# Patient Record
Sex: MALE | Race: WHITE | Employment: UNEMPLOYED | URBAN - METROPOLITAN AREA
[De-identification: names, ages, dates, MRNs, and addresses within clinical notes are randomized per-mention and may not be internally consistent; named-entity substitution may affect disease eponyms.]

---

## 2019-11-14 ENCOUNTER — TELEPHONE (OUTPATIENT)
Dept: OPHTHALMOLOGY | Facility: CLINIC | Age: 74
End: 2019-11-14

## 2019-11-14 NOTE — TELEPHONE ENCOUNTER
Scheduled November 25th at 1215 with Dr. Gil (MD sees new referrals for this diagnosis 1/2 day once a month)    Pt aware of date/time/location    Note to facilitator and financial counselor to [facility]    Shawn Zhang RN RN 3:16 PM 11/14/19        [facility]    Phone Message    May a detailed message be left on voicemail: yes    Reason for Call: Other: New Pt Appt needed ASAP - please call Pt back on his Cell phone to schedule - Pt Referred for Choroidal Lesion for possible Melanoma - GL's say for Choroidal Nevus and Rupture to send high priority encounter to clinic pool to handle so this is similar so sending encounter - Created Pt full chart - Pt Referral & Records being faxed over from the [facility] - Pt Referred by Dr Sil Gonzales at [facility] - Pt would appreciate a call back to get an Appt booked - Pt said Referral mentioned seeing Dr Gil - but Pt said he would see any Dr that specializes in his condition - Thanks        Action Taken: Message routed to:  [facility]

## 2019-11-25 ENCOUNTER — OFFICE VISIT (OUTPATIENT)
Dept: OPHTHALMOLOGY | Facility: CLINIC | Age: 74
End: 2019-11-25
Attending: OPHTHALMOLOGY
Payer: COMMERCIAL

## 2019-11-25 DIAGNOSIS — H31.8 CHOROIDAL LESION: Primary | ICD-10-CM

## 2019-11-25 DIAGNOSIS — H31.8 CHOROIDAL LESION: ICD-10-CM

## 2019-11-25 DIAGNOSIS — C69.32 MALIGNANT MELANOMA OF CHOROID OF LEFT EYE (H): Primary | ICD-10-CM

## 2019-11-25 PROCEDURE — 76513 OPH US DX ANT SGM US UNI/BI: CPT | Mod: ZF | Performed by: OPHTHALMOLOGY

## 2019-11-25 PROCEDURE — 92250 FUNDUS PHOTOGRAPHY W/I&R: CPT | Mod: ZF | Performed by: OPHTHALMOLOGY

## 2019-11-25 PROCEDURE — 76510 OPH US DX B-SCAN&QUAN A-SCAN: CPT | Mod: ZF | Performed by: OPHTHALMOLOGY

## 2019-11-25 PROCEDURE — G0463 HOSPITAL OUTPT CLINIC VISIT: HCPCS | Mod: ZF

## 2019-11-25 PROCEDURE — 92134 CPTRZ OPH DX IMG PST SGM RTA: CPT | Mod: ZF | Performed by: OPHTHALMOLOGY

## 2019-11-25 RX ORDER — FINASTERIDE 5 MG/1
5 TABLET, FILM COATED ORAL
COMMUNITY
Start: 2000-01-01

## 2019-11-25 RX ORDER — AMOXICILLIN 500 MG/1
CAPSULE ORAL
Status: ON HOLD | COMMUNITY
Start: 2017-01-01 | End: 2020-01-01

## 2019-11-25 RX ORDER — ACETAMINOPHEN 500 MG
1000 TABLET ORAL
COMMUNITY
Start: 2017-12-15

## 2019-11-25 RX ORDER — TAMSULOSIN HYDROCHLORIDE 0.4 MG/1
0.4 CAPSULE ORAL
COMMUNITY
Start: 2000-01-01

## 2019-11-25 RX ORDER — METHYLPHENIDATE HYDROCHLORIDE 10 MG/1
TABLET ORAL PRN
COMMUNITY
Start: 2018-01-01

## 2019-11-25 RX ORDER — TESTOSTERONE 20.25 MG/1.25G
GEL TOPICAL
COMMUNITY
Start: 2018-06-30

## 2019-11-25 ASSESSMENT — REFRACTION_WEARINGRX
OD_CYLINDER: +1.00
OD_AXIS: 010
OS_SPHERE: +1.50
SPECS_TYPE: TRIFOCALS
OD_SPHERE: +1.50
OS_CYLINDER: +0.25
OS_AXIS: 010
OS_ADD: +2.50
OD_ADD: +2.50

## 2019-11-25 ASSESSMENT — TONOMETRY
OS_IOP_MMHG: 17
IOP_METHOD: TONOPEN
OD_IOP_MMHG: 17

## 2019-11-25 ASSESSMENT — VISUAL ACUITY
METHOD: SNELLEN - LINEAR
CORRECTION_TYPE: GLASSES
OD_CC+: -1
OS_CC: 20/30
OD_CC: 20/20
OS_CC+: +1

## 2019-11-25 ASSESSMENT — GONIOSCOPY
OS_SUPERIOR: NORMAL
OS_TEMPORAL: NORMAL
OS_NASAL: NORMAL
OS_INFERIOR: NORMAL

## 2019-11-25 ASSESSMENT — CONF VISUAL FIELD
OS_NORMAL: 1
OD_NORMAL: 1

## 2019-11-25 ASSESSMENT — CUP TO DISC RATIO
OS_RATIO: 0.3
OD_RATIO: 0.3

## 2019-11-25 ASSESSMENT — SLIT LAMP EXAM - LIDS
COMMENTS: NORMAL
COMMENTS: NORMAL

## 2019-11-25 ASSESSMENT — EXTERNAL EXAM - RIGHT EYE: OD_EXAM: NORMAL

## 2019-11-25 ASSESSMENT — EXTERNAL EXAM - LEFT EYE: OS_EXAM: NORMAL

## 2019-11-25 NOTE — NURSING NOTE
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  Associated symptoms include Negative for flashes and shade.              Comments     Pt is here for a lesion evaluation of the LE [facility]. Pt does note some vision changes x the last 2 months. Most of the vision changes are in the LE, pt notes \"white balloons\" (intermittent) that float in the vision of the LE. Pt c/o seeing a significant amount of \"targets\" around pt's field of vision x 2 months constantly. Pt also c/o his vision seeming dimmer/darker x the last 2 weeks in the LE. Pt c/o some eye pain (dull ache, 2-3/10 on pain scale ) in LE only x the last 2 weeks. Denies any dryness or redness.    Ocular meds: none    CHRIS Winter 12:30 PM November 25, 2019                   "

## 2019-11-25 NOTE — LETTER
"11/25/2019      RE: Galileo Mayberry  [address]  [facility] [address]       CC -   choroidlal lesion OS    INTERVAL HISTORY - Initial visit.  New \"targets\" seen in OS since ~10/2019, increasing    HPI - Galileo Mayberry is a  74 year old year-old patient referred by the [facility] for evaluation and treat of a choroidal lesion left eye   [facility] , found choroidal elevation OS inferior not noted previously.  SJ prior 1 year before.  No personal cancer history prior.  Has FMH of CA in multiple relatives    PAST OCULAR SURGERY  None    RETINAL IMAGING:  OCT  11-25-19  OD -  Macula - retina normal, PVD  OS -  Macula - retina normal, PVD, vitreous opacities   Inferior - SRF    U/S OS 11-25-19  A-scan - low reflectivity  B-scan - elevated bilobed lesion, size 6.28mm x 18.31(T) x 17.04(L)    UBM 11-25-19  OS - lesion involves CB about 2 clock hours inferior    ASSESSMENT & PLAN    1. CMM OS   - very likely based on U/S, exam, and history   - plan 22 mm plaque given size on U/S and exam   - d/w patient r/b/a, prognosis of CMM   - d/w patient biopsy and GEP, may be interested in pursuing   - d/w patient enucleation vs plaque, wishes to proceed with plaque     - r/b/a d/w patient: failure to stop tumor growth, metastasis   - diplopia, vision loss, blindness, radiation retinopathy   - risk of tumor spread with biopsy    2. PVD OU    3. NS OU    return to clinic:  For plaque Tx    ATTESTATION     Attending Attestation: Complete documentation of historical and exam elements from today's encounter can be found in the full encounter summary report (not reduplicated in this progress note).  I personally obtained the chief complaint(s) and history of present illness.  I confirmed and edited as necessary the review of systems, past medical/surgical history, family history, social history, and examination findings as documented by others; and I examined the patient myself.  I personally reviewed the relevant " tests, images, and reports as documented above.  I formulated and edited as necessary the assessment and plan and discussed the findings and management plan with the patient and family- Mayra Gil MD, PhD      Mayra Gil MD, PhD  , Vitreoretinal Surgery  Department of Ophthalmology  [facility]

## 2019-11-25 NOTE — PROGRESS NOTES
"CC -   choroidlal lesion OS    INTERVAL HISTORY - Initial visit.  New \"targets\" seen in OS since ~10/2019, increasing    HPI -   Galileo Mayberry is a  74 year old year-old patient referred by the [facility] for evaluation and treat of a choroidal lesion left eye   [facility] , found choroidal elevation OS inferior not noted previously.  SJ prior 1 year before.  No personal cancer history prior.  Has FMH of CA in multiple relatives      PAST OCULAR SURGERY  None    RETINAL IMAGING:  OCT  11-25-19  OD -  Macula - retina normal, PVD  OS -  Macula - retina normal, PVD, vitreous opacities   Inferior - SRF        U/S OS 11-25-19  A-scan - low reflectivity  B-scan - elevated bilobed lesion, size 6.28mm x 18.31(T) x 17.04(L)      UBM 11-25-19  OS - lesion involves CB about 2 clock hours inferior      ASSESSMENT & PLAN    1. CMM OS   - very likely based on U/S, exam, and history   - plan 22 mm plaque given size on U/S and exam   - d/w patient r/b/a, prognosis of CMM   - d/w patient biopsy and GEP, may be interested in pursuing   - d/w patient enucleation vs plaque, wishes to proceed with plaque     - r/b/a d/w patient: failure to stop tumor growth, metastasis   - diplopia, vision loss, blindness, radiation retinopathy   - risk of tumor spread with biopsy    2. PVD OU      3. NS OU          return to clinic:  For plaque Tx    ATTESTATION     Attending Attestation:     Complete documentation of historical and exam elements from today's encounter can be found in the full encounter summary report (not reduplicated in this progress note).  I personally obtained the chief complaint(s) and history of present illness.  I confirmed and edited as necessary the review of systems, past medical/surgical history, family history, social history, and examination findings as documented by others; and I examined the patient myself.  I personally reviewed the relevant tests, images, and reports as documented above.  I formulated " and edited as necessary the assessment and plan and discussed the findings and management plan with the patient and family    Mayra Gil MD, PhD  , Vitreoretinal Surgery  Department of Ophthalmology  [facility]

## 2019-11-27 NOTE — TELEPHONE ENCOUNTER
ONCOLOGY INTAKE: Records Information      APPT INFORMATION:  Referring provider: Dr. Mayra Gil   Referring provider s clinic:  [facility]  Reason for visit/diagnosis: Choroidal Carcinoma  Has patient been notified of appointment date and time?: (Referring provider was contacting Pt)    RECORDS INFORMATION:  Were the records received with the referral (via [employer])? No    Has patient been seen for any external appt for this diagnosis? No    If yes, where? N/a    Has patient had any imaging or procedures outside of [place] for this condition? No      If Yes, where? N/a    ADDITIONAL INFORMATION:  None

## 2019-12-10 ENCOUNTER — PREP FOR PROCEDURE (OUTPATIENT)
Dept: OPHTHALMOLOGY | Facility: CLINIC | Age: 74
End: 2019-12-10

## 2019-12-10 ENCOUNTER — HOSPITAL ENCOUNTER (OUTPATIENT)
Facility: CLINIC | Age: 74
End: 2019-12-10
Attending: OPHTHALMOLOGY | Admitting: OPHTHALMOLOGY

## 2019-12-10 DIAGNOSIS — H31.8 CHOROIDAL LESION: ICD-10-CM

## 2019-12-10 DIAGNOSIS — C69.32 MALIGNANT MELANOMA OF CHOROID OF LEFT EYE (H): ICD-10-CM

## 2019-12-11 DIAGNOSIS — C69.32 MALIGNANT MELANOMA OF CHOROID OF LEFT EYE (H): Primary | ICD-10-CM

## 2019-12-12 ENCOUNTER — OFFICE VISIT (OUTPATIENT)
Dept: RADIATION ONCOLOGY | Facility: CLINIC | Age: 74
End: 2019-12-12
Attending: RADIOLOGY
Payer: COMMERCIAL

## 2019-12-12 VITALS — HEART RATE: 58 BPM | DIASTOLIC BLOOD PRESSURE: 78 MMHG | WEIGHT: 226.6 LBS | SYSTOLIC BLOOD PRESSURE: 144 MMHG

## 2019-12-12 DIAGNOSIS — C69.32 MALIGNANT MELANOMA OF CHOROID OF LEFT EYE (H): Primary | ICD-10-CM

## 2019-12-12 PROCEDURE — G0463 HOSPITAL OUTPT CLINIC VISIT: HCPCS | Performed by: RADIOLOGY

## 2019-12-12 SDOH — HEALTH STABILITY: MENTAL HEALTH: HOW MANY STANDARD DRINKS CONTAINING ALCOHOL DO YOU HAVE ON A TYPICAL DAY?: NOT ASKED

## 2019-12-12 SDOH — HEALTH STABILITY: MENTAL HEALTH: HOW OFTEN DO YOU HAVE 6 OR MORE DRINKS ON ONE OCCASION?: NEVER

## 2019-12-12 ASSESSMENT — ENCOUNTER SYMPTOMS
HEARTBURN: 0
FEVER: 0
DIARRHEA: 0
SORE THROAT: 1
COUGH: 1
BLURRED VISION: 1
CONSTIPATION: 0
WEIGHT LOSS: 0
HEADACHES: 0
NECK PAIN: 1
DEPRESSION: 0
NAUSEA: 0
CHILLS: 0
SPUTUM PRODUCTION: 1
SHORTNESS OF BREATH: 0
VOMITING: 0
ABDOMINAL PAIN: 0
DIZZINESS: 0
DOUBLE VISION: 0
FREQUENCY: 0
FALLS: 0
WHEEZING: 0
NERVOUS/ANXIOUS: 0

## 2019-12-12 NOTE — PROGRESS NOTES
HPI  INITIAL PATIENT ASSESSMENT    Diagnosis: met melanoma    Prior radiation therapy: None    Prior chemotherapy: None    Prior hormonal therapy:No    Pain Eval:  Denies    Psychosocial  Living arrangements: wife  Fall Risk: independent   referral needs: Not needed    Advanced Directive: Yes - Location: chart  Implantable Cardiac Device? No    Onset of menarche:   LMP: No LMP for male patient.  Onset of menopause:   Abnormal vaginal bleeding/discharge:   Are you pregnant?   Reproductive note: 2 grown children    Nurse face-to-face time: Level 3:  10 min face to face time    Review of Systems   Constitutional: Negative for chills, fever, malaise/fatigue and weight loss.   HENT: Positive for congestion, hearing loss, sore throat and tinnitus.    Eyes: Positive for blurred vision. Negative for double vision.        L eye fuzzy vision since Nov   Respiratory: Positive for cough and sputum production. Negative for shortness of breath and wheezing.    Cardiovascular: Negative for chest pain and leg swelling.   Gastrointestinal: Negative for abdominal pain, constipation, diarrhea, heartburn, nausea and vomiting.   Genitourinary: Negative for frequency and urgency.   Musculoskeletal: Positive for neck pain. Negative for falls.   Skin: Negative for itching and rash.   Neurological: Negative for dizziness and headaches.   Endo/Heme/Allergies: Negative for environmental allergies.   Psychiatric/Behavioral: Negative for depression. The patient is not nervous/anxious.

## 2019-12-12 NOTE — PROGRESS NOTES
Radiation Oncology Consultation:  Date on this visit: 12/12/2019    Galileo Mayberry  is referred by Melba  for a radiation oncology consultation. He requires evaluation for diagnosis of ocular melanoma    History Of Present Illness:  Mr. Mayberry is a 74 year old male referred by the [facility] for evaluation and treat of a choroidal lesion LEFT  eye.   [facility] , found choroidal elevation in the left eye  inferiorly  not noted previously. At that time he complained that the Left eye had floaters, dozens of different sized dots like a constellation, 3 weeks ago onset. He was also having  having flashes left eye for  weeks, 3-4 x/d, ongoing without improvement. Peripheral vision may have a shadow   Right eye with stable floaters,No eye pain   [facility] revealed   OD: 20/25-2 Pinhole: 20/No Improvement  0S: 20/25-2 Pinhole: 20/No Improvement    [facility] revealed   OS with 5:30-7:30 anterior irregular choroidal elevation     He was referred to DR Jeffery Gli agrees that this is likely an ocular melaonom on the left.  The measurements by Dr Gil :      U/S OS 11-25-19  A-scan - low reflectivity  B-scan - elevated bilobed lesion, size 6.28mm x 18.31(T) x 17.04(L)     UBM 11-25-19   OS - lesion involves CB about 2 clock hours inferior     No personal cancer history prior.  Has FMH of CA in multiple relatives        PAST OCULAR SURGERY  None               Past Medical/Surgical History:  Past Medical History:   Diagnosis Date     Degenerative joint disease      Metastatic melanoma (H)     L eye     Nonsenile cataract      Past Surgical History:   Procedure Laterality Date     ------------OTHER-------------  2014    back surgery L4/L5      ------------OTHER-------------      knee surgery both 1961 Right, 1971 Left knee     AS REMOVAL OF KIDNEY STONE  2015     JOINT REPLACEMENT  2017    Both kneses replaced (2017 and 2018)      ROTATOR CUFF REPAIR RT/LT Right 2016     TURP  2000     Past Ocular History:   Family hx: no family history of melanoma. Mother with glaucoma  Denies hx ocular trauma, surgery, laser. Denies amblyopia.  Past Radiation History:none  Past Chemotherapy History:none  Implanted Cardiac device:   none  Advanced directive  :      Review of Systems:  Reviewed.  See details in nursing note    Allergies:  Allergies as of 12/12/2019 - Reviewed 11/25/2019   Allergen Reaction Noted     Vardenafil Other (See Comments) and Nausea 06/23/2009     Contrast dye Rash 12/04/2008       Current Medications:   current medication list reviewed    Family History:  Family History   Problem Relation Age of Onset     Glaucoma Mother      Macular Degeneration No family hx of        Social History:  Social History     Socioeconomic History     Marital status:      Spouse name: Not on file     Number of children: Not on file     Years of education: Not on file     Highest education level: Not on file   Occupational History     Not on file   Social Needs     Financial resource strain: Not on file     Food insecurity:     Worry: Not on file     Inability: Not on file     Transportation needs:     Medical: Not on file     Non-medical: Not on file   Tobacco Use     Smoking status: Never Smoker     Smokeless tobacco: Never Used   Substance and Sexual Activity     Alcohol use: Not on file     Drug use: Not on file     Sexual activity: Not on file   Lifestyle     Physical activity:     Days per week: Not on file     Minutes per session: Not on file     Stress: Not on file   Relationships     Social connections:     Talks on phone: Not on file     Gets together: Not on file     Attends Scientology service: Not on file     Active member of club or organization: Not on file     Attends meetings of clubs or organizations: Not on file     Relationship status: Not on file     Intimate partner violence:     Fear of current or ex partner: Not on file      Emotionally abused: Not on file     Physically abused: Not on file     Forced sexual activity: Not on file   Other Topics Concern     Not on file   Social History Narrative     Not on file       Physical Exam:  Blood pressure (!) 144/78, pulse 58, weight 102.8 kg (226 lb 9.6 oz).      Pathology: na    Laboratory/Imaging Studies  No results found for any visits on 12/12/19.    ASSESSMENT:     1. CMM OS              - very likely based on U/S, exam, and history         RECOMMENDATION:      He is a good  candidate for eye plaque therapy, although this is a relatively large tumor.   We will likely use a 22 mm plaque.    The alternative which would be enucleation or proton therapy were discussed.     He will be seeing Dr Trent next week.  I anticipate a CT of the CAP as staging.     He has previously discussed this with Dr Gil and he has recommended a  plan 22 mm plaque given size on U/S and exam    The risks and benefits of radiotherapy were discussed with the patient.  Potential acute and long term side effects were explained especially that he may have loss of vision, a cataract and that if the tumor is not controlled, he would likely require an enucleation.     The patient agrees to proceed with radiation therapy, but he has a few questions for Dr Gil.  A written consent was obtained.  I gave him the NCCN guidelines for evaluation and treatment of ocular melanoma.     Thank you for allowing me to participate in Galileo Mayberry 's care .  Please do not hesitate to call me with questions.    Naomi Conteh MD  [phone]   Pager   [phone]  [facility]   [phone] [place]  [phone] [facility]  Primary Physician: Vanvranken, Benjamin E   Patient Care Team:  Vanvranken, Benjamin E, MD as PCP - Sil Hernández MD as Referring Physician  Mayra Gil MD as Referring Physician (Ophthalmology)  wJ Burden MD as MD (Hematology &  Oncology)

## 2019-12-12 NOTE — LETTER
12/12/2019       RE: Galileo Mayberry  [address]  [facility] [address]     Dear Colleague,    Thank you for referring your patient, Galileo Mayberry, to the RADIATION ONCOLOGY CLINIC. Please see a copy of my visit note below.    Radiation Oncology Consultation:  Date on this visit: 12/12/2019    Galileo Mayberry  is referred by Melba  for a radiation oncology consultation. He requires evaluation for diagnosis of ocular melanoma    History Of Present Illness:  Mr. Mayberry is a 74 year old male referred by the [facility] for evaluation and treat of a choroidal lesion LEFT  eye.   [facility] , found choroidal elevation in the left eye  inferiorly  not noted previously. At that time he complained that the Left eye had floaters, dozens of different sized dots like a constellation, 3 weeks ago onset. He was also having  having flashes left eye for  weeks, 3-4 x/d, ongoing without improvement. Peripheral vision may have a shadow   Right eye with stable floaters,No eye pain   [facility] revealed   OD: 20/25-2 Pinhole: 20/No Improvement  0S: 20/25-2 Pinhole: 20/No Improvement    [facility] revealed   OS with 5:30-7:30 anterior irregular choroidal elevation     He was referred to DR Jeffery Gil agrees that this is likely an ocular melaonom on the left.  The measurements by Dr Gil :      U/S OS 11-25-19  A-scan - low reflectivity  B-scan - elevated bilobed lesion, size 6.28mm x 18.31(T) x 17.04(L)     UBM 11-25-19   OS - lesion involves CB about 2 clock hours inferior     No personal cancer history prior.  Has FMH of CA in multiple relatives        PAST OCULAR SURGERY  None               Past Medical/Surgical History:  Past Medical History:   Diagnosis Date     Degenerative joint disease      Metastatic melanoma (H)     L eye     Nonsenile cataract      Past Surgical History:   Procedure Laterality Date      ------------OTHER-------------  2014    back surgery L4/L5      ------------OTHER-------------      knee surgery both 1961 Right, 1971 Left knee     AS REMOVAL OF KIDNEY STONE  2015     JOINT REPLACEMENT  2017    Both kneses replaced (2017 and 2018)     ROTATOR CUFF REPAIR RT/LT Right 2016     TURP  2000     Past Ocular History:   Family hx: no family history of melanoma. Mother with glaucoma  Denies hx ocular trauma, surgery, laser. Denies amblyopia.  Past Radiation History:none  Past Chemotherapy History:none  Implanted Cardiac device:   none  Advanced directive  :      Review of Systems:  Reviewed.  See details in nursing note    Allergies:  Allergies as of 12/12/2019 - Reviewed 11/25/2019   Allergen Reaction Noted     Vardenafil Other (See Comments) and Nausea 06/23/2009     Contrast dye Rash 12/04/2008       Current Medications:   current medication list reviewed    Family History:  Family History   Problem Relation Age of Onset     Glaucoma Mother      Macular Degeneration No family hx of        Social History:  Social History     Socioeconomic History     Marital status:      Spouse name: Not on file     Number of children: Not on file     Years of education: Not on file     Highest education level: Not on file   Occupational History     Not on file   Social Needs     Financial resource strain: Not on file     Food insecurity:     Worry: Not on file     Inability: Not on file     Transportation needs:     Medical: Not on file     Non-medical: Not on file   Tobacco Use     Smoking status: Never Smoker     Smokeless tobacco: Never Used   Substance and Sexual Activity     Alcohol use: Not on file     Drug use: Not on file     Sexual activity: Not on file   Lifestyle     Physical activity:     Days per week: Not on file     Minutes per session: Not on file     Stress: Not on file   Relationships     Social connections:     Talks on phone: Not on file     Gets together: Not on file     Attends Yazidi  service: Not on file     Active member of club or organization: Not on file     Attends meetings of clubs or organizations: Not on file     Relationship status: Not on file     Intimate partner violence:     Fear of current or ex partner: Not on file     Emotionally abused: Not on file     Physically abused: Not on file     Forced sexual activity: Not on file   Other Topics Concern     Not on file   Social History Narrative     Not on file       Physical Exam:  Blood pressure (!) 144/78, pulse 58, weight 102.8 kg (226 lb 9.6 oz).      Pathology: na    Laboratory/Imaging Studies  No results found for any visits on 12/12/19.    ASSESSMENT:     1. CMM OS              - very likely based on U/S, exam, and history         RECOMMENDATION:      He is a good  candidate for eye plaque therapy, although this is a relatively large tumor.   We will likely use a 22 mm plaque.    The alternative which would be enucleation or proton therapy were discussed.     He will be seeing Dr Trent next week.  I anticipate a CT of the CAP as staging.     He has previously discussed this with Dr Gil and he has recommended a  plan 22 mm plaque given size on U/S and exam    The risks and benefits of radiotherapy were discussed with the patient.  Potential acute and long term side effects were explained especially that he may have loss of vision, a cataract and that if the tumor is not controlled, he would likely require an enucleation.     The patient agrees to proceed with radiation therapy, but he has a few questions for Dr Gil.  A written consent was obtained.  I gave him the NCCN guidelines for evaluation and treatment of ocular melanoma.     Thank you for allowing me to participate in Galileo Mayberry 's care .  Please do not hesitate to call me with questions.    Naomi Conteh MD  [phone]   Pager   [phone]  [facility]   [phone] [place]  [phone] [facility]  Primary Physician: Vanvranken,  Bebeto KAY   Patient Care Team:  Vanvranken, Benjamin E, MD as PCP - General  Sil Gonzales MD as Referring Physician  Mayra Gil MD as Referring Physician (Ophthalmology)  Jw Burden MD as MD (Hematology & Oncology)                    HPI  INITIAL PATIENT ASSESSMENT    Diagnosis: met melanoma    Prior radiation therapy: None    Prior chemotherapy: None    Prior hormonal therapy:No    Pain Eval:  Denies    Psychosocial  Living arrangements: wife  Fall Risk: independent   referral needs: Not needed    Advanced Directive: Yes - Location: chart  Implantable Cardiac Device? No    Onset of menarche:   LMP: No LMP for male patient.  Onset of menopause:   Abnormal vaginal bleeding/discharge:   Are you pregnant?   Reproductive note: 2 grown children    Nurse face-to-face time: Level 3:  10 min face to face time    Review of Systems   Constitutional: Negative for chills, fever, malaise/fatigue and weight loss.   HENT: Positive for congestion, hearing loss, sore throat and tinnitus.    Eyes: Positive for blurred vision. Negative for double vision.        L eye fuzzy vision since Nov   Respiratory: Positive for cough and sputum production. Negative for shortness of breath and wheezing.    Cardiovascular: Negative for chest pain and leg swelling.   Gastrointestinal: Negative for abdominal pain, constipation, diarrhea, heartburn, nausea and vomiting.   Genitourinary: Negative for frequency and urgency.   Musculoskeletal: Positive for neck pain. Negative for falls.   Skin: Negative for itching and rash.   Neurological: Negative for dizziness and headaches.   Endo/Heme/Allergies: Negative for environmental allergies.   Psychiatric/Behavioral: Negative for depression. The patient is not nervous/anxious.          Again, thank you for allowing me to participate in the care of your patient.      Sincerely,    Naomi Conteh MD

## 2019-12-13 ENCOUNTER — PATIENT OUTREACH (OUTPATIENT)
Dept: ONCOLOGY | Facility: CLINIC | Age: 74
End: 2019-12-13

## 2019-12-13 DIAGNOSIS — C69.32 MALIGNANT MELANOMA OF CHOROID OF LEFT EYE (H): ICD-10-CM

## 2019-12-13 DIAGNOSIS — C69.32 MALIGNANT MELANOMA OF CHOROID OF LEFT EYE (H): Primary | ICD-10-CM

## 2019-12-13 RX ORDER — METHYLPREDNISOLONE 32 MG/1
TABLET ORAL
Qty: 2 TABLET | Refills: 0 | Status: SHIPPED | OUTPATIENT
Start: 2019-12-13 | End: 2019-12-13

## 2019-12-13 RX ORDER — METHYLPREDNISOLONE 32 MG/1
TABLET ORAL
Qty: 2 TABLET | Refills: 0 | Status: SHIPPED | OUTPATIENT
Start: 2019-12-13 | End: 2020-01-01

## 2019-12-13 NOTE — PROGRESS NOTES
This RN updated patients with schedule and treatment plan as follows:    1. Labs and ct-cap w/contrast scheduled for 12/14 am.  2. Pre-meds for contrast allergy (methylprednisolone 32 mg. 12 hours and 2 hours before scan) reviewed with patient.  Rx sent to pharmacy.  3. Patient to see Dr. Trent for management of choroidal melanoma on 12/19.    Patient indicated understanding of above.      Sayra Montiel MBA, MSN, RN, ONC  RN Care Coordinator  [facility]

## 2019-12-14 ENCOUNTER — ANCILLARY PROCEDURE (OUTPATIENT)
Dept: CT IMAGING | Facility: CLINIC | Age: 74
End: 2019-12-14
Attending: INTERNAL MEDICINE
Payer: COMMERCIAL

## 2019-12-14 DIAGNOSIS — C69.32 MALIGNANT MELANOMA OF CHOROID OF LEFT EYE (H): ICD-10-CM

## 2019-12-14 LAB
ALBUMIN SERPL-MCNC: 3.8 G/DL (ref 3.4–5)
ALP SERPL-CCNC: 57 U/L (ref 40–150)
ALT SERPL W P-5'-P-CCNC: 21 U/L (ref 0–70)
ANION GAP SERPL CALCULATED.3IONS-SCNC: 4 MMOL/L (ref 3–14)
AST SERPL W P-5'-P-CCNC: 10 U/L (ref 0–45)
BASOPHILS # BLD AUTO: 0 10E9/L (ref 0–0.2)
BASOPHILS NFR BLD AUTO: 0.1 %
BILIRUB SERPL-MCNC: 0.4 MG/DL (ref 0.2–1.3)
BUN SERPL-MCNC: 24 MG/DL (ref 7–30)
CALCIUM SERPL-MCNC: 8.8 MG/DL (ref 8.5–10.1)
CHLORIDE SERPL-SCNC: 108 MMOL/L (ref 94–109)
CO2 SERPL-SCNC: 27 MMOL/L (ref 20–32)
CREAT BLD-MCNC: 1.2 MG/DL (ref 0.66–1.25)
CREAT SERPL-MCNC: 1.04 MG/DL (ref 0.66–1.25)
DIFFERENTIAL METHOD BLD: NORMAL
EOSINOPHIL # BLD AUTO: 0 10E9/L (ref 0–0.7)
EOSINOPHIL NFR BLD AUTO: 0.1 %
ERYTHROCYTE [DISTWIDTH] IN BLOOD BY AUTOMATED COUNT: 13.1 % (ref 10–15)
GFR SERPL CREATININE-BSD FRML MDRD: 59 ML/MIN/{1.73_M2}
GFR SERPL CREATININE-BSD FRML MDRD: 69 ML/MIN/{1.73_M2}
GLUCOSE SERPL-MCNC: 122 MG/DL (ref 70–99)
HCT VFR BLD AUTO: 43.2 % (ref 40–53)
HGB BLD-MCNC: 14.8 G/DL (ref 13.3–17.7)
IMM GRANULOCYTES # BLD: 0 10E9/L (ref 0–0.4)
IMM GRANULOCYTES NFR BLD: 0.3 %
LYMPHOCYTES # BLD AUTO: 1.2 10E9/L (ref 0.8–5.3)
LYMPHOCYTES NFR BLD AUTO: 12.2 %
MCH RBC QN AUTO: 30 PG (ref 26.5–33)
MCHC RBC AUTO-ENTMCNC: 34.3 G/DL (ref 31.5–36.5)
MCV RBC AUTO: 87 FL (ref 78–100)
MONOCYTES # BLD AUTO: 0.2 10E9/L (ref 0–1.3)
MONOCYTES NFR BLD AUTO: 1.7 %
NEUTROPHILS # BLD AUTO: 8.2 10E9/L (ref 1.6–8.3)
NEUTROPHILS NFR BLD AUTO: 85.6 %
NRBC # BLD AUTO: 0 10*3/UL
NRBC BLD AUTO-RTO: 0 /100
PLATELET # BLD AUTO: 324 10E9/L (ref 150–450)
POTASSIUM SERPL-SCNC: 4.2 MMOL/L (ref 3.4–5.3)
PROT SERPL-MCNC: 7.3 G/DL (ref 6.8–8.8)
RBC # BLD AUTO: 4.94 10E12/L (ref 4.4–5.9)
SODIUM SERPL-SCNC: 138 MMOL/L (ref 133–144)
WBC # BLD AUTO: 9.6 10E9/L (ref 4–11)

## 2019-12-14 PROCEDURE — 80053 COMPREHEN METABOLIC PANEL: CPT | Performed by: INTERNAL MEDICINE

## 2019-12-14 PROCEDURE — 85025 COMPLETE CBC W/AUTO DIFF WBC: CPT | Performed by: INTERNAL MEDICINE

## 2019-12-14 RX ORDER — IOPAMIDOL 755 MG/ML
135 INJECTION, SOLUTION INTRAVASCULAR ONCE
Status: COMPLETED | OUTPATIENT
Start: 2019-12-14 | End: 2019-12-14

## 2019-12-14 RX ADMIN — IOPAMIDOL 135 ML: 755 INJECTION, SOLUTION INTRAVASCULAR at 09:30

## 2019-12-14 NOTE — NURSING NOTE
Chief Complaint   Patient presents with     Lab Only     labs drawn by rn. from previously-placed piv.     Good blood return noted from previously-placed piv.  Labs drawn from piv by rn the piv dc'd.    Katie Bowens RN

## 2019-12-19 ENCOUNTER — ONCOLOGY VISIT (OUTPATIENT)
Dept: ONCOLOGY | Facility: CLINIC | Age: 74
End: 2019-12-19
Attending: INTERNAL MEDICINE
Payer: COMMERCIAL

## 2019-12-19 ENCOUNTER — PRE VISIT (OUTPATIENT)
Dept: ONCOLOGY | Facility: CLINIC | Age: 74
End: 2019-12-19

## 2019-12-19 VITALS
HEIGHT: 71 IN | BODY MASS INDEX: 31.48 KG/M2 | SYSTOLIC BLOOD PRESSURE: 144 MMHG | TEMPERATURE: 97.3 F | RESPIRATION RATE: 16 BRPM | OXYGEN SATURATION: 98 % | DIASTOLIC BLOOD PRESSURE: 82 MMHG | HEART RATE: 63 BPM | WEIGHT: 224.9 LBS

## 2019-12-19 DIAGNOSIS — C69.32 MALIGNANT MELANOMA OF CHOROID OF LEFT EYE (H): Primary | ICD-10-CM

## 2019-12-19 PROCEDURE — 99205 OFFICE O/P NEW HI 60 MIN: CPT | Mod: GC | Performed by: INTERNAL MEDICINE

## 2019-12-19 PROCEDURE — G0463 HOSPITAL OUTPT CLINIC VISIT: HCPCS | Mod: ZF

## 2019-12-19 ASSESSMENT — PAIN SCALES - GENERAL: PAINLEVEL: NO PAIN (0)

## 2019-12-19 ASSESSMENT — MIFFLIN-ST. JEOR: SCORE: 1769.57

## 2019-12-19 NOTE — PROGRESS NOTES
[facility] Oncology Consultation    Outpatient Visit Note:    Patient: Galileo Mayberry  MRN: [id number]  : 1945  ALISSA: 2019    Reason for Consultation:  Galileo Maybrery is a referred from the [facility] for evaluation and treatment of new diagnosis of ocular melanoma.    History of Present Illness:  Galileo Mayberry is a 74 year old male with history of DJD who presents with new diagnosis of ocular melanoma, diagnosed 2019 after a choroidal lesion was found in his Left eye.  Patient reports over the last several months he developed new floaters/spots in his L eye.  [facility] when he was found to have a probable ocular melanoma.  He was then referred to the [facility] for further management.  On  he was seen by ophthalmology who noted a left eye, elevated bilobed lesion, size 6.28mm x 18.31(T) x 17.04(L).  He was referred to radiation oncology for eye plaque therapy which is planned for next month.  He additionally underwent CT-CAP on 19 which showed no evidence of metastatic disease.    Today Mr. Mayberry reports he is doing well.  He notes after he was given his diagnosis he did extensive reading regarding the condition.  He feels he has a good understanding of immediate next steps via surgery/radiation but wonders after what he will need to do afterwards.  He notes that quality of life is important to him and he would prefer that providers be open with him if his prognosis becomes poor in the future so he can take care of certain things in his life before he passes.  He understands that right now he does not have evidence of metastatic disease.    He denies any acute concerns today including eye pain, changes in vision, fevers/chills, chest pain, shortness of breath or any other issues.    Past Medical History:  Past Medical History:   Diagnosis Date     Degenerative joint disease       Metastatic melanoma (H)     L eye     Nonsenile cataract        Past Surgical History:  Past Surgical History:   Procedure Laterality Date     ------------OTHER-------------  2014    back surgery L4/L5      ------------OTHER-------------      knee surgery both 1961 Right, 1971 Left knee     AS REMOVAL OF KIDNEY STONE  2015     JOINT REPLACEMENT  2017    Both kneses replaced (2017 and 2018)     ROTATOR CUFF REPAIR RT/LT Right 2016     TURP  2000       Medications:  Current Outpatient Medications   Medication Sig     acetaminophen (TYLENOL) 500 MG tablet Take 1,000 mg by mouth     amoxicillin (AMOXIL) 500 MG capsule      Cholecalciferol (VITAMIN D3) 25 MCG (1000 UT) CAPS Takes 3 daily     diclofenac (VOLTAREN) 1 % topical gel      finasteride (PROSCAR) 5 MG tablet Take 5 mg by mouth     methylphenidate (RITALIN) 10 MG tablet as needed     methylPREDNISolone (MEDROL) 32 MG tablet Take 1 tab 12 hours before scan.  Take the second tab 2 hours before scan.     tamsulosin (FLOMAX) 0.4 MG capsule Take 0.4 mg by mouth     Testosterone 1.62 % GEL      No current facility-administered medications for this visit.        Allergies:  Allergies   Allergen Reactions     Vardenafil Other (See Comments) and Nausea     Contrast Dye Rash       ROS:  A 14 point ROS is negative except as stated in the HPI    Social History:  Never smoker, drinks ~ 1 beer per week, denies illicit drug use.  Was a  in [employer].  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    Family History:  Father passed away from prostate cancer at age 40, grandfather had kidney cancer  Patient has 2 daughters, both healthy    Objective:  Vitals: B/P: 144/82, T: 97.3, P: 63, R: 16, Wt: 224 lbs 14.4 oz Body mass index is 31.52 kg/m .   Exam:   Gen: Appears well, no distress  HEENT: no scleral icterus or hemorrhage, no wet purpura, no lymphadenopathy  CV: regular, no murmurs  Pulm: clear  Abd: soft, nontender, no splenomegaly  Ext: no  edema  Skin: no ecchymoses or hematomas  Neuro: no focal deficits, affect and cognition are normal    Labs:  Results for JUWAN LYNCH (MRN [id number]) as of 12/19/2019 13:31   Ref. Range 12/14/2019 11:05   Sodium Latest Ref Range: 133 - 144 mmol/L 138   Potassium Latest Ref Range: 3.4 - 5.3 mmol/L 4.2   Chloride Latest Ref Range: 94 - 109 mmol/L 108   Carbon Dioxide Latest Ref Range: 20 - 32 mmol/L 27   Urea Nitrogen Latest Ref Range: 7 - 30 mg/dL 24   Creatinine Latest Ref Range: 0.66 - 1.25 mg/dL 1.04   GFR Estimate Latest Ref Range: >60 mL/min/1.73_m2 69   GFR Estimate If Black Latest Ref Range: >60 mL/min/1.73_m2 80   Calcium Latest Ref Range: 8.5 - 10.1 mg/dL 8.8   Anion Gap Latest Ref Range: 3 - 14 mmol/L 4   Albumin Latest Ref Range: 3.4 - 5.0 g/dL 3.8   Protein Total Latest Ref Range: 6.8 - 8.8 g/dL 7.3   Bilirubin Total Latest Ref Range: 0.2 - 1.3 mg/dL 0.4   Alkaline Phosphatase Latest Ref Range: 40 - 150 U/L 57   ALT Latest Ref Range: 0 - 70 U/L 21   AST Latest Ref Range: 0 - 45 U/L 10   Glucose Latest Ref Range: 70 - 99 mg/dL 122 (H)   WBC Latest Ref Range: 4.0 - 11.0 10e9/L 9.6   Hemoglobin Latest Ref Range: 13.3 - 17.7 g/dL 14.8   Hematocrit Latest Ref Range: 40.0 - 53.0 % 43.2   Platelet Count Latest Ref Range: 150 - 450 10e9/L 324   RBC Count Latest Ref Range: 4.4 - 5.9 10e12/L 4.94   MCV Latest Ref Range: 78 - 100 fl 87   MCH Latest Ref Range: 26.5 - 33.0 pg 30.0   MCHC Latest Ref Range: 31.5 - 36.5 g/dL 34.3   RDW Latest Ref Range: 10.0 - 15.0 % 13.1   Diff Method Unknown Automated Method   % Neutrophils Latest Units: % 85.6   % Lymphocytes Latest Units: % 12.2   % Monocytes Latest Units: % 1.7   % Eosinophils Latest Units: % 0.1   % Basophils Latest Units: % 0.1   % Immature Granulocytes Latest Units: % 0.3   Nucleated RBCs Latest Ref Range: 0 /100 0   Absolute Neutrophil Latest Ref Range: 1.6 - 8.3 10e9/L 8.2   Absolute Lymphocytes Latest Ref Range: 0.8 - 5.3 10e9/L 1.2   Absolute Monocytes  Latest Ref Range: 0.0 - 1.3 10e9/L 0.2   Absolute Eosinophils Latest Ref Range: 0.0 - 0.7 10e9/L 0.0   Absolute Basophils Latest Ref Range: 0.0 - 0.2 10e9/L 0.0   Abs Immature Granulocytes Latest Ref Range: 0 - 0.4 10e9/L 0.0   Absolute Nucleated RBC Unknown 0.0       Imaging:  CT CHEST/ABDOMEN/PELVIS W CONTRAST, 12/14/2019 9:43 AM  IMPRESSION: In this patient with history of left eye melanoma:  1. No convincing evidence for metastatic disease in the abdomen,  pelvis and bones.  2. Indeterminate pulmonary nodules measuring up to 4 mm. Attention on  follow-up studies.  3. Additional incidental findings as described above.    Assessment:  In summary, Galileo Mayberry is a 74 year old with T3 ocular melanoma who is planned for upcoming plaque therapy.    Plan:  We spent significant time today reviewing ocular melanoma including both staging workup and treatments.  Mr. Mayberry is planned for his procedure next month and he notes he discussed having a biopsy with ophthalmology (noted in their most recent note).  We will plan  toorder genetic marker testing on this biopsy in order to better prognosticate and determine whether adjuvant chemotherapy is indicated.  We will plan to have the patient return to clinic approximately one month after his procedure to review this testing and make sure these tests are added, as well as review potential next steps.    Regardless of genetic testing results the patient will require follow up approximately 3 months after his treatment with repeat CT CAP at that time.    The patient has a multiple family members with malignancy and reports other family members have been informed of a genetic component to their disease (he is unsure what this diagnosis was).  He is interested in meeting with genetics; referral placed today.    Manny Mckeon MD  Hematology/Oncology Fellow  [phone]    ---  I evaluated the patient and reviewed the plan of care with the patient and the Oncology Fellow.  I agree with the assessment and plan documented in the clinical note.    In brief, Mr. Mayberry is a delightful 74 year old man with a T3 choroidal melanoma lesion with symptoms. CT-CAP was obtained and was negative for metastasis, and plan is to proceed with plaque brachytherapy. Biopsy is planned, which we will send for [place] testing. I will plan to see back in about a month to review next steps in further detail. We also discussed genetics referral, and observation regime of scans every 3-4 months for the first 2 years.    Jw Reyes M.D.   of Medicine  Hematology, Oncology and Transplantation  [facility]

## 2019-12-19 NOTE — TELEPHONE ENCOUNTER
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 No, internal referral    Has patient been seen for any external appt for this diagnosis? No    If yes, where? NA    Has patient had any imaging or procedures outside of [place] for this condition? No      If Yes, where? NA    ADDITIONAL INFORMATION:

## 2019-12-19 NOTE — LETTER
2019       RE: Galileo Mayberry  [address] [place] [address]     Dear Colleague,    Thank you for referring your patient, Galileo Mayberry, to the [facility]. Please see a copy of my visit note below.        [facility] Oncology Consultation    Outpatient Visit Note:    Patient: Galileo Mayberry  MRN: [id number]  : 1945  ALISSA: 2019    Reason for Consultation:  Galileo Mayberry is a referred from the [facility] for evaluation and treatment of new diagnosis of ocular melanoma.    History of Present Illness:  Galileo Mayberry is a 74 year old male with history of DJD who presents with new diagnosis of ocular melanoma, diagnosed 2019 after a choroidal lesion was found in his Left eye.  Patient reports over the last several months he developed new floaters/spots in his L eye.  [facility] when he was found to have a probable ocular melanoma.  He was then referred to the [facility] for further management.  On  he was seen by ophthalmology who noted a left eye, elevated bilobed lesion, size 6.28mm x 18.31(T) x 17.04(L).  He was referred to radiation oncology for eye plaque therapy which is planned for next month.  He additionally underwent CT-CAP on 19 which showed no evidence of metastatic disease.    Today Mr. Mayberry reports he is doing well.  He notes after he was given his diagnosis he did extensive reading regarding the condition.  He feels he has a good understanding of immediate next steps via surgery/radiation but wonders after what he will need to do afterwards.  He notes that quality of life is important to him and he would prefer that providers be open with him if his prognosis becomes poor in the future so he can take care of certain things in his life before he passes.  He understands that right now he does not have evidence of metastatic disease.    He  denies any acute concerns today including eye pain, changes in vision, fevers/chills, chest pain, shortness of breath or any other issues.    Past Medical History:  Past Medical History:   Diagnosis Date     Degenerative joint disease      Metastatic melanoma (H)     L eye     Nonsenile cataract        Past Surgical History:  Past Surgical History:   Procedure Laterality Date     ------------OTHER-------------  2014    back surgery L4/L5      ------------OTHER-------------      knee surgery both 1961 Right, 1971 Left knee     AS REMOVAL OF KIDNEY STONE  2015     JOINT REPLACEMENT  2017    Both kneses replaced (2017 and 2018)     ROTATOR CUFF REPAIR RT/LT Right 2016     TURP  2000       Medications:  Current Outpatient Medications   Medication Sig     acetaminophen (TYLENOL) 500 MG tablet Take 1,000 mg by mouth     amoxicillin (AMOXIL) 500 MG capsule      Cholecalciferol (VITAMIN D3) 25 MCG (1000 UT) CAPS Takes 3 daily     diclofenac (VOLTAREN) 1 % topical gel      finasteride (PROSCAR) 5 MG tablet Take 5 mg by mouth     methylphenidate (RITALIN) 10 MG tablet as needed     methylPREDNISolone (MEDROL) 32 MG tablet Take 1 tab 12 hours before scan.  Take the second tab 2 hours before scan.     tamsulosin (FLOMAX) 0.4 MG capsule Take 0.4 mg by mouth     Testosterone 1.62 % GEL      No current facility-administered medications for this visit.        Allergies:  Allergies   Allergen Reactions     Vardenafil Other (See Comments) and Nausea     Contrast Dye Rash       ROS:  A 14 point ROS is negative except as stated in the HPI    Social History:  Never smoker, drinks ~ 1 beer per week, denies illicit drug use.  Was a  in [employer].  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    Family History:  Father passed away from prostate cancer at age 40, grandfather had kidney cancer  Patient has 2 daughters, both healthy    Objective:  Vitals: B/P: 144/82, T: 97.3, P: 63, R: 16, Wt: 224 lbs 14.4  oz Body mass index is 31.52 kg/m .   Exam:   Gen: Appears well, no distress  HEENT: no scleral icterus or hemorrhage, no wet purpura, no lymphadenopathy  CV: regular, no murmurs  Pulm: clear  Abd: soft, nontender, no splenomegaly  Ext: no edema  Skin: no ecchymoses or hematomas  Neuro: no focal deficits, affect and cognition are normal    Labs:  Results for JUWAN LYNCH (MRN [id number]) as of 12/19/2019 13:31   Ref. Range 12/14/2019 11:05   Sodium Latest Ref Range: 133 - 144 mmol/L 138   Potassium Latest Ref Range: 3.4 - 5.3 mmol/L 4.2   Chloride Latest Ref Range: 94 - 109 mmol/L 108   Carbon Dioxide Latest Ref Range: 20 - 32 mmol/L 27   Urea Nitrogen Latest Ref Range: 7 - 30 mg/dL 24   Creatinine Latest Ref Range: 0.66 - 1.25 mg/dL 1.04   GFR Estimate Latest Ref Range: >60 mL/min/1.73_m2 69   GFR Estimate If Black Latest Ref Range: >60 mL/min/1.73_m2 80   Calcium Latest Ref Range: 8.5 - 10.1 mg/dL 8.8   Anion Gap Latest Ref Range: 3 - 14 mmol/L 4   Albumin Latest Ref Range: 3.4 - 5.0 g/dL 3.8   Protein Total Latest Ref Range: 6.8 - 8.8 g/dL 7.3   Bilirubin Total Latest Ref Range: 0.2 - 1.3 mg/dL 0.4   Alkaline Phosphatase Latest Ref Range: 40 - 150 U/L 57   ALT Latest Ref Range: 0 - 70 U/L 21   AST Latest Ref Range: 0 - 45 U/L 10   Glucose Latest Ref Range: 70 - 99 mg/dL 122 (H)   WBC Latest Ref Range: 4.0 - 11.0 10e9/L 9.6   Hemoglobin Latest Ref Range: 13.3 - 17.7 g/dL 14.8   Hematocrit Latest Ref Range: 40.0 - 53.0 % 43.2   Platelet Count Latest Ref Range: 150 - 450 10e9/L 324   RBC Count Latest Ref Range: 4.4 - 5.9 10e12/L 4.94   MCV Latest Ref Range: 78 - 100 fl 87   MCH Latest Ref Range: 26.5 - 33.0 pg 30.0   MCHC Latest Ref Range: 31.5 - 36.5 g/dL 34.3   RDW Latest Ref Range: 10.0 - 15.0 % 13.1   Diff Method Unknown Automated Method   % Neutrophils Latest Units: % 85.6   % Lymphocytes Latest Units: % 12.2   % Monocytes Latest Units: % 1.7   % Eosinophils Latest Units: % 0.1   % Basophils Latest Units: % 0.1    % Immature Granulocytes Latest Units: % 0.3   Nucleated RBCs Latest Ref Range: 0 /100 0   Absolute Neutrophil Latest Ref Range: 1.6 - 8.3 10e9/L 8.2   Absolute Lymphocytes Latest Ref Range: 0.8 - 5.3 10e9/L 1.2   Absolute Monocytes Latest Ref Range: 0.0 - 1.3 10e9/L 0.2   Absolute Eosinophils Latest Ref Range: 0.0 - 0.7 10e9/L 0.0   Absolute Basophils Latest Ref Range: 0.0 - 0.2 10e9/L 0.0   Abs Immature Granulocytes Latest Ref Range: 0 - 0.4 10e9/L 0.0   Absolute Nucleated RBC Unknown 0.0       Imaging:  CT CHEST/ABDOMEN/PELVIS W CONTRAST, 12/14/2019 9:43 AM  IMPRESSION: In this patient with history of left eye melanoma:  1. No convincing evidence for metastatic disease in the abdomen,  pelvis and bones.  2. Indeterminate pulmonary nodules measuring up to 4 mm. Attention on  follow-up studies.  3. Additional incidental findings as described above.    Assessment:  In summary, Galileo Mayberry is a 74 year old with T3 ocular melanoma who is planned for upcoming plaque therapy.    Plan:  We spent significant time today reviewing ocular melanoma including both staging workup and treatments.  Mr. Mayberry is planned for his procedure next month and he notes he discussed having a biopsy with ophthalmology (noted in their most recent note).  We will plan  toorder genetic marker testing on this biopsy in order to better prognosticate and determine whether adjuvant chemotherapy is indicated.  We will plan to have the patient return to clinic approximately one month after his procedure to review this testing and make sure these tests are added, as well as review potential next steps.    Regardless of genetic testing results the patient will require follow up approximately 3 months after his treatment with repeat CT CAP at that time.    The patient has a multiple family members with malignancy and reports other family members have been informed of a genetic component to their disease (he is unsure what this diagnosis was).   He is interested in meeting with genetics; referral placed today.    Manny Mckeon MD  Hematology/Oncology Fellow  [phone]    ---  I evaluated the patient and reviewed the plan of care with the patient and the Oncology Fellow. I agree with the assessment and plan documented in the clinical note.    In brief, Mr. Mayberry is a delightful 74 year old man with a T3 choroidal melanoma lesion with symptoms. CT-CAP was obtained and was negative for metastasis, and plan is to proceed with plaque brachytherapy. Biopsy is planned, which we will send for [place] testing. I will plan to see back in about a month to review next steps in further detail. We also discussed genetics referral, and observation regime of scans every 3-4 months for the first 2 years.    Jw Reyes M.D.   of Medicine  Hematology, Oncology and Transplantation  [facility]

## 2019-12-19 NOTE — NURSING NOTE
"Oncology Rooming Note    December 19, 2019 8:56 AM   Galileo Mayberry is a 74 year old male who presents for:    Chief Complaint   Patient presents with     New Patient     NEW PT; CHOROIDAL CARCINOMA; VITALS TAKEN      Initial Vitals: BP (!) 144/82   Pulse 63   Temp 97.3  F (36.3  C) (Oral)   Resp 16   Ht 1.799 m (5' 10.83\")   Wt 102 kg (224 lb 14.4 oz)   SpO2 98%   BMI 31.52 kg/m   Estimated body mass index is 31.52 kg/m  as calculated from the following:    Height as of this encounter: 1.799 m (5' 10.83\").    Weight as of this encounter: 102 kg (224 lb 14.4 oz). Body surface area is 2.26 meters squared.  No Pain (0) Comment: Data Unavailable   No LMP for male patient.  Allergies reviewed: Yes  Medications reviewed: Yes    Medications: Medication refills not needed today.  Pharmacy name entered into EPIC:    [facility] - [place], [place] - [id number] - [facility] [address] [id number]    Clinical concerns: No new concerns today  Dr Trent  was notified.      Sari Rivera              "

## 2020-01-01 ENCOUNTER — TELEPHONE (OUTPATIENT)
Dept: VASCULAR SURGERY | Facility: CLINIC | Age: 75
End: 2020-01-01

## 2020-01-01 ENCOUNTER — APPOINTMENT (OUTPATIENT)
Dept: MEDSURG UNIT | Facility: CLINIC | Age: 75
End: 2020-01-01
Attending: RADIOLOGY
Payer: COMMERCIAL

## 2020-01-01 ENCOUNTER — VIRTUAL VISIT (OUTPATIENT)
Dept: ONCOLOGY | Facility: CLINIC | Age: 75
End: 2020-01-01
Attending: INTERNAL MEDICINE
Payer: COMMERCIAL

## 2020-01-01 ENCOUNTER — APPOINTMENT (OUTPATIENT)
Dept: INTERVENTIONAL RADIOLOGY/VASCULAR | Facility: CLINIC | Age: 75
End: 2020-01-01
Attending: RADIOLOGY
Payer: COMMERCIAL

## 2020-01-01 ENCOUNTER — HOSPITAL ENCOUNTER (OUTPATIENT)
Dept: NUCLEAR MEDICINE | Facility: CLINIC | Age: 75
Setting detail: NUCLEAR MEDICINE
End: 2020-11-16
Attending: RADIOLOGY | Admitting: RADIOLOGY
Payer: COMMERCIAL

## 2020-01-01 ENCOUNTER — MYC MEDICAL ADVICE (OUTPATIENT)
Dept: RADIOLOGY | Facility: CLINIC | Age: 75
End: 2020-01-01

## 2020-01-01 ENCOUNTER — PATIENT OUTREACH (OUTPATIENT)
Dept: ONCOLOGY | Facility: CLINIC | Age: 75
End: 2020-01-01

## 2020-01-01 ENCOUNTER — HOSPITAL ENCOUNTER (OUTPATIENT)
Facility: CLINIC | Age: 75
Discharge: HOME | End: 2020-11-16
Attending: RADIOLOGY | Admitting: RADIOLOGY
Payer: COMMERCIAL

## 2020-01-01 ENCOUNTER — APPOINTMENT (OUTPATIENT)
Dept: LAB | Facility: CLINIC | Age: 75
End: 2020-01-01
Attending: INTERNAL MEDICINE
Payer: COMMERCIAL

## 2020-01-01 ENCOUNTER — HOSPITAL ENCOUNTER (OUTPATIENT)
Dept: MRI IMAGING | Facility: CLINIC | Age: 75
Discharge: HOME | End: 2020-12-02
Attending: INTERNAL MEDICINE | Admitting: INTERNAL MEDICINE
Payer: COMMERCIAL

## 2020-01-01 ENCOUNTER — DOCUMENTATION ONLY (OUTPATIENT)
Dept: ONCOLOGY | Facility: CLINIC | Age: 75
End: 2020-01-01

## 2020-01-01 ENCOUNTER — VIRTUAL VISIT (OUTPATIENT)
Dept: ONCOLOGY | Facility: CLINIC | Age: 75
End: 2020-01-01
Payer: COMMERCIAL

## 2020-01-01 ENCOUNTER — HOSPITAL ENCOUNTER (OUTPATIENT)
Dept: MRI IMAGING | Facility: CLINIC | Age: 75
End: 2020-10-22
Attending: INTERNAL MEDICINE
Payer: COMMERCIAL

## 2020-01-01 ENCOUNTER — HOSPITAL ENCOUNTER (OUTPATIENT)
Dept: NUCLEAR MEDICINE | Facility: CLINIC | Age: 75
Setting detail: NUCLEAR MEDICINE
End: 2020-10-19
Attending: RADIOLOGY | Admitting: RADIOLOGY
Payer: COMMERCIAL

## 2020-01-01 ENCOUNTER — VIRTUAL VISIT (OUTPATIENT)
Dept: RADIOLOGY | Facility: CLINIC | Age: 75
End: 2020-01-01
Attending: RADIOLOGY
Payer: COMMERCIAL

## 2020-01-01 ENCOUNTER — ONCOLOGY VISIT (OUTPATIENT)
Dept: ONCOLOGY | Facility: CLINIC | Age: 75
End: 2020-01-01
Attending: NURSE PRACTITIONER
Payer: COMMERCIAL

## 2020-01-01 ENCOUNTER — VIRTUAL VISIT (OUTPATIENT)
Dept: ONCOLOGY | Facility: CLINIC | Age: 75
End: 2020-01-01
Attending: PHYSICIAN ASSISTANT
Payer: COMMERCIAL

## 2020-01-01 ENCOUNTER — PATIENT OUTREACH (OUTPATIENT)
Dept: PALLIATIVE CARE | Facility: CLINIC | Age: 75
End: 2020-01-01

## 2020-01-01 ENCOUNTER — VIRTUAL VISIT (OUTPATIENT)
Dept: PALLIATIVE CARE | Facility: CLINIC | Age: 75
End: 2020-01-01
Attending: INTERNAL MEDICINE
Payer: COMMERCIAL

## 2020-01-01 ENCOUNTER — HOSPITAL ENCOUNTER (OUTPATIENT)
Facility: CLINIC | Age: 75
Discharge: HOME | End: 2020-10-19
Attending: RADIOLOGY | Admitting: RADIOLOGY
Payer: COMMERCIAL

## 2020-01-01 ENCOUNTER — MYC MEDICAL ADVICE (OUTPATIENT)
Dept: PALLIATIVE CARE | Facility: CLINIC | Age: 75
End: 2020-01-01

## 2020-01-01 ENCOUNTER — HEALTH MAINTENANCE LETTER (OUTPATIENT)
Age: 75
End: 2020-01-01

## 2020-01-01 VITALS
OXYGEN SATURATION: 97 % | WEIGHT: 206 LBS | DIASTOLIC BLOOD PRESSURE: 51 MMHG | TEMPERATURE: 98.1 F | HEIGHT: 71 IN | SYSTOLIC BLOOD PRESSURE: 103 MMHG | HEART RATE: 51 BPM | BODY MASS INDEX: 28.84 KG/M2 | RESPIRATION RATE: 16 BRPM

## 2020-01-01 VITALS
HEART RATE: 63 BPM | OXYGEN SATURATION: 97 % | SYSTOLIC BLOOD PRESSURE: 113 MMHG | WEIGHT: 206 LBS | BODY MASS INDEX: 28.73 KG/M2 | TEMPERATURE: 98.4 F | RESPIRATION RATE: 16 BRPM | DIASTOLIC BLOOD PRESSURE: 71 MMHG

## 2020-01-01 VITALS
WEIGHT: 199 LBS | HEIGHT: 71 IN | RESPIRATION RATE: 16 BRPM | DIASTOLIC BLOOD PRESSURE: 63 MMHG | HEART RATE: 56 BPM | BODY MASS INDEX: 27.86 KG/M2 | TEMPERATURE: 97.7 F | SYSTOLIC BLOOD PRESSURE: 108 MMHG | OXYGEN SATURATION: 96 %

## 2020-01-01 VITALS
SYSTOLIC BLOOD PRESSURE: 125 MMHG | OXYGEN SATURATION: 98 % | HEART RATE: 60 BPM | RESPIRATION RATE: 16 BRPM | DIASTOLIC BLOOD PRESSURE: 75 MMHG | TEMPERATURE: 98.3 F

## 2020-01-01 VITALS
DIASTOLIC BLOOD PRESSURE: 65 MMHG | SYSTOLIC BLOOD PRESSURE: 101 MMHG | WEIGHT: 206.1 LBS | TEMPERATURE: 98.4 F | RESPIRATION RATE: 18 BRPM | HEART RATE: 61 BPM | BODY MASS INDEX: 28.75 KG/M2 | OXYGEN SATURATION: 97 %

## 2020-01-01 VITALS
SYSTOLIC BLOOD PRESSURE: 134 MMHG | RESPIRATION RATE: 18 BRPM | DIASTOLIC BLOOD PRESSURE: 79 MMHG | OXYGEN SATURATION: 97 % | BODY MASS INDEX: 29.62 KG/M2 | TEMPERATURE: 97.9 F | WEIGHT: 212.4 LBS | HEART RATE: 61 BPM

## 2020-01-01 DIAGNOSIS — C78.7 LIVER METASTASES: ICD-10-CM

## 2020-01-01 DIAGNOSIS — C69.40 MELANOMA OF UVEA METASTATIC TO LIVER (H): ICD-10-CM

## 2020-01-01 DIAGNOSIS — C69.32 MALIGNANT MELANOMA OF CHOROID OF LEFT EYE (H): Primary | ICD-10-CM

## 2020-01-01 DIAGNOSIS — C78.7 MELANOMA OF UVEA METASTATIC TO LIVER (H): Primary | ICD-10-CM

## 2020-01-01 DIAGNOSIS — C78.7 MELANOMA OF UVEA METASTATIC TO LIVER (H): ICD-10-CM

## 2020-01-01 DIAGNOSIS — C69.32 MALIGNANT MELANOMA OF CHOROID OF LEFT EYE (H): ICD-10-CM

## 2020-01-01 DIAGNOSIS — Z91.041 ALLERGY TO INTRAVENOUS CONTRAST: ICD-10-CM

## 2020-01-01 DIAGNOSIS — C79.51 BONE METASTASIS: Primary | ICD-10-CM

## 2020-01-01 DIAGNOSIS — H31.8 CHOROIDAL LESION: ICD-10-CM

## 2020-01-01 DIAGNOSIS — R53.0 NEOPLASTIC MALIGNANT RELATED FATIGUE: ICD-10-CM

## 2020-01-01 DIAGNOSIS — C69.40 MELANOMA OF UVEA METASTATIC TO LIVER (H): Primary | ICD-10-CM

## 2020-01-01 DIAGNOSIS — Z11.59 ENCOUNTER FOR SCREENING FOR OTHER VIRAL DISEASES: ICD-10-CM

## 2020-01-01 DIAGNOSIS — Z79.899 ENCOUNTER FOR LONG-TERM (CURRENT) USE OF MEDICATIONS: ICD-10-CM

## 2020-01-01 DIAGNOSIS — Z71.89 ADVANCE CARE PLANNING: ICD-10-CM

## 2020-01-01 DIAGNOSIS — Z91.041 ALLERGY TO INTRAVENOUS CONTRAST: Primary | ICD-10-CM

## 2020-01-01 DIAGNOSIS — Z91.041 CONTRAST MEDIA ALLERGY: ICD-10-CM

## 2020-01-01 DIAGNOSIS — R63.0 ANOREXIA: ICD-10-CM

## 2020-01-01 DIAGNOSIS — G47.00 INSOMNIA, UNSPECIFIED TYPE: ICD-10-CM

## 2020-01-01 DIAGNOSIS — Z11.59 ENCOUNTER FOR SCREENING FOR OTHER VIRAL DISEASES: Primary | ICD-10-CM

## 2020-01-01 DIAGNOSIS — G47.00 INSOMNIA, UNSPECIFIED TYPE: Primary | ICD-10-CM

## 2020-01-01 DIAGNOSIS — L29.9 PRURITIC DISORDER: ICD-10-CM

## 2020-01-01 DIAGNOSIS — C79.51 MALIGNANT MELANOMA METASTATIC TO BONE (H): ICD-10-CM

## 2020-01-01 LAB
ALBUMIN SERPL-MCNC: 3.3 G/DL (ref 3.4–5)
ALBUMIN SERPL-MCNC: 3.4 G/DL (ref 3.4–5)
ALBUMIN SERPL-MCNC: 3.6 G/DL (ref 3.4–5)
ALBUMIN SERPL-MCNC: 3.8 G/DL (ref 3.4–5)
ALBUMIN SERPL-MCNC: 4.3 G/DL (ref 3.4–5)
ALP SERPL-CCNC: 143 U/L (ref 40–150)
ALP SERPL-CCNC: 66 U/L (ref 40–150)
ALP SERPL-CCNC: 78 U/L (ref 40–150)
ALP SERPL-CCNC: 86 U/L (ref 40–150)
ALP SERPL-CCNC: 86 U/L (ref 40–150)
ALT SERPL W P-5'-P-CCNC: 39 U/L (ref 0–70)
ALT SERPL W P-5'-P-CCNC: 39 U/L (ref 0–70)
ALT SERPL W P-5'-P-CCNC: 47 U/L (ref 0–70)
ALT SERPL W P-5'-P-CCNC: 50 U/L (ref 0–70)
ALT SERPL W P-5'-P-CCNC: 55 U/L (ref 0–70)
ANION GAP SERPL CALCULATED.3IONS-SCNC: 5 MMOL/L (ref 3–14)
ANION GAP SERPL CALCULATED.3IONS-SCNC: 6 MMOL/L (ref 3–14)
ANION GAP SERPL CALCULATED.3IONS-SCNC: 6 MMOL/L (ref 3–14)
ANION GAP SERPL CALCULATED.3IONS-SCNC: 9 MMOL/L (ref 3–14)
APTT PPP: 33 SEC (ref 22–37)
AST SERPL W P-5'-P-CCNC: 27 U/L (ref 0–45)
AST SERPL W P-5'-P-CCNC: 29 U/L (ref 0–45)
AST SERPL W P-5'-P-CCNC: 40 U/L (ref 0–45)
AST SERPL W P-5'-P-CCNC: 43 U/L (ref 0–45)
AST SERPL W P-5'-P-CCNC: 43 U/L (ref 0–45)
BASOPHILS # BLD AUTO: 0 10E9/L (ref 0–0.2)
BASOPHILS # BLD AUTO: 0 10E9/L (ref 0–0.2)
BASOPHILS # BLD AUTO: 0.1 10E9/L (ref 0–0.2)
BASOPHILS NFR BLD AUTO: 0.4 %
BASOPHILS NFR BLD AUTO: 0.4 %
BASOPHILS NFR BLD AUTO: 1.7 %
BILIRUB DIRECT SERPL-MCNC: 0.2 MG/DL (ref 0–0.2)
BILIRUB DIRECT SERPL-MCNC: 0.2 MG/DL (ref 0–0.2)
BILIRUB SERPL-MCNC: 0.5 MG/DL (ref 0.2–1.3)
BILIRUB SERPL-MCNC: 0.6 MG/DL (ref 0.2–1.3)
BILIRUB SERPL-MCNC: 0.7 MG/DL (ref 0.2–1.3)
BILIRUB SERPL-MCNC: 0.7 MG/DL (ref 0.2–1.3)
BILIRUB SERPL-MCNC: 0.9 MG/DL (ref 0.2–1.3)
BUN SERPL-MCNC: 14 MG/DL (ref 7–30)
BUN SERPL-MCNC: 17 MG/DL (ref 7–30)
BUN SERPL-MCNC: 19 MG/DL (ref 7–30)
BUN SERPL-MCNC: 22 MG/DL (ref 7–30)
CALCIUM SERPL-MCNC: 8.6 MG/DL (ref 8.5–10.1)
CALCIUM SERPL-MCNC: 8.7 MG/DL (ref 8.5–10.1)
CALCIUM SERPL-MCNC: 9.2 MG/DL (ref 8.5–10.1)
CALCIUM SERPL-MCNC: 9.4 MG/DL (ref 8.5–10.1)
CHLORIDE SERPL-SCNC: 106 MMOL/L (ref 94–109)
CHLORIDE SERPL-SCNC: 107 MMOL/L (ref 94–109)
CHLORIDE SERPL-SCNC: 108 MMOL/L (ref 94–109)
CHLORIDE SERPL-SCNC: 108 MMOL/L (ref 94–109)
CHOLEST SERPL-MCNC: 163 MG/DL
CO2 SERPL-SCNC: 22 MMOL/L (ref 20–32)
CO2 SERPL-SCNC: 26 MMOL/L (ref 20–32)
CO2 SERPL-SCNC: 27 MMOL/L (ref 20–32)
CO2 SERPL-SCNC: 28 MMOL/L (ref 20–32)
COPPER SERPL-MCNC: 114.1 UG/DL (ref 70–140)
CREAT SERPL-MCNC: 1.01 MG/DL (ref 0.66–1.25)
CREAT SERPL-MCNC: 1.01 MG/DL (ref 0.66–1.25)
CREAT SERPL-MCNC: 1.03 MG/DL (ref 0.66–1.25)
CREAT SERPL-MCNC: 1.05 MG/DL (ref 0.66–1.25)
CRP SERPL-MCNC: 3 MG/L (ref 0–8)
DHEA-S SERPL-MCNC: 27 UG/DL (ref 80–560)
DIFFERENTIAL METHOD BLD: ABNORMAL
DIFFERENTIAL METHOD BLD: ABNORMAL
DIFFERENTIAL METHOD BLD: NORMAL
EOSINOPHIL # BLD AUTO: 0.1 10E9/L (ref 0–0.7)
EOSINOPHIL # BLD AUTO: 0.1 10E9/L (ref 0–0.7)
EOSINOPHIL # BLD AUTO: 0.2 10E9/L (ref 0–0.7)
EOSINOPHIL NFR BLD AUTO: 1.2 %
EOSINOPHIL NFR BLD AUTO: 3.8 %
EOSINOPHIL NFR BLD AUTO: 3.9 %
ERYTHROCYTE [DISTWIDTH] IN BLOOD BY AUTOMATED COUNT: 12.3 % (ref 10–15)
ERYTHROCYTE [DISTWIDTH] IN BLOOD BY AUTOMATED COUNT: 12.3 % (ref 10–15)
ERYTHROCYTE [DISTWIDTH] IN BLOOD BY AUTOMATED COUNT: 12.4 % (ref 10–15)
ERYTHROCYTE [DISTWIDTH] IN BLOOD BY AUTOMATED COUNT: 13.3 % (ref 10–15)
ERYTHROCYTE [SEDIMENTATION RATE] IN BLOOD BY WESTERGREN METHOD: 20 MM/H (ref 0–20)
FERRITIN SERPL-MCNC: 47 NG/ML (ref 26–388)
GFR SERPL CREATININE-BSD FRML MDRD: 68 ML/MIN/{1.73_M2}
GFR SERPL CREATININE-BSD FRML MDRD: 69 ML/MIN/{1.73_M2}
GFR SERPL CREATININE-BSD FRML MDRD: 71 ML/MIN/{1.73_M2}
GFR SERPL CREATININE-BSD FRML MDRD: 71 ML/MIN/{1.73_M2}
GGT SERPL-CCNC: 143 U/L (ref 0–75)
GLUCOSE SERPL-MCNC: 109 MG/DL (ref 70–99)
GLUCOSE SERPL-MCNC: 113 MG/DL (ref 70–99)
GLUCOSE SERPL-MCNC: 131 MG/DL (ref 70–99)
GLUCOSE SERPL-MCNC: 85 MG/DL (ref 70–99)
HCT VFR BLD AUTO: 40.7 % (ref 40–53)
HCT VFR BLD AUTO: 41.1 % (ref 40–53)
HCT VFR BLD AUTO: 42.2 % (ref 40–53)
HCT VFR BLD AUTO: 42.6 % (ref 40–53)
HCYS SERPL-SCNC: 11.2 UMOL/L (ref 4–12)
HCYS SERPL-SCNC: 11.3 UMOL/L (ref 4–12)
HDLC SERPL-MCNC: 36 MG/DL
HGB BLD-MCNC: 13.6 G/DL (ref 13.3–17.7)
HGB BLD-MCNC: 13.7 G/DL (ref 13.3–17.7)
HGB BLD-MCNC: 14.1 G/DL (ref 13.3–17.7)
HGB BLD-MCNC: 14.1 G/DL (ref 13.3–17.7)
IMM GRANULOCYTES # BLD: 0 10E9/L (ref 0–0.4)
IMM GRANULOCYTES # BLD: 0 10E9/L (ref 0–0.4)
IMM GRANULOCYTES NFR BLD: 0.3 %
IMM GRANULOCYTES NFR BLD: 0.6 %
INR PPP: 1.04 (ref 0.86–1.14)
LDH SERPL L TO P-CCNC: 357 U/L (ref 85–227)
LDH SERPL L TO P-CCNC: 534 U/L (ref 85–227)
LDLC SERPL CALC-MCNC: 88 MG/DL
LYMPHOCYTES # BLD AUTO: 0.5 10E9/L (ref 0.8–5.3)
LYMPHOCYTES # BLD AUTO: 0.7 10E9/L (ref 0.8–5.3)
LYMPHOCYTES # BLD AUTO: 0.9 10E9/L (ref 0.8–5.3)
LYMPHOCYTES NFR BLD AUTO: 13.9 %
LYMPHOCYTES NFR BLD AUTO: 15 %
LYMPHOCYTES NFR BLD AUTO: 20 %
MAGNESIUM SERPL-MCNC: 2.1 MG/DL (ref 1.6–2.3)
MAGNESIUM SERPL-MCNC: 2.2 MG/DL (ref 1.6–2.3)
MAGNESIUM SERPL-MCNC: 2.4 MG/DL (ref 1.6–2.3)
MCH RBC QN AUTO: 30.7 PG (ref 26.5–33)
MCH RBC QN AUTO: 31.6 PG (ref 26.5–33)
MCH RBC QN AUTO: 31.8 PG (ref 26.5–33)
MCH RBC QN AUTO: 32.5 PG (ref 26.5–33)
MCHC RBC AUTO-ENTMCNC: 33.1 G/DL (ref 31.5–36.5)
MCHC RBC AUTO-ENTMCNC: 33.3 G/DL (ref 31.5–36.5)
MCHC RBC AUTO-ENTMCNC: 33.4 G/DL (ref 31.5–36.5)
MCHC RBC AUTO-ENTMCNC: 33.4 G/DL (ref 31.5–36.5)
MCV RBC AUTO: 92 FL (ref 78–100)
MCV RBC AUTO: 95 FL (ref 78–100)
MCV RBC AUTO: 96 FL (ref 78–100)
MCV RBC AUTO: 97 FL (ref 78–100)
MONOCYTES # BLD AUTO: 0.4 10E9/L (ref 0–1.3)
MONOCYTES # BLD AUTO: 0.5 10E9/L (ref 0–1.3)
MONOCYTES # BLD AUTO: 0.6 10E9/L (ref 0–1.3)
MONOCYTES NFR BLD AUTO: 10.7 %
MONOCYTES NFR BLD AUTO: 11.2 %
MONOCYTES NFR BLD AUTO: 11.4 %
NEUTROPHILS # BLD AUTO: 2.4 10E9/L (ref 1.6–8.3)
NEUTROPHILS # BLD AUTO: 3 10E9/L (ref 1.6–8.3)
NEUTROPHILS # BLD AUTO: 3.4 10E9/L (ref 1.6–8.3)
NEUTROPHILS NFR BLD AUTO: 64.5 %
NEUTROPHILS NFR BLD AUTO: 69.6 %
NEUTROPHILS NFR BLD AUTO: 71.4 %
NONHDLC SERPL-MCNC: 127 MG/DL
NRBC # BLD AUTO: 0 10*3/UL
NRBC # BLD AUTO: 0 10*3/UL
NRBC BLD AUTO-RTO: 0 /100
NRBC BLD AUTO-RTO: 0 /100
PHOSPHATE SERPL-MCNC: 3 MG/DL (ref 2.5–4.5)
PHOSPHATE SERPL-MCNC: 3.6 MG/DL (ref 2.5–4.5)
PHOSPHATE SERPL-MCNC: 3.8 MG/DL (ref 2.5–4.5)
PLATELET # BLD AUTO: 180 10E9/L (ref 150–450)
PLATELET # BLD AUTO: 181 10E9/L (ref 150–450)
PLATELET # BLD AUTO: 241 10E9/L (ref 150–450)
PLATELET # BLD AUTO: 280 10E9/L (ref 150–450)
POTASSIUM SERPL-SCNC: 3.7 MMOL/L (ref 3.4–5.3)
POTASSIUM SERPL-SCNC: 3.8 MMOL/L (ref 3.4–5.3)
POTASSIUM SERPL-SCNC: 4.2 MMOL/L (ref 3.4–5.3)
POTASSIUM SERPL-SCNC: 4.2 MMOL/L (ref 3.4–5.3)
PROT SERPL-MCNC: 6.8 G/DL (ref 6.8–8.8)
PROT SERPL-MCNC: 6.9 G/DL (ref 6.8–8.8)
PROT SERPL-MCNC: 7.3 G/DL (ref 6.8–8.8)
PROT SERPL-MCNC: 7.4 G/DL (ref 6.8–8.8)
PROT SERPL-MCNC: 7.5 G/DL (ref 6.8–8.8)
RBC # BLD AUTO: 4.18 10E12/L (ref 4.4–5.9)
RBC # BLD AUTO: 4.31 10E12/L (ref 4.4–5.9)
RBC # BLD AUTO: 4.46 10E12/L (ref 4.4–5.9)
RBC # BLD AUTO: 4.6 10E12/L (ref 4.4–5.9)
SARS-COV-2 RNA SPEC QL NAA+PROBE: NOT DETECTED
SARS-COV-2 RNA SPEC QL NAA+PROBE: NOT DETECTED
SODIUM SERPL-SCNC: 138 MMOL/L (ref 133–144)
SODIUM SERPL-SCNC: 139 MMOL/L (ref 133–144)
SODIUM SERPL-SCNC: 139 MMOL/L (ref 133–144)
SODIUM SERPL-SCNC: 141 MMOL/L (ref 133–144)
SPECIMEN SOURCE: NORMAL
SPECIMEN SOURCE: NORMAL
TRIGL SERPL-MCNC: 196 MG/DL
TSH SERPL DL<=0.005 MIU/L-ACNC: 1.41 MU/L (ref 0.4–4)
TSH SERPL DL<=0.005 MIU/L-ACNC: 1.97 MU/L (ref 0.4–4)
TSH SERPL DL<=0.005 MIU/L-ACNC: 3.58 MU/L (ref 0.4–4)
URATE SERPL-MCNC: 5.1 MG/DL (ref 3.5–7.2)
VEGF SERPL-MCNC: 75 PG/ML (ref 9–86)
WBC # BLD AUTO: 3.5 10E9/L (ref 4–11)
WBC # BLD AUTO: 3.9 10E9/L (ref 4–11)
WBC # BLD AUTO: 4.6 10E9/L (ref 4–11)
WBC # BLD AUTO: 4.8 10E9/L (ref 4–11)

## 2020-01-01 PROCEDURE — 99152 MOD SED SAME PHYS/QHP 5/>YRS: CPT | Mod: GC | Performed by: RADIOLOGY

## 2020-01-01 PROCEDURE — 99153 MOD SED SAME PHYS/QHP EA: CPT

## 2020-01-01 PROCEDURE — 272N000566 HC SHEATH CR3

## 2020-01-01 PROCEDURE — A9581 GADOXETATE DISODIUM INJ: HCPCS | Performed by: INTERNAL MEDICINE

## 2020-01-01 PROCEDURE — 75726 ARTERY X-RAYS ABDOMEN: CPT

## 2020-01-01 PROCEDURE — 36415 COLL VENOUS BLD VENIPUNCTURE: CPT | Performed by: INTERNAL MEDICINE

## 2020-01-01 PROCEDURE — 83735 ASSAY OF MAGNESIUM: CPT | Performed by: INTERNAL MEDICINE

## 2020-01-01 PROCEDURE — 80050 GENERAL HEALTH PANEL: CPT | Performed by: PATHOLOGY

## 2020-01-01 PROCEDURE — 82977 ASSAY OF GGT: CPT | Performed by: INTERNAL MEDICINE

## 2020-01-01 PROCEDURE — 999N001193 HC VIDEO/TELEPHONE VISIT; NO CHARGE

## 2020-01-01 PROCEDURE — 99153 MOD SED SAME PHYS/QHP EA: CPT | Mod: 59

## 2020-01-01 PROCEDURE — 99207 PR NO CHARGE LOS: CPT

## 2020-01-01 PROCEDURE — 96417 CHEMO IV INFUS EACH ADDL SEQ: CPT

## 2020-01-01 PROCEDURE — 99214 OFFICE O/P EST MOD 30 MIN: CPT | Mod: 95 | Performed by: PHYSICIAN ASSISTANT

## 2020-01-01 PROCEDURE — C1769 GUIDE WIRE: HCPCS

## 2020-01-01 PROCEDURE — 83735 ASSAY OF MAGNESIUM: CPT | Performed by: PATHOLOGY

## 2020-01-01 PROCEDURE — 258N000003 HC RX IP 258 OP 636: Performed by: INTERNAL MEDICINE

## 2020-01-01 PROCEDURE — C1887 CATHETER, GUIDING: HCPCS

## 2020-01-01 PROCEDURE — 77300 RADIATION THERAPY DOSE PLAN: CPT

## 2020-01-01 PROCEDURE — 99204 OFFICE O/P NEW MOD 45 MIN: CPT | Mod: 95 | Performed by: STUDENT IN AN ORGANIZED HEALTH CARE EDUCATION/TRAINING PROGRAM

## 2020-01-01 PROCEDURE — C9113 INJ PANTOPRAZOLE SODIUM, VIA: HCPCS | Performed by: PHYSICIAN ASSISTANT

## 2020-01-01 PROCEDURE — 76937 US GUIDE VASCULAR ACCESS: CPT | Mod: 26 | Performed by: RADIOLOGY

## 2020-01-01 PROCEDURE — 37243 VASC EMBOLIZE/OCCLUDE ORGAN: CPT

## 2020-01-01 PROCEDURE — 250N000011 HC RX IP 250 OP 636: Performed by: PHYSICIAN ASSISTANT

## 2020-01-01 PROCEDURE — 99152 MOD SED SAME PHYS/QHP 5/>YRS: CPT | Mod: 59

## 2020-01-01 PROCEDURE — 85610 PROTHROMBIN TIME: CPT | Performed by: RADIOLOGY

## 2020-01-01 PROCEDURE — 250N000011 HC RX IP 250 OP 636: Performed by: STUDENT IN AN ORGANIZED HEALTH CARE EDUCATION/TRAINING PROGRAM

## 2020-01-01 PROCEDURE — 96367 TX/PROPH/DG ADDL SEQ IV INF: CPT

## 2020-01-01 PROCEDURE — 258N000003 HC RX IP 258 OP 636: Performed by: PHYSICIAN ASSISTANT

## 2020-01-01 PROCEDURE — 80076 HEPATIC FUNCTION PANEL: CPT | Performed by: RADIOLOGY

## 2020-01-01 PROCEDURE — 250N000009 HC RX 250: Performed by: STUDENT IN AN ORGANIZED HEALTH CARE EDUCATION/TRAINING PROGRAM

## 2020-01-01 PROCEDURE — 74183 MRI ABD W/O CNTR FLWD CNTR: CPT | Mod: 26 | Performed by: RADIOLOGY

## 2020-01-01 PROCEDURE — 999N000134 HC STATISTIC PP CARE STAGE 3

## 2020-01-01 PROCEDURE — C1760 CLOSURE DEV, VASC: HCPCS

## 2020-01-01 PROCEDURE — 255N000002 HC RX 255 OP 636: Performed by: RADIOLOGY

## 2020-01-01 PROCEDURE — 999N000127 HC STATISTIC PERIPHERAL IV START W US GUIDANCE

## 2020-01-01 PROCEDURE — 250N000011 HC RX IP 250 OP 636: Performed by: INTERNAL MEDICINE

## 2020-01-01 PROCEDURE — 99152 MOD SED SAME PHYS/QHP 5/>YRS: CPT

## 2020-01-01 PROCEDURE — 272N000143 HC KIT CR3

## 2020-01-01 PROCEDURE — 74183 MRI ABD W/O CNTR FLWD CNTR: CPT

## 2020-01-01 PROCEDURE — 36248 INS CATH ABD/L-EXT ART ADDL: CPT | Mod: GZ

## 2020-01-01 PROCEDURE — 255N000002 HC RX 255 OP 636: Performed by: INTERNAL MEDICINE

## 2020-01-01 PROCEDURE — 36247 INS CATH ABD/L-EXT ART 3RD: CPT | Mod: GZ

## 2020-01-01 PROCEDURE — 78830 RP LOCLZJ TUM SPECT W/CT 1: CPT | Mod: XU

## 2020-01-01 PROCEDURE — 99213 OFFICE O/P EST LOW 20 MIN: CPT | Performed by: RADIOLOGY

## 2020-01-01 PROCEDURE — 84100 ASSAY OF PHOSPHORUS: CPT | Performed by: INTERNAL MEDICINE

## 2020-01-01 PROCEDURE — 272N000504 HC NEEDLE CR4

## 2020-01-01 PROCEDURE — G0463 HOSPITAL OUTPT CLINIC VISIT: HCPCS

## 2020-01-01 PROCEDURE — 83520 IMMUNOASSAY QUANT NOS NONAB: CPT | Performed by: INTERNAL MEDICINE

## 2020-01-01 PROCEDURE — 84443 ASSAY THYROID STIM HORMONE: CPT | Performed by: PATHOLOGY

## 2020-01-01 PROCEDURE — 36247 INS CATH ABD/L-EXT ART 3RD: CPT | Mod: GC | Performed by: RADIOLOGY

## 2020-01-01 PROCEDURE — 82728 ASSAY OF FERRITIN: CPT | Performed by: INTERNAL MEDICINE

## 2020-01-01 PROCEDURE — 99214 OFFICE O/P EST MOD 30 MIN: CPT | Mod: 95 | Performed by: INTERNAL MEDICINE

## 2020-01-01 PROCEDURE — 82525 ASSAY OF COPPER: CPT | Performed by: INTERNAL MEDICINE

## 2020-01-01 PROCEDURE — 96413 CHEMO IV INFUSION 1 HR: CPT

## 2020-01-01 PROCEDURE — 80048 BASIC METABOLIC PNL TOTAL CA: CPT | Performed by: RADIOLOGY

## 2020-01-01 PROCEDURE — 84100 ASSAY OF PHOSPHORUS: CPT | Performed by: PATHOLOGY

## 2020-01-01 PROCEDURE — 97802 MEDICAL NUTRITION INDIV IN: CPT | Mod: TEL | Performed by: DIETITIAN, REGISTERED

## 2020-01-01 PROCEDURE — 79445 NUCLEAR RX INTRA-ARTERIAL: CPT | Mod: 26 | Performed by: RADIOLOGY

## 2020-01-01 PROCEDURE — 83615 LACTATE (LD) (LDH) ENZYME: CPT | Performed by: INTERNAL MEDICINE

## 2020-01-01 PROCEDURE — C2616 BRACHYTX, NON-STR,YTTRIUM-90: HCPCS | Performed by: RADIOLOGY

## 2020-01-01 PROCEDURE — 80053 COMPREHEN METABOLIC PANEL: CPT | Performed by: INTERNAL MEDICINE

## 2020-01-01 PROCEDURE — 85652 RBC SED RATE AUTOMATED: CPT | Performed by: INTERNAL MEDICINE

## 2020-01-01 PROCEDURE — 99215 OFFICE O/P EST HI 40 MIN: CPT | Performed by: NURSE PRACTITIONER

## 2020-01-01 PROCEDURE — 78800 RP LOCLZJ TUM 1 AREA 1 D IMG: CPT | Mod: 26

## 2020-01-01 PROCEDURE — 85025 COMPLETE CBC W/AUTO DIFF WBC: CPT | Performed by: INTERNAL MEDICINE

## 2020-01-01 PROCEDURE — 99000 SPECIMEN HANDLING OFFICE-LAB: CPT | Performed by: PATHOLOGY

## 2020-01-01 PROCEDURE — U0003 INFECTIOUS AGENT DETECTION BY NUCLEIC ACID (DNA OR RNA); SEVERE ACUTE RESPIRATORY SYNDROME CORONAVIRUS 2 (SARS-COV-2) (CORONAVIRUS DISEASE [COVID-19]), AMPLIFIED PROBE TECHNIQUE, MAKING USE OF HIGH THROUGHPUT TECHNOLOGIES AS DESCRIBED BY CMS-2020-01-R: HCPCS | Mod: 90 | Performed by: PATHOLOGY

## 2020-01-01 PROCEDURE — 86140 C-REACTIVE PROTEIN: CPT | Performed by: INTERNAL MEDICINE

## 2020-01-01 PROCEDURE — 78800 RP LOCLZJ TUM 1 AREA 1 D IMG: CPT | Mod: 26 | Performed by: RADIOLOGY

## 2020-01-01 PROCEDURE — 278N000001 HC RX 278: Performed by: RADIOLOGY

## 2020-01-01 PROCEDURE — 36415 COLL VENOUS BLD VENIPUNCTURE: CPT | Performed by: PATHOLOGY

## 2020-01-01 PROCEDURE — 84443 ASSAY THYROID STIM HORMONE: CPT | Performed by: INTERNAL MEDICINE

## 2020-01-01 PROCEDURE — 80061 LIPID PANEL: CPT | Performed by: INTERNAL MEDICINE

## 2020-01-01 PROCEDURE — G0008 ADMIN INFLUENZA VIRUS VAC: HCPCS | Performed by: PHYSICIAN ASSISTANT

## 2020-01-01 PROCEDURE — 36247 INS CATH ABD/L-EXT ART 3RD: CPT

## 2020-01-01 PROCEDURE — 84550 ASSAY OF BLOOD/URIC ACID: CPT | Performed by: INTERNAL MEDICINE

## 2020-01-01 PROCEDURE — 90662 IIV NO PRSV INCREASED AG IM: CPT | Performed by: PHYSICIAN ASSISTANT

## 2020-01-01 PROCEDURE — U0003 INFECTIOUS AGENT DETECTION BY NUCLEIC ACID (DNA OR RNA); SEVERE ACUTE RESPIRATORY SYNDROME CORONAVIRUS 2 (SARS-COV-2) (CORONAVIRUS DISEASE [COVID-19]), AMPLIFIED PROBE TECHNIQUE, MAKING USE OF HIGH THROUGHPUT TECHNOLOGIES AS DESCRIBED BY CMS-2020-01-R: HCPCS | Performed by: RADIOLOGY

## 2020-01-01 PROCEDURE — 83615 LACTATE (LD) (LDH) ENZYME: CPT | Performed by: PATHOLOGY

## 2020-01-01 PROCEDURE — 80050 GENERAL HEALTH PANEL: CPT | Performed by: INTERNAL MEDICINE

## 2020-01-01 PROCEDURE — 82627 DEHYDROEPIANDROSTERONE: CPT | Performed by: INTERNAL MEDICINE

## 2020-01-01 PROCEDURE — 37243 VASC EMBOLIZE/OCCLUDE ORGAN: CPT | Mod: GC | Performed by: RADIOLOGY

## 2020-01-01 PROCEDURE — 85730 THROMBOPLASTIN TIME PARTIAL: CPT | Performed by: RADIOLOGY

## 2020-01-01 PROCEDURE — 85027 COMPLETE CBC AUTOMATED: CPT | Performed by: RADIOLOGY

## 2020-01-01 PROCEDURE — 83090 ASSAY OF HOMOCYSTEINE: CPT | Performed by: INTERNAL MEDICINE

## 2020-01-01 RX ORDER — NALOXONE HYDROCHLORIDE 0.4 MG/ML
.1-.4 INJECTION, SOLUTION INTRAMUSCULAR; INTRAVENOUS; SUBCUTANEOUS
Status: DISCONTINUED | OUTPATIENT
Start: 2020-01-01 | End: 2020-01-01 | Stop reason: HOSPADM

## 2020-01-01 RX ORDER — LORAZEPAM 0.5 MG/1
0.5 TABLET ORAL EVERY 4 HOURS PRN
Qty: 30 TABLET | Refills: 3 | Status: SHIPPED | OUTPATIENT
Start: 2020-01-01 | End: 2020-01-01

## 2020-01-01 RX ORDER — ONDANSETRON 2 MG/ML
4 INJECTION INTRAMUSCULAR; INTRAVENOUS ONCE
Status: COMPLETED | OUTPATIENT
Start: 2020-01-01 | End: 2020-01-01

## 2020-01-01 RX ORDER — METHYLPREDNISOLONE 4 MG
4 TABLET, DOSE PACK ORAL 2 TIMES DAILY
Qty: 21 TABLET | Refills: 0 | Status: SHIPPED | OUTPATIENT
Start: 2020-01-01 | End: 2021-01-01

## 2020-01-01 RX ORDER — LORAZEPAM 2 MG/ML
0.5 INJECTION INTRAMUSCULAR EVERY 4 HOURS PRN
Status: CANCELLED
Start: 2020-01-01

## 2020-01-01 RX ORDER — NALOXONE HYDROCHLORIDE 0.4 MG/ML
.1-.4 INJECTION, SOLUTION INTRAMUSCULAR; INTRAVENOUS; SUBCUTANEOUS
Status: CANCELLED | OUTPATIENT
Start: 2020-01-01

## 2020-01-01 RX ORDER — ACETAMINOPHEN 325 MG/1
650 TABLET ORAL EVERY 4 HOURS PRN
Status: DISCONTINUED | OUTPATIENT
Start: 2020-01-01 | End: 2020-01-01 | Stop reason: HOSPADM

## 2020-01-01 RX ORDER — AMPICILLIN AND SULBACTAM 2; 1 G/1; G/1
3 INJECTION, POWDER, FOR SOLUTION INTRAMUSCULAR; INTRAVENOUS
Status: COMPLETED | OUTPATIENT
Start: 2020-01-01 | End: 2020-01-01

## 2020-01-01 RX ORDER — METHYLPREDNISOLONE SODIUM SUCCINATE 125 MG/2ML
125 INJECTION, POWDER, LYOPHILIZED, FOR SOLUTION INTRAMUSCULAR; INTRAVENOUS
Status: CANCELLED
Start: 2020-01-01

## 2020-01-01 RX ORDER — EPINEPHRINE 1 MG/ML
0.3 INJECTION, SOLUTION INTRAMUSCULAR; SUBCUTANEOUS EVERY 5 MIN PRN
Status: CANCELLED | OUTPATIENT
Start: 2020-01-01

## 2020-01-01 RX ORDER — HEPARIN SODIUM (PORCINE) LOCK FLUSH IV SOLN 100 UNIT/ML 100 UNIT/ML
5 SOLUTION INTRAVENOUS
Status: CANCELLED | OUTPATIENT
Start: 2020-01-01

## 2020-01-01 RX ORDER — DEXTROSE MONOHYDRATE 25 G/50ML
25-50 INJECTION, SOLUTION INTRAVENOUS
Status: CANCELLED | OUTPATIENT
Start: 2020-01-01

## 2020-01-01 RX ORDER — FLUMAZENIL 0.1 MG/ML
0.2 INJECTION, SOLUTION INTRAVENOUS
Status: DISCONTINUED | OUTPATIENT
Start: 2020-01-01 | End: 2020-01-01 | Stop reason: HOSPADM

## 2020-01-01 RX ORDER — HEPARIN SODIUM,PORCINE 10 UNIT/ML
5 VIAL (ML) INTRAVENOUS
Status: CANCELLED | OUTPATIENT
Start: 2020-01-01

## 2020-01-01 RX ORDER — DEXTROSE MONOHYDRATE 25 G/50ML
25-50 INJECTION, SOLUTION INTRAVENOUS
Status: DISCONTINUED | OUTPATIENT
Start: 2020-01-01 | End: 2020-01-01 | Stop reason: HOSPADM

## 2020-01-01 RX ORDER — CIPROFLOXACIN 500 MG/1
500 TABLET, FILM COATED ORAL DAILY
Qty: 14 TABLET | Refills: 0 | Status: SHIPPED | OUTPATIENT
Start: 2020-01-01 | End: 2020-01-01

## 2020-01-01 RX ORDER — METHYLPREDNISOLONE 4 MG
4 TABLET, DOSE PACK ORAL 2 TIMES DAILY
Qty: 21 TABLET | Refills: 0 | Status: SHIPPED | OUTPATIENT
Start: 2020-01-01 | End: 2020-01-01

## 2020-01-01 RX ORDER — DIPHENHYDRAMINE HYDROCHLORIDE 50 MG/ML
50 INJECTION INTRAMUSCULAR; INTRAVENOUS
Status: CANCELLED
Start: 2020-01-01

## 2020-01-01 RX ORDER — LIDOCAINE 40 MG/G
CREAM TOPICAL
Status: DISCONTINUED | OUTPATIENT
Start: 2020-01-01 | End: 2020-01-01 | Stop reason: HOSPADM

## 2020-01-01 RX ORDER — DOXEPIN 6 MG/1
6 TABLET, FILM COATED ORAL
Qty: 30 TABLET | Refills: 1 | Status: SHIPPED | OUTPATIENT
Start: 2020-01-01 | End: 2020-01-01

## 2020-01-01 RX ORDER — ALBUTEROL SULFATE 90 UG/1
1-2 AEROSOL, METERED RESPIRATORY (INHALATION)
Status: CANCELLED
Start: 2020-01-01

## 2020-01-01 RX ORDER — SODIUM CHLORIDE 9 MG/ML
INJECTION, SOLUTION INTRAVENOUS CONTINUOUS
Status: DISCONTINUED | OUTPATIENT
Start: 2020-01-01 | End: 2020-01-01 | Stop reason: HOSPADM

## 2020-01-01 RX ORDER — ALBUTEROL SULFATE 0.83 MG/ML
2.5 SOLUTION RESPIRATORY (INHALATION)
Status: CANCELLED | OUTPATIENT
Start: 2020-01-01

## 2020-01-01 RX ORDER — OXYCODONE HYDROCHLORIDE 5 MG/1
5-10 TABLET ORAL EVERY 6 HOURS PRN
Qty: 10 TABLET | Refills: 0 | Status: SHIPPED | OUTPATIENT
Start: 2020-01-01 | End: 2020-01-01

## 2020-01-01 RX ORDER — DOXEPIN HYDROCHLORIDE 10 MG/ML
6 SOLUTION ORAL AT BEDTIME
Qty: 18 ML | Refills: 1 | Status: SHIPPED | OUTPATIENT
Start: 2020-01-01 | End: 2020-01-01

## 2020-01-01 RX ORDER — HYDROMORPHONE HYDROCHLORIDE 2 MG/1
2-4 TABLET ORAL EVERY 4 HOURS PRN
Status: DISCONTINUED | OUTPATIENT
Start: 2020-01-01 | End: 2020-01-01 | Stop reason: HOSPADM

## 2020-01-01 RX ORDER — HEPARIN SODIUM 200 [USP'U]/100ML
1 INJECTION, SOLUTION INTRAVENOUS CONTINUOUS PRN
Status: DISCONTINUED | OUTPATIENT
Start: 2020-01-01 | End: 2020-01-01 | Stop reason: HOSPADM

## 2020-01-01 RX ORDER — SODIUM CHLORIDE 9 MG/ML
1000 INJECTION, SOLUTION INTRAVENOUS CONTINUOUS PRN
Status: CANCELLED
Start: 2020-01-01

## 2020-01-01 RX ORDER — NICOTINE POLACRILEX 4 MG
15-30 LOZENGE BUCCAL
Status: DISCONTINUED | OUTPATIENT
Start: 2020-01-01 | End: 2020-01-01 | Stop reason: HOSPADM

## 2020-01-01 RX ORDER — ZOLEDRONIC ACID 0.04 MG/ML
4 INJECTION, SOLUTION INTRAVENOUS ONCE
Status: CANCELLED | OUTPATIENT
Start: 2020-01-01 | End: 2020-01-01

## 2020-01-01 RX ORDER — ONDANSETRON 4 MG/1
4-8 TABLET, FILM COATED ORAL EVERY 6 HOURS PRN
Qty: 40 TABLET | Refills: 0 | Status: SHIPPED | OUTPATIENT
Start: 2020-01-01

## 2020-01-01 RX ORDER — PROCHLORPERAZINE MALEATE 10 MG
10 TABLET ORAL EVERY 6 HOURS PRN
Qty: 30 TABLET | Refills: 3 | Status: SHIPPED | OUTPATIENT
Start: 2020-01-01 | End: 2021-01-01

## 2020-01-01 RX ORDER — MEPERIDINE HYDROCHLORIDE 25 MG/ML
25 INJECTION INTRAMUSCULAR; INTRAVENOUS; SUBCUTANEOUS EVERY 30 MIN PRN
Status: CANCELLED | OUTPATIENT
Start: 2020-01-01

## 2020-01-01 RX ORDER — CALCIUM CARBONATE 500(1250)
1 TABLET ORAL 2 TIMES DAILY
Qty: 60 TABLET | Refills: 3 | Status: SHIPPED | OUTPATIENT
Start: 2020-01-01 | End: 2020-01-01

## 2020-01-01 RX ORDER — DOXEPIN HYDROCHLORIDE 10 MG/ML
6 SOLUTION ORAL AT BEDTIME
Qty: 18 ML | Refills: 1 | Status: SHIPPED | OUTPATIENT
Start: 2020-01-01

## 2020-01-01 RX ORDER — METHYLPREDNISOLONE 32 MG/1
TABLET ORAL
Qty: 2 TABLET | Refills: 0 | Status: SHIPPED | OUTPATIENT
Start: 2020-01-01 | End: 2021-01-01

## 2020-01-01 RX ORDER — AMOXICILLIN 250 MG
1 CAPSULE ORAL 2 TIMES DAILY PRN
Qty: 60 TABLET | Refills: 1 | Status: SHIPPED | OUTPATIENT
Start: 2020-01-01

## 2020-01-01 RX ORDER — METHYLPREDNISOLONE 32 MG/1
TABLET ORAL
Qty: 2 TABLET | Refills: 0 | Status: SHIPPED | OUTPATIENT
Start: 2020-01-01 | End: 2020-01-01

## 2020-01-01 RX ORDER — METHYLPREDNISOLONE 32 MG/1
TABLET ORAL
Qty: 2 TABLET | Refills: 0 | Status: ON HOLD | OUTPATIENT
Start: 2020-01-01 | End: 2020-01-01

## 2020-01-01 RX ORDER — ZOLEDRONIC ACID 0.04 MG/ML
4 INJECTION, SOLUTION INTRAVENOUS ONCE
Status: COMPLETED | OUTPATIENT
Start: 2020-01-01 | End: 2020-01-01

## 2020-01-01 RX ORDER — ONDANSETRON 4 MG/1
4-8 TABLET, FILM COATED ORAL EVERY 6 HOURS PRN
Qty: 40 TABLET | Refills: 0 | Status: SHIPPED | OUTPATIENT
Start: 2020-01-01 | End: 2020-01-01

## 2020-01-01 RX ORDER — FENTANYL CITRATE 50 UG/ML
25-50 INJECTION, SOLUTION INTRAMUSCULAR; INTRAVENOUS EVERY 5 MIN PRN
Status: DISCONTINUED | OUTPATIENT
Start: 2020-01-01 | End: 2020-01-01 | Stop reason: HOSPADM

## 2020-01-01 RX ORDER — HYDROMORPHONE HYDROCHLORIDE 2 MG/1
2 TABLET ORAL EVERY 6 HOURS PRN
Qty: 10 TABLET | Refills: 0 | Status: SHIPPED | OUTPATIENT
Start: 2020-01-01 | End: 2021-01-01

## 2020-01-01 RX ORDER — IODIXANOL 320 MG/ML
150 INJECTION, SOLUTION INTRAVASCULAR ONCE
Status: COMPLETED | OUTPATIENT
Start: 2020-01-01 | End: 2020-01-01

## 2020-01-01 RX ORDER — POLYETHYLENE GLYCOL 3350 17 G/17G
1 POWDER, FOR SOLUTION ORAL 2 TIMES DAILY
Qty: 255 G | Refills: 1 | Status: SHIPPED | OUTPATIENT
Start: 2020-01-01

## 2020-01-01 RX ORDER — PROCHLORPERAZINE MALEATE 10 MG
10 TABLET ORAL EVERY 6 HOURS PRN
Qty: 30 TABLET | Refills: 3 | Status: SHIPPED | OUTPATIENT
Start: 2020-01-01 | End: 2020-01-01

## 2020-01-01 RX ORDER — CALCIUM CARBONATE 500(1250)
1 TABLET ORAL 2 TIMES DAILY
Qty: 60 TABLET | Refills: 3 | Status: SHIPPED | OUTPATIENT
Start: 2020-01-01

## 2020-01-01 RX ORDER — ONDANSETRON 4 MG/1
4-8 TABLET, FILM COATED ORAL EVERY 6 HOURS PRN
Qty: 40 TABLET | Refills: 0 | Status: ON HOLD | OUTPATIENT
Start: 2020-01-01 | End: 2020-01-01

## 2020-01-01 RX ORDER — PANTOPRAZOLE SODIUM 40 MG/1
40 TABLET, DELAYED RELEASE ORAL DAILY
Qty: 30 TABLET | Refills: 0 | Status: SHIPPED | OUTPATIENT
Start: 2020-01-01 | End: 2020-01-01

## 2020-01-01 RX ORDER — LORAZEPAM 0.5 MG/1
0.5 TABLET ORAL EVERY 4 HOURS PRN
Qty: 30 TABLET | Refills: 3 | Status: SHIPPED | OUTPATIENT
Start: 2020-01-01

## 2020-01-01 RX ORDER — NICOTINE POLACRILEX 4 MG
15-30 LOZENGE BUCCAL
Status: CANCELLED | OUTPATIENT
Start: 2020-01-01

## 2020-01-01 RX ADMIN — MIDAZOLAM 1 MG: 1 INJECTION INTRAMUSCULAR; INTRAVENOUS at 08:34

## 2020-01-01 RX ADMIN — LIDOCAINE HYDROCHLORIDE 5 ML: 10 INJECTION, SOLUTION EPIDURAL; INFILTRATION; INTRACAUDAL; PERINEURAL at 08:58

## 2020-01-01 RX ADMIN — FENTANYL CITRATE 25 MCG: 50 INJECTION, SOLUTION INTRAMUSCULAR; INTRAVENOUS at 08:51

## 2020-01-01 RX ADMIN — HYDROCORTISONE SODIUM SUCCINATE 100 MG: 100 INJECTION, POWDER, FOR SOLUTION INTRAMUSCULAR; INTRAVENOUS at 07:32

## 2020-01-01 RX ADMIN — AMPICILLIN SODIUM AND SULBACTAM SODIUM 3 G: 2; 1 INJECTION, POWDER, FOR SOLUTION INTRAMUSCULAR; INTRAVENOUS at 07:33

## 2020-01-01 RX ADMIN — HEPARIN SODIUM 2 BAG: 200 INJECTION, SOLUTION INTRAVENOUS at 08:31

## 2020-01-01 RX ADMIN — FENTANYL CITRATE 50 MCG: 50 INJECTION, SOLUTION INTRAMUSCULAR; INTRAVENOUS at 08:34

## 2020-01-01 RX ADMIN — ZOLEDRONIC ACID 4 MG: 0.04 INJECTION, SOLUTION INTRAVENOUS at 12:43

## 2020-01-01 RX ADMIN — SODIUM CHLORIDE 300 MG: 9 INJECTION, SOLUTION INTRAVENOUS at 13:03

## 2020-01-01 RX ADMIN — HYDROCORTISONE SODIUM SUCCINATE 100 MG: 100 INJECTION, POWDER, FOR SOLUTION INTRAMUSCULAR; INTRAVENOUS at 08:26

## 2020-01-01 RX ADMIN — SODIUM CHLORIDE: 900 INJECTION INTRAVENOUS at 07:32

## 2020-01-01 RX ADMIN — FENTANYL CITRATE 25 MCG: 50 INJECTION, SOLUTION INTRAMUSCULAR; INTRAVENOUS at 08:56

## 2020-01-01 RX ADMIN — IODIXANOL 20 ML: 320 INJECTION, SOLUTION INTRAVASCULAR at 09:25

## 2020-01-01 RX ADMIN — SODIUM CHLORIDE 100 MG: 9 INJECTION, SOLUTION INTRAVENOUS at 16:00

## 2020-01-01 RX ADMIN — SODIUM CHLORIDE 250 ML: 9 INJECTION, SOLUTION INTRAVENOUS at 12:43

## 2020-01-01 RX ADMIN — GADOXETATE DISODIUM 10 ML: 181.43 INJECTION, SOLUTION INTRAVENOUS at 13:09

## 2020-01-01 RX ADMIN — SODIUM CHLORIDE 250 ML: 9 INJECTION, SOLUTION INTRAVENOUS at 15:19

## 2020-01-01 RX ADMIN — FENTANYL CITRATE 25 MCG: 50 INJECTION, SOLUTION INTRAMUSCULAR; INTRAVENOUS at 09:10

## 2020-01-01 RX ADMIN — SODIUM CHLORIDE 250 ML: 9 INJECTION, SOLUTION INTRAVENOUS at 12:58

## 2020-01-01 RX ADMIN — SODIUM CHLORIDE 40 ML: 9 INJECTION, SOLUTION INTRAVENOUS at 08:59

## 2020-01-01 RX ADMIN — MIDAZOLAM 0.5 MG: 1 INJECTION INTRAMUSCULAR; INTRAVENOUS at 08:51

## 2020-01-01 RX ADMIN — GADOXETATE DISODIUM 10 ML: 181.43 INJECTION, SOLUTION INTRAVENOUS at 08:58

## 2020-01-01 RX ADMIN — ONDANSETRON 4 MG: 2 INJECTION INTRAMUSCULAR; INTRAVENOUS at 08:47

## 2020-01-01 RX ADMIN — LIDOCAINE HYDROCHLORIDE 8 ML: 10 INJECTION, SOLUTION EPIDURAL; INFILTRATION; INTRACAUDAL; PERINEURAL at 09:09

## 2020-01-01 RX ADMIN — FENTANYL CITRATE 50 MCG: 50 INJECTION, SOLUTION INTRAMUSCULAR; INTRAVENOUS at 08:59

## 2020-01-01 RX ADMIN — FENTANYL CITRATE 50 MCG: 50 INJECTION, SOLUTION INTRAMUSCULAR; INTRAVENOUS at 08:46

## 2020-01-01 RX ADMIN — MIDAZOLAM 1 MG: 1 INJECTION INTRAMUSCULAR; INTRAVENOUS at 08:59

## 2020-01-01 RX ADMIN — SODIUM CHLORIDE 100 MG: 9 INJECTION, SOLUTION INTRAVENOUS at 12:17

## 2020-01-01 RX ADMIN — MIDAZOLAM 0.5 MG: 1 INJECTION INTRAMUSCULAR; INTRAVENOUS at 09:05

## 2020-01-01 RX ADMIN — IODIXANOL 20 ML: 320 INJECTION, SOLUTION INTRAVASCULAR at 09:18

## 2020-01-01 RX ADMIN — SODIUM CHLORIDE 300 MG: 9 INJECTION, SOLUTION INTRAVENOUS at 11:44

## 2020-01-01 RX ADMIN — SODIUM CHLORIDE 250 ML: 9 INJECTION, SOLUTION INTRAVENOUS at 11:44

## 2020-01-01 RX ADMIN — PANTOPRAZOLE SODIUM 40 MG: 40 INJECTION, POWDER, FOR SOLUTION INTRAVENOUS at 07:32

## 2020-01-01 RX ADMIN — AMPICILLIN SODIUM AND SULBACTAM SODIUM 3 G: 2; 1 INJECTION, POWDER, FOR SOLUTION INTRAMUSCULAR; INTRAVENOUS at 07:52

## 2020-01-01 RX ADMIN — ONDANSETRON 4 MG: 2 INJECTION INTRAMUSCULAR; INTRAVENOUS at 07:55

## 2020-01-01 RX ADMIN — FENTANYL CITRATE 25 MCG: 50 INJECTION, SOLUTION INTRAMUSCULAR; INTRAVENOUS at 09:05

## 2020-01-01 RX ADMIN — GADOXETATE DISODIUM 8.6 ML: 181.43 INJECTION, SOLUTION INTRAVENOUS at 08:59

## 2020-01-01 RX ADMIN — SODIUM CHLORIDE 100 MG: 9 INJECTION, SOLUTION INTRAVENOUS at 14:04

## 2020-01-01 RX ADMIN — SODIUM CHLORIDE 300 MG: 9 INJECTION, SOLUTION INTRAVENOUS at 15:20

## 2020-01-01 RX ADMIN — MIDAZOLAM 0.5 MG: 1 INJECTION INTRAMUSCULAR; INTRAVENOUS at 09:10

## 2020-01-01 RX ADMIN — MIDAZOLAM 0.5 MG: 1 INJECTION INTRAMUSCULAR; INTRAVENOUS at 08:56

## 2020-01-01 RX ADMIN — Medication 72.2 MCI.: at 13:00

## 2020-01-01 RX ADMIN — INFLUENZA A VIRUS A/MICHIGAN/45/2015 X-275 (H1N1) ANTIGEN (FORMALDEHYDE INACTIVATED), INFLUENZA A VIRUS A/SINGAPORE/INFIMH-16-0019/2016 IVR-186 (H3N2) ANTIGEN (FORMALDEHYDE INACTIVATED), INFLUENZA B VIRUS B/PHUKET/3073/2013 ANTIGEN (FORMALDEHYDE INACTIVATED), AND INFLUENZA B VIRUS B/MARYLAND/15/2016 BX-69A ANTIGEN (FORMALDEHYDE INACTIVATED) 0.7 ML: 60; 60; 60; 60 INJECTION, SUSPENSION INTRAMUSCULAR at 13:10

## 2020-01-01 RX ADMIN — MIDAZOLAM 1 MG: 1 INJECTION INTRAMUSCULAR; INTRAVENOUS at 08:46

## 2020-01-01 RX ADMIN — Medication 69 MCI.: at 09:58

## 2020-01-01 RX ADMIN — PANTOPRAZOLE SODIUM 40 MG: 40 INJECTION, POWDER, FOR SOLUTION INTRAVENOUS at 08:11

## 2020-01-01 RX ADMIN — SODIUM CHLORIDE 300 MG: 9 INJECTION, SOLUTION INTRAVENOUS at 13:17

## 2020-01-01 RX ADMIN — SODIUM CHLORIDE 100 MG: 9 INJECTION, SOLUTION INTRAVENOUS at 13:49

## 2020-01-01 ASSESSMENT — PAIN SCALES - GENERAL
PAINLEVEL: NO PAIN (1)
PAINLEVEL: NO PAIN (0)
PAINLEVEL: NO PAIN (0)

## 2020-01-01 ASSESSMENT — MIFFLIN-ST. JEOR
SCORE: 1681.54
SCORE: 1649.79

## 2020-01-07 ENCOUNTER — TELEPHONE (OUTPATIENT)
Dept: OPHTHALMOLOGY | Facility: CLINIC | Age: 75
End: 2020-01-07

## 2020-01-10 ENCOUNTER — PRE VISIT (OUTPATIENT)
Dept: ONCOLOGY | Facility: CLINIC | Age: 75
End: 2020-01-10

## 2020-01-15 ENCOUNTER — TELEPHONE (OUTPATIENT)
Dept: OPHTHALMOLOGY | Facility: CLINIC | Age: 75
End: 2020-01-15

## 2020-01-15 NOTE — TELEPHONE ENCOUNTER
Surgeries on 1/20/20 and 1/24/2020 with Dr. Mayra Gil have been cancelled per doctor. Patient will now be going to [place] for radiation, it is too large now to plaque. Patient aware that surgeries have been cancelled.    [phone]

## 2020-01-20 ENCOUNTER — TRANSFERRED RECORDS (OUTPATIENT)
Dept: SURGERY | Facility: CLINIC | Age: 75
End: 2020-01-20

## 2020-01-27 ENCOUNTER — OFFICE VISIT (OUTPATIENT)
Dept: ONCOLOGY | Facility: CLINIC | Age: 75
End: 2020-01-27
Attending: INTERNAL MEDICINE
Payer: COMMERCIAL

## 2020-01-27 DIAGNOSIS — C69.90 OCULAR MELANOMA, UNSPECIFIED LATERALITY (H): ICD-10-CM

## 2020-01-27 DIAGNOSIS — Z80.51 FAMILY HISTORY OF KIDNEY CANCER: ICD-10-CM

## 2020-01-27 DIAGNOSIS — Z80.42 FAMILY HISTORY OF PROSTATE CANCER: ICD-10-CM

## 2020-01-27 DIAGNOSIS — C69.90 OCULAR MELANOMA, UNSPECIFIED LATERALITY (H): Primary | ICD-10-CM

## 2020-01-27 PROCEDURE — 96040 ZZH GENETIC COUNSELING, EACH 30 MINUTES: CPT | Mod: ZF | Performed by: GENETIC COUNSELOR, MS

## 2020-01-27 PROCEDURE — 36415 COLL VENOUS BLD VENIPUNCTURE: CPT

## 2020-01-27 NOTE — LETTER
Cancer Risk Management  Program Locations    [facility]  [facility]  Mailing Address  Cancer Risk Management Program  [facility]  [address] [facility] [address]  [place] [address]    New patient appointments  [phone]  February 2, 2020    Shiva Mayberry  [address]  [facility] [address]      Dear Shiva,    It was a pleasure meeting with you at the Cancer Risk Management Program at the [facility] on 1/27/2020. Here is a copy of the progress note from your recent genetic counseling visit to the Cancer Risk Management Program. If you have any additional questions, please feel free to call.    Referring Provider: Jw Trent MD    Presenting Information:   I met with Shiva Mayberry today for genetic counseling at the Cancer Risk Management Program at the [facility] to discuss his personal and family history of cancer.  He is here today to review this history, cancer screening recommendations, and available genetic testing options.    Personal History:  Shiva is a 74 year old male. 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      Shiva reports that he has had 3 colonoscopies, approximately every 10 years. He reports no polyps. Shiva reported former minimal tobacco use in his 20s and alcohol use of 1 drink per week. He was a  in the past.    Family History: (Please see scanned pedigree for detailed family history information)  Siblings:  His sister was diagnosed with an abdominal cancer (he reports likely GI cancer) at age 57 and had two surgeries. She passed away at age 65.   His other sister is 82 years old with no known history of cancer.   Her son (Shiva's nephew) is 55 years old and was diagnosed with kidney cancer in his late 40s. He had one kidney  removed.  Maternal:  A maternal uncle was diagnosed with prostate cancer in his mid-late 60s and passed away at age 70.  He reports no other known cancers in his maternal relatives, although he has limited contact with them.   Paternal:  His father was diagnosed with prostate cancer in his 30s. He also had kidney disease and possibly kidney cancer. He passed away at age 41.   His father was an only child.    His paternal grandfather was diagnosed with either kidney or stomach cancer and passed away in his early to mid 50s.   His paternal grandmother passed away around age 30 due to tuberculosis.     He is not aware of any genetic testing in his relatives.     His maternal and paternal ancestry is unknown.      Discussion:    Galileo's personal and family history of cancer is suggestive of a hereditary cancer syndrome.    We reviewed the features of sporadic, familial, and hereditary cancers. In looking at Galileo's family history, it is possible that a cancer susceptibility gene is present due to his recent diagnosis of ocular melanoma and his family history of prostate cancer and kidney cancer.    Based on his diagnosis of ocular melanoma and family history of prostate cancer in his father in his 30s, we discussed the BRCA1 and BRCA2 genes. Mutations in these genes cause a condition known as Hereditary Breast and Ovarian Cancer syndrome (HBOC). Women with a mutation in either of these genes are at increased risk for breast and ovarian cancer. There is also an increased risk for a second primary breast cancer. Men with a mutation in either of these genes are at increased risk for breast and prostate cancer. Both women and men may also be at increased risk for pancreatic cancer and melanoma. There may also be an increased risk for ocular melanoma.     Additionally, based on his diagnosis and his family history of kidney cancer in his nephew and possibly in his father and paternal grandfather, we discussed the BAP1  gene. Mutations in the BAP1 gene cause BAP1-tumor predisposition syndrome, which is associated with an increased risk for skin findings including atypical Spitz tumors, cutaneous (skin) melanoma, and basal cell carcinoma, as well as other cancers, such as uveal (eye) melanoma, malignant mesothelioma, and kidney cancer.      A detailed handout regarding these genes and other genes in which mutations are associated with an increased risk for melanoma was provided to Galileo at the end of our appointment today and can be found in the after visit summary. Topics included: inheritance pattern, cancer risks, cancer screening recommendations, and also risks, benefits and limitations of testing.    Based on his personal and family history, we discussed the option of genetic testing of genes associated with ocular melanoma and other cancers.      We discussed that there are additional genes that could cause increased risk for melanoma, kidney, prostate, and GI cancers. As many of these genes present with overlapping features in a family and accurate cancer risk cannot always be established based upon the pedigree analysis alone, it would be reasonable for Galileo to consider panel genetic testing to analyze multiple genes at once.    We reviewed genetic testing options for the cancers seen in Galileo s family history that suggest a hereditary cause: an actionable high/moderate risk gene custom panel or an expanded custom panel. Galileo expressed an interest in learning as much information as possible from the testing. We discussed expanded panel options including the CancerNext panel, the CancerNext-Expanded panel, and the CustomNext-Cancer panel. He opted for an expanded CustomNext-Cancer panel (75 genes associated with an increased risk for multiple different types of cancer).    We discussed that many genes on this panel are associated with specific hereditary cancer syndromes and have published management guidelines.  Other genes have medical management guidelines available to screen for certain cancers. The remaining genes are associated with increased cancer risk and may allow us to make medical recommendations when mutations are identified. Some of the genes on this expanded panel may have limited information available about specific cancer risks and therefore, there may be limited screening guidelines available. He stated that he understood potential limitations of a larger gene panel.     Consent was obtained and genetic testing for CustomNext-Cancer was sent to [facility]. Turnaround time: approximately 3-4 weeks.    Medical Management: For Galileo, we reviewed that the information from genetic testing may determine:    additional cancer screening for which Galileo may qualify (i.e. more frequent dermatologic exams, more frequent colonoscopies, etc.),    and targeted chemotherapies if he were to develop certain cancers in the future (i.e. immunotherapy for individuals with Jackson syndrome, PARP inhibitors, etc.).     These recommendations will be discussed in detail once genetic testing is completed.     Plan:  1) Today Galileo elected to proceed with genetic testing via a CustomNext-Cancer panel (75 genes associated with an increased risk for multiple different types of cancer) offered by [facility].  2) This information should be available in 3-4 weeks.  3) Galileo will return to clinic to discuss the results.    Krupa Starkey MS, [facility]  Licensed Genetic Counselor  Office: [phone]

## 2020-01-27 NOTE — PATIENT INSTRUCTIONS
Assessing Cancer Risk  Only about 5-10% of cancers are thought to be due to an inherited cancer susceptibility gene.    These families often have:    Several people with the same or related types of cancer    Cancers diagnosed at a young age (before age 50)    Individuals with more than one primary cancer    Multiple generations of the family affected with cancer    Melanoma Genes  We each inherit two copies of every gene in our bodies: one from our mother, and one from our father.  Each gene has a specific job to do.  When a gene has a mistake or  mutation  in it, it does not work like it should.  When someone inherits a mutation in a melanoma gene, this increases their risk to develop melanoma above that of the general population risk (about 1.6% lifetime risk).  Inheriting a gene mutation does not guarantee that a person will develop melanoma or other associated cancers, but it does significantly increase his or her risk above that of the general population     Below is a list of genes commonly associated with melanoma. A single mutation in any of these genes increases the risk for melanoma, among other cancers.     BAP1  BAP1-tumor predisposition syndrome (BAP1-TPDS) is caused by mutations in one copy of the BAP1 gene. It is associated with an increased risk for different skin findings such as atypical Spitz tumors, cutaneous (skin) melanoma, and basal cell carcinoma, as well as other cancers, such as uveal (eye) melanoma, malignant mesothelioma, and kidney cancer. The exact lifetime risks for these cancers are unknown at this time. Other suspected cancer risks include breast cancer, cholangiocarcinoma, non-small cell lung cancer, meningioma and neuroendocrine tumors.       BRCA2  Mutations in one copy of the BRCA2 gene result in Hereditary Breast and Ovarian Cancer (HBOC) syndrome. Individuals with HBOC have elevated breast, ovarian, male breast, prostate, pancreatic and melanoma risks. Risks for both  cutaneous and ocular melanoma may be elevated in some families with a BRCA2 mutation, but not all.     General Population BRCA2 Mutation   Breast 12% 40-80%   Ovarian 1-2% 10-20%   Male Breast <1% 5-10%   Prostate 16% 20%   Pancreatic 2-3% Increased   Melanoma 1.6% Increased     CDK4  Individuals with a mutation in one copy of their CDK4 gene have familial cutaneous malignant melanoma. They are at an increased risk for developing atypical moles and melanoma, though the exact lifetime risk has not yet been defined.     CDKN2A  Mutations in one copy of the CDKN2A gene is associated with Familial Atypical Multiple Mole Melanoma (FAMMM) syndrome. It is characterized by multiple melanocytic nevi and a family history of melanoma. The likelihood of developing melanoma varies by geographic location, and has been estimated to be 50-75% in the [place]. CDKN2A are also associated with an increased risk for pancreatic cancer, among others.  The likelihood of developing pancreatic cancer has been estimated to be approximately 17%; in some families, though, this risk may be higher.    MITF  Individuals with the E318K mutation in the MITF gene have an elevated risk for melanoma. This risk has been estimated to be nearly 3 times higher than the general population risk. Other mutations in the MITF are not currently known to be associated with this increased risk.    PTEN  PTEN hamartoma tumor syndrome (PHTS) is caused by mutations in one copy of the PTEN gene. It is characterized by increased risk for breast, thyroid (especially follicular type), endometrial and other cancers as well as macrocephaly and intellectual disability. Melanoma and other cutaneous features such as trichilemmoma, facial papules and palmoplantar keratoses can be seen as well.     General Population PTEN Mutation   Breast 12% 60-80%   Thyroid 1-2% 20-40%   Endometrial 2-3% 20-30%   Colon 4.5% 10-20%   Renal 1-2% 10-30%   Melanoma 1.6% 2-6%       RB1  Retinoblastoma is caused by mutations in one copy of the RB1 gene. It is characterized by a significantly increased risk for malignant tumors in the retina, typically found in young children. Melanomas, osteosarcomas, and soft tissues sarcoma can also be seen in affected individuals, though the exact lifetime risks are unknown.     TP53  Mutations in one copy of the TP53 gene cause Li-Fraumeni syndrome (LFS). LFS is associated with the development of many different types of cancer (i.e. sarcomas, adrenocortical tumors, breast cancer) beginning in childhood or young adulthood. Increased risk for melanoma has also been reported in families with LFS.     Genetic Testing  Genetic testing involves a simple blood test and will look at the genetic information in genes for any harmful mutations that are associated with increased melanoma and other cancer risks. If possible, it is recommended that the person(s) who has had cancer be tested before other family members.  That person will give us the most useful information about whether or not a specific gene is associated with the cancer in the family.     Results  There are three possible results genetic testing:    Positive--a harmful mutation was identified    Negative--no mutation was identified    Variant of unknown significance--a variation in one of the genes was identified, but it is unclear how this impacts cancer risk in the family    Advantages and Disadvantages  There are advantages and disadvantages to genetic testing of these genes.    Advantages    May clarify your cancer risk    Can help you make medical decisions    May explain the cancers in your family    May give useful information to your family members (if you share your results)    Disadvantages    Possible negative emotional impact of learning about inherited cancer risk    Uncertainty in interpreting a negative test result in some situations    Possible genetic discrimination concerns (see  below)    Inheritance   Mutations in genes associated with melanoma are inherited in an autosomal dominant pattern.  This means that if a parent has a mutation, each of his or her children will have a 50% chance of inheriting that same mutation.  Therefore, each child--male or female--would have a 50% chance of being at increased risk for developing cancer.                                            Image obtained from Genetics Home Reference, 2013       Genetic Information Nondiscrimination Act (RODRIGUE)  RODRIGUE is a federal law that protects individuals from health insurance or employment discrimination based on a genetic test result alone.  Although rare, there are currently no legal protections in terms of life insurance, long term care, or disability insurances.  Visit the National Human Genome Research [place] at Genome.gov/76878005 to learn more.    Reducing Cancer Risk  If a harmful mutation is found in a cancer risk gene, there may be certain screens or preventative surgeries that can be offered. For example, screening recommendations for individuals with a melanoma-associated gene mutation may include dermatology exams, ophthalmology exams, or other screening, depending on the gene mutation identified. This information will be discussed after genetic testing is completed.    If no mutations are found on genetic testing, screening is then recommended based on personal and/or family history of cancer.     Both men and women with a gene mutation should talk to their doctor or genetic counselor about mutation specific screening as different mutations present with different risk and screening recommendations. In addition, the age at which to start screening may be modified based on family history of young cancer.    Questions to Think About Regarding Genetic Testing    What effect will the test result have on me and my relationship with my family members if I have an inherited gene mutation?  If I don t have a  gene mutation?    Should I share my test results, and how will my family react to this news, which may also affect them?    Are my children ready to learn new information that may one day affect their own health?    Resources  Melanoma Research Foundation https://www.melanoma.org/   AIM at Melanoma Foundation [email or web address]   American Cancer Society (ACS) cancer.org   National Cancer [place] (NCI) cancer.gov     Please call us if you have any questions or concerns.   Cancer Risk Management Program [phone] ([phone])  ? Bhavin Toure, MS, Located within [facility] [phone]  ? Andressa Batista, MS, Located within [facility]  [phone]  ? Ruby Godwinamy, MS, Located within [facility]  [phone]  ? Hanna Caleb, MS, Located within [facility]  [phone]  ? Adilia Deal, MS, Located within [facility] [phone]  ? Kacie Menchaca, MS, Located within [facility] [phone]  ? Krupa Lalito, MS, Located within [facility]  [phone]    References  1. Oleg E, Piyush P, Nemo K, Efrem A, Jeffry H. Hereditary Melanoma: Update on Syndromes and Management - Genetics of familial atypical multiple mole melanoma syndrome. Journal of the American Academy of Dermatology. 2016;74(3):395-407. doi:10.1016/j.jaad.2015.08.038.  2. Chacorta K, Tabitha R, Sloane CM, [employer] . Comprehensive review of BAP1 tumor predisposition syndrome with report of two new cases. Clinical genetics. 2016;89(3):285-294. doi:10.1111/cge.88002.  3. Agnes, Briseyda, Sekou, Mir, Rochelle, Fineberg, . . . Vamsi. (2013). Cancer Risks for BRCA1 and BRCA2 Mutation Carriers: Results From Prospective Analysis of EMBRACE. Journal Of The National Cancer [place], 105(11), 812-822.  4. Rosaura Boykin, Eleno Soni, Michelle Benjamin, Bess Mendoza, Latanya Chaney, oCsme Suarez, . . . Saleem Monahan. (1996). Germline mutations in the [id number] binding domain of CDK4 in familial melanoma. Nature Genetics, 12(1), 97-99.  5. RAJESH Ramirez, SAMI Phelps, RAJESH Marks, KYAW Mccloud, JOSE J Tripp., Spatz, A., . . . KYAW Eng (2007). BRCA1, BRCA2, TP53, and CDKN2A  germline mutations in patients with breast cancer and cutaneous melanoma. Familial Cancer, 6(4), 453-461.  6. SAMI Hancock, & HUNTER El. (2012). Genetic alterations of PTEN in human melanoma. Cellular and Molecular Life Sciences, 69(9), 0360-9662.  7. Cecelia Odom, Olga Laura, Katarzyna Sims, Arnaud Ash, Kassandra Mckeon, Salena Parrish, . . . Scarlett Latif. (2011). A SUMOylation-defective MITF germline mutation predisposes to melanoma and renal carcinoma. Nature, 480(5124), 94-8.  8. CHASE Sears., SAMI Dey, KYAW Huerta, & JOSE J De Oliveira. (2008). Identification of DIRZGM53 , JGXWL3V , FGFR3, and RB1 mutations in melanoma by inhibition of nonsense-mediated mRNA decay. Genes, Chromosomes and Cancer, 47(12), 5166-7897.  9. ISABEL Leach, & RAJESH Gómez (2015). Cowden syndrome: Recognizing and managing a not?so?rare hereditary cancer syndrome. Journal of Surgical Oncology, 111(1), 125-130.  10. Prevalence of the E318K MITF germline mutation in [place] melanoma patients: Associations with histological subtypes and family cancer history. (2013). Pigment Cell & Melanoma Research, 26(2), 259-262.  11. Oleg E, Piyush P, Nemo K, Efrem A, Jeffry H. Hereditary Melanoma: Update on Syndromes and Management - Genetics of familial atypical multiple mole melanoma syndrome. Journal of the American Academy of Dermatology. 2016;74(3):395-407. doi:10.1016/j.jaad.2015.08.038.  12. Mike RR, Kedar ED, Keven KG, et al. Prevalence of CDKN2A mutations in pancreatic cancer patients: implications for genetic counseling.  Journal of Human Genetics. 2011;19(4):472-478. doi:10.1038/ejhg.2010.198.  13. JUANCARLOS Hargrove Demenais, Florence, Goldstein, Alisa M., Carrie Brown Bishop, Julia Newton, Paillerets, Brigitte Bressac-de, . . . Latanya Chaney (2002). Geographical Variation in the Penetrance of CDKN2A Mutations for Melanoma. Journal of the National Cancer [place], 94(12), 894-903.  14. Reji,  Stephen Parry Gillanders, Ame De Oliveira, . . . Caleb. (2006). High-risk Melanoma Susceptibility Genes and Pancreatic Cancer, Neural System Tumors, and Uveal Melanoma across [employer]. Cancer Research., 66(55), 5779-3436.

## 2020-01-27 NOTE — PROGRESS NOTES
1/27/2020    Referring Provider: Jw Trent MD    Presenting Information:   I met with Galileo Mayberry today for genetic counseling at the Cancer Risk Management Program at the [facility] to discuss his personal and family history of cancer.  He is here today to review this history, cancer screening recommendations, and available genetic testing options.    Personal History:  Galileo is a 74 year old male. 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      Galileo reports that he has had 3 colonoscopies, approximately every 10 years. He reports no polyps. Galileo reported former minimal tobacco use in his 20s and alcohol use of 1 drink per week. He was a  in the past.    Family History: (Please see scanned pedigree for detailed family history information)  Siblings:  His sister was diagnosed with an abdominal cancer (he reports likely GI cancer) at age 57 and had two surgeries. She passed away at age 65.   His other sister is 82 years old with no known history of cancer.   Her son (Galileo's nephew) is 55 years old and was diagnosed with kidney cancer in his late 40s. He had one kidney removed.  Maternal:  A maternal uncle was diagnosed with prostate cancer in his mid-late 60s and passed away at age 70.  He reports no other known cancers in his maternal relatives, although he has limited contact with them.   Paternal:  His father was diagnosed with prostate cancer in his 30s. He also had kidney disease and possibly kidney cancer. He passed away at age 41.   His father was an only child.    His paternal grandfather was diagnosed with either kidney or stomach cancer and passed away in his early to mid 50s.   His paternal grandmother passed away around age 30 due to tuberculosis.     He is not aware of any genetic testing in his relatives.     His maternal and paternal ancestry is unknown.      Discussion:    Galileo's personal and family history of cancer is suggestive of a  hereditary cancer syndrome.    We reviewed the features of sporadic, familial, and hereditary cancers. In looking at Galileo's family history, it is possible that a cancer susceptibility gene is present due to his recent diagnosis of ocular melanoma and his family history of prostate cancer and kidney cancer.    Based on his diagnosis of ocular melanoma and family history of prostate cancer in his father in his 30s, we discussed the BRCA1 and BRCA2 genes. Mutations in these genes cause a condition known as Hereditary Breast and Ovarian Cancer syndrome (HBOC). Women with a mutation in either of these genes are at increased risk for breast and ovarian cancer. There is also an increased risk for a second primary breast cancer. Men with a mutation in either of these genes are at increased risk for breast and prostate cancer. Both women and men may also be at increased risk for pancreatic cancer and melanoma. There may also be an increased risk for ocular melanoma.     Additionally, based on his diagnosis and his family history of kidney cancer in his nephew and possibly in his father and paternal grandfather, we discussed the BAP1 gene. Mutations in the BAP1 gene cause BAP1-tumor predisposition syndrome, which is associated with an increased risk for skin findings including atypical Spitz tumors, cutaneous (skin) melanoma, and basal cell carcinoma, as well as other cancers, such as uveal (eye) melanoma, malignant mesothelioma, and kidney cancer.      A detailed handout regarding these genes and other genes in which mutations are associated with an increased risk for melanoma was provided to Galileo at the end of our appointment today and can be found in the after visit summary. Topics included: inheritance pattern, cancer risks, cancer screening recommendations, and also risks, benefits and limitations of testing.    Based on his personal and family history, we discussed the option of genetic testing of genes associated  with ocular melanoma and other cancers.      We discussed that there are additional genes that could cause increased risk for melanoma, kidney, prostate, and GI cancers. As many of these genes present with overlapping features in a family and accurate cancer risk cannot always be established based upon the pedigree analysis alone, it would be reasonable for Galileo to consider panel genetic testing to analyze multiple genes at once.    We reviewed genetic testing options for the cancers seen in Galileo s family history that suggest a hereditary cause: an actionable high/moderate risk gene custom panel or an expanded custom panel. Galileo expressed an interest in learning as much information as possible from the testing. We discussed expanded panel options including the CancerNext panel, the CancerNext-Expanded panel, and the CustomNext-Cancer panel. He opted for an expanded CustomNext-Cancer panel (75 genes associated with an increased risk for multiple different types of cancer).    We discussed that many genes on this panel are associated with specific hereditary cancer syndromes and have published management guidelines. Other genes have medical management guidelines available to screen for certain cancers. The remaining genes are associated with increased cancer risk and may allow us to make medical recommendations when mutations are identified. Some of the genes on this expanded panel may have limited information available about specific cancer risks and therefore, there may be limited screening guidelines available. He stated that he understood potential limitations of a larger gene panel.     Consent was obtained and genetic testing for CustomNext-Cancer was sent to [facility]. Turnaround time: approximately 3-4 weeks.    Medical Management: For Galileo, we reviewed that the information from genetic testing may determine:    additional cancer screening for which Galileo may qualify (i.e. more  frequent dermatologic exams, more frequent colonoscopies, etc.),    and targeted chemotherapies if he were to develop certain cancers in the future (i.e. immunotherapy for individuals with Jackson syndrome, PARP inhibitors, etc.).     These recommendations will be discussed in detail once genetic testing is completed.     Plan:  1) Today Galileo elected to proceed with genetic testing via a CustomNext-Cancer panel (75 genes associated with an increased risk for multiple different types of cancer) offered by [facility].  2) This information should be available in 3-4 weeks.  3) Galileo will return to clinic to discuss the results.    Face to face time: 60 minutes    Krupa Starkey MS, [facility]  Licensed Genetic Counselor  Office: [phone]

## 2020-01-27 NOTE — LETTER
RE: Galileo Mayberry  [address] [facility] [address]     Dear Colleague,    Thank you for referring your patient, Galileo Mayberry, to the [facility]. Please see a copy of my visit note below.    1/27/2020    Referring Provider: Jw Trent MD    Presenting Information:   I met with Galileo Mayberry today for genetic counseling at the Cancer Risk Management Program at the [facility] to discuss his personal and family history of cancer.  He is here today to review this history, cancer screening recommendations, and available genetic testing options.    Personal History:  Galileo is a 74 year old male. 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      Galileo reports that he has had 3 colonoscopies, approximately every 10 years. He reports no polyps. Galileo reported former minimal tobacco use in his 20s and alcohol use of 1 drink per week. He was a  in the past.    Family History: (Please see scanned pedigree for detailed family history information)  Siblings:  His sister was diagnosed with an abdominal cancer (he reports likely GI cancer) at age 57 and had two surgeries. She passed away at age 65.   His other sister is 82 years old with no known history of cancer.   Her son (Galileo's nephew) is 55 years old and was diagnosed with kidney cancer in his late 40s. He had one kidney removed.  Maternal:  A maternal uncle was diagnosed with prostate cancer in his mid-late 60s and passed away at age 70.  He reports no other known cancers in his maternal relatives, although he has limited contact with them.   Paternal:  His father was diagnosed with prostate cancer in his 30s. He also had kidney disease and possibly kidney cancer. He passed away at age 41.   His father was an only child.    His paternal grandfather was diagnosed with either kidney or stomach cancer and passed away in his early to mid 50s.   His paternal grandmother passed  away around age 30 due to tuberculosis.     He is not aware of any genetic testing in his relatives.     His maternal and paternal ancestry is unknown.      Discussion:    Galileo's personal and family history of cancer is suggestive of a hereditary cancer syndrome.    We reviewed the features of sporadic, familial, and hereditary cancers. In looking at Galileo's family history, it is possible that a cancer susceptibility gene is present due to his recent diagnosis of ocular melanoma and his family history of prostate cancer and kidney cancer.    Based on his diagnosis of ocular melanoma and family history of prostate cancer in his father in his 30s, we discussed the BRCA1 and BRCA2 genes. Mutations in these genes cause a condition known as Hereditary Breast and Ovarian Cancer syndrome (HBOC). Women with a mutation in either of these genes are at increased risk for breast and ovarian cancer. There is also an increased risk for a second primary breast cancer. Men with a mutation in either of these genes are at increased risk for breast and prostate cancer. Both women and men may also be at increased risk for pancreatic cancer and melanoma. There may also be an increased risk for ocular melanoma.     Additionally, based on his diagnosis and his family history of kidney cancer in his nephew and possibly in his father and paternal grandfather, we discussed the BAP1 gene. Mutations in the BAP1 gene cause BAP1-tumor predisposition syndrome, which is associated with an increased risk for skin findings including atypical Spitz tumors, cutaneous (skin) melanoma, and basal cell carcinoma, as well as other cancers, such as uveal (eye) melanoma, malignant mesothelioma, and kidney cancer.      A detailed handout regarding these genes and other genes in which mutations are associated with an increased risk for melanoma was provided to Galileo at the end of our appointment today and can be found in the after visit summary. Topics  included: inheritance pattern, cancer risks, cancer screening recommendations, and also risks, benefits and limitations of testing.    Based on his personal and family history, we discussed the option of genetic testing of genes associated with ocular melanoma and other cancers.      We discussed that there are additional genes that could cause increased risk for melanoma, kidney, prostate, and GI cancers. As many of these genes present with overlapping features in a family and accurate cancer risk cannot always be established based upon the pedigree analysis alone, it would be reasonable for Galileo to consider panel genetic testing to analyze multiple genes at once.    We reviewed genetic testing options for the cancers seen in Galileo s family history that suggest a hereditary cause: an actionable high/moderate risk gene custom panel or an expanded custom panel. Galileo expressed an interest in learning as much information as possible from the testing. We discussed expanded panel options including the CancerNext panel, the CancerNext-Expanded panel, and the CustomNext-Cancer panel. He opted for an expanded CustomNext-Cancer panel (75 genes associated with an increased risk for multiple different types of cancer).    We discussed that many genes on this panel are associated with specific hereditary cancer syndromes and have published management guidelines. Other genes have medical management guidelines available to screen for certain cancers. The remaining genes are associated with increased cancer risk and may allow us to make medical recommendations when mutations are identified. Some of the genes on this expanded panel may have limited information available about specific cancer risks and therefore, there may be limited screening guidelines available. He stated that he understood potential limitations of a larger gene panel.     Consent was obtained and genetic testing for CustomNext-Cancer was sent to [facility]. Turnaround time: approximately 3-4 weeks.    Medical Management: For Galileo, we reviewed that the information from genetic testing may determine:    additional cancer screening for which Galileo may qualify (i.e. more frequent dermatologic exams, more frequent colonoscopies, etc.),    and targeted chemotherapies if he were to develop certain cancers in the future (i.e. immunotherapy for individuals with Jackson syndrome, PARP inhibitors, etc.).     These recommendations will be discussed in detail once genetic testing is completed.     Plan:  1) Today Galileo elected to proceed with genetic testing via a CustomNext-Cancer panel (75 genes associated with an increased risk for multiple different types of cancer) offered by [facility].  2) This information should be available in 3-4 weeks.  3) Galileo will return to clinic to discuss the results.    Face to face time: 60 minutes    Krupa Starkey MS, [facility]  Licensed Genetic Counselor  Office: [phone]

## 2020-01-27 NOTE — NURSING NOTE
Chief Complaint   Patient presents with     Labs Only     genetics kit drawn via venipuncture by RN in lab     There were no vitals taken for this visit.    Labs collected and sent from left antecubital venipuncture in lab by RN. Pt tolerated well.     Claire Hughes RN

## 2020-01-28 LAB — MISCELLANEOUS TEST: NORMAL

## 2020-02-12 LAB — LAB SCANNED RESULT: NORMAL

## 2020-02-27 ENCOUNTER — ONCOLOGY VISIT (OUTPATIENT)
Dept: ONCOLOGY | Facility: CLINIC | Age: 75
End: 2020-02-27
Attending: INTERNAL MEDICINE
Payer: COMMERCIAL

## 2020-02-27 VITALS
DIASTOLIC BLOOD PRESSURE: 72 MMHG | TEMPERATURE: 98 F | SYSTOLIC BLOOD PRESSURE: 114 MMHG | OXYGEN SATURATION: 96 % | WEIGHT: 218.9 LBS | RESPIRATION RATE: 16 BRPM | BODY MASS INDEX: 30.65 KG/M2 | HEART RATE: 96 BPM | HEIGHT: 71 IN

## 2020-02-27 DIAGNOSIS — C69.90 OCULAR MELANOMA, UNSPECIFIED LATERALITY (H): Primary | ICD-10-CM

## 2020-02-27 PROCEDURE — 99214 OFFICE O/P EST MOD 30 MIN: CPT | Mod: ZP | Performed by: INTERNAL MEDICINE

## 2020-02-27 PROCEDURE — G0463 HOSPITAL OUTPT CLINIC VISIT: HCPCS | Mod: ZF

## 2020-02-27 RX ORDER — PREDNISOLONE ACETATE 10 MG/ML
1 SUSPENSION/ DROPS OPHTHALMIC 4 TIMES DAILY
COMMUNITY
Start: 2020-02-14 | End: 2020-06-26

## 2020-02-27 RX ORDER — NEOMYCIN SULFATE, POLYMYXIN B SULFATE, AND DEXAMETHASONE 3.5; 10000; 1 MG/G; [USP'U]/G; MG/G
OINTMENT OPHTHALMIC 3 TIMES DAILY
COMMUNITY
Start: 2020-02-14 | End: 2020-06-26

## 2020-02-27 RX ORDER — ATROPINE SULFATE 10 MG/ML
1 SOLUTION/ DROPS OPHTHALMIC DAILY
COMMUNITY
Start: 2020-02-14 | End: 2020-06-26

## 2020-02-27 ASSESSMENT — MIFFLIN-ST. JEOR: SCORE: 1737.35

## 2020-02-27 ASSESSMENT — PAIN SCALES - GENERAL: PAINLEVEL: NO PAIN (0)

## 2020-02-27 NOTE — LETTER
2/27/2020       RE: Galileo Mayberry  [address]  [place] [address]     Dear Colleague,    Thank you for referring your patient, Galileo Mayberry, to the [facility]. Please see a copy of my visit note below.    [facility]  MEDICAL ONCOLOGY PROGRESS NOTE  Feb 27, 2020    CHIEF COMPLAINT: ocular melanoma.    Oncologic History:  1. 11/2019, he is diagnosed with ocular melanoma, after a choroidal lesion was found in his Left eye.  Patient reports over the last several months he developed new floaters/spots in his L eye.  2. 11/25/2019, he was seen by ophthalmology who noted a left eye, elevated bilobed lesion, size 6.28mm x 18.31(T) x 17.04(L).   3. 12/14/19, CT-CAP showed no evidence of metastatic disease.  4. 1/10/2020: Patient referred to [facility]. B-scan ultrasound of left eye showed elevated bilobed lesion measuring 7.2-7.3 height with a base of 21.4 x 22.5 mm. Low to medium reflectivity. Difficult measurements due to location. Ciliary body involvement was noted. Ultrasound basal dimension measurement included subretinal fluid, and clinical measurement had to be done by transillumination due to anterior tumor location.  5. 1/15/2020, visual acuity right eye 20/20 -1, left eye 20/25 +2 nh. Visual fields counting fingers full bilaterally. Clinical fundus exam of left eye revealed choroidal/ciliary body malignant melanoma with basal measurement of 22 x 21 mm and height of 7.5 mm. Located in the Inferior left eye from 4:30-7:30, 15 mm to disc, 15 mm to fovea, +subretinal fluid, bilobed. The tumor without fluid appears to be 1-2 mm smaller in basal dimension (20 x 19 mm)  6. 2/10/2020, Iodine-125 24 mm plaque placement delivering 85 Gy to an 8 mm height.      HISTORY OF PRESENT ILLNESS  Mr. Mayberry is a 75 year old man with a history of uveal melanoma. He returns today for evaluation following plaque brachytherapy on 2/10/2020 at [facility]. He has done well and is  healing up from the procedure. He notes some residual blurriness in the left eye. He notes it helps to close the eye for clearer vision, but then depth perception suffers.    Currently he feels well and denies any systemic symptoms. No nausea, vomiting, abdominal pain, weight loss, diarrhea. No fevers or chills. ECOG performance status is 0.      Current Outpatient Medications   Medication Sig Dispense Refill     acetaminophen (TYLENOL) 500 MG tablet Take 1,000 mg by mouth       atropine 1 % ophthalmic solution Place 1 drop Into the left eye daily 1       Cholecalciferol (VITAMIN D3) 25 MCG (1000 UT) CAPS Takes 3 daily       diclofenac (VOLTAREN) 1 % topical gel        finasteride (PROSCAR) 5 MG tablet Take 5 mg by mouth       methylphenidate (RITALIN) 10 MG tablet as needed       neomycin-polymyxin-dexamethasone (MAXITROL) 3.5-36906-1.1 ophthalmic ointment 3 times daily       prednisoLONE acetate (PRED FORTE) 1 % ophthalmic suspension Place 1 drop Into the left eye 4 times daily       tamsulosin (FLOMAX) 0.4 MG capsule Take 0.4 mg by mouth       Testosterone 1.62 % GEL        amoxicillin (AMOXIL) 500 MG capsule        methylPREDNISolone (MEDROL) 32 MG tablet Take 1 tab 12 hours before scan.  Take the second tab 2 hours before scan. (Patient not taking: Reported on 2/27/2020) 2 tablet 0       REVIEW OF SYSTEMS  12-point ROS negative except as in HPI    Past Medical History:   Diagnosis Date     Degenerative joint disease      Metastatic melanoma (H)     L eye     Nonsenile cataract      Past Surgical History:   Procedure Laterality Date     ------------OTHER-------------  2014    back surgery L4/L5      ------------OTHER-------------      knee surgery both 1961 Right, 1971 Left knee     AS REMOVAL OF KIDNEY STONE  2015     JOINT REPLACEMENT  2017    Both kneses replaced (2017 and 2018)     ROTATOR CUFF REPAIR RT/LT Right 2016     TURP  2000     Family History   Problem Relation Age of Onset     Glaucoma Mother       "Prostate Cancer Father      Cancer Sister      Macular Degeneration No family hx of        PHYSICAL EXAMINATION  Ht 1.799 m (5' 10.83\")   BMI 31.52 kg/m    Wt Readings from Last 3 Encounters:   12/19/19 102 kg (224 lb 14.4 oz)   12/12/19 102.8 kg (226 lb 9.6 oz)     Gen: Appears well, no distress  HEENT: no scleral icterus, the sclera on the left is injected and pupil is dilated.  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 Mayberry. He underwent plaque brachytherapy on 2/10 and he is doing well. He had FNA biopsy and [facility] testing was sent and is currently pending.    We will plan to see back in about 1 month with MRI liver for restaging purposes. We will also review the [employer] risk score and plan to discuss adjuvant therapy recommendations.    We discussed today that he was a  for the . Given known increase in ionizing radiation exposure due to cosmic rays, this very well could have been the cause of his melanoma.     Multiple questions answered to patient's apparent satisfaction.    Jw Reyes M.D.   of Medicine  Hematology, Oncology and Transplantation  "

## 2020-02-27 NOTE — NURSING NOTE
"Oncology Rooming Note    February 27, 2020 10:24 AM   Galileo Mayberry is a 75 year old male who presents for:    Chief Complaint   Patient presents with     Oncology Clinic Visit     RETURN VISIT; CHOROIDAL CARCINOMA; VITALS TAKEN      Initial Vitals: /72   Pulse 96   Temp 98  F (36.7  C) (Oral)   Resp 16   Ht 1.799 m (5' 10.83\")   Wt 99.3 kg (218 lb 14.4 oz)   SpO2 96%   BMI 30.68 kg/m   Estimated body mass index is 30.68 kg/m  as calculated from the following:    Height as of this encounter: 1.799 m (5' 10.83\").    Weight as of this encounter: 99.3 kg (218 lb 14.4 oz). Body surface area is 2.23 meters squared.  No Pain (0) Comment: Data Unavailable   No LMP for male patient.  Allergies reviewed: Yes  Medications reviewed: Yes    Medications: Medication refills not needed today.  Pharmacy name entered into EPIC:    [facility] - [place], [place] - [id number] - [facility] [address] [address]    Clinical concerns: No new concerns today      Partner Violence Screening Completed: Yes   Oncology Distress Screening Completed: Yes     Sari Rivera              "

## 2020-02-27 NOTE — PROGRESS NOTES
[facility]  MEDICAL ONCOLOGY PROGRESS NOTE  Feb 27, 2020    CHIEF COMPLAINT: ocular melanoma.    Oncologic History:  1. 11/2019, he is diagnosed with ocular melanoma, after a choroidal lesion was found in his Left eye.  Patient reports over the last several months he developed new floaters/spots in his L eye.  2. 11/25/2019, he was seen by ophthalmology who noted a left eye, elevated bilobed lesion, size 6.28mm x 18.31(T) x 17.04(L).   3. 12/14/19, CT-CAP showed no evidence of metastatic disease.  4. 1/10/2020: Patient referred to [facility]. B-scan ultrasound of left eye showed elevated bilobed lesion measuring 7.2-7.3 height with a base of 21.4 x 22.5 mm. Low to medium reflectivity. Difficult measurements due to location. Ciliary body involvement was noted. Ultrasound basal dimension measurement included subretinal fluid, and clinical measurement had to be done by transillumination due to anterior tumor location.  5. 1/15/2020, visual acuity right eye 20/20 -1, left eye 20/25 +2 nh. Visual fields counting fingers full bilaterally. Clinical fundus exam of left eye revealed choroidal/ciliary body malignant melanoma with basal measurement of 22 x 21 mm and height of 7.5 mm. Located in the Inferior left eye from 4:30-7:30, 15 mm to disc, 15 mm to fovea, +subretinal fluid, bilobed. The tumor without fluid appears to be 1-2 mm smaller in basal dimension (20 x 19 mm)  6. 2/10/2020, Iodine-125 24 mm plaque placement delivering 85 Gy to an 8 mm height.      HISTORY OF PRESENT ILLNESS  Mr. Mayberry is a 75 year old man with a history of uveal melanoma. He returns today for evaluation following plaque brachytherapy on 2/10/2020 at [facility]. He has done well and is healing up from the procedure. He notes some residual blurriness in the left eye. He notes it helps to close the eye for clearer vision, but then depth perception suffers.    Currently he feels well and denies any systemic symptoms. No nausea,  "vomiting, abdominal pain, weight loss, diarrhea. No fevers or chills. ECOG performance status is 0.      Current Outpatient Medications   Medication Sig Dispense Refill     acetaminophen (TYLENOL) 500 MG tablet Take 1,000 mg by mouth       atropine 1 % ophthalmic solution Place 1 drop Into the left eye daily 1       Cholecalciferol (VITAMIN D3) 25 MCG (1000 UT) CAPS Takes 3 daily       diclofenac (VOLTAREN) 1 % topical gel        finasteride (PROSCAR) 5 MG tablet Take 5 mg by mouth       methylphenidate (RITALIN) 10 MG tablet as needed       neomycin-polymyxin-dexamethasone (MAXITROL) 3.5-44002-2.1 ophthalmic ointment 3 times daily       prednisoLONE acetate (PRED FORTE) 1 % ophthalmic suspension Place 1 drop Into the left eye 4 times daily       tamsulosin (FLOMAX) 0.4 MG capsule Take 0.4 mg by mouth       Testosterone 1.62 % GEL        amoxicillin (AMOXIL) 500 MG capsule        methylPREDNISolone (MEDROL) 32 MG tablet Take 1 tab 12 hours before scan.  Take the second tab 2 hours before scan. (Patient not taking: Reported on 2/27/2020) 2 tablet 0       REVIEW OF SYSTEMS  12-point ROS negative except as in HPI    Past Medical History:   Diagnosis Date     Degenerative joint disease      Metastatic melanoma (H)     L eye     Nonsenile cataract      Past Surgical History:   Procedure Laterality Date     ------------OTHER-------------  2014    back surgery L4/L5      ------------OTHER-------------      knee surgery both 1961 Right, 1971 Left knee     AS REMOVAL OF KIDNEY STONE  2015     JOINT REPLACEMENT  2017    Both kneses replaced (2017 and 2018)     ROTATOR CUFF REPAIR RT/LT Right 2016     TURP  2000     Family History   Problem Relation Age of Onset     Glaucoma Mother      Prostate Cancer Father      Cancer Sister      Macular Degeneration No family hx of        PHYSICAL EXAMINATION  Ht 1.799 m (5' 10.83\")   BMI 31.52 kg/m    Wt Readings from Last 3 Encounters:   12/19/19 102 kg (224 lb 14.4 oz)   12/12/19 102.8 " kg (226 lb 9.6 oz)     Gen: Appears well, no distress  HEENT: no scleral icterus, the sclera on the left is injected and pupil is dilated.  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 Mayberry. He underwent plaque brachytherapy on 2/10 and he is doing well. He had FNA biopsy and [employer] testing was sent and is currently pending.    We will plan to see back in about 1 month with MRI liver for restaging purposes. We will also review the [employer] risk score and plan to discuss adjuvant therapy recommendations.    We discussed today that he was a  for the . Given known increase in ionizing radiation exposure due to cosmic rays, this very well could have been the cause of his melanoma.     Multiple questions answered to patient's apparent satisfaction.    Jw Reyes M.D.   of Medicine  Hematology, Oncology and Transplantation

## 2020-03-01 ENCOUNTER — HEALTH MAINTENANCE LETTER (OUTPATIENT)
Age: 75
End: 2020-03-01

## 2020-03-04 ENCOUNTER — OFFICE VISIT (OUTPATIENT)
Dept: ONCOLOGY | Facility: CLINIC | Age: 75
End: 2020-03-04
Attending: GENETIC COUNSELOR, MS
Payer: COMMERCIAL

## 2020-03-04 DIAGNOSIS — C69.90 OCULAR MELANOMA, UNSPECIFIED LATERALITY (H): Primary | ICD-10-CM

## 2020-03-04 DIAGNOSIS — Z80.42 FAMILY HISTORY OF PROSTATE CANCER: ICD-10-CM

## 2020-03-04 DIAGNOSIS — Z80.51 FAMILY HISTORY OF KIDNEY CANCER: ICD-10-CM

## 2020-03-04 PROCEDURE — 40000072 ZZH STATISTIC GENETIC COUNSELING, < 16 MIN: Mod: ZF | Performed by: GENETIC COUNSELOR, MS

## 2020-03-04 NOTE — Clinical Note
Please send copy of letter to patient with test results. Please enclose test results: Send outs misc test [[id number]] (Order [id number])

## 2020-03-04 NOTE — LETTER
Cancer Risk Management  Program [facility]  [facility] Cancer [facility] Cancer [facility]  Mailing Address  Cancer Risk Management Program  [facility]  [address]  [place] [address]    New patient appointments  [phone]  April 19, 2020    Shiva Mayberry  [address]  [place] [address]      Dear Shiva,    It was a pleasure meeting with you at the Cancer Risk Management Program at the [facility] on 3/4/2020. Here is a copy of the progress note from your recent genetic counseling visit to the Cancer Risk Management Program. If you have any additional questions, please feel free to call.    Referring Provider: Jw Trent MD    Presenting Information:  Shiva Mayberry returned to the Cancer Risk Management Program at the [facility] to discuss his genetic testing results. His blood was drawn on 1/27/2020.  A CustomNext-Cancer panel (75 genes associated with an increased risk for multiple different types of cancer) was ordered from [employer]. This testing was done because of his personal and family history of cancer.    Genetic Testing Result: NEGATIVE  Shiva is negative for mutations in the 75 genes analyzed: AIP, ALK, APC, ROSY, AXIN2, BAP1, BARD1, BLM, BMPR1A, BRCA1, BRCA2, BRIP1, CDC73, CDH1, CDK4, CDKN1B, CDKN2A, CHEK2, CTNNA1, DICER1, FANCC, FH, FLCN, GALNT12, HOXB13, KIT, MAX, MEN1, MET, MLH1, MRE11A, MSH2, MSH3, MSH6, MUTYH, NBN, NF1, NF2, NTHL1, PALB2, PDGFRA, PHOX2B, PMS2, POLD1, POLE, POT1, DMHRI0B, PTCH1, PTEN, RAD50, RAD51C, RAD51D, RB1, RET, SDHA, SDHAF2, SDHB, SDHC, SDHD, SMAD4, SMARCA4, SMARCB1, SMARCE1, STK11, SUFU, ZIHU310, TP53, TSC1, TSC2, VHL and XRCC2 (sequencing and deletion/duplication); EGFR and MITF (sequencing only); EPCAM and GREM1 (deletion/duplication  only).      Interpretation:  We discussed several different interpretations of this negative test result.    1. One explanation may be that there is a different gene or combination of genes and environment that are associated with the cancers in this family.  2. Another explanation may be that his relatives did have a mutation in one of these genes and he did not inherit it.  3. There is also a small possibility that there is a mutation in one of these genes, and the testing laboratory could not find it with their current testing methods.       Screening:  Based on this negative test result, it is important for Galileo and his relatives to refer back to the family history for appropriate cancer screening.      He should continue with his ocular melanoma treatments as recommended by his oncology team. He should also continue with his colonoscopies as recommended based on his personal findings. Other population cancer screening options, such as those recommended by the American Cancer Society and the National Comprehensive Cancer Network (NCCN), are also appropriate for Galileo and his family. These screening recommendations may change if there are changes to Galileo's personal and/or family history of cancer. Final screening recommendations should be made by each individual's managing physician.      His daughters should discuss the family history of cancers with their physicians.     Inheritance:  We reviewed the autosomal dominant inheritance of mutations in these genes. We discussed that Galileo cannot/did not pass on an identifiable mutation in these genes to his children based on this test result. Mutations in these genes do not skip generations.      Additional Testing Considerations:  Although Galileo's genetic testing result was negative, other relatives may still carry a gene mutation associated with an increased risk for cancer. Genetic counseling is recommended for his nephew who was diagnosed with kidney  cancer in his late 40s to discuss genetic testing options. Galileo had previously reported that his sister was diagnosed with gastrointestinal cancer. He reports today that he believes this may have actually been an ovarian cancer. Based on this and his father's history of prostate cancer in his 30s, Galileo's other sister and nieces/nephews could also consider meeting with a genetic counselor to discuss genetic testing options. If any of these relatives do pursue genetic testing, Galileo is encouraged to contact me so that we may review the impact of their test results on him.    Summary:  We do not have an explanation for Galileo's personal or family history of cancer. While no genetic changes were identified, Galileo may still be at risk for certain cancers due to family history, environmental factors, or other genetic causes not identified by this test.  Because of that, it is important that he continue with cancer screening based on his personal and family history as discussed above.    Genetic testing is rapidly advancing, and new cancer susceptibility genes will most likely be identified in the future. Therefore, I encouraged Galileo to contact me annually or if there are changes in his personal or family history. This may change how we assess his cancer risk, screening, and the testing we would offer.    Plan:  1. I provided Galileo with a copy of his test results today and a handout summarizing negative genetic test results (see after visit summary).  2. He plans to follow-up with his physicians.  3. He should contact me annually, or sooner if his family history changes.    If Galileo has any further questions, I encouraged him to contact me at [phone].    Krupa Starkey MS, [facility]  Licensed Genetic Counselor  Office: [phone]

## 2020-03-04 NOTE — PATIENT INSTRUCTIONS
Negative Genetic Test Result    Genetic Testing  You had a blood test that looked at the genetic information in one or more genes associated with increased cancer risk.  The testing looked for any harmful changes that would stop this particular gene from working like it should. If an individual does not have any harmful changes or variants of unknown significance found from their blood test, their genetic test result is reported as negative.       Results  The genetic test did not identify any pathogenic (harmful) changes in the genes that were tested. There are several possible explanations for a negative test result. Without knowing the gene mutation in your family, the cause of the cancer in you or your relatives is still unknown. Your genetic counselor can help interpret the result for you and your relatives. In this case, there are several reasons that may explain the negative test result:    There may be a gene mutation in the family that you did not inherit.     You may have a gene mutation in a different gene that was not included in the test, or has not yet been discovered.     The cancers in you or your family may be due to a combination of genetic factors and environment (multifactorial/familial).    The cancers in you or your family may be sporadic/random cancers.    There is very small chance that a mutation was not found by current testing methods.  As testing technology evolves over time, it may still be possible to identify a mutation in a gene that was not found on this test.    It is important to note which genes were included in your test. A list of these genes can be found on your test result.    Screening Recommendations  Due to this negative test result, cancer screening recommendations should be based on your personal and family history. This may include increased cancer screening for you and/or your family members. Your genetic counselor and health care provider can help make  appropriate recommendations.      Please call us if you have any questions or concerns.   Cancer Risk Management Program [phone] ([phone])  ? Bhavin Toure, MS, [facility] [phone]  ? Andressa Batista, MS, [facility]  [phone]  ? Ruby Ferreira, MS, [facility]  [phone]  ? Hanna Ellis, MS, [facility]  [phone]  ? Adilia Deal, MS, [facility] [phone]  ? Kacie Menchaca, MS, [facility] [phone]  ? Krupa Starkey, MS, [facility]  [phone]

## 2020-03-18 ENCOUNTER — TRANSFERRED RECORDS (OUTPATIENT)
Dept: HEALTH INFORMATION MANAGEMENT | Facility: CLINIC | Age: 75
End: 2020-03-18

## 2020-03-20 ENCOUNTER — ANCILLARY PROCEDURE (OUTPATIENT)
Dept: MRI IMAGING | Facility: CLINIC | Age: 75
End: 2020-03-20
Attending: INTERNAL MEDICINE
Payer: COMMERCIAL

## 2020-03-20 DIAGNOSIS — C69.90 OCULAR MELANOMA, UNSPECIFIED LATERALITY (H): ICD-10-CM

## 2020-03-20 DIAGNOSIS — C69.32 MALIGNANT MELANOMA OF CHOROID OF LEFT EYE (H): ICD-10-CM

## 2020-03-20 LAB
ALBUMIN SERPL-MCNC: 3.8 G/DL (ref 3.4–5)
ALP SERPL-CCNC: 53 U/L (ref 40–150)
ALT SERPL W P-5'-P-CCNC: 19 U/L (ref 0–70)
ANION GAP SERPL CALCULATED.3IONS-SCNC: 4 MMOL/L (ref 3–14)
AST SERPL W P-5'-P-CCNC: 10 U/L (ref 0–45)
BASOPHILS # BLD AUTO: 0 10E9/L (ref 0–0.2)
BASOPHILS NFR BLD AUTO: 0.2 %
BILIRUB SERPL-MCNC: 0.5 MG/DL (ref 0.2–1.3)
BUN SERPL-MCNC: 22 MG/DL (ref 7–30)
CALCIUM SERPL-MCNC: 8.9 MG/DL (ref 8.5–10.1)
CHLORIDE SERPL-SCNC: 107 MMOL/L (ref 94–109)
CO2 SERPL-SCNC: 29 MMOL/L (ref 20–32)
CREAT SERPL-MCNC: 1.05 MG/DL (ref 0.66–1.25)
DIFFERENTIAL METHOD BLD: NORMAL
EOSINOPHIL # BLD AUTO: 0.1 10E9/L (ref 0–0.7)
EOSINOPHIL NFR BLD AUTO: 0.7 %
ERYTHROCYTE [DISTWIDTH] IN BLOOD BY AUTOMATED COUNT: 13.2 % (ref 10–15)
GFR SERPL CREATININE-BSD FRML MDRD: 68 ML/MIN/{1.73_M2}
GLUCOSE SERPL-MCNC: 89 MG/DL (ref 70–99)
HCT VFR BLD AUTO: 42.9 % (ref 40–53)
HGB BLD-MCNC: 14.6 G/DL (ref 13.3–17.7)
IMM GRANULOCYTES # BLD: 0 10E9/L (ref 0–0.4)
IMM GRANULOCYTES NFR BLD: 0.5 %
LYMPHOCYTES # BLD AUTO: 1.8 10E9/L (ref 0.8–5.3)
LYMPHOCYTES NFR BLD AUTO: 21.2 %
MCH RBC QN AUTO: 30.2 PG (ref 26.5–33)
MCHC RBC AUTO-ENTMCNC: 34 G/DL (ref 31.5–36.5)
MCV RBC AUTO: 89 FL (ref 78–100)
MONOCYTES # BLD AUTO: 0.6 10E9/L (ref 0–1.3)
MONOCYTES NFR BLD AUTO: 7.3 %
NEUTROPHILS # BLD AUTO: 5.8 10E9/L (ref 1.6–8.3)
NEUTROPHILS NFR BLD AUTO: 70.1 %
NRBC # BLD AUTO: 0 10*3/UL
NRBC BLD AUTO-RTO: 0 /100
PLATELET # BLD AUTO: 279 10E9/L (ref 150–450)
POTASSIUM SERPL-SCNC: 3.8 MMOL/L (ref 3.4–5.3)
PROT SERPL-MCNC: 7 G/DL (ref 6.8–8.8)
RBC # BLD AUTO: 4.84 10E12/L (ref 4.4–5.9)
SODIUM SERPL-SCNC: 140 MMOL/L (ref 133–144)
WBC # BLD AUTO: 8.2 10E9/L (ref 4–11)

## 2020-03-20 PROCEDURE — 85025 COMPLETE CBC W/AUTO DIFF WBC: CPT | Performed by: INTERNAL MEDICINE

## 2020-03-20 PROCEDURE — 80053 COMPREHEN METABOLIC PANEL: CPT | Performed by: INTERNAL MEDICINE

## 2020-03-20 NOTE — NURSING NOTE
Chief Complaint   Patient presents with     Blood Draw     IV placement with blood draw     Labs drawn via IV placed by Jalyn Rehman in left arm

## 2020-03-27 ENCOUNTER — ONCOLOGY VISIT (OUTPATIENT)
Dept: ONCOLOGY | Facility: CLINIC | Age: 75
End: 2020-03-27
Attending: INTERNAL MEDICINE
Payer: COMMERCIAL

## 2020-03-27 VITALS
SYSTOLIC BLOOD PRESSURE: 123 MMHG | WEIGHT: 221.9 LBS | BODY MASS INDEX: 31.06 KG/M2 | HEIGHT: 71 IN | DIASTOLIC BLOOD PRESSURE: 76 MMHG | OXYGEN SATURATION: 98 % | HEART RATE: 72 BPM | TEMPERATURE: 98.4 F

## 2020-03-27 DIAGNOSIS — C69.32 MALIGNANT MELANOMA OF CHOROID OF LEFT EYE (H): Primary | ICD-10-CM

## 2020-03-27 PROCEDURE — G0463 HOSPITAL OUTPT CLINIC VISIT: HCPCS | Mod: ZF

## 2020-03-27 PROCEDURE — 40000114 ZZH STATISTIC NO CHARGE CLINIC VISIT

## 2020-03-27 PROCEDURE — 99214 OFFICE O/P EST MOD 30 MIN: CPT | Mod: ZP | Performed by: INTERNAL MEDICINE

## 2020-03-27 ASSESSMENT — MIFFLIN-ST. JEOR: SCORE: 1750.96

## 2020-03-27 ASSESSMENT — PAIN SCALES - GENERAL: PAINLEVEL: NO PAIN (0)

## 2020-03-27 NOTE — NURSING NOTE
"Oncology Rooming Note    March 27, 2020 11:37 AM   Galileo Mayberry is a 75 year old male who presents for:    Chief Complaint   Patient presents with     Oncology Clinic Visit     [facility] RETURN; CHOROIDAL CARCINOMA; Vitals Done     Initial Vitals: /76   Pulse 72   Temp 98.4  F (36.9  C) (Oral)   Ht 1.799 m (5' 10.83\")   Wt 100.7 kg (221 lb 14.4 oz)   SpO2 98%   BMI 31.10 kg/m   Estimated body mass index is 31.1 kg/m  as calculated from the following:    Height as of this encounter: 1.799 m (5' 10.83\").    Weight as of this encounter: 100.7 kg (221 lb 14.4 oz). Body surface area is 2.24 meters squared.  No Pain (0) Comment: Data Unavailable   No LMP for male patient.  Allergies reviewed: Yes  Medications reviewed: Yes    Medications: Medication refills not needed today.  Pharmacy name entered into EPIC:    [facility] - [place], [place] - [id number] - [facility] [address] [address]    Clinical concerns: No new concerns.  Dr. Trent was notified.      Nicole Serrano, [facility]              "

## 2020-03-27 NOTE — LETTER
3/27/2020       RE: Galileo Mayberry  [address]  [place] [facility] [address]     Dear Colleague,    Thank you for referring your patient, Galileo Mayberry, to the [facility]. Please see a copy of my visit note below.    [facility]  MEDICAL ONCOLOGY PROGRESS NOTE  Mar 27, 2020    CHIEF COMPLAINT: choroidal melanoma, [employer] Class II, PRAME positive    Oncologic History:  1. 11/2019, he is diagnosed with ocular melanoma, after a choroidal lesion was found in his Left eye.  Patient reports over the last several months he developed new floaters/spots in his L eye.  2. 11/25/2019, he was seen by ophthalmology who noted a left eye, elevated bilobed lesion, size 6.28mm x 18.31(T) x 17.04(L).   3. 12/14/19, CT-CAP showed no evidence of metastatic disease.  4. 1/10/2020: Patient referred to [facility]. B-scan ultrasound of left eye showed elevated bilobed lesion measuring 7.2-7.3 height with a base of 21.4 x 22.5 mm. Low to medium reflectivity. Difficult measurements due to location. Ciliary body involvement was noted. Ultrasound basal dimension measurement included subretinal fluid, and clinical measurement had to be done by transillumination due to anterior tumor location.  5. 1/15/2020, visual acuity right eye 20/20 -1, left eye 20/25 +2 nh. Visual fields counting fingers full bilaterally. Clinical fundus exam of left eye revealed choroidal/ciliary body malignant melanoma with basal measurement of 22 x 21 mm and height of 7.5 mm. Located in the Inferior left eye from 4:30-7:30, 15 mm to disc, 15 mm to fovea, +subretinal fluid, bilobed. The tumor without fluid appears to be 1-2 mm smaller in basal dimension (20 x 19 mm)  6. 2/10/2020, Iodine-125 24 mm plaque placement delivering 85 Gy to an 8 mm height.      HISTORY OF PRESENT ILLNESS  Mr. Mayberry is a 75 year old man with a history of uveal melanoma. He returns today for evaluation.    He was seen at [facility] and received the  [facility] testing results. He was told his tumor was Class II and PRAME mRNA positive, or high risk for metastasis.    Prior to today's visit he had an MRI of the abdomen, which shows no evidence of metastatic disease within the abdomen.    He currently he feels well and denies any systemic symptoms. No nausea, vomiting, abdominal pain, weight loss, diarrhea. No fevers or chills. ECOG performance status is 0.      Current Outpatient Medications   Medication Sig Dispense Refill     acetaminophen (TYLENOL) 500 MG tablet Take 1,000 mg by mouth       amoxicillin (AMOXIL) 500 MG capsule        atropine 1 % ophthalmic solution Place 1 drop Into the left eye daily 1       Cholecalciferol (VITAMIN D3) 25 MCG (1000 UT) CAPS Takes 3 daily       diclofenac (VOLTAREN) 1 % topical gel        finasteride (PROSCAR) 5 MG tablet Take 5 mg by mouth       methylphenidate (RITALIN) 10 MG tablet as needed       neomycin-polymyxin-dexamethasone (MAXITROL) 3.5-82960-0.1 ophthalmic ointment 3 times daily       prednisoLONE acetate (PRED FORTE) 1 % ophthalmic suspension Place 1 drop Into the left eye 4 times daily       tamsulosin (FLOMAX) 0.4 MG capsule Take 0.4 mg by mouth       Testosterone 1.62 % GEL        methylPREDNISolone (MEDROL) 32 MG tablet Take 1 tab 12 hours before scan.  Take the second tab 2 hours before scan. (Patient not taking: Reported on 3/27/2020) 2 tablet 0       REVIEW OF SYSTEMS  12-point ROS negative except as in HPI    Past Medical History:   Diagnosis Date     Degenerative joint disease      Metastatic melanoma (H)     L eye     Nonsenile cataract      Past Surgical History:   Procedure Laterality Date     ------------OTHER-------------  2014    back surgery L4/L5      ------------OTHER-------------      knee surgery both 1961 Right, 1971 Left knee     AS REMOVAL OF KIDNEY STONE  2015     JOINT REPLACEMENT  2017    Both kneses replaced (2017 and 2018)     ROTATOR CUFF REPAIR RT/LT Right 2016     TURP  2000     Family  "History   Problem Relation Age of Onset     Glaucoma Mother      Prostate Cancer Father      Cancer Sister      Macular Degeneration No family hx of        PHYSICAL EXAMINATION  /76   Pulse 72   Temp 98.4  F (36.9  C) (Oral)   Ht 1.799 m (5' 10.83\")   Wt 100.7 kg (221 lb 14.4 oz)   SpO2 98%   BMI 31.10 kg/m    Wt Readings from Last 3 Encounters:   03/27/20 100.7 kg (221 lb 14.4 oz)   02/27/20 99.3 kg (218 lb 14.4 oz)   12/19/19 102 kg (224 lb 14.4 oz)     Gen: Appears well, no distress  HEENT: no scleral icterus, the sclera on the left is injected and pupil is dilated.  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 Mayberry. He underwent plaque brachytherapy on 2/10 and he is doing well. [employer] testing revealed Class II score and PRAME mRNA positive disease, high risk for metastasis.    We will plan to start Sunitinib based on data showing improvements in PFS and overall survival using the medication in high risk uveal melanoma. I will plan to see him back in about 3 months with MRI liver and CT-chest for restaging purposes. We will plan to continue with observation every 3 months with MRI liver and CT-chest.     Multiple questions answered to patient's apparent satisfaction.    Jw Reyes M.D.   of Medicine  Hematology, Oncology and Transplantation  "

## 2020-03-27 NOTE — PROGRESS NOTES
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 11/2019, he is diagnosed with ocular melanoma, after a choroidal lesion was found in his Left eye.  Patient reports over the last several months he developed new floaters/spots in his L eye.  2. 11/25/2019, he was seen by ophthalmology who noted a left eye, elevated bilobed lesion, size 6.28mm x 18.31(T) x 17.04(L).   3. 12/14/19, CT-CAP showed no evidence of metastatic disease.  4. 1/10/2020: Patient referred to [facility]. B-scan ultrasound of left eye showed elevated bilobed lesion measuring 7.2-7.3 height with a base of 21.4 x 22.5 mm. Low to medium reflectivity. Difficult measurements due to location. Ciliary body involvement was noted. Ultrasound basal dimension measurement included subretinal fluid, and clinical measurement had to be done by transillumination due to anterior tumor location.  5. 1/15/2020, visual acuity right eye 20/20 -1, left eye 20/25 +2 nh. Visual fields counting fingers full bilaterally. Clinical fundus exam of left eye revealed choroidal/ciliary body malignant melanoma with basal measurement of 22 x 21 mm and height of 7.5 mm. Located in the Inferior left eye from 4:30-7:30, 15 mm to disc, 15 mm to fovea, +subretinal fluid, bilobed. The tumor without fluid appears to be 1-2 mm smaller in basal dimension (20 x 19 mm)  6. 2/10/2020, Iodine-125 24 mm plaque placement delivering 85 Gy to an 8 mm height.      HISTORY OF PRESENT ILLNESS  Mr. Mayberry is a 75 year old man with a history of uveal melanoma. He returns today for evaluation.    He was seen at [place] and received the [employer] testing results. He was told his tumor was Class II and PRAME mRNA positive, or high risk for metastasis.    Prior to today's visit he had an MRI of the abdomen, which shows no evidence of metastatic disease within the abdomen.    He  currently he feels well and denies any systemic symptoms. No nausea, vomiting, abdominal pain, weight loss, diarrhea. No fevers or chills. ECOG performance status is 0.      Current Outpatient Medications   Medication Sig Dispense Refill     acetaminophen (TYLENOL) 500 MG tablet Take 1,000 mg by mouth       amoxicillin (AMOXIL) 500 MG capsule        atropine 1 % ophthalmic solution Place 1 drop Into the left eye daily 1       Cholecalciferol (VITAMIN D3) 25 MCG (1000 UT) CAPS Takes 3 daily       diclofenac (VOLTAREN) 1 % topical gel        finasteride (PROSCAR) 5 MG tablet Take 5 mg by mouth       methylphenidate (RITALIN) 10 MG tablet as needed       neomycin-polymyxin-dexamethasone (MAXITROL) 3.5-81557-8.1 ophthalmic ointment 3 times daily       prednisoLONE acetate (PRED FORTE) 1 % ophthalmic suspension Place 1 drop Into the left eye 4 times daily       tamsulosin (FLOMAX) 0.4 MG capsule Take 0.4 mg by mouth       Testosterone 1.62 % GEL        methylPREDNISolone (MEDROL) 32 MG tablet Take 1 tab 12 hours before scan.  Take the second tab 2 hours before scan. (Patient not taking: Reported on 3/27/2020) 2 tablet 0       REVIEW OF SYSTEMS  12-point ROS negative except as in HPI    Past Medical History:   Diagnosis Date     Degenerative joint disease      Metastatic melanoma (H)     L eye     Nonsenile cataract      Past Surgical History:   Procedure Laterality Date     ------------OTHER-------------  2014    back surgery L4/L5      ------------OTHER-------------      knee surgery both 1961 Right, 1971 Left knee     AS REMOVAL OF KIDNEY STONE  2015     JOINT REPLACEMENT  2017    Both kneses replaced (2017 and 2018)     ROTATOR CUFF REPAIR RT/LT Right 2016     TURP  2000     Family History   Problem Relation Age of Onset     Glaucoma Mother      Prostate Cancer Father      Cancer Sister      Macular Degeneration No family hx of        PHYSICAL EXAMINATION  /76   Pulse 72   Temp 98.4  F (36.9  C) (Oral)   Ht  "1.799 m (5' 10.83\")   Wt 100.7 kg (221 lb 14.4 oz)   SpO2 98%   BMI 31.10 kg/m    Wt Readings from Last 3 Encounters:   03/27/20 100.7 kg (221 lb 14.4 oz)   02/27/20 99.3 kg (218 lb 14.4 oz)   12/19/19 102 kg (224 lb 14.4 oz)     Gen: Appears well, no distress  HEENT: no scleral icterus, the sclera on the left is injected and pupil is dilated.  Lymph: No lymphadenopathy  CV: regular  Pulm: Good effort  Ext: no edema  Skin: no ecchymoses or hematomas  Neuro: no focal deficits, affect and cognition are normal    ASSESSMENT AND PLAN  #1 Choroidal melanoma with ciliary body involvement, left eye, status-post plaque brachytherapy,  [employer] Class II and PRAME mRNA positive  It was a pleasure to see Mr. Mayberry. He underwent plaque brachytherapy on 2/10 and he is doing well. [employer] testing revealed Class II score and PRAME mRNA positive disease, high risk for metastasis.    We will plan to start Sunitinib based on data showing improvements in PFS and overall survival using the medication in high risk uveal melanoma. I will plan to see him back in about 3 months with MRI liver and CT-chest for restaging purposes. We will plan to continue with observation every 3 months with MRI liver and CT-chest.     Multiple questions answered to patient's apparent satisfaction.    Jw Reyes M.D.   of Medicine  Hematology, Oncology and Transplantation      "

## 2020-03-29 NOTE — PROGRESS NOTES
"3/4/2020     Referring Provider: Jw Trent MD    Presenting Information:  Galileo Mayberry returned to the Cancer Risk Management Program at the [facility] to discuss his genetic testing results. His blood was drawn on 1/27/2020.  A CustomNext-Cancer panel (75 genes associated with an increased risk for multiple different types of cancer) was ordered from [employer]. This testing was done because of his personal and family history of cancer.    Genetic Testing Result: NEGATIVE  Galileo is negative for mutations in the 75 genes analyzed: AIP, ALK, APC, ROSY, AXIN2, BAP1, BARD1, BLM, BMPR1A, BRCA1, BRCA2, BRIP1, CDC73, CDH1, CDK4, CDKN1B, CDKN2A, CHEK2, CTNNA1, DICER1, FANCC, FH, FLCN, GALNT12, HOXB13, KIT, MAX, MEN1, MET, MLH1, MRE11A, MSH2, MSH3, MSH6, MUTYH, NBN, NF1, NF2, NTHL1, PALB2, PDGFRA, PHOX2B, PMS2, POLD1, POLE, POT1, FGRKZ6F, PTCH1, PTEN, RAD50, RAD51C, RAD51D, RB1, RET, SDHA, SDHAF2, SDHB, SDHC, SDHD, SMAD4, SMARCA4, SMARCB1, SMARCE1, STK11, SUFU, OSRF825, TP53, TSC1, TSC2, VHL and XRCC2 (sequencing and deletion/duplication); EGFR and MITF (sequencing only); EPCAM and GREM1 (deletion/duplication only).      A copy of the test report can be found in the Laboratory tab, dated 1/27/2020, and named \"[facility]\". The report is scanned in as a linked document.    Interpretation:  We discussed several different interpretations of this negative test result.    1. One explanation may be that there is a different gene or combination of genes and environment that are associated with the cancers in this family.  2. Another explanation may be that his relatives did have a mutation in one of these genes and he did not inherit it.  3. There is also a small possibility that there is a mutation in one of these genes, and the testing laboratory could not find it with their current testing methods.       Screening:  Based on this negative test result, it is important for Galileo and his " relatives to refer back to the family history for appropriate cancer screening.      He should continue with his ocular melanoma treatments as recommended by his oncology team. He should also continue with his colonoscopies as recommended based on his personal findings. Other population cancer screening options, such as those recommended by the American Cancer Society and the National Comprehensive Cancer Network (NCCN), are also appropriate for Galileo and his family. These screening recommendations may change if there are changes to Galileo's personal and/or family history of cancer. Final screening recommendations should be made by each individual's managing physician.      His daughters should discuss the family history of cancers with their physicians.     Inheritance:  We reviewed the autosomal dominant inheritance of mutations in these genes. We discussed that Galileo cannot/did not pass on an identifiable mutation in these genes to his children based on this test result. Mutations in these genes do not skip generations.      Additional Testing Considerations:  Although Galileo's genetic testing result was negative, other relatives may still carry a gene mutation associated with an increased risk for cancer. Genetic counseling is recommended for his nephew who was diagnosed with kidney cancer in his late 40s to discuss genetic testing options. Galileo had previously reported that his sister was diagnosed with gastrointestinal cancer. He reports today that he believes this may have actually been an ovarian cancer. Based on this and his father's history of prostate cancer in his 30s, Galileo's other sister and nieces/nephews could also consider meeting with a genetic counselor to discuss genetic testing options. If any of these relatives do pursue genetic testing, Galileo is encouraged to contact me so that we may review the impact of their test results on him.    Summary:  We do not have an explanation for  Galileo's personal or family history of cancer. While no genetic changes were identified, Galileo may still be at risk for certain cancers due to family history, environmental factors, or other genetic causes not identified by this test.  Because of that, it is important that he continue with cancer screening based on his personal and family history as discussed above.    Genetic testing is rapidly advancing, and new cancer susceptibility genes will most likely be identified in the future. Therefore, I encouraged Galileo to contact me annually or if there are changes in his personal or family history. This may change how we assess his cancer risk, screening, and the testing we would offer.    Plan:  1. I provided Galileo with a copy of his test results today and a handout summarizing negative genetic test results (see after visit summary).  2. He plans to follow-up with his physicians.  3. He should contact me annually, or sooner if his family history changes.    If Galileo has any further questions, I encouraged him to contact me at [phone].    Time spent face to face: 15 minutes    Krupa Starkey MS, [facility]  Licensed Genetic Counselor  Office: [phone]

## 2020-03-30 DIAGNOSIS — C69.32 MALIGNANT MELANOMA OF CHOROID OF LEFT EYE (H): Primary | ICD-10-CM

## 2020-03-31 DIAGNOSIS — C69.32 MALIGNANT MELANOMA OF CHOROID OF LEFT EYE (H): Primary | ICD-10-CM

## 2020-03-31 RX ORDER — SUNITINIB MALATE 25 MG/1
25 CAPSULE ORAL DAILY
Qty: 28 CAPSULE | Refills: 0 | Status: SHIPPED | OUTPATIENT
Start: 2020-03-31 | End: 2020-09-09

## 2020-03-31 RX ORDER — PROCHLORPERAZINE MALEATE 10 MG
10 TABLET ORAL EVERY 6 HOURS PRN
Qty: 30 TABLET | Refills: 2 | Status: CANCELLED | OUTPATIENT
Start: 2020-04-02

## 2020-04-08 ENCOUNTER — TELEPHONE (OUTPATIENT)
Dept: ONCOLOGY | Facility: CLINIC | Age: 75
End: 2020-04-08

## 2020-04-08 DIAGNOSIS — C69.32 MALIGNANT MELANOMA OF CHOROID OF LEFT EYE (H): Primary | ICD-10-CM

## 2020-04-08 DIAGNOSIS — Z79.899 ENCOUNTER FOR LONG-TERM (CURRENT) USE OF MEDICATIONS: ICD-10-CM

## 2020-04-08 NOTE — TELEPHONE ENCOUNTER
[facility] in [facility] to the Sutent Prescription. They got it, but it has been sitting in a pending queue for a PA to be sent to me. The only 2 people that have access to that information have been off since last Thursday. I asked if they would be in tomorrow and was told that they dont have access to the schedule. I will plan to follow up tomorrow. I tried to leave a message for this patient but when I called it never let me get to the  and the call was dropped all 3 times.    Naty Winchester CPhT  [facility]  Oncology Pharmacy Liaison  [email or web address]  Phone: [phone]  Fax: [phone]

## 2020-04-08 NOTE — TELEPHONE ENCOUNTER
Oral Chemotherapy Monitoring Program    Primary Oncologist: Dr. Vitor Trent  Primary Oncology Clinic: [facility]   Cancer Diagnosis: Uveal/Choroidal Melanoma    Drug: Sutent 25mg by mouth daily.   Dosing will mirror the trial https://doi.org/10.1016/j.ophtha.2017.08.017 where it was daily administration rather than a 4 weeks on and 2 weeks off schedule per Dr. Vitor Trent.   Start Date: ASAP  Dose is appropriate for patients: Renal and Hepatic Function   Expected duration of therapy: Until disease progression or unacceptable toxicity    Drug Interaction Assessment:   DDI: Sutent + testosterone = C; androgens may enhance the hypoglycemic effect of agents with blood glucose lowering effects. Patient does not have PMH of diabetes and was educated that this was not a concern.   Medication list checked (4/8): -Sutent -Acetaminophen -Amoxicillin -Cholecalciferol -Diclofenac -Finasteride -Methylphenidate -MethylPREDNISolone -Pred Forte -Tamsulosin -Testosterone    Lab Monitoring Plan  CBC, CMP, Mag, Phos every 4-6 weeks  TSH/T4 every 4-6 weeks for 2 cycles, then every 12 weeks  BP every 2 weeks for 8 weeks, then at the start of each cycle thereafter    Subjective/Objective:  Galileo Mayberry is a 75 year old male contacted by phone for an initial visit for oral chemotherapy education.     ORAL CHEMOTHERAPY 4/8/2020   Drug Name Sutent (Sunitinib)   Current Dosage 25mg   Current Schedule Daily   Cycle Details Continuous   Any new drug interactions? Yes   Pharmacist Intervention? Yes   Intervention(s) Patient Education   Is the dose as ordered appropriate for the patient? Yes       Last PHQ-2 Score on record:   PHQ-2 ( 1999 [employer]) 11/25/2019   Q1: Little interest or pleasure in doing things 1   Q2: Feeling down, depressed or hopeless 0   PHQ-2 Score 1       Vitals:  BP:   BP Readings from Last 1 Encounters:   03/27/20 123/76     Wt Readings from Last 1 Encounters:   03/27/20 100.7 kg (221 lb 14.4 oz)  "    Estimated body surface area is 2.24 meters squared as calculated from the following:    Height as of 3/27/20: 1.799 m (5' 10.83\").    Weight as of 3/27/20: 100.7 kg (221 lb 14.4 oz).      Labs:  _  Result Component Current Result Ref Range   Sodium 140 (3/20/2020) 133 - 144 mmol/L     _  Result Component Current Result Ref Range   Potassium 3.8 (3/20/2020) 3.4 - 5.3 mmol/L     _  Result Component Current Result Ref Range   Calcium 8.9 (3/20/2020) 8.5 - 10.1 mg/dL     No results found for Mag within last 30 days.     No results found for Phos within last 30 days.     _  Result Component Current Result Ref Range   Albumin 3.8 (3/20/2020) 3.4 - 5.0 g/dL     _  Result Component Current Result Ref Range   Urea Nitrogen 22 (3/20/2020) 7 - 30 mg/dL     _  Result Component Current Result Ref Range   Creatinine 1.05 (3/20/2020) 0.66 - 1.25 mg/dL     _  Result Component Current Result Ref Range   AST 10 (3/20/2020) 0 - 45 U/L     _  Result Component Current Result Ref Range   ALT 19 (3/20/2020) 0 - 70 U/L     _  Result Component Current Result Ref Range   Bilirubin Total 0.5 (3/20/2020) 0.2 - 1.3 mg/dL     _  Result Component Current Result Ref Range   WBC 8.2 (3/20/2020) 4.0 - 11.0 10e9/L     _  Result Component Current Result Ref Range   Hemoglobin 14.6 (3/20/2020) 13.3 - 17.7 g/dL     _  Result Component Current Result Ref Range   Platelet Count 279 (3/20/2020) 150 - 450 10e9/L     _  Result Component Current Result Ref Range   Absolute Neutrophil 5.8 (3/20/2020) 1.6 - 8.3 10e9/L       Assessment:  Patient is appropriate to start therapy.    Plan:  Basic chemotherapy teaching was reviewed with the patient including indication, start date of therapy, dose, administration, adverse effects, missed doses, food and drug interactions, monitoring, side effect management, office contact information, and safe handling. Written materials were provided and all questions answered.    Follow-Up:  1 week following the start of " Sutent therapy.        Eboni VelizD  Oral Chemotherapy Monitoring Program  [facility]  [phone]

## 2020-04-20 ENCOUNTER — TELEPHONE (OUTPATIENT)
Dept: ONCOLOGY | Facility: CLINIC | Age: 75
End: 2020-04-20

## 2020-04-20 NOTE — ORAL ONC MGMT
Oral Chemotherapy Monitoring Program     Primary Oncologist: Dr. Vitor Trent  Primary Oncology Clinic: [facility]   Cancer Diagnosis: Uveal/Choroidal Melanoma     Drug: Sutent 25mg by mouth daily.   Dosing will mirror the trial https://doi.org/10.1016/j.ophtha.2017.08.017 where it was daily administration rather than a 4 weeks on and 2 weeks off schedule per Dr. Vitor Trent.   Start Date: 4/13/2020  Dose is appropriate for patients: Renal and Hepatic Function            Expected duration of therapy: Until disease progression or unacceptable toxicity     Drug Interaction Assessment:   DDI: Sutent + testosterone = C; androgens may enhance the hypoglycemic effect of agents with blood glucose lowering effects. Patient does not have PMH of diabetes and was educated that this was not a concern. Favian feels good on this medication combination as of 4/20/2020.  Medications checked include: Acetaminophen -Amoxicillin -Atropine -Cholecalciferol -Diclofenac -Finasteride -Methylphenidate -MethylPREDNISolone -Neomycin, Polymyxin B, and Dexamethasone -PrednisoLONE -SUNItinib -Tamsulosin -Testosterone    Lab Monitoring Plan  CBC, CMP, Mag, Phos every 4 weeks  TSH/T4 every 4 weeks for 2 cycles, then every 12 weeks  BP every 2 weeks for 8 weeks, then at the start of each cycle thereafter     Subjective/Objective:  Galileo Mayberry is a 75 year old male contacted by phone for a follow-up visit for oral chemotherapy.  Favian said he has not checked his blood pressure, but that it usually runs low. He denied any nausea, constipation or diarrhea. He said he drinks 8-10 x 8 ounce glasses of water per day. He said he has a good appetite and exercises by riding bike every other day. He said he takes his Sutent every morning before breakfast. He thanked me for the call and care.    ORAL CHEMOTHERAPY 4/8/2020 4/20/2020   Drug Name Sutent (Sunitinib) Sutent (Sunitinib)   Current Dosage 25mg 25mg   Current Schedule Daily  "Daily   Cycle Details Continuous Continuous   Start Date of Last Cycle - 4/13/2020   Planned next cycle start date - 5/11/2020   Doses missed in last 2 weeks - 0   Adherence Assessment - Adherent   Adverse Effects - No AE identified during assessment   Any new drug interactions? Yes -   Pharmacist Intervention? Yes -   Intervention(s) Patient Education -   Is the dose as ordered appropriate for the patient? Yes -       Last PHQ-2 Score on record:   PHQ-2 ( 1999 [employer]) 11/25/2019   Q1: Little interest or pleasure in doing things 1   Q2: Feeling down, depressed or hopeless 0   PHQ-2 Score 1       Vitals:  BP:   BP Readings from Last 1 Encounters:   03/27/20 123/76     Wt Readings from Last 1 Encounters:   03/27/20 100.7 kg (221 lb 14.4 oz)     Estimated body surface area is 2.24 meters squared as calculated from the following:    Height as of 3/27/20: 1.799 m (5' 10.83\").    Weight as of 3/27/20: 100.7 kg (221 lb 14.4 oz).    Labs:  No results found for NA within last 30 days.     No results found for K within last 30 days.     No results found for CA within last 30 days.     No results found for Mag within last 30 days.     No results found for Phos within last 30 days.     No results found for ALBUMIN within last 30 days.     No results found for BUN within last 30 days.     No results found for CR within last 30 days.       No results found for AST within last 30 days.     No results found for ALT within last 30 days.     No results found for BILITOTAL within last 30 days.       No results found for WBC within last 30 days.     No results found for HGB within last 30 days.     No results found for PLT within last 30 days.     No results found for ANC within last 30 days.       Assessment:  Favina appears to be doing well with his first week of Sutent therapy.    Plan:  Favian will either buy a blood pressure monitor or go in to a place near him to have is blood pressure checked regularly. Plan for follow up labs on " 5/04/2020 and follow up with Dr. Trent on 6/26/2020. He also as a visit to [facility] for radiation follow up.    Refill Due:  By 5/11/2020    Manny LynD  [facility]  [phone]  April 20, 2020

## 2020-04-27 DIAGNOSIS — C69.32 MALIGNANT MELANOMA OF CHOROID OF LEFT EYE (H): Primary | ICD-10-CM

## 2020-05-04 DIAGNOSIS — C69.32 MALIGNANT MELANOMA OF CHOROID OF LEFT EYE (H): ICD-10-CM

## 2020-05-04 LAB
ALBUMIN SERPL-MCNC: 3.8 G/DL (ref 3.4–5)
ALP SERPL-CCNC: 51 U/L (ref 40–150)
ALT SERPL W P-5'-P-CCNC: 30 U/L (ref 0–70)
ANION GAP SERPL CALCULATED.3IONS-SCNC: 6 MMOL/L (ref 3–14)
AST SERPL W P-5'-P-CCNC: 19 U/L (ref 0–45)
BASOPHILS # BLD AUTO: 0 10E9/L (ref 0–0.2)
BASOPHILS NFR BLD AUTO: 0.9 %
BILIRUB SERPL-MCNC: 0.9 MG/DL (ref 0.2–1.3)
BUN SERPL-MCNC: 21 MG/DL (ref 7–30)
CALCIUM SERPL-MCNC: 8.7 MG/DL (ref 8.5–10.1)
CHLORIDE SERPL-SCNC: 110 MMOL/L (ref 94–109)
CO2 SERPL-SCNC: 26 MMOL/L (ref 20–32)
CREAT SERPL-MCNC: 1.17 MG/DL (ref 0.66–1.25)
DIFFERENTIAL METHOD BLD: ABNORMAL
EOSINOPHIL # BLD AUTO: 0.1 10E9/L (ref 0–0.7)
EOSINOPHIL NFR BLD AUTO: 3.7 %
ERYTHROCYTE [DISTWIDTH] IN BLOOD BY AUTOMATED COUNT: 15 % (ref 10–15)
GFR SERPL CREATININE-BSD FRML MDRD: 60 ML/MIN/{1.73_M2}
GLUCOSE SERPL-MCNC: 93 MG/DL (ref 70–99)
HCT VFR BLD AUTO: 43.6 % (ref 40–53)
HGB BLD-MCNC: 14.9 G/DL (ref 13.3–17.7)
IMM GRANULOCYTES # BLD: 0 10E9/L (ref 0–0.4)
IMM GRANULOCYTES NFR BLD: 0.3 %
LYMPHOCYTES # BLD AUTO: 1.6 10E9/L (ref 0.8–5.3)
LYMPHOCYTES NFR BLD AUTO: 46.4 %
MAGNESIUM SERPL-MCNC: 2.2 MG/DL (ref 1.6–2.3)
MCH RBC QN AUTO: 30.2 PG (ref 26.5–33)
MCHC RBC AUTO-ENTMCNC: 34.2 G/DL (ref 31.5–36.5)
MCV RBC AUTO: 88 FL (ref 78–100)
MONOCYTES # BLD AUTO: 0.3 10E9/L (ref 0–1.3)
MONOCYTES NFR BLD AUTO: 7.7 %
NEUTROPHILS # BLD AUTO: 1.4 10E9/L (ref 1.6–8.3)
NEUTROPHILS NFR BLD AUTO: 41 %
NRBC # BLD AUTO: 0 10*3/UL
NRBC BLD AUTO-RTO: 0 /100
PHOSPHATE SERPL-MCNC: 3.1 MG/DL (ref 2.5–4.5)
PLATELET # BLD AUTO: 186 10E9/L (ref 150–450)
POTASSIUM SERPL-SCNC: 3.9 MMOL/L (ref 3.4–5.3)
PROT SERPL-MCNC: 7.1 G/DL (ref 6.8–8.8)
RBC # BLD AUTO: 4.93 10E12/L (ref 4.4–5.9)
SODIUM SERPL-SCNC: 142 MMOL/L (ref 133–144)
TSH SERPL DL<=0.005 MIU/L-ACNC: 3.75 MU/L (ref 0.4–4)
WBC # BLD AUTO: 3.5 10E9/L (ref 4–11)

## 2020-05-04 PROCEDURE — 80053 COMPREHEN METABOLIC PANEL: CPT | Performed by: INTERNAL MEDICINE

## 2020-05-04 PROCEDURE — 83735 ASSAY OF MAGNESIUM: CPT | Performed by: INTERNAL MEDICINE

## 2020-05-04 PROCEDURE — 84443 ASSAY THYROID STIM HORMONE: CPT | Performed by: INTERNAL MEDICINE

## 2020-05-04 PROCEDURE — 84100 ASSAY OF PHOSPHORUS: CPT | Performed by: INTERNAL MEDICINE

## 2020-05-04 PROCEDURE — 85025 COMPLETE CBC W/AUTO DIFF WBC: CPT | Performed by: INTERNAL MEDICINE

## 2020-05-04 NOTE — NURSING NOTE
Chief Complaint   Patient presents with     Blood Draw     Labs drawn via  by RN in lab.     Labs collected from venipuncture by RN. Pt provided chart of past blood pressures taken at home for provider.  4/24/20 128/83, pulse 57  4/25/20 126/84, pulse 54  4/26/20 120/75, pulse 60  4/27/20 121/75, pulse 66  4/30/20 126/77, pulse 61   5/1/20 134/85, pulse 58  5/4/20 129/88 pulse 53    Chanda Amaral RN

## 2020-05-05 ENCOUNTER — TELEPHONE (OUTPATIENT)
Dept: ONCOLOGY | Facility: CLINIC | Age: 75
End: 2020-05-05

## 2020-05-05 DIAGNOSIS — C69.32 MALIGNANT MELANOMA OF CHOROID OF LEFT EYE (H): Primary | ICD-10-CM

## 2020-05-05 RX ORDER — SUNITINIB MALATE 25 MG/1
25 CAPSULE ORAL DAILY
Qty: 28 CAPSULE | Refills: 0 | Status: SHIPPED | OUTPATIENT
Start: 2020-05-05 | End: 2020-09-09

## 2020-05-05 NOTE — TELEPHONE ENCOUNTER
Oral Chemotherapy Monitoring Program    Called patient to follow up on labs from yesterday, and to remind Favian to have labs checked in about 1 month. Left message to please call back. No names or medications were mentioned.       Guero Adan  Pharmacy Intern  Oral Chemotherapy Monitoring Program  [phone]

## 2020-05-06 NOTE — TELEPHONE ENCOUNTER
Oral Chemotherapy Monitoring Program    Primary Oncologist: Dr. Vitor Trent  Primary Oncology Clinic: [facility]   Cancer Diagnosis: Uveal/Choroidal Melanoma     Drug: Sutent 25mg by mouth daily.   Dosing will mirror the trial https://doi.org/10.1016/j.ophtha.2017.08.017 where it was daily administration rather than a 4 weeks on and 2 weeks off schedule per Dr. Vitor Trent.   Start Date: 4/13/2020  Dose is appropriate for patients: Renal and Hepatic Function            Expected duration of therapy: Until disease progression or unacceptable toxicity     Drug Interaction Assessment:   DDI: Sutent + testosterone = C; androgens may enhance the hypoglycemic effect of agents with blood glucose lowering effects. Patient does not have PMH of diabetes and was educated that this was not a concern. Favian feels good on this medication combination as of 4/20/2020.  Medications checked include: Acetaminophen -Amoxicillin -Atropine -Cholecalciferol -Diclofenac -Finasteride -Methylphenidate -MethylPREDNISolone -Neomycin, Polymyxin B, and Dexamethasone -PrednisoLONE -SUNItinib -Tamsulosin -Testosterone     Lab Monitoring Plan  CBC, CMP, Mag, Phos every 4 weeks  TSH/T4 every 4 weeks for 2 cycles, then every 12 weeks  BP every 2 weeks for 8 weeks, then at the start of each cycle thereafter    Subjective/Objective:  Galileo Mayberry is a 75 year old male contacted by phone for a follow-up visit for oral chemotherapy.  Favian's labs from 5/4 show no concerning abnormalities. Favian stated things have been going well on Sutent, he has occasional loose stools, but not diarrhea, and has not needed to use anything for them. He has been checking his blood pressure at home, and the recent readings were entered under his lab appointment on 5/4. His blood pressure readings have been ~126/80. Favian thanked me for the call and care.     ORAL CHEMOTHERAPY 4/8/2020 4/20/2020 5/6/2020   Drug Name Sutent (Sunitinib) Sutent  "(Sunitinib) Sutent (Sunitinib)   Current Dosage 25mg 25mg 25mg   Current Schedule Daily Daily Daily   Cycle Details Continuous Continuous Continuous   Start Date of Last Cycle - 4/13/2020 -   Planned next cycle start date - 5/11/2020 -   Doses missed in last 2 weeks - 0 -   Adherence Assessment - Adherent Adherent   Adverse Effects - No AE identified during assessment -   Any new drug interactions? Yes - -   Pharmacist Intervention? Yes - -   Intervention(s) Patient Education - -   Is the dose as ordered appropriate for the patient? Yes - -     Vitals:  BP:   BP Readings from Last 1 Encounters:   03/27/20 123/76     Wt Readings from Last 1 Encounters:   03/27/20 100.7 kg (221 lb 14.4 oz)     Estimated body surface area is 2.24 meters squared as calculated from the following:    Height as of 3/27/20: 1.799 m (5' 10.83\").    Weight as of 3/27/20: 100.7 kg (221 lb 14.4 oz).    Labs:  _  Result Component Current Result Ref Range   Sodium 142 (5/4/2020) 133 - 144 mmol/L     _  Result Component Current Result Ref Range   Potassium 3.9 (5/4/2020) 3.4 - 5.3 mmol/L     _  Result Component Current Result Ref Range   Calcium 8.7 (5/4/2020) 8.5 - 10.1 mg/dL     _  Result Component Current Result Ref Range   Magnesium 2.2 (5/4/2020) 1.6 - 2.3 mg/dL     _  Result Component Current Result Ref Range   Phosphorus 3.1 (5/4/2020) 2.5 - 4.5 mg/dL     _  Result Component Current Result Ref Range   Albumin 3.8 (5/4/2020) 3.4 - 5.0 g/dL     _  Result Component Current Result Ref Range   Urea Nitrogen 21 (5/4/2020) 7 - 30 mg/dL     _  Result Component Current Result Ref Range   Creatinine 1.17 (5/4/2020) 0.66 - 1.25 mg/dL       _  Result Component Current Result Ref Range   AST 19 (5/4/2020) 0 - 45 U/L     _  Result Component Current Result Ref Range   ALT 30 (5/4/2020) 0 - 70 U/L     _  Result Component Current Result Ref Range   Bilirubin Total 0.9 (5/4/2020) 0.2 - 1.3 mg/dL       _  Result Component Current Result Ref Range   WBC 3.5 " (L) (5/4/2020) 4.0 - 11.0 10e9/L     _  Result Component Current Result Ref Range   Hemoglobin 14.9 (5/4/2020) 13.3 - 17.7 g/dL     _  Result Component Current Result Ref Range   Platelet Count 186 (5/4/2020) 150 - 450 10e9/L     _  Result Component Current Result Ref Range   Absolute Neutrophil 1.4 (L) (5/4/2020) 1.6 - 8.3 10e9/L       Assessment:  Favian is tolerating therapy well at this time.    Plan:  Continue Sutent.  Have labs checked in ~1 month. I contacted the schedulers to make an appointment at [facility] on 6/1.    Follow-Up:  With lab results on 6/1.      Guero Adan  Pharmacy Intern  Oral Chemotherapy Monitoring Program  [phone]

## 2020-05-07 ENCOUNTER — TELEPHONE (OUTPATIENT)
Dept: ONCOLOGY | Facility: CLINIC | Age: 75
End: 2020-05-07

## 2020-05-07 NOTE — ORAL ONC MGMT
Oral Chemotherapy Monitoring Program    Did not re-release Sutent prescription. Per [facility] Oncology Liaison, for [facility], the liaison will  local print it, e-mail it to the provider, provider e-mail it back and then liaison will fax it to the [facility].      Canelo Calderon, PharmD  Hematology/Oncology Clinical Pharmacist  [facility]  [facility]

## 2020-05-07 NOTE — TELEPHONE ENCOUNTER
I just spoke with the patient. I sent the prescription for Sutent at 10:14 this morning as it requires a physical signature from Dr. Trent in order for me to fax it to them. I gave him this information and also let him know what the process is with [facility] prescriptions. I also gave him my direct phone should he have any issues.     Naty Winchester CPhT  [facility]  Oncology Pharmacy Liaison  [email or web address]  Phone: [phone]  Fax: [phone]

## 2020-05-12 ENCOUNTER — TELEPHONE (OUTPATIENT)
Dept: ONCOLOGY | Facility: CLINIC | Age: 75
End: 2020-05-12

## 2020-05-12 NOTE — TELEPHONE ENCOUNTER
I spoke with the patient and he had inquired as to whether or not we were able to e-[facility] to the [facility] since it seems to be a difficult process getting the [facility] to actually get him his medication on time each month. They wont stock the medication so it always takes 1-2 business days for it to get it. I called the [facility] today and asked about that as the patient was told they would accept that. They told me that unless it was a referral from [facility] they would not allow e-scribe. I let the patient know this information. He informed me that he was actually referred to us from [facility]. He is going to e-mail his contact at [facility] that he has been working with. I will reach out to her just to double check that e-scribeing is actually something they will allow for him. Otherwise we will help come up with another solution. Patient is on the same page.     Naty Winchester CPhT  [facility]  Oncology Pharmacy Liaison  [email or web address]  Phone: [phone]  Fax: [phone]

## 2020-05-15 NOTE — TELEPHONE ENCOUNTER
Spoke with patient's community care nurse this morning. She checked with the pharmacy and they told her that we would be able to electronically prescribe this from now on if we would like. I have reached out to the patient with this information as it sounds like that is what he would like us to do going forward.     Naty Winchester CPhT  [facility]  Oncology Pharmacy Liaison  [email or web address]  Phone: [phone]  Fax: [phone]

## 2020-05-19 NOTE — TELEPHONE ENCOUNTER
Spoke with patient and let him know that next month we will try to e-scribe to the pharmacy and if they are not able to get it then we will have to go back to doing the old fashion fax way. He stated he understood.    He also asked about whether or not he needed to keep checking and documenting blood pressure, I have sent a note to his care team to have them follow up.    Naty Winchester CPhT  [facility]  Oncology Pharmacy Liaison  [email or web address]  Phone: [phone]  Fax: [phone]

## 2020-05-24 DIAGNOSIS — C69.32 MALIGNANT MELANOMA OF CHOROID OF LEFT EYE (H): Primary | ICD-10-CM

## 2020-06-01 ENCOUNTER — TELEPHONE (OUTPATIENT)
Dept: ONCOLOGY | Facility: CLINIC | Age: 75
End: 2020-06-01

## 2020-06-01 DIAGNOSIS — Z79.899 ENCOUNTER FOR LONG-TERM (CURRENT) USE OF MEDICATIONS: ICD-10-CM

## 2020-06-01 DIAGNOSIS — C69.32 MALIGNANT MELANOMA OF CHOROID OF LEFT EYE (H): ICD-10-CM

## 2020-06-01 LAB
ALBUMIN SERPL-MCNC: 3.7 G/DL (ref 3.4–5)
ALP SERPL-CCNC: 47 U/L (ref 40–150)
ALT SERPL W P-5'-P-CCNC: 24 U/L (ref 0–70)
ANION GAP SERPL CALCULATED.3IONS-SCNC: 2 MMOL/L (ref 3–14)
AST SERPL W P-5'-P-CCNC: 17 U/L (ref 0–45)
BASOPHILS # BLD AUTO: 0 10E9/L (ref 0–0.2)
BASOPHILS NFR BLD AUTO: 0.3 %
BILIRUB SERPL-MCNC: 0.7 MG/DL (ref 0.2–1.3)
BUN SERPL-MCNC: 16 MG/DL (ref 7–30)
CALCIUM SERPL-MCNC: 8.4 MG/DL (ref 8.5–10.1)
CHLORIDE SERPL-SCNC: 109 MMOL/L (ref 94–109)
CO2 SERPL-SCNC: 29 MMOL/L (ref 20–32)
CREAT SERPL-MCNC: 1.15 MG/DL (ref 0.66–1.25)
DIFFERENTIAL METHOD BLD: ABNORMAL
EOSINOPHIL # BLD AUTO: 0.1 10E9/L (ref 0–0.7)
EOSINOPHIL NFR BLD AUTO: 2.6 %
ERYTHROCYTE [DISTWIDTH] IN BLOOD BY AUTOMATED COUNT: 16.6 % (ref 10–15)
GFR SERPL CREATININE-BSD FRML MDRD: 61 ML/MIN/{1.73_M2}
GLUCOSE SERPL-MCNC: 94 MG/DL (ref 70–99)
HCT VFR BLD AUTO: 42 % (ref 40–53)
HGB BLD-MCNC: 14.8 G/DL (ref 13.3–17.7)
LYMPHOCYTES # BLD AUTO: 1.4 10E9/L (ref 0.8–5.3)
LYMPHOCYTES NFR BLD AUTO: 44.7 %
MAGNESIUM SERPL-MCNC: 2.2 MG/DL (ref 1.6–2.3)
MCH RBC QN AUTO: 31.8 PG (ref 26.5–33)
MCHC RBC AUTO-ENTMCNC: 35.2 G/DL (ref 31.5–36.5)
MCV RBC AUTO: 90 FL (ref 78–100)
MONOCYTES # BLD AUTO: 0.3 10E9/L (ref 0–1.3)
MONOCYTES NFR BLD AUTO: 8.9 %
NEUTROPHILS # BLD AUTO: 1.3 10E9/L (ref 1.6–8.3)
NEUTROPHILS NFR BLD AUTO: 43.5 %
PHOSPHATE SERPL-MCNC: 3 MG/DL (ref 2.5–4.5)
PLATELET # BLD AUTO: 192 10E9/L (ref 150–450)
POTASSIUM SERPL-SCNC: 4 MMOL/L (ref 3.4–5.3)
PROT SERPL-MCNC: 7 G/DL (ref 6.8–8.8)
RBC # BLD AUTO: 4.65 10E12/L (ref 4.4–5.9)
SODIUM SERPL-SCNC: 140 MMOL/L (ref 133–144)
TSH SERPL DL<=0.005 MIU/L-ACNC: 3.35 MU/L (ref 0.4–4)
WBC # BLD AUTO: 3 10E9/L (ref 4–11)

## 2020-06-01 PROCEDURE — 83735 ASSAY OF MAGNESIUM: CPT | Performed by: INTERNAL MEDICINE

## 2020-06-01 PROCEDURE — 80050 GENERAL HEALTH PANEL: CPT | Performed by: INTERNAL MEDICINE

## 2020-06-01 PROCEDURE — 36415 COLL VENOUS BLD VENIPUNCTURE: CPT | Performed by: INTERNAL MEDICINE

## 2020-06-01 PROCEDURE — 84100 ASSAY OF PHOSPHORUS: CPT | Performed by: INTERNAL MEDICINE

## 2020-06-01 NOTE — TELEPHONE ENCOUNTER
Oral Chemotherapy Monitoring Program   Lab Follow-Up    Placed call to patient in follow up of lab work completed today for Sutent oral chemotherapy.     Left message detailing that lab work was stable and there were no concerns. No drug names were mentioned. Encouraged patient to call back with any concerns.       Amber Tate, PharmD  Oral Chemotherapy Monitoring Program  [facility]  [phone]  June 1, 2020

## 2020-06-02 ENCOUNTER — TELEPHONE (OUTPATIENT)
Dept: ONCOLOGY | Facility: CLINIC | Age: 75
End: 2020-06-02

## 2020-06-02 DIAGNOSIS — C69.32 MALIGNANT MELANOMA OF CHOROID OF LEFT EYE (H): Primary | ICD-10-CM

## 2020-06-02 RX ORDER — SUNITINIB MALATE 25 MG/1
25 CAPSULE ORAL DAILY
Qty: 28 CAPSULE | Refills: 0 | Status: SHIPPED | OUTPATIENT
Start: 2020-06-02 | End: 2020-09-09

## 2020-06-02 NOTE — TELEPHONE ENCOUNTER
[facility] with [facility] this afternoon to verify that the prescription for Sutent was received as it was E-scribed for the first time. She said that they did get it and that it appeared to have been mailed out this afternoon.    Naty Winchester CPhT  [facility]  Oncology Pharmacy Liaison  [email or web address]  Phone: [phone]  Fax: [phone]

## 2020-06-09 ENCOUNTER — TELEPHONE (OUTPATIENT)
Dept: ONCOLOGY | Facility: CLINIC | Age: 75
End: 2020-06-09

## 2020-06-09 NOTE — TELEPHONE ENCOUNTER
"Oral Chemotherapy Monitoring Program    Primary Oncologist: Dr. Trent  Primary Oncology Clinic: [facility]  Cancer Diagnosis: Uveal/Choroidal Melanoma    Therapy History:  Sutent 25mg by mouth daily  C1D1= 4/13/2020    Drug Interaction Assessment: No new drug interactions identified.    Lab Monitoring Plan  CBC, CMP, Mag, Phos every 4 weeks  TSH/T4 every 4 weeks for 2 cycles, then every 12 weeks  BP every 2 weeks for 8 weeks, then at the start of each cycle thereafter  Subjective/Objective:  Galileo Mayberry is a 75 year old male contacted by phone for a follow-up visit for oral chemotherapy.  Favian confirmed that he is adherent to his Sutent therapy. He is monitoring his blood pressure daily and says that his averages have increased from 115/65 to 125/75 since starting Sutent. He described his bowel movements as \"inconsistent\" but does not think he needs anything to manage them. Favian denied nausea and vomiting but stated that he has stomach acid that has gotten progressively worse since starting Sutent therapy and causes him to belch. He says it is worse in the evening but is quickly resolved by drinking a mix of baking soda and water or taking Tums. He has also noticed more fatigue and weakness recently but is able to ride his bike regularly. Favian reported feeling dizzy occasionally. He states his appetite has not decreased on the medication and is drinking enough water to \"keep his urine clear\". Favian noted that he has been waking up in the middle of the night to urinate recently and cannot get back to sleep. He says it is easier to sleep when he has exercised during the day. Favian denied any other side effects,specifically heartburn and mouth sores.     ORAL CHEMOTHERAPY 4/8/2020 4/20/2020 5/6/2020 6/9/2020   Drug Name Sutent (Sunitinib) Sutent (Sunitinib) Sutent (Sunitinib) Sutent (Sunitinib)   Current Dosage 25mg 25mg 25mg 25mg   Current Schedule Daily Daily Daily Daily   Cycle Details " "Continuous Continuous Continuous Continuous   Start Date of Last Cycle - 4/13/2020 - -   Planned next cycle start date - 5/11/2020 - -   Doses missed in last 2 weeks - 0 - 0   Adherence Assessment - Adherent Adherent Adherent   Adverse Effects - No AE identified during assessment - No AE identified during assessment   Any new drug interactions? Yes - - No   Pharmacist Intervention? Yes - - No   Intervention(s) Patient Education - - -   Is the dose as ordered appropriate for the patient? Yes - - Yes       Last PHQ-2 Score on record:   PHQ-2 ( 1999 [employer]) 11/25/2019   Q1: Little interest or pleasure in doing things 1   Q2: Feeling down, depressed or hopeless 0   PHQ-2 Score 1     Vitals:  BP:   BP Readings from Last 1 Encounters:   03/27/20 123/76     Wt Readings from Last 1 Encounters:   03/27/20 100.7 kg (221 lb 14.4 oz)     Estimated body surface area is 2.24 meters squared as calculated from the following:    Height as of 3/27/20: 1.799 m (5' 10.83\").    Weight as of 3/27/20: 100.7 kg (221 lb 14.4 oz).    Labs:  _  Result Component Current Result Ref Range   Sodium 140 (6/1/2020) 133 - 144 mmol/L     _  Result Component Current Result Ref Range   Potassium 4.0 (6/1/2020) 3.4 - 5.3 mmol/L     _  Result Component Current Result Ref Range   Calcium 8.4 (L) (6/1/2020) 8.5 - 10.1 mg/dL     _  Result Component Current Result Ref Range   Magnesium 2.2 (6/1/2020) 1.6 - 2.3 mg/dL     _  Result Component Current Result Ref Range   Phosphorus 3.0 (6/1/2020) 2.5 - 4.5 mg/dL     _  Result Component Current Result Ref Range   Albumin 3.7 (6/1/2020) 3.4 - 5.0 g/dL     _  Result Component Current Result Ref Range   Urea Nitrogen 16 (6/1/2020) 7 - 30 mg/dL     _  Result Component Current Result Ref Range   Creatinine 1.15 (6/1/2020) 0.66 - 1.25 mg/dL       _  Result Component Current Result Ref Range   AST 17 (6/1/2020) 0 - 45 U/L     _  Result Component Current Result Ref Range   ALT 24 (6/1/2020) 0 - 70 U/L     _  Result " Component Current Result Ref Range   Bilirubin Total 0.7 (6/1/2020) 0.2 - 1.3 mg/dL       _  Result Component Current Result Ref Range   WBC 3.0 (L) (6/1/2020) 4.0 - 11.0 10e9/L     _  Result Component Current Result Ref Range   Hemoglobin 14.8 (6/1/2020) 13.3 - 17.7 g/dL     _  Result Component Current Result Ref Range   Platelet Count 192 (6/1/2020) 150 - 450 10e9/L     _  Result Component Current Result Ref Range   Absolute Neutrophil 1.3 (L) (6/1/2020) 1.6 - 8.3 10e9/L         Assessment:  Favian is tolerating Sutent therapy well with several mild side effects. He has stomach acid that causes flatulence but is managing well with a baking soda and water mixture and Tums. He is experiencing dizziness and was advised to drink 64 oz of water a day and be cautious when standing up or moving quickly. He has also had some difficulty with waking in the middle of the night. His blood pressure is stable. He has fatigue but is still able to exercise regularly. He was advised to continue activities and allow time for rest during the day.    Plan:  Continue Sutent therapy as planned.  Monitor for increased dizziness and difficulty sleeping.  Continue monitoring blood pressure.    Follow-Up:  Review 6/26 Miley [facility].    Refill Due:  6/26 for 7/4    Ro Van  Pharmacy Intern  [facility]  [phone]

## 2020-06-26 ENCOUNTER — ANCILLARY PROCEDURE (OUTPATIENT)
Dept: MRI IMAGING | Facility: CLINIC | Age: 75
End: 2020-06-26
Attending: INTERNAL MEDICINE
Payer: COMMERCIAL

## 2020-06-26 ENCOUNTER — VIRTUAL VISIT (OUTPATIENT)
Dept: ONCOLOGY | Facility: CLINIC | Age: 75
End: 2020-06-26
Attending: INTERNAL MEDICINE
Payer: COMMERCIAL

## 2020-06-26 ENCOUNTER — ANCILLARY PROCEDURE (OUTPATIENT)
Dept: CT IMAGING | Facility: CLINIC | Age: 75
End: 2020-06-26
Attending: INTERNAL MEDICINE
Payer: COMMERCIAL

## 2020-06-26 DIAGNOSIS — C69.32 MALIGNANT MELANOMA OF CHOROID OF LEFT EYE (H): Primary | ICD-10-CM

## 2020-06-26 DIAGNOSIS — C69.90 OCULAR MELANOMA, UNSPECIFIED LATERALITY (H): ICD-10-CM

## 2020-06-26 DIAGNOSIS — C69.32 MALIGNANT MELANOMA OF CHOROID OF LEFT EYE (H): ICD-10-CM

## 2020-06-26 LAB
ALBUMIN SERPL-MCNC: 3.9 G/DL (ref 3.4–5)
ALP SERPL-CCNC: 53 U/L (ref 40–150)
ALT SERPL W P-5'-P-CCNC: 26 U/L (ref 0–70)
ANION GAP SERPL CALCULATED.3IONS-SCNC: 4 MMOL/L (ref 3–14)
AST SERPL W P-5'-P-CCNC: 14 U/L (ref 0–45)
BASOPHILS # BLD AUTO: 0 10E9/L (ref 0–0.2)
BASOPHILS NFR BLD AUTO: 0 %
BILIRUB SERPL-MCNC: 0.6 MG/DL (ref 0.2–1.3)
BUN SERPL-MCNC: 18 MG/DL (ref 7–30)
CALCIUM SERPL-MCNC: 9 MG/DL (ref 8.5–10.1)
CHLORIDE SERPL-SCNC: 109 MMOL/L (ref 94–109)
CO2 SERPL-SCNC: 26 MMOL/L (ref 20–32)
CREAT SERPL-MCNC: 1.09 MG/DL (ref 0.66–1.25)
DIFFERENTIAL METHOD BLD: ABNORMAL
EOSINOPHIL # BLD AUTO: 0 10E9/L (ref 0–0.7)
EOSINOPHIL NFR BLD AUTO: 0 %
ERYTHROCYTE [DISTWIDTH] IN BLOOD BY AUTOMATED COUNT: 17 % (ref 10–15)
GFR SERPL CREATININE-BSD FRML MDRD: 65 ML/MIN/{1.73_M2}
GLUCOSE SERPL-MCNC: 115 MG/DL (ref 70–99)
HCT VFR BLD AUTO: 43.2 % (ref 40–53)
HGB BLD-MCNC: 14.8 G/DL (ref 13.3–17.7)
IMM GRANULOCYTES # BLD: 0 10E9/L (ref 0–0.4)
IMM GRANULOCYTES NFR BLD: 0.3 %
LYMPHOCYTES # BLD AUTO: 1 10E9/L (ref 0.8–5.3)
LYMPHOCYTES NFR BLD AUTO: 26.2 %
MAGNESIUM SERPL-MCNC: 2.3 MG/DL (ref 1.6–2.3)
MCH RBC QN AUTO: 32.4 PG (ref 26.5–33)
MCHC RBC AUTO-ENTMCNC: 34.3 G/DL (ref 31.5–36.5)
MCV RBC AUTO: 95 FL (ref 78–100)
MONOCYTES # BLD AUTO: 0.1 10E9/L (ref 0–1.3)
MONOCYTES NFR BLD AUTO: 1.3 %
NEUTROPHILS # BLD AUTO: 2.8 10E9/L (ref 1.6–8.3)
NEUTROPHILS NFR BLD AUTO: 72.2 %
NRBC # BLD AUTO: 0 10*3/UL
NRBC BLD AUTO-RTO: 0 /100
PHOSPHATE SERPL-MCNC: 3.1 MG/DL (ref 2.5–4.5)
PLATELET # BLD AUTO: 201 10E9/L (ref 150–450)
POTASSIUM SERPL-SCNC: 4.3 MMOL/L (ref 3.4–5.3)
PROT SERPL-MCNC: 7.4 G/DL (ref 6.8–8.8)
RBC # BLD AUTO: 4.57 10E12/L (ref 4.4–5.9)
SODIUM SERPL-SCNC: 140 MMOL/L (ref 133–144)
TSH SERPL DL<=0.005 MIU/L-ACNC: 1.33 MU/L (ref 0.4–4)
WBC # BLD AUTO: 3.8 10E9/L (ref 4–11)

## 2020-06-26 PROCEDURE — 84443 ASSAY THYROID STIM HORMONE: CPT | Performed by: INTERNAL MEDICINE

## 2020-06-26 PROCEDURE — 83735 ASSAY OF MAGNESIUM: CPT | Performed by: INTERNAL MEDICINE

## 2020-06-26 PROCEDURE — 85025 COMPLETE CBC W/AUTO DIFF WBC: CPT | Performed by: INTERNAL MEDICINE

## 2020-06-26 PROCEDURE — 84100 ASSAY OF PHOSPHORUS: CPT | Performed by: INTERNAL MEDICINE

## 2020-06-26 PROCEDURE — 80053 COMPREHEN METABOLIC PANEL: CPT | Performed by: INTERNAL MEDICINE

## 2020-06-26 PROCEDURE — 36415 COLL VENOUS BLD VENIPUNCTURE: CPT | Performed by: INTERNAL MEDICINE

## 2020-06-26 PROCEDURE — 99214 OFFICE O/P EST MOD 30 MIN: CPT | Mod: 95 | Performed by: INTERNAL MEDICINE

## 2020-06-26 PROCEDURE — 40001009 ZZH VIDEO/TELEPHONE VISIT; NO CHARGE

## 2020-06-26 ASSESSMENT — PAIN SCALES - GENERAL: PAINLEVEL: NO PAIN (0)

## 2020-06-26 NOTE — PROGRESS NOTES
"Galileo Mayberry is a 75 year old male who is being evaluated via a billable video visit.      The patient has been notified of following:     \"This video visit will be conducted via a call between you and your physician/provider. We have found that certain health care needs can be provided without the need for an in-person physical exam.  This service lets us provide the care you need with a video conversation.  If a prescription is necessary we can send it directly to your pharmacy.  If lab work is needed we can place an order for that and you can then stop by our lab to have the test done at a later time.    Video visits are billed at different rates depending on your insurance coverage.  Please reach out to your insurance provider with any questions.    If during the course of the call the physician/provider feels a video visit is not appropriate, you will not be charged for this service.\"    Patient has given verbal consent for Video visit? Yes    Will anyone else be joining your video visit? No      Patient reported vitals added.   0/10 pain scale.     No refills needed.   No additional concerns.     Nicole Serrano CMA    Video-Visit Details    Type of service:  Video Visit    Video Start Time: 2:15 PM  Video End Time: 2:35 PM    Originating Location (pt. Location): Home    Distant Location (provider location):  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 11/2019, he is diagnosed with ocular melanoma, after a choroidal lesion was found in his Left eye.  Patient reports over the last several months he developed new floaters/spots in his L eye.  2. 11/25/2019, he was seen by ophthalmology who noted a left eye, elevated bilobed lesion, size 6.28mm x 18.31(T) x 17.04(L).   3. 12/14/19, CT-CAP " showed no evidence of metastatic disease.  4. 1/10/2020: Patient referred to [facility]. B-scan ultrasound of left eye showed elevated bilobed lesion measuring 7.2-7.3 height with a base of 21.4 x 22.5 mm. Low to medium reflectivity. Difficult measurements due to location. Ciliary body involvement was noted. Ultrasound basal dimension measurement included subretinal fluid, and clinical measurement had to be done by transillumination due to anterior tumor location.  5. 1/15/2020, visual acuity right eye 20/20 -1, left eye 20/25 +2 nh. Visual fields counting fingers full bilaterally. Clinical fundus exam of left eye revealed choroidal/ciliary body malignant melanoma with basal measurement of 22 x 21 mm and height of 7.5 mm. Located in the Inferior left eye from 4:30-7:30, 15 mm to disc, 15 mm to fovea, +subretinal fluid, bilobed. The tumor without fluid appears to be 1-2 mm smaller in basal dimension (20 x 19 mm)  6. 2/10/2020, Iodine-125 24 mm plaque placement delivering 85 Gy to an 8 mm height.      HISTORY OF PRESENT ILLNESS  Mr. Mayberry is a 75 year old man with a history of uveal melanoma. He returns today for evaluation.    He was seen at [facility] and received the [place] testing results. He was told his tumor was Class II and PRAME mRNA positive, or high risk for metastasis.    Prior to today's visit he had an MRI of the abdomen, which shows small nodular areas of restricted diffusion I did not appreciate on the scan from 3 months ago. Final read is that they are consistent with hepatic cysts.    He currently he feels well and denies any major systemic symptoms. No nausea, vomiting, abdominal pain, weight loss, diarrhea. No fevers or chills.     Labs fortunately show stable CMP. TSH is 1.33. WBC is 3.8, hemoglobin is stable at 14.8, platelets are 201.     ECOG performance status is 0.      Current Outpatient Medications   Medication Sig Dispense Refill     acetaminophen (TYLENOL) 500 MG tablet Take 1,000 mg by mouth        amoxicillin (AMOXIL) 500 MG capsule        Cholecalciferol (VITAMIN D3) 25 MCG (1000 UT) CAPS Takes 3 daily       diclofenac (VOLTAREN) 1 % topical gel        finasteride (PROSCAR) 5 MG tablet Take 5 mg by mouth       methylphenidate (RITALIN) 10 MG tablet as needed       methylPREDNISolone (MEDROL) 32 MG tablet Take 1 tab 12 hours before scan.  Take the second tab 2 hours before scan. 2 tablet 0     SUNItinib (SUTENT) 25 MG capsule Take 1 capsule (25 mg) by mouth daily for 28 days May take with food or on empty stomach. 28 capsule 0     tamsulosin (FLOMAX) 0.4 MG capsule Take 0.4 mg by mouth       Testosterone 1.62 % GEL        SUNItinib (SUTENT) 25 MG capsule Take 1 capsule (25 mg) by mouth daily for 28 days May take with food or on empty stomach. 28 capsule 0     SUNItinib (SUTENT) 25 MG capsule Take 1 capsule (25 mg) by mouth daily for 28 days Take for 4 weeks, then 2 weeks off. May take with food or on empty stomach. 28 capsule 0       REVIEW OF SYSTEMS  12-point ROS negative except as in HPI    Past Medical History:   Diagnosis Date     Degenerative joint disease      Metastatic melanoma (H)     L eye     Nonsenile cataract      Past Surgical History:   Procedure Laterality Date     ------------OTHER-------------  2014    back surgery L4/L5      ------------OTHER-------------      knee surgery both 1961 Right, 1971 Left knee     AS REMOVAL OF KIDNEY STONE  2015     JOINT REPLACEMENT  2017    Both kneses replaced (2017 and 2018)     ROTATOR CUFF REPAIR RT/LT Right 2016     TURP  2000     Family History   Problem Relation Age of Onset     Glaucoma Mother      Prostate Cancer Father      Cancer Sister      Macular Degeneration No family hx of        PHYSICAL EXAMINATION  There were no vitals taken for this visit.  Wt Readings from Last 3 Encounters:   03/27/20 100.7 kg (221 lb 14.4 oz)   02/27/20 99.3 kg (218 lb 14.4 oz)   12/19/19 102 kg (224 lb 14.4 oz)     Gen: Appears well, no distress  HEENT: no scleral  icterus, the sclera on the left is injected and pupil is dilated.  Lymph: No lymphadenopathy  CV: regular  Pulm: Good effort  Ext: no edema  Skin: no ecchymoses or hematomas  Neuro: no focal deficits, affect and cognition are normal    The rest of a comprehensive physical examination is deferred due to PHE (public health emergency) video visit restrictions.    ASSESSMENT AND PLAN  #1 Choroidal melanoma with ciliary body involvement, left eye, status-post plaque brachytherapy,  [employer] Class II and PRAME mRNA positive  It was a pleasure to see Mr. Mayberry. He underwent plaque brachytherapy on 2/10, and testing revealed Class II PRAME mRNA positive disease, high risk for metastasis.    He has been on Sunitinib for about 3 months and is tolerating treatment well. MRI-abdomen radiology interpretation is consistent with small hepatic cysts. We will continue with the plan for radiographic reassessment in 1-2 months. In the meantime, he will continue with sunitinib.      Multiple questions answered to patient's apparent satisfaction.    Jw Reyes M.D.   of Medicine  Hematology, Oncology and Transplantation

## 2020-06-26 NOTE — DISCHARGE INSTRUCTIONS
MRI Contrast Discharge Instructions    The IV contrast you received today will pass out of your body in your  urine. This will happen in the next 24 hours. You will not feel this process.  Your urine will not change color.    Drink at least 4 extra glasses of water or juice today (unless your doctor  has restricted your fluids). This reduces the stress on your kidneys.  You may take your regular medicines.    If you are on dialysis: It is best to have dialysis today.    If you have a reaction: Most reactions happen right away. If you have  any new symptoms after leaving the hospital (such as hives or swelling),  call your hospital at the correct number below. Or call your family doctor.  If you have breathing distress or wheezing, call 911.    Special instructions: ***    I have read and understand the above information.    Signature:______________________________________ Date:___________    Staff:__________________________________________ Date:___________     Time:__________    [place] [facility] Departments:    ___[place]: [phone]  ___[facility]: [phone]  ___[place]: [phone] ___[facility]: [phone]  ___[facility]: [phone]  ___[facility]: [phone]  ___[phone]  ___[facility]: [phone]  ___[phone]

## 2020-06-26 NOTE — LETTER
6/26/2020         RE: Galileo Mayberry  [address]  [facility] [address]        Dear Colleague,    Thank you for referring your patient, Galileo Mayberry, to the [facility]. Please see a copy of my visit note below.    Galileo Mayberry is a 75 year old male who is being evaluated via a billable video visit.          Patient reported vitals added.   0/10 pain scale.     No refills needed.   No additional concerns.     Nicole Serrano CMA    Video-Visit Details    Type of service:  Video Visit    Video Start Time: 2:15 PM  Video End Time: 2:35 PM    Originating Location (pt. Location): Home    Distant Location (provider location):  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 11/2019, he is diagnosed with ocular melanoma, after a choroidal lesion was found in his Left eye.  Patient reports over the last several months he developed new floaters/spots in his L eye.  2. 11/25/2019, he was seen by ophthalmology who noted a left eye, elevated bilobed lesion, size 6.28mm x 18.31(T) x 17.04(L).   3. 12/14/19, CT-CAP showed no evidence of metastatic disease.  4. 1/10/2020: Patient referred to [facility]. B-scan ultrasound of left eye showed elevated bilobed lesion measuring 7.2-7.3 height with a base of 21.4 x 22.5 mm. Low to medium reflectivity. Difficult measurements due to location. Ciliary body involvement was noted. Ultrasound basal dimension measurement included subretinal fluid, and clinical measurement had to be done by transillumination due to anterior tumor location.  5. 1/15/2020, visual acuity right eye 20/20 -1, left eye 20/25 +2 nh. Visual fields counting fingers full bilaterally. Clinical fundus exam of left eye revealed choroidal/ciliary body malignant  melanoma with basal measurement of 22 x 21 mm and height of 7.5 mm. Located in the Inferior left eye from 4:30-7:30, 15 mm to disc, 15 mm to fovea, +subretinal fluid, bilobed. The tumor without fluid appears to be 1-2 mm smaller in basal dimension (20 x 19 mm)  6. 2/10/2020, Iodine-125 24 mm plaque placement delivering 85 Gy to an 8 mm height.      HISTORY OF PRESENT ILLNESS  Mr. Mayberry is a 75 year old man with a history of uveal melanoma. He returns today for evaluation.    He was seen at [place] and received the [place] testing results. He was told his tumor was Class II and PRAME mRNA positive, or high risk for metastasis.    Prior to today's visit he had an MRI of the abdomen, which shows small nodular areas of restricted diffusion I did not appreciate on the scan from 3 months ago. Final read is that they are consistent with hepatic cysts.    He currently he feels well and denies any major systemic symptoms. No nausea, vomiting, abdominal pain, weight loss, diarrhea. No fevers or chills.     Labs fortunately show stable CMP. TSH is 1.33. WBC is 3.8, hemoglobin is stable at 14.8, platelets are 201.     ECOG performance status is 0.      Current Outpatient Medications   Medication Sig Dispense Refill     acetaminophen (TYLENOL) 500 MG tablet Take 1,000 mg by mouth       amoxicillin (AMOXIL) 500 MG capsule        Cholecalciferol (VITAMIN D3) 25 MCG (1000 UT) CAPS Takes 3 daily       diclofenac (VOLTAREN) 1 % topical gel        finasteride (PROSCAR) 5 MG tablet Take 5 mg by mouth       methylphenidate (RITALIN) 10 MG tablet as needed       methylPREDNISolone (MEDROL) 32 MG tablet Take 1 tab 12 hours before scan.  Take the second tab 2 hours before scan. 2 tablet 0     SUNItinib (SUTENT) 25 MG capsule Take 1 capsule (25 mg) by mouth daily for 28 days May take with food or on empty stomach. 28 capsule 0     tamsulosin (FLOMAX) 0.4 MG capsule Take 0.4 mg by mouth       Testosterone 1.62 % GEL        SUNItinib (SUTENT) 25  MG capsule Take 1 capsule (25 mg) by mouth daily for 28 days May take with food or on empty stomach. 28 capsule 0     SUNItinib (SUTENT) 25 MG capsule Take 1 capsule (25 mg) by mouth daily for 28 days Take for 4 weeks, then 2 weeks off. May take with food or on empty stomach. 28 capsule 0       REVIEW OF SYSTEMS  12-point ROS negative except as in HPI    Past Medical History:   Diagnosis Date     Degenerative joint disease      Metastatic melanoma (H)     L eye     Nonsenile cataract      Past Surgical History:   Procedure Laterality Date     ------------OTHER-------------  2014    back surgery L4/L5      ------------OTHER-------------      knee surgery both 1961 Right, 1971 Left knee     AS REMOVAL OF KIDNEY STONE  2015     JOINT REPLACEMENT  2017    Both kneses replaced (2017 and 2018)     ROTATOR CUFF REPAIR RT/LT Right 2016     TURP  2000     Family History   Problem Relation Age of Onset     Glaucoma Mother      Prostate Cancer Father      Cancer Sister      Macular Degeneration No family hx of        PHYSICAL EXAMINATION  There were no vitals taken for this visit.  Wt Readings from Last 3 Encounters:   03/27/20 100.7 kg (221 lb 14.4 oz)   02/27/20 99.3 kg (218 lb 14.4 oz)   12/19/19 102 kg (224 lb 14.4 oz)     Gen: Appears well, no distress  HEENT: no scleral icterus, the sclera on the left is injected and pupil is dilated.  Lymph: No lymphadenopathy  CV: regular  Pulm: Good effort  Ext: no edema  Skin: no ecchymoses or hematomas  Neuro: no focal deficits, affect and cognition are normal    The rest of a comprehensive physical examination is deferred due to PHE (public health emergency) video visit restrictions.    ASSESSMENT AND PLAN  #1 Choroidal melanoma with ciliary body involvement, left eye, status-post plaque brachytherapy,  [employer] Class II and PRAME mRNA positive  It was a pleasure to see Mr. Mayberry. He underwent plaque brachytherapy on 2/10, and testing revealed Class II PRAME mRNA positive  disease, high risk for metastasis.    He has been on Sunitinib for about 3 months and is tolerating treatment well. MRI-abdomen radiology interpretation is consistent with small hepatic cysts. We will continue with the plan for radiographic reassessment in 1-2 months. In the meantime, he will continue with sunitinib.      Multiple questions answered to patient's apparent satisfaction.    Jw Reyes M.D.   of Medicine  Hematology, Oncology and Transplantation

## 2020-06-29 DIAGNOSIS — C69.32 MALIGNANT MELANOMA OF CHOROID OF LEFT EYE (H): Primary | ICD-10-CM

## 2020-06-30 DIAGNOSIS — C69.32 MALIGNANT MELANOMA OF CHOROID OF LEFT EYE (H): Primary | ICD-10-CM

## 2020-06-30 RX ORDER — SUNITINIB MALATE 25 MG/1
25 CAPSULE ORAL DAILY
Qty: 28 CAPSULE | Refills: 0 | Status: SHIPPED | OUTPATIENT
Start: 2020-06-30 | End: 2020-09-09

## 2020-07-01 ENCOUNTER — TELEPHONE (OUTPATIENT)
Dept: ONCOLOGY | Facility: CLINIC | Age: 75
End: 2020-07-01

## 2020-07-01 NOTE — TELEPHONE ENCOUNTER
Left message to schedule in about 1 month (~7/24) a MRI and labs and then a virtual follow up with Dr. Trent after that.

## 2020-07-02 DIAGNOSIS — C69.32 MALIGNANT MELANOMA OF CHOROID OF LEFT EYE (H): Primary | ICD-10-CM

## 2020-07-15 ENCOUNTER — TELEPHONE (OUTPATIENT)
Dept: ONCOLOGY | Facility: CLINIC | Age: 75
End: 2020-07-15

## 2020-07-15 NOTE — ORAL ONC MGMT
Oral Chemotherapy Monitoring Program    Primary Oncologist: Dr. Trent  Primary Oncology Clinic: [facility]  Cancer Diagnosis: Uveal/Choroidal Melanoma    Therapy History:  Sutent 25mg  Take 1 capsule by mouth daily  C1D1= 04/13/2020    Drug Interaction Assessment: No new notable drug interactions were identified.   Medications assessed:    Previous identified drug -drug interaction:  Sutent + testosterone = C; androgens may enhance the hypoglycemic effect of agents with blood glucose lowering effects    Lab Monitoring Plan:  CBC, CMP, Mag, Phos every 4 weeks  TSH/T4 every 4 weeks for 2 cycles, then every 12 weeks  BP every 2 weeks for 8 weeks, then at the start of each cycle thereafter    Subjective/Objective:  Galileo Mayberry is a 75 year old male contacted by phone for oral chemotherapy follow up. Favian confirmed his dose of 1 capsule by mouth daily and has not missed any doses. He described experiencing hair discoloration, weight gain, loose bowels, constipation, and heartburn. He stated that his hair has started to turn gray as of early June. Favian's appetite has been fine but he estimates that he has gained 7-8 pounds. He is currently taking omeprazole and a probiotic to alleviate heart burn symptoms. He occasionally has loose stools or constipation, and does not take anything to manage these symptoms.    ORAL CHEMOTHERAPY 4/8/2020 4/20/2020 5/6/2020 6/9/2020 7/15/2020   Drug Name Sutent (Sunitinib) Sutent (Sunitinib) Sutent (Sunitinib) Sutent (Sunitinib) Sutent (Sunitinib)   Current Dosage 25mg 25mg 25mg 25mg 25mg   Current Schedule Daily Daily Daily Daily Daily   Cycle Details Continuous Continuous Continuous Continuous Continuous   Start Date of Last Cycle - 4/13/2020 - - -   Planned next cycle start date - 5/11/2020 - - -   Doses missed in last 2 weeks - 0 - 0 0   Adherence Assessment - Adherent Adherent Adherent Adherent   Adverse Effects - No AE identified during assessment - No AE  identified during assessment Constipation;Diarrhea   Constipation - - - - Grade 1   Pharmacist Intervention(constipation) - - - - Yes   Intervention(s) - - - - OTC recommendation   Diarrhea - - - - Grade 1   Pharmacist Intervention(diarrhea) - - - - Yes   Intervention(s) - - - - OTC recommendation   Any new drug interactions? Yes - - No -   Pharmacist Intervention? Yes - - No -   Intervention(s) Patient Education - - - -   Is the dose as ordered appropriate for the patient? Yes - - Yes -       Vitals:  BP:   BP Readings from Last 1 Encounters:   03/27/20 123/76     Wt Readings from Last 1 Encounters:   03/27/20 100.7 kg (221 lb 14.4 oz)       Labs:  _  Result Component Current Result Ref Range   Sodium 140 (6/26/2020) 133 - 144 mmol/L     _  Result Component Current Result Ref Range   Potassium 4.3 (6/26/2020) 3.4 - 5.3 mmol/L     _  Result Component Current Result Ref Range   Calcium 9.0 (6/26/2020) 8.5 - 10.1 mg/dL     _  Result Component Current Result Ref Range   Magnesium 2.3 (6/26/2020) 1.6 - 2.3 mg/dL     _  Result Component Current Result Ref Range   Phosphorus 3.1 (6/26/2020) 2.5 - 4.5 mg/dL     _  Result Component Current Result Ref Range   Albumin 3.9 (6/26/2020) 3.4 - 5.0 g/dL     _  Result Component Current Result Ref Range   Urea Nitrogen 18 (6/26/2020) 7 - 30 mg/dL     _  Result Component Current Result Ref Range   Creatinine 1.09 (6/26/2020) 0.66 - 1.25 mg/dL     _  Result Component Current Result Ref Range   AST 14 (6/26/2020) 0 - 45 U/L     _  Result Component Current Result Ref Range   ALT 26 (6/26/2020) 0 - 70 U/L     _  Result Component Current Result Ref Range   Bilirubin Total 0.6 (6/26/2020) 0.2 - 1.3 mg/dL     _  Result Component Current Result Ref Range   WBC 3.8 (L) (6/26/2020) 4.0 - 11.0 10e9/L     _  Result Component Current Result Ref Range   Hemoglobin 14.8 (6/26/2020) 13.3 - 17.7 g/dL     _  Result Component Current Result Ref Range   Platelet Count 201 (6/26/2020) 150 - 450 10e9/L      _  Result Component Current Result Ref Range   Absolute Neutrophil 2.8 (6/26/2020) 1.6 - 8.3 10e9/L       Assessment:  Favian is tolerating sutent with some side effects including hair discoloration, weight gain, loose stools and constipation. I recommended miralax for occasional constipation. For loose stools or diarrhea, I recommended imodium. 2 tablets at first loose stool then 1 tablet with each loose stool thereafter. Hair discoloration occurs in 7%-29% of patients that take sutent according to [employer].     Plan:  Continue Sutent as prescribed  Use imodium as needed for loose stools/diarrhea  Use miralax or senna for constipation  Continue to monitor for side effects or changes    Follow-Up:  Review labs on 07/22    Estephania Honeycutt  Pharmacy Intern  [facility]  [phone]

## 2020-07-21 DIAGNOSIS — C69.32 MALIGNANT MELANOMA OF CHOROID OF LEFT EYE (H): Primary | ICD-10-CM

## 2020-07-22 DIAGNOSIS — C69.32 MALIGNANT MELANOMA OF CHOROID OF LEFT EYE (H): ICD-10-CM

## 2020-07-22 LAB
BASOPHILS # BLD AUTO: 0 10E9/L (ref 0–0.2)
BASOPHILS NFR BLD AUTO: 0.3 %
DIFFERENTIAL METHOD BLD: ABNORMAL
EOSINOPHIL # BLD AUTO: 0.1 10E9/L (ref 0–0.7)
EOSINOPHIL NFR BLD AUTO: 2.1 %
ERYTHROCYTE [DISTWIDTH] IN BLOOD BY AUTOMATED COUNT: 16.9 % (ref 10–15)
HCT VFR BLD AUTO: 37.5 % (ref 40–53)
HGB BLD-MCNC: 12.9 G/DL (ref 13.3–17.7)
LYMPHOCYTES # BLD AUTO: 1.4 10E9/L (ref 0.8–5.3)
LYMPHOCYTES NFR BLD AUTO: 36.9 %
MCH RBC QN AUTO: 33.6 PG (ref 26.5–33)
MCHC RBC AUTO-ENTMCNC: 34.4 G/DL (ref 31.5–36.5)
MCV RBC AUTO: 98 FL (ref 78–100)
MONOCYTES # BLD AUTO: 0.3 10E9/L (ref 0–1.3)
MONOCYTES NFR BLD AUTO: 8.5 %
NEUTROPHILS # BLD AUTO: 2 10E9/L (ref 1.6–8.3)
NEUTROPHILS NFR BLD AUTO: 52.2 %
PLATELET # BLD AUTO: 182 10E9/L (ref 150–450)
RBC # BLD AUTO: 3.84 10E12/L (ref 4.4–5.9)
WBC # BLD AUTO: 3.8 10E9/L (ref 4–11)

## 2020-07-22 PROCEDURE — 36415 COLL VENOUS BLD VENIPUNCTURE: CPT | Performed by: INTERNAL MEDICINE

## 2020-07-22 PROCEDURE — 80050 GENERAL HEALTH PANEL: CPT | Performed by: INTERNAL MEDICINE

## 2020-07-22 PROCEDURE — 84100 ASSAY OF PHOSPHORUS: CPT | Performed by: INTERNAL MEDICINE

## 2020-07-22 PROCEDURE — 83735 ASSAY OF MAGNESIUM: CPT | Performed by: INTERNAL MEDICINE

## 2020-07-23 ENCOUNTER — TELEPHONE (OUTPATIENT)
Dept: ONCOLOGY | Facility: CLINIC | Age: 75
End: 2020-07-23

## 2020-07-23 LAB
ALBUMIN SERPL-MCNC: 3.6 G/DL (ref 3.4–5)
ALP SERPL-CCNC: 43 U/L (ref 40–150)
ALT SERPL W P-5'-P-CCNC: 25 U/L (ref 0–70)
ANION GAP SERPL CALCULATED.3IONS-SCNC: 10 MMOL/L (ref 3–14)
AST SERPL W P-5'-P-CCNC: 17 U/L (ref 0–45)
BILIRUB SERPL-MCNC: 0.6 MG/DL (ref 0.2–1.3)
BUN SERPL-MCNC: 21 MG/DL (ref 7–30)
CALCIUM SERPL-MCNC: 8.3 MG/DL (ref 8.5–10.1)
CHLORIDE SERPL-SCNC: 108 MMOL/L (ref 94–109)
CO2 SERPL-SCNC: 24 MMOL/L (ref 20–32)
CREAT SERPL-MCNC: 1.19 MG/DL (ref 0.66–1.25)
GFR SERPL CREATININE-BSD FRML MDRD: 58 ML/MIN/{1.73_M2}
GLUCOSE SERPL-MCNC: 98 MG/DL (ref 70–99)
MAGNESIUM SERPL-MCNC: 2.1 MG/DL (ref 1.6–2.3)
PHOSPHATE SERPL-MCNC: 3.1 MG/DL (ref 2.5–4.5)
POTASSIUM SERPL-SCNC: 4.2 MMOL/L (ref 3.4–5.3)
PROT SERPL-MCNC: 6.7 G/DL (ref 6.8–8.8)
SODIUM SERPL-SCNC: 142 MMOL/L (ref 133–144)
TSH SERPL DL<=0.005 MIU/L-ACNC: 3.24 MU/L (ref 0.4–4)

## 2020-07-23 NOTE — ORAL ONC MGMT
Oral Chemotherapy Monitoring Program    Placed call to patient in follow up of labs drawn on 7/22.    I discussed with patient that labs look stable and there are no concerning abnormalities at this time. I asked about if he was still monitoring his blood pressure at home. He said he is monitoring it after exercising and it is 110/70 mmHg and then after sitting and working, it is 125/80 mmHg. He asked about his next refill of Sutent, and I informed him the prescription is signed and we will work on getting it refilled tomorrow.     Labs:  _  Result Component Current Result Ref Range   Sodium 142 (7/22/2020) 133 - 144 mmol/L     _  Result Component Current Result Ref Range   Potassium 4.2 (7/22/2020) 3.4 - 5.3 mmol/L     _  Result Component Current Result Ref Range   Calcium 8.3 (L) (7/22/2020) 8.5 - 10.1 mg/dL     _  Result Component Current Result Ref Range   Magnesium 2.1 (7/22/2020) 1.6 - 2.3 mg/dL     _  Result Component Current Result Ref Range   Phosphorus 3.1 (7/22/2020) 2.5 - 4.5 mg/dL     _  Result Component Current Result Ref Range   Albumin 3.6 (7/22/2020) 3.4 - 5.0 g/dL     _  Result Component Current Result Ref Range   Urea Nitrogen 21 (7/22/2020) 7 - 30 mg/dL     _  Result Component Current Result Ref Range   Creatinine 1.19 (7/22/2020) 0.66 - 1.25 mg/dL       _  Result Component Current Result Ref Range   AST 17 (7/22/2020) 0 - 45 U/L     _  Result Component Current Result Ref Range   ALT 25 (7/22/2020) 0 - 70 U/L     _  Result Component Current Result Ref Range   Bilirubin Total 0.6 (7/22/2020) 0.2 - 1.3 mg/dL       _  Result Component Current Result Ref Range   WBC 3.8 (L) (7/22/2020) 4.0 - 11.0 10e9/L     _  Result Component Current Result Ref Range   Hemoglobin 12.9 (L) (7/22/2020) 13.3 - 17.7 g/dL     _  Result Component Current Result Ref Range   Platelet Count 182 (7/22/2020) 150 - 450 10e9/L     _  Result Component Current Result Ref Range   Absolute Neutrophil 2.0 (7/22/2020) 1.6 - 8.3 10e9/L        Mary Barriga  Pharmacy Intern  [facility]  [phone]

## 2020-07-24 DIAGNOSIS — C69.32 MALIGNANT MELANOMA OF CHOROID OF LEFT EYE (H): Primary | ICD-10-CM

## 2020-07-24 RX ORDER — SUNITINIB MALATE 25 MG/1
25 CAPSULE ORAL DAILY
Qty: 28 CAPSULE | Refills: 0 | Status: SHIPPED | OUTPATIENT
Start: 2020-07-24 | End: 2020-09-09

## 2020-08-21 DIAGNOSIS — C69.32 MALIGNANT MELANOMA OF CHOROID OF LEFT EYE (H): Primary | ICD-10-CM

## 2020-08-24 DIAGNOSIS — C69.32 MALIGNANT MELANOMA OF CHOROID OF LEFT EYE (H): Primary | ICD-10-CM

## 2020-08-24 RX ORDER — SUNITINIB MALATE 25 MG/1
25 CAPSULE ORAL DAILY
Qty: 28 CAPSULE | Refills: 0 | Status: SHIPPED | OUTPATIENT
Start: 2020-08-24 | End: 2020-09-09

## 2020-08-26 ENCOUNTER — HOSPITAL ENCOUNTER (OUTPATIENT)
Dept: MRI IMAGING | Facility: CLINIC | Age: 75
End: 2020-08-26
Attending: INTERNAL MEDICINE
Payer: COMMERCIAL

## 2020-08-26 ENCOUNTER — HOSPITAL ENCOUNTER (OUTPATIENT)
Dept: CT IMAGING | Facility: CLINIC | Age: 75
End: 2020-08-26
Attending: INTERNAL MEDICINE
Payer: COMMERCIAL

## 2020-08-26 DIAGNOSIS — C69.32 MALIGNANT MELANOMA OF CHOROID OF LEFT EYE (H): ICD-10-CM

## 2020-08-26 DIAGNOSIS — C69.90 OCULAR MELANOMA, UNSPECIFIED LATERALITY (H): ICD-10-CM

## 2020-08-26 LAB
ALBUMIN SERPL-MCNC: 3.6 G/DL (ref 3.4–5)
ALP SERPL-CCNC: 52 U/L (ref 40–150)
ALT SERPL W P-5'-P-CCNC: 29 U/L (ref 0–70)
ANION GAP SERPL CALCULATED.3IONS-SCNC: 5 MMOL/L (ref 3–14)
AST SERPL W P-5'-P-CCNC: 22 U/L (ref 0–45)
BASOPHILS # BLD AUTO: 0 10E9/L (ref 0–0.2)
BASOPHILS NFR BLD AUTO: 0.7 %
BILIRUB SERPL-MCNC: 1.7 MG/DL (ref 0.2–1.3)
BUN SERPL-MCNC: 20 MG/DL (ref 7–30)
CALCIUM SERPL-MCNC: 8.7 MG/DL (ref 8.5–10.1)
CHLORIDE SERPL-SCNC: 110 MMOL/L (ref 94–109)
CO2 SERPL-SCNC: 26 MMOL/L (ref 20–32)
CREAT BLD-MCNC: 1.3 MG/DL (ref 0.66–1.25)
CREAT SERPL-MCNC: 1.21 MG/DL (ref 0.66–1.25)
DIFFERENTIAL METHOD BLD: ABNORMAL
EOSINOPHIL # BLD AUTO: 0.2 10E9/L (ref 0–0.7)
EOSINOPHIL NFR BLD AUTO: 5 %
ERYTHROCYTE [DISTWIDTH] IN BLOOD BY AUTOMATED COUNT: 14.6 % (ref 10–15)
GFR SERPL CREATININE-BSD FRML MDRD: 54 ML/MIN/{1.73_M2}
GFR SERPL CREATININE-BSD FRML MDRD: 57 ML/MIN/{1.73_M2}
GLUCOSE SERPL-MCNC: 98 MG/DL (ref 70–99)
HCT VFR BLD AUTO: 40.8 % (ref 40–53)
HGB BLD-MCNC: 13.7 G/DL (ref 13.3–17.7)
IMM GRANULOCYTES # BLD: 0 10E9/L (ref 0–0.4)
IMM GRANULOCYTES NFR BLD: 0 %
LYMPHOCYTES # BLD AUTO: 1.4 10E9/L (ref 0.8–5.3)
LYMPHOCYTES NFR BLD AUTO: 46.5 %
MAGNESIUM SERPL-MCNC: 2.1 MG/DL (ref 1.6–2.3)
MCH RBC QN AUTO: 33.3 PG (ref 26.5–33)
MCHC RBC AUTO-ENTMCNC: 33.6 G/DL (ref 31.5–36.5)
MCV RBC AUTO: 99 FL (ref 78–100)
MONOCYTES # BLD AUTO: 0.2 10E9/L (ref 0–1.3)
MONOCYTES NFR BLD AUTO: 6.7 %
NEUTROPHILS # BLD AUTO: 1.2 10E9/L (ref 1.6–8.3)
NEUTROPHILS NFR BLD AUTO: 41.1 %
NRBC # BLD AUTO: 0 10*3/UL
NRBC BLD AUTO-RTO: 0 /100
PHOSPHATE SERPL-MCNC: 3.2 MG/DL (ref 2.5–4.5)
PLATELET # BLD AUTO: 185 10E9/L (ref 150–450)
POTASSIUM SERPL-SCNC: 4.2 MMOL/L (ref 3.4–5.3)
PROT SERPL-MCNC: 6.8 G/DL (ref 6.8–8.8)
RBC # BLD AUTO: 4.11 10E12/L (ref 4.4–5.9)
SODIUM SERPL-SCNC: 142 MMOL/L (ref 133–144)
WBC # BLD AUTO: 3 10E9/L (ref 4–11)

## 2020-08-26 PROCEDURE — 25500064 ZZH RX 255 OP 636: Performed by: INTERNAL MEDICINE

## 2020-08-26 PROCEDURE — 84100 ASSAY OF PHOSPHORUS: CPT | Performed by: INTERNAL MEDICINE

## 2020-08-26 PROCEDURE — 83735 ASSAY OF MAGNESIUM: CPT | Performed by: INTERNAL MEDICINE

## 2020-08-26 PROCEDURE — 36415 COLL VENOUS BLD VENIPUNCTURE: CPT | Performed by: INTERNAL MEDICINE

## 2020-08-26 PROCEDURE — 80053 COMPREHEN METABOLIC PANEL: CPT | Performed by: INTERNAL MEDICINE

## 2020-08-26 PROCEDURE — 71250 CT THORAX DX C-: CPT

## 2020-08-26 PROCEDURE — 82565 ASSAY OF CREATININE: CPT | Mod: 91

## 2020-08-26 PROCEDURE — 85025 COMPLETE CBC W/AUTO DIFF WBC: CPT | Performed by: INTERNAL MEDICINE

## 2020-08-26 PROCEDURE — A9581 GADOXETATE DISODIUM INJ: HCPCS | Performed by: INTERNAL MEDICINE

## 2020-08-26 PROCEDURE — 74183 MRI ABD W/O CNTR FLWD CNTR: CPT

## 2020-08-26 RX ADMIN — GADOXETATE DISODIUM 10 ML: 181.43 INJECTION, SOLUTION INTRAVENOUS at 09:56

## 2020-08-28 ENCOUNTER — VIRTUAL VISIT (OUTPATIENT)
Dept: ONCOLOGY | Facility: CLINIC | Age: 75
End: 2020-08-28
Attending: INTERNAL MEDICINE
Payer: COMMERCIAL

## 2020-08-28 DIAGNOSIS — C69.32 MALIGNANT MELANOMA OF CHOROID OF LEFT EYE (H): Primary | ICD-10-CM

## 2020-08-28 PROCEDURE — 40001009 ZZH VIDEO/TELEPHONE VISIT; NO CHARGE

## 2020-08-28 PROCEDURE — 99215 OFFICE O/P EST HI 40 MIN: CPT | Mod: 95 | Performed by: INTERNAL MEDICINE

## 2020-08-28 PROCEDURE — G0463 HOSPITAL OUTPT CLINIC VISIT: HCPCS | Mod: GT,ZF

## 2020-08-28 NOTE — LETTER
8/28/2020         RE: Galileo Mayberry  [address]  [place] [address]        Dear Colleague,    Thank you for referring your patient, Galileo Mayberry, to the [facility]. Please see a copy of my visit note below.    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 11/2019, he is diagnosed with ocular melanoma, after a choroidal lesion was found in his Left eye.  Patient reports over the last several months he developed new floaters/spots in his L eye.  2. 11/25/2019, he was seen by ophthalmology who noted a left eye, elevated bilobed lesion, size 6.28mm x 18.31(T) x 17.04(L).   3. 12/14/19, CT-CAP showed no evidence of metastatic disease.  4. 1/10/2020: Patient referred to [facility]. B-scan ultrasound of left eye showed elevated bilobed lesion measuring 7.2-7.3 height with a base of 21.4 x 22.5 mm. Low to medium reflectivity. Difficult measurements due to location. Ciliary body involvement was noted. Ultrasound basal dimension measurement included subretinal fluid, and clinical measurement had to be done by transillumination due to anterior tumor location.  5. 1/15/2020, visual acuity right eye 20/20 -1, left eye 20/25 +2 nh. Visual fields counting fingers full bilaterally. Clinical fundus exam of left eye revealed choroidal/ciliary body malignant melanoma with basal measurement of 22 x 21 mm and height of 7.5 mm. Located in the Inferior left eye from 4:30-7:30, 15 mm to disc, 15 mm to fovea, +subretinal fluid, bilobed. The tumor without fluid appears to be 1-2 mm smaller in basal dimension (20 x 19 mm)  6. 2/10/2020, Iodine-125 24 mm plaque placement delivering 85 Gy to an 8 mm height.  7. 3/31/2020, he starts adjuvant sunitinib 25 mg daily for high risk disease, Class II molecular signature and PRAME mRNA positive, high-risk of early  metastasis.    HISTORY OF PRESENT ILLNESS  Mr. 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 He returns today for evaluation.     Three months ago he developed small nodular areas of restricted diffusion on MRI abdomen, with consideration for hepatic cysts he continued Sutent, which he tolerates very well.     MRI today confirms disease progression. He endorses increased fatigued the last month or so. He denies fevers or chills.    Abs Lymphs 1.4, Albumin 3.6 both normal  Creatinine is 1.2-1.3 mg/dL, ANC 1.2 , Bilirubin 1.7 all mildly abnormal    ECOG performance status is 1.      Current Outpatient Medications   Medication Sig Dispense Refill     acetaminophen (TYLENOL) 500 MG tablet Take 1,000 mg by mouth       amoxicillin (AMOXIL) 500 MG capsule        Cholecalciferol (VITAMIN D3) 25 MCG (1000 UT) CAPS Takes 3 daily       diclofenac (VOLTAREN) 1 % topical gel        finasteride (PROSCAR) 5 MG tablet Take 5 mg by mouth       methylphenidate (RITALIN) 10 MG tablet as needed       methylPREDNISolone (MEDROL) 32 MG tablet Take 1 tab 12 hours before scan.  Take the second tab 2 hours before scan. 2 tablet 0     SUNItinib (SUTENT) 25 MG capsule Take 1 capsule (25 mg) by mouth daily for 28 days May take with food or on empty stomach. 28 capsule 0     tamsulosin (FLOMAX) 0.4 MG capsule Take 0.4 mg by mouth       Testosterone 1.62 % GEL        SUNItinib (SUTENT) 25 MG capsule Take 1 capsule (25 mg) by mouth daily for 28 days May take with food or on empty stomach. 28 capsule 0     SUNItinib (SUTENT) 25 MG capsule Take 1 capsule (25 mg) by mouth daily for 28 days May take with food or on empty stomach. 28 capsule 0     SUNItinib (SUTENT) 25 MG capsule Take 1 capsule (25 mg) by mouth daily for 28 days May take with food or on empty stomach. 28 capsule 0     SUNItinib (SUTENT) 25 MG capsule Take 1 capsule (25 mg) by mouth daily for 28 days May take with food or on empty stomach. 28 capsule 0     SUNItinib  (SUTENT) 25 MG capsule Take 1 capsule (25 mg) by mouth daily for 28 days Take for 4 weeks, then 2 weeks off. May take with food or on empty stomach. 28 capsule 0       REVIEW OF SYSTEMS  12-point ROS negative except as in HPI    Past Medical History:   Diagnosis Date     Degenerative joint disease      Metastatic melanoma (H)     L eye     Nonsenile cataract      Past Surgical History:   Procedure Laterality Date     ------------OTHER-------------  2014    back surgery L4/L5      ------------OTHER-------------      knee surgery both 1961 Right, 1971 Left knee     AS REMOVAL OF KIDNEY STONE  2015     JOINT REPLACEMENT  2017    Both kneses replaced (2017 and 2018)     ROTATOR CUFF REPAIR RT/LT Right 2016     TURP  2000     Family History   Problem Relation Age of Onset     Glaucoma Mother      Prostate Cancer Father      Cancer Sister      Macular Degeneration No family hx of        PHYSICAL EXAMINATION  There were no vitals taken for this visit.  Wt Readings from Last 3 Encounters:   03/27/20 100.7 kg (221 lb 14.4 oz)   02/27/20 99.3 kg (218 lb 14.4 oz)   12/19/19 102 kg (224 lb 14.4 oz)     Gen: Appears well, no distress, in the home office  HEENT: no scleral icterus, the sclera on the left is injected and pupil is dilated.  Lymph: No lymphadenopathy  CV: regular  Pulm: Good effort  Ext: no edema  Skin: no ecchymoses or hematomas  Neuro: no focal deficits, affect and cognition are normal    The rest of a comprehensive physical examination is deferred due to PHE (public health emergency) video visit restrictions.    ASSESSMENT AND PLAN  #1 Metastatic uveal melanoma  #2 Choroidal melanoma with ciliary body involvement, left eye, status-post plaque brachytherapy,  [employer] Class II and PRAME mRNA positive  #3 Hyperbilirubinemia, Tbili 1.7  #4 Mild neutropenia, ANC 1200  It was a pleasure to see Mr. Mayberry and his wife. He underwent plaque brachytherapy on 2/10/2020 and testing revealed Class II PRAME mRNA  "positive disease, high risk for metastasis. He began adjuvant Sutent and completed nearly 6 months of Sunitinib.  However, the MRI-abdomen now convincingly shows worsening metastatic disease.    I have recommended he stop Sunitinib therapy. We discussed pros and cons of proceeding with biopsy to pursue further information to guide treatment selection. For PD-L1 positive disease would recommend ipilimumab/nivolumab. For PD-L1 negative disease would recommend either radiation therapy and Ipi/Nivo versus trial participation. Likely he will not tolerate IL2 well for the radiation and IL2 study.    PLAN  -Interventional Radiology consult for guided biopsy ASAP  -Return to clinic in 1 week to review treatment and potential trials       Multiple questions answered to patient's apparent satisfaction.     Jw Reyes M.D.   of Medicine  Hematology, Oncology and Transplantation      Galileo Mayberry is a 75 year old male who is being evaluated via a billable video visit.      The patient has been notified of following:     \"This video visit will be conducted via a call between you and your physician/provider. We have found that certain health care needs can be provided without the need for an in-person physical exam.  This service lets us provide the care you need with a video conversation.  If a prescription is necessary we can send it directly to your pharmacy.  If lab work is needed we can place an order for that and you can then stop by our lab to have the test done at a later time.    Video visits are billed at different rates depending on your insurance coverage.  Please reach out to your insurance provider with any questions.    If during the course of the call the physician/provider feels a video visit is not appropriate, you will not be charged for this service.\"    Patient has given verbal consent for Video visit? Yes  How would you like to obtain your AVS? MyChart  If you are " "dropped from the video visit, the video invite should be resent to: Text to cell phone: [phone]   Will anyone else be joining your video visit? No       Vitals - Patient Reported  Weight (Patient Reported): 100.2 kg (221 lb)  Height (Patient Reported): 179.9 cm (5' 10.83\")  BMI (Based on Pt Reported Ht/Wt): 30.97  Pain Score: No Pain (0)    I have reviewed and updated the patient's allergies and medication list.    Concerns: No concerns  Refills: No refills needed.      Hawa Coelho CMA    Video-Visit Details    Type of service:  Video Visit    Video Start Time: 4:25 PM  Video End Time: 5:00 PM    Originating Location (pt. Location): Home    Distant Location (provider location):  [facility]     Platform used for Video Visit: Well          Again, thank you for allowing me to participate in the care of your patient.        Sincerely,        Jw Trent MD    "

## 2020-08-28 NOTE — PROGRESS NOTES
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 11/2019, he is diagnosed with ocular melanoma, after a choroidal lesion was found in his Left eye.  Patient reports over the last several months he developed new floaters/spots in his L eye.  2. 11/25/2019, he was seen by ophthalmology who noted a left eye, elevated bilobed lesion, size 6.28mm x 18.31(T) x 17.04(L).   3. 12/14/19, CT-CAP showed no evidence of metastatic disease.  4. 1/10/2020: Patient referred to [facility]. B-scan ultrasound of left eye showed elevated bilobed lesion measuring 7.2-7.3 height with a base of 21.4 x 22.5 mm. Low to medium reflectivity. Difficult measurements due to location. Ciliary body involvement was noted. Ultrasound basal dimension measurement included subretinal fluid, and clinical measurement had to be done by transillumination due to anterior tumor location.  5. 1/15/2020, visual acuity right eye 20/20 -1, left eye 20/25 +2 nh. Visual fields counting fingers full bilaterally. Clinical fundus exam of left eye revealed choroidal/ciliary body malignant melanoma with basal measurement of 22 x 21 mm and height of 7.5 mm. Located in the Inferior left eye from 4:30-7:30, 15 mm to disc, 15 mm to fovea, +subretinal fluid, bilobed. The tumor without fluid appears to be 1-2 mm smaller in basal dimension (20 x 19 mm)  6. 2/10/2020, Iodine-125 24 mm plaque placement delivering 85 Gy to an 8 mm height.  7. 3/31/2020, he starts adjuvant sunitinib 25 mg daily for high risk disease, Class II molecular signature and PRAME mRNA positive, high-risk of early metastasis.    HISTORY OF PRESENT ILLNESS  Mr. 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 He returns today for evaluation.     Three months ago he developed small nodular areas of restricted diffusion on MRI abdomen, with consideration for hepatic  cysts he continued Sutent, which he tolerates very well.     MRI today confirms disease progression. He endorses increased fatigued the last month or so. He denies fevers or chills.    Abs Lymphs 1.4, Albumin 3.6 both normal  Creatinine is 1.2-1.3 mg/dL, ANC 1.2 , Bilirubin 1.7 all mildly abnormal    ECOG performance status is 1.      Current Outpatient Medications   Medication Sig Dispense Refill     acetaminophen (TYLENOL) 500 MG tablet Take 1,000 mg by mouth       amoxicillin (AMOXIL) 500 MG capsule        Cholecalciferol (VITAMIN D3) 25 MCG (1000 UT) CAPS Takes 3 daily       diclofenac (VOLTAREN) 1 % topical gel        finasteride (PROSCAR) 5 MG tablet Take 5 mg by mouth       methylphenidate (RITALIN) 10 MG tablet as needed       methylPREDNISolone (MEDROL) 32 MG tablet Take 1 tab 12 hours before scan.  Take the second tab 2 hours before scan. 2 tablet 0     SUNItinib (SUTENT) 25 MG capsule Take 1 capsule (25 mg) by mouth daily for 28 days May take with food or on empty stomach. 28 capsule 0     tamsulosin (FLOMAX) 0.4 MG capsule Take 0.4 mg by mouth       Testosterone 1.62 % GEL        SUNItinib (SUTENT) 25 MG capsule Take 1 capsule (25 mg) by mouth daily for 28 days May take with food or on empty stomach. 28 capsule 0     SUNItinib (SUTENT) 25 MG capsule Take 1 capsule (25 mg) by mouth daily for 28 days May take with food or on empty stomach. 28 capsule 0     SUNItinib (SUTENT) 25 MG capsule Take 1 capsule (25 mg) by mouth daily for 28 days May take with food or on empty stomach. 28 capsule 0     SUNItinib (SUTENT) 25 MG capsule Take 1 capsule (25 mg) by mouth daily for 28 days May take with food or on empty stomach. 28 capsule 0     SUNItinib (SUTENT) 25 MG capsule Take 1 capsule (25 mg) by mouth daily for 28 days Take for 4 weeks, then 2 weeks off. May take with food or on empty stomach. 28 capsule 0       REVIEW OF SYSTEMS  12-point ROS negative except as in HPI    Past Medical History:   Diagnosis Date      Degenerative joint disease      Metastatic melanoma (H)     L eye     Nonsenile cataract      Past Surgical History:   Procedure Laterality Date     ------------OTHER-------------  2014    back surgery L4/L5      ------------OTHER-------------      knee surgery both 1961 Right, 1971 Left knee     AS REMOVAL OF KIDNEY STONE  2015     JOINT REPLACEMENT  2017    Both kneses replaced (2017 and 2018)     ROTATOR CUFF REPAIR RT/LT Right 2016     TURP  2000     Family History   Problem Relation Age of Onset     Glaucoma Mother      Prostate Cancer Father      Cancer Sister      Macular Degeneration No family hx of        PHYSICAL EXAMINATION  There were no vitals taken for this visit.  Wt Readings from Last 3 Encounters:   03/27/20 100.7 kg (221 lb 14.4 oz)   02/27/20 99.3 kg (218 lb 14.4 oz)   12/19/19 102 kg (224 lb 14.4 oz)     Gen: Appears well, no distress, in the home office  HEENT: no scleral icterus, the sclera on the left is injected and pupil is dilated.  Lymph: No lymphadenopathy  CV: regular  Pulm: Good effort  Ext: no edema  Skin: no ecchymoses or hematomas  Neuro: no focal deficits, affect and cognition are normal    The rest of a comprehensive physical examination is deferred due to PHE (public health emergency) video visit restrictions.    ASSESSMENT AND PLAN  #1 Metastatic uveal melanoma  #2 Choroidal melanoma with ciliary body involvement, left eye, status-post plaque brachytherapy,  [employer] Class II and PRAME mRNA positive  #3 Hyperbilirubinemia, Tbili 1.7  #4 Mild neutropenia, ANC 1200  It was a pleasure to see Mr. Mayberry and his wife. He underwent plaque brachytherapy on 2/10/2020 and testing revealed Class II PRAME mRNA positive disease, high risk for metastasis. He began adjuvant Sutent and completed nearly 6 months of Sunitinib.  However, the MRI-abdomen now convincingly shows worsening metastatic disease.    I have recommended he stop Sunitinib therapy. We discussed pros and cons of  "proceeding with biopsy to pursue further information to guide treatment selection. For PD-L1 positive disease would recommend ipilimumab/nivolumab. For PD-L1 negative disease would recommend either radiation therapy and Ipi/Nivo versus trial participation. Likely he will not tolerate IL2 well for the radiation and IL2 study.    PLAN  -Interventional Radiology consult for guided biopsy ASAP  -Return to clinic in 1 week to review treatment and potential trials       Multiple questions answered to patient's apparent satisfaction.     Jw Reyes M.D.   of Medicine  Hematology, Oncology and Transplantation      Galileo Mayberry is a 75 year old male who is being evaluated via a billable video visit.      The patient has been notified of following:     \"This video visit will be conducted via a call between you and your physician/provider. We have found that certain health care needs can be provided without the need for an in-person physical exam.  This service lets us provide the care you need with a video conversation.  If a prescription is necessary we can send it directly to your pharmacy.  If lab work is needed we can place an order for that and you can then stop by our lab to have the test done at a later time.    Video visits are billed at different rates depending on your insurance coverage.  Please reach out to your insurance provider with any questions.    If during the course of the call the physician/provider feels a video visit is not appropriate, you will not be charged for this service.\"    Patient has given verbal consent for Video visit? Yes  How would you like to obtain your AVS? MyChart  If you are dropped from the video visit, the video invite should be resent to: Text to cell phone: [phone]   Will anyone else be joining your video visit? No       Vitals - Patient Reported  Weight (Patient Reported): 100.2 kg (221 lb)  Height (Patient Reported): 179.9 cm (5' " "10.83\")  BMI (Based on Pt Reported Ht/Wt): 30.97  Pain Score: No Pain (0)    I have reviewed and updated the patient's allergies and medication list.    Concerns: No concerns  Refills: No refills needed.      Hawa Coelho CMA    Video-Visit Details    Type of service:  Video Visit    Video Start Time: 4:25 PM  Video End Time: 5:00 PM    Originating Location (pt. Location): Home    Distant Location (provider location):  [facility]     Platform used for Video Visit: Natasha"

## 2020-09-01 DIAGNOSIS — K76.9 LIVER LESION: Primary | ICD-10-CM

## 2020-09-01 DIAGNOSIS — C69.32 MALIGNANT MELANOMA OF CHOROID OF LEFT EYE (H): ICD-10-CM

## 2020-09-03 ENCOUNTER — TRANSFERRED RECORDS (OUTPATIENT)
Dept: HEALTH INFORMATION MANAGEMENT | Facility: CLINIC | Age: 75
End: 2020-09-03

## 2020-09-04 ENCOUNTER — VIRTUAL VISIT (OUTPATIENT)
Dept: ONCOLOGY | Facility: CLINIC | Age: 75
End: 2020-09-04
Attending: INTERNAL MEDICINE
Payer: COMMERCIAL

## 2020-09-04 DIAGNOSIS — C69.32 MALIGNANT MELANOMA OF CHOROID OF LEFT EYE (H): ICD-10-CM

## 2020-09-04 DIAGNOSIS — C69.90 OCULAR MELANOMA, UNSPECIFIED LATERALITY (H): Primary | ICD-10-CM

## 2020-09-04 PROCEDURE — G0463 HOSPITAL OUTPT CLINIC VISIT: HCPCS | Mod: GT,ZF

## 2020-09-04 PROCEDURE — 40001009 ZZH VIDEO/TELEPHONE VISIT; NO CHARGE

## 2020-09-04 PROCEDURE — 99214 OFFICE O/P EST MOD 30 MIN: CPT | Mod: 95 | Performed by: INTERNAL MEDICINE

## 2020-09-04 NOTE — LETTER
9/4/2020         RE: Galileo Mayberry  [address]  [place] [address]        Dear Colleague,    Thank you for referring your patient, Galileo Mayberry, to the [facility]. Please see a copy of my visit note below.    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 11/2019, he is diagnosed with ocular melanoma, after a choroidal lesion was found in his Left eye.  Patient reports over the last several months he developed new floaters/spots in his L eye.  2. 11/25/2019, he was seen by ophthalmology who noted a left eye, elevated bilobed lesion, size 6.28mm x 18.31(T) x 17.04(L).   3. 12/14/19, CT-CAP showed no evidence of metastatic disease.  4. 1/10/2020: Patient referred to [facility]. B-scan ultrasound of left eye showed elevated bilobed lesion measuring 7.2-7.3 height with a base of 21.4 x 22.5 mm. Low to medium reflectivity. Difficult measurements due to location. Ciliary body involvement was noted. Ultrasound basal dimension measurement included subretinal fluid, and clinical measurement had to be done by transillumination due to anterior tumor location.  5. 1/15/2020, visual acuity right eye 20/20 -1, left eye 20/25 +2 nh. Visual fields counting fingers full bilaterally. Clinical fundus exam of left eye revealed choroidal/ciliary body malignant melanoma with basal measurement of 22 x 21 mm and height of 7.5 mm. Located in the Inferior left eye from 4:30-7:30, 15 mm to disc, 15 mm to fovea, +subretinal fluid, bilobed. The tumor without fluid appears to be 1-2 mm smaller in basal dimension (20 x 19 mm)  6. 2/10/2020, Iodine-125 24 mm plaque placement delivering 85 Gy to an 8 mm height.  7. 3/31/2020, he starts adjuvant sunitinib 25 mg daily for high risk disease, Class II molecular signature and PRAME mRNA positive, high-risk of early  metastasis.    HISTORY OF PRESENT ILLNESS  Mr. 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 He returns today in follow-up. Since our last visit he was able to obtain a liver biopsy at [facility]. Biopsy results are currently pending.     ECOG performance status is 1.      Current Outpatient Medications   Medication Sig Dispense Refill     acetaminophen (TYLENOL) 500 MG tablet Take 1,000 mg by mouth       amoxicillin (AMOXIL) 500 MG capsule        Cholecalciferol (VITAMIN D3) 25 MCG (1000 UT) CAPS Takes 3 daily       diclofenac (VOLTAREN) 1 % topical gel        finasteride (PROSCAR) 5 MG tablet Take 5 mg by mouth       methylphenidate (RITALIN) 10 MG tablet as needed       methylPREDNISolone (MEDROL) 32 MG tablet Take 1 tab 12 hours before scan.  Take the second tab 2 hours before scan. 2 tablet 0     tamsulosin (FLOMAX) 0.4 MG capsule Take 0.4 mg by mouth       Testosterone 1.62 % GEL          REVIEW OF SYSTEMS  12-point ROS negative except as in HPI    Past Medical History:   Diagnosis Date     Degenerative joint disease      Metastatic melanoma (H)     L eye     Nonsenile cataract      Past Surgical History:   Procedure Laterality Date     ------------OTHER-------------  2014    back surgery L4/L5      ------------OTHER-------------      knee surgery both 1961 Right, 1971 Left knee     AS REMOVAL OF KIDNEY STONE  2015     JOINT REPLACEMENT  2017    Both kneses replaced (2017 and 2018)     ROTATOR CUFF REPAIR RT/LT Right 2016     TURP  2000     Family History   Problem Relation Age of Onset     Glaucoma Mother      Prostate Cancer Father      Cancer Sister      Macular Degeneration No family hx of        PHYSICAL EXAMINATION  There were no vitals taken for this visit.  Wt Readings from Last 3 Encounters:   03/27/20 100.7 kg (221 lb 14.4 oz)   02/27/20 99.3 kg (218 lb 14.4 oz)   12/19/19 102 kg (224 lb 14.4 oz)     Gen: Appears well, no distress, in the home office  HEENT: no scleral icterus, the  "sclera on the left is injected and pupil is dilated.  Lymph: No lymphadenopathy  CV: regular  Pulm: Good effort  Ext: no edema  Skin: no ecchymoses or hematomas  Neuro: no focal deficits, affect and cognition are normal    The rest of a comprehensive physical examination is deferred due to PHE (public health emergency) video visit restrictions.    ASSESSMENT AND PLAN  #1 Metastatic uveal melanoma  #2 Choroidal melanoma with ciliary body involvement, left eye, status-post plaque brachytherapy,  [employer] Class II and PRAME mRNA positive  #3 Hyperbilirubinemia, Tbili 1.7  #4 Mild neutropenia, ANC 1200  It was a pleasure to see Mr. Mayberry and his wife. Fortunately he was able to undergo biopsy at [facility]. Hopefully results will be ready by early next week and we will be able to continue with plan for molecular testing and PD-L1 expression.    For PD-L1 positive disease would recommend first line ipilimumab/nivolumab. For PD-L1 negative disease would recommend radiation therapy and Ipi/Nivo versus clinical trial participation. Likely he will not tolerate IL2 well for participation in our radiation and IL2 study.    PLAN  -Return in 1 week to review biopsy results       Multiple questions answered to patient's apparent satisfaction.     Jw Reyes M.D.   of Medicine  Hematology, Oncology and Transplantation          Galileo Mayberry is a 75 year old male who is being evaluated via a billable video visit.      The patient has been notified of following:     \"This video visit will be conducted via a call between you and your physician/provider. We have found that certain health care needs can be provided without the need for an in-person physical exam.  This service lets us provide the care you need with a video conversation.  If a prescription is necessary we can send it directly to your pharmacy.  If lab work is needed we can place an order for that and you can then stop by our lab " "to have the test done at a later time.    Video visits are billed at different rates depending on your insurance coverage.  Please reach out to your insurance provider with any questions.    If during the course of the call the physician/provider feels a video visit is not appropriate, you will not be charged for this service.\"    Patient has given verbal consent for Video visit? Yes  How would you like to obtain your AVS? MyChart  If you are dropped from the video visit, the video invite should be resent to: Text to cell phone: [phone]   Will anyone else be joining your video visit? No       Vitals - Patient Reported  Weight (Patient Reported): 100.2 kg (221 lb)  Height (Patient Reported): 179.9 cm (5' 10.83\")  BMI (Based on Pt Reported Ht/Wt): 30.97  Pain Score: No Pain (0)    I have reviewed and updated the patient's allergies and medication list.    Concerns: No concerns  Refills: No refills needed.      Hawa Coelho CMA    Video-Visit Details    Type of service:  Video Visit    Video Start Time: 4:00 PM  Video End Time: 4:30 PM    Originating Location (pt. Location): Home    Distant Location (provider location):  [facility] CANCER [facility]     Platform used for Video Visit: Arie             Again, thank you for allowing me to participate in the care of your patient.        Sincerely,        Jw Trent MD    "

## 2020-09-04 NOTE — TELEPHONE ENCOUNTER
DIAGNOSIS: Liver bx   DATE RECEIVED: 9.9.20   NOTES STATUS DETAILS   OFFICE NOTE from referring provider Internal 8.28.20  Dr. Jw Trent  [facility] Oncology   OFFICE NOTE from other specialist N/A    DISCHARGE SUMMARY from hospital N/A    DISCHARGE REPORT from the ER N/A    OPERATIVE REPORT N/A    MEDICATION LIST Internal    XRAYS (IMAGES & REPORTS) Internal *sched* for 9.8.20  US Abd Ltd    8.26.20, 6.26.20, 3.20.20  MR Abd    8.26.20, 6.26.20,   CT Chest    12.14.19  CT Chest/Abd/Pelvis   PATHOLOGY  Slides & report N/A

## 2020-09-04 NOTE — PROGRESS NOTES
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 11/2019, he is diagnosed with ocular melanoma, after a choroidal lesion was found in his Left eye.  Patient reports over the last several months he developed new floaters/spots in his L eye.  2. 11/25/2019, he was seen by ophthalmology who noted a left eye, elevated bilobed lesion, size 6.28mm x 18.31(T) x 17.04(L).   3. 12/14/19, CT-CAP showed no evidence of metastatic disease.  4. 1/10/2020: Patient referred to [facility]. B-scan ultrasound of left eye showed elevated bilobed lesion measuring 7.2-7.3 height with a base of 21.4 x 22.5 mm. Low to medium reflectivity. Difficult measurements due to location. Ciliary body involvement was noted. Ultrasound basal dimension measurement included subretinal fluid, and clinical measurement had to be done by transillumination due to anterior tumor location.  5. 1/15/2020, visual acuity right eye 20/20 -1, left eye 20/25 +2 nh. Visual fields counting fingers full bilaterally. Clinical fundus exam of left eye revealed choroidal/ciliary body malignant melanoma with basal measurement of 22 x 21 mm and height of 7.5 mm. Located in the Inferior left eye from 4:30-7:30, 15 mm to disc, 15 mm to fovea, +subretinal fluid, bilobed. The tumor without fluid appears to be 1-2 mm smaller in basal dimension (20 x 19 mm)  6. 2/10/2020, Iodine-125 24 mm plaque placement delivering 85 Gy to an 8 mm height.  7. 3/31/2020, he starts adjuvant sunitinib 25 mg daily for high risk disease, Class II molecular signature and PRAME mRNA positive, high-risk of early metastasis.    HISTORY OF PRESENT ILLNESS  Mr. 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 He returns today in follow-up. Since our last visit he was able to obtain a liver biopsy at [facility]. Biopsy results are currently pending.     ECOG  performance status is 1.      Current Outpatient Medications   Medication Sig Dispense Refill     acetaminophen (TYLENOL) 500 MG tablet Take 1,000 mg by mouth       amoxicillin (AMOXIL) 500 MG capsule        Cholecalciferol (VITAMIN D3) 25 MCG (1000 UT) CAPS Takes 3 daily       diclofenac (VOLTAREN) 1 % topical gel        finasteride (PROSCAR) 5 MG tablet Take 5 mg by mouth       methylphenidate (RITALIN) 10 MG tablet as needed       methylPREDNISolone (MEDROL) 32 MG tablet Take 1 tab 12 hours before scan.  Take the second tab 2 hours before scan. 2 tablet 0     tamsulosin (FLOMAX) 0.4 MG capsule Take 0.4 mg by mouth       Testosterone 1.62 % GEL          REVIEW OF SYSTEMS  12-point ROS negative except as in HPI    Past Medical History:   Diagnosis Date     Degenerative joint disease      Metastatic melanoma (H)     L eye     Nonsenile cataract      Past Surgical History:   Procedure Laterality Date     ------------OTHER-------------  2014    back surgery L4/L5      ------------OTHER-------------      knee surgery both 1961 Right, 1971 Left knee     AS REMOVAL OF KIDNEY STONE  2015     JOINT REPLACEMENT  2017    Both kneses replaced (2017 and 2018)     ROTATOR CUFF REPAIR RT/LT Right 2016     TURP  2000     Family History   Problem Relation Age of Onset     Glaucoma Mother      Prostate Cancer Father      Cancer Sister      Macular Degeneration No family hx of        PHYSICAL EXAMINATION  There were no vitals taken for this visit.  Wt Readings from Last 3 Encounters:   03/27/20 100.7 kg (221 lb 14.4 oz)   02/27/20 99.3 kg (218 lb 14.4 oz)   12/19/19 102 kg (224 lb 14.4 oz)     Gen: Appears well, no distress, in the home office  HEENT: no scleral icterus, the sclera on the left is injected and pupil is dilated.  Lymph: No lymphadenopathy  CV: regular  Pulm: Good effort  Ext: no edema  Skin: no ecchymoses or hematomas  Neuro: no focal deficits, affect and cognition are normal    The rest of a comprehensive physical  "examination is deferred due to PHE (public health emergency) video visit restrictions.    ASSESSMENT AND PLAN  #1 Metastatic uveal melanoma  #2 Choroidal melanoma with ciliary body involvement, left eye, status-post plaque brachytherapy,  [employer] Class II and PRAME mRNA positive  #3 Hyperbilirubinemia, Tbili 1.7  #4 Mild neutropenia, ANC 1200  It was a pleasure to see Mr. Mayberry and his wife. Fortunately he was able to undergo biopsy at [place]. Hopefully results will be ready by early next week and we will be able to continue with plan for molecular testing and PD-L1 expression.    For PD-L1 positive disease would recommend first line ipilimumab/nivolumab. For PD-L1 negative disease would recommend radiation therapy and Ipi/Nivo versus clinical trial participation. Likely he will not tolerate IL2 well for participation in our radiation and IL2 study.    PLAN  -Return in 1 week to review biopsy results       Multiple questions answered to patient's apparent satisfaction.     Jw Reyes M.D.   of Medicine  Hematology, Oncology and Transplantation          Galileo Mayberry is a 75 year old male who is being evaluated via a billable video visit.      The patient has been notified of following:     \"This video visit will be conducted via a call between you and your physician/provider. We have found that certain health care needs can be provided without the need for an in-person physical exam.  This service lets us provide the care you need with a video conversation.  If a prescription is necessary we can send it directly to your pharmacy.  If lab work is needed we can place an order for that and you can then stop by our lab to have the test done at a later time.    Video visits are billed at different rates depending on your insurance coverage.  Please reach out to your insurance provider with any questions.    If during the course of the call the physician/provider feels a " "video visit is not appropriate, you will not be charged for this service.\"    Patient has given verbal consent for Video visit? Yes  How would you like to obtain your AVS? MyChart  If you are dropped from the video visit, the video invite should be resent to: Text to cell phone: [phone]   Will anyone else be joining your video visit? No       Vitals - Patient Reported  Weight (Patient Reported): 100.2 kg (221 lb)  Height (Patient Reported): 179.9 cm (5' 10.83\")  BMI (Based on Pt Reported Ht/Wt): 30.97  Pain Score: No Pain (0)    I have reviewed and updated the patient's allergies and medication list.    Concerns: No concerns  Refills: No refills needed.      Hawa Coelho CMA    Video-Visit Details    Type of service:  Video Visit    Video Start Time: 4:00 PM  Video End Time: 4:30 PM    Originating Location (pt. Location): Home    Distant Location (provider location):  [facility] CANCER [facility]     Platform used for Video Visit: "[employer]"           "

## 2020-09-09 ENCOUNTER — PRE VISIT (OUTPATIENT)
Dept: INTERVENTIONAL RADIOLOGY/VASCULAR | Facility: CLINIC | Age: 75
End: 2020-09-09

## 2020-09-11 ENCOUNTER — VIRTUAL VISIT (OUTPATIENT)
Dept: ONCOLOGY | Facility: CLINIC | Age: 75
End: 2020-09-11
Attending: INTERNAL MEDICINE
Payer: COMMERCIAL

## 2020-09-11 DIAGNOSIS — C69.32 MALIGNANT MELANOMA OF CHOROID OF LEFT EYE (H): Primary | ICD-10-CM

## 2020-09-11 PROCEDURE — 40001009 ZZH VIDEO/TELEPHONE VISIT; NO CHARGE

## 2020-09-11 PROCEDURE — 99215 OFFICE O/P EST HI 40 MIN: CPT | Mod: 95 | Performed by: INTERNAL MEDICINE

## 2020-09-11 NOTE — LETTER
9/11/2020         RE: Galileo Mayberry  [address]  [place] [address]        Dear Colleague,    Thank you for referring your patient, Galileo Mayberry, to the [facility]. Please see a copy of my visit note below.    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 11/2019, he is diagnosed with ocular melanoma, after a choroidal lesion was found in his Left eye.  Patient reports over the last several months he developed new floaters/spots in his L eye.  2. 11/25/2019, he was seen by ophthalmology who noted a left eye, elevated bilobed lesion, size 6.28mm x 18.31(T) x 17.04(L).   3. 12/14/19, CT-CAP showed no evidence of metastatic disease.  4. 1/10/2020: Patient referred to [facility]. B-scan ultrasound of left eye showed elevated bilobed lesion measuring 7.2-7.3 height with a base of 21.4 x 22.5 mm. Low to medium reflectivity. Difficult measurements due to location. Ciliary body involvement was noted. Ultrasound basal dimension measurement included subretinal fluid, and clinical measurement had to be done by transillumination due to anterior tumor location.  5. 1/15/2020, visual acuity right eye 20/20 -1, left eye 20/25 +2 nh. Visual fields counting fingers full bilaterally. Clinical fundus exam of left eye revealed choroidal/ciliary body malignant melanoma with basal measurement of 22 x 21 mm and height of 7.5 mm. Located in the Inferior left eye from 4:30-7:30, 15 mm to disc, 15 mm to fovea, +subretinal fluid, bilobed. The tumor without fluid appears to be 1-2 mm smaller in basal dimension (20 x 19 mm)  6. 2/10/2020, Iodine-125 24 mm plaque placement delivering 85 Gy to an 8 mm height.  7. 3/31/2020, he starts adjuvant sunitinib 25 mg daily for high risk disease, Class II molecular signature and PRAME mRNA positive, high-risk of early  metastasis.    HISTORY OF PRESENT ILLNESS  Mr. 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 He returns today in follow-up. Since our last visit he was able to obtain a liver biopsy at [facility]. Biopsy results confirmed metastatic uveal melanoma. We have sent off for further testing to include PD-L1 expression and molecular derangements. Favian has also started eating more of a ketogenic diet and feels great. He has more energy and notices an improvement in overall quality of life and stamina.    ECOG performance status is 1.      Current Outpatient Medications   Medication Sig Dispense Refill     acetaminophen (TYLENOL) 500 MG tablet Take 1,000 mg by mouth       amoxicillin (AMOXIL) 500 MG capsule        Cholecalciferol (VITAMIN D3) 25 MCG (1000 UT) CAPS Takes 3 daily       diclofenac (VOLTAREN) 1 % topical gel        finasteride (PROSCAR) 5 MG tablet Take 5 mg by mouth       methylphenidate (RITALIN) 10 MG tablet as needed       tamsulosin (FLOMAX) 0.4 MG capsule Take 0.4 mg by mouth       Testosterone 1.62 % GEL        methylPREDNISolone (MEDROL) 32 MG tablet Take 1 tab 12 hours before scan.  Take the second tab 2 hours before scan. (Patient not taking: Reported on 9/11/2020) 2 tablet 0       REVIEW OF SYSTEMS  12-point ROS negative except as in HPI    Past Medical History:   Diagnosis Date     Degenerative joint disease      Metastatic melanoma (H)     L eye     Nonsenile cataract      Past Surgical History:   Procedure Laterality Date     ------------OTHER-------------  2014    back surgery L4/L5      ------------OTHER-------------      knee surgery both 1961 Right, 1971 Left knee     AS REMOVAL OF KIDNEY STONE  2015     JOINT REPLACEMENT  2017    Both kneses replaced (2017 and 2018)     ROTATOR CUFF REPAIR RT/LT Right 2016     TURP  2000     Family History   Problem Relation Age of Onset     Glaucoma Mother      Prostate Cancer Father      Cancer Sister      Macular Degeneration No family hx of         PHYSICAL EXAMINATION  There were no vitals taken for this visit.  Wt Readings from Last 3 Encounters:   03/27/20 100.7 kg (221 lb 14.4 oz)   02/27/20 99.3 kg (218 lb 14.4 oz)   12/19/19 102 kg (224 lb 14.4 oz)     Gen: Appears well, no distress, in the home office  HEENT: no scleral icterus, the sclera on the left is injected and pupil is dilated.  Lymph: No lymphadenopathy  CV: regular  Pulm: Good effort  Ext: no edema  Skin: no ecchymoses or hematomas  Neuro: no focal deficits, affect and cognition are normal    The rest of a comprehensive physical examination is deferred due to PHE (public health emergency) video visit restrictions.    ASSESSMENT AND PLAN  #1 Metastatic uveal melanoma  #2 Choroidal melanoma with ciliary body involvement, left eye, status-post plaque brachytherapy,  [employer] Class II and PRAME mRNA positive  #3 Hyperbilirubinemia, Tbili 1.7  #4 Mild neutropenia, ANC 1200  It was a pleasure to speak with Mr. Mayberry.  We have submitted the request for Caris testing on the [place] biopsy sample.  He is 75 years old and likely would have difficulty with high-dose IL2 we are using in the metastatic uveal melanoma clinical trial ([id number]).    For PD-L1 positive disease would likely recommend first line ipilimumab/nivolumab. However, it is far more likely he will have PD-L1 negative disease. For PD-L1 negative disease would recommend radiation therapy and Ipi/Nivo versus another clinical trial for participation.    I will send to radiation oncology and Interventional radiology to begin liver directed therapy discussions. Will see back in about 3 weeks with labs to review.       Multiple questions answered to patient's apparent satisfaction.     Jw Reyes M.D.   of Medicine  Hematology, Oncology and Transplantation        Galileo Mayberry is a 75 year old male who is being evaluated via a billable video visit.      The patient has been notified of  "following:     \"This video visit will be conducted via a call between you and your physician/provider. We have found that certain health care needs can be provided without the need for an in-person physical exam.  This service lets us provide the care you need with a video conversation.  If a prescription is necessary we can send it directly to your pharmacy.  If lab work is needed we can place an order for that and you can then stop by our lab to have the test done at a later time.    Video visits are billed at different rates depending on your insurance coverage.  Please reach out to your insurance provider with any questions.    If during the course of the call the physician/provider feels a video visit is not appropriate, you will not be charged for this service.\"    Patient has given verbal consent for Video visit? Yes    How would you like to obtain your AVS? MyChart     If you are dropped from the video visit, the video invite should be resent to: Text to cell phone: [phone]     Will anyone else be joining your video visit? No          Vitals - Patient Reported  Weight (Patient Reported): 98 kg (216 lb)  Height (Patient Reported): 179.9 cm (5' 10.83\")  BMI (Based on Pt Reported Ht/Wt): 30.27  Pain Score: No Pain (0)    Braxton Byrd LPN      Video-Visit Details    Type of service:  Video Visit    Video Start Time: 5:00 PM  Video End Time: 5:30 PM    Originating Location (pt. Location): Home    Distant Location (provider location):  [facility]     Platform used for Video Visit: JohnnyWell          Again, thank you for allowing me to participate in the care of your patient.        Sincerely,        Jw Trent MD    "

## 2020-09-11 NOTE — PROGRESS NOTES
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 11/2019, he is diagnosed with ocular melanoma, after a choroidal lesion was found in his Left eye.  Patient reports over the last several months he developed new floaters/spots in his L eye.  2. 11/25/2019, he was seen by ophthalmology who noted a left eye, elevated bilobed lesion, size 6.28mm x 18.31(T) x 17.04(L).   3. 12/14/19, CT-CAP showed no evidence of metastatic disease.  4. 1/10/2020: Patient referred to [facility]. B-scan ultrasound of left eye showed elevated bilobed lesion measuring 7.2-7.3 height with a base of 21.4 x 22.5 mm. Low to medium reflectivity. Difficult measurements due to location. Ciliary body involvement was noted. Ultrasound basal dimension measurement included subretinal fluid, and clinical measurement had to be done by transillumination due to anterior tumor location.  5. 1/15/2020, visual acuity right eye 20/20 -1, left eye 20/25 +2 nh. Visual fields counting fingers full bilaterally. Clinical fundus exam of left eye revealed choroidal/ciliary body malignant melanoma with basal measurement of 22 x 21 mm and height of 7.5 mm. Located in the Inferior left eye from 4:30-7:30, 15 mm to disc, 15 mm to fovea, +subretinal fluid, bilobed. The tumor without fluid appears to be 1-2 mm smaller in basal dimension (20 x 19 mm)  6. 2/10/2020, Iodine-125 24 mm plaque placement delivering 85 Gy to an 8 mm height.  7. 3/31/2020, he starts adjuvant sunitinib 25 mg daily for high risk disease, Class II molecular signature and PRAME mRNA positive, high-risk of early metastasis.    HISTORY OF PRESENT ILLNESS  Mr. 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 He returns today in follow-up. Since our last visit he was able to obtain a liver biopsy at [facility]. Biopsy results confirmed metastatic uveal melanoma.  We have sent off for further testing to include PD-L1 expression and molecular derangements. Favian has also started eating more of a ketogenic diet and feels great. He has more energy and notices an improvement in overall quality of life and stamina.    ECOG performance status is 1.      Current Outpatient Medications   Medication Sig Dispense Refill     acetaminophen (TYLENOL) 500 MG tablet Take 1,000 mg by mouth       amoxicillin (AMOXIL) 500 MG capsule        Cholecalciferol (VITAMIN D3) 25 MCG (1000 UT) CAPS Takes 3 daily       diclofenac (VOLTAREN) 1 % topical gel        finasteride (PROSCAR) 5 MG tablet Take 5 mg by mouth       methylphenidate (RITALIN) 10 MG tablet as needed       tamsulosin (FLOMAX) 0.4 MG capsule Take 0.4 mg by mouth       Testosterone 1.62 % GEL        methylPREDNISolone (MEDROL) 32 MG tablet Take 1 tab 12 hours before scan.  Take the second tab 2 hours before scan. (Patient not taking: Reported on 9/11/2020) 2 tablet 0       REVIEW OF SYSTEMS  12-point ROS negative except as in HPI    Past Medical History:   Diagnosis Date     Degenerative joint disease      Metastatic melanoma (H)     L eye     Nonsenile cataract      Past Surgical History:   Procedure Laterality Date     ------------OTHER-------------  2014    back surgery L4/L5      ------------OTHER-------------      knee surgery both 1961 Right, 1971 Left knee     AS REMOVAL OF KIDNEY STONE  2015     JOINT REPLACEMENT  2017    Both kneses replaced (2017 and 2018)     ROTATOR CUFF REPAIR RT/LT Right 2016     TURP  2000     Family History   Problem Relation Age of Onset     Glaucoma Mother      Prostate Cancer Father      Cancer Sister      Macular Degeneration No family hx of        PHYSICAL EXAMINATION  There were no vitals taken for this visit.  Wt Readings from Last 3 Encounters:   03/27/20 100.7 kg (221 lb 14.4 oz)   02/27/20 99.3 kg (218 lb 14.4 oz)   12/19/19 102 kg (224 lb 14.4 oz)     Gen: Appears well, no distress, in the  "home office  HEENT: no scleral icterus, the sclera on the left is injected and pupil is dilated.  Lymph: No lymphadenopathy  CV: regular  Pulm: Good effort  Ext: no edema  Skin: no ecchymoses or hematomas  Neuro: no focal deficits, affect and cognition are normal    The rest of a comprehensive physical examination is deferred due to PHE (public health emergency) video visit restrictions.    ASSESSMENT AND PLAN  #1 Metastatic uveal melanoma  #2 Choroidal melanoma with ciliary body involvement, left eye, status-post plaque brachytherapy,  [employer] Class II and PRAME mRNA positive  #3 Hyperbilirubinemia, Tbili 1.7  #4 Mild neutropenia, ANC 1200  It was a pleasure to speak with Mr. Mayberry.  We have submitted the request for Caris testing on the [place] biopsy sample.  He is 75 years old and likely would have difficulty with high-dose IL2 we are using in the metastatic uveal melanoma clinical trial ([id number]).    For PD-L1 positive disease would likely recommend first line ipilimumab/nivolumab. However, it is far more likely he will have PD-L1 negative disease. For PD-L1 negative disease would recommend radiation therapy and Ipi/Nivo versus another clinical trial for participation.    I will send to radiation oncology and Interventional radiology to begin liver directed therapy discussions. Will see back in about 3 weeks with labs to review.       Multiple questions answered to patient's apparent satisfaction.     Jw Reyes M.D.   of Medicine  Hematology, Oncology and Transplantation        Galileo Mayberry is a 75 year old male who is being evaluated via a billable video visit.      The patient has been notified of following:     \"This video visit will be conducted via a call between you and your physician/provider. We have found that certain health care needs can be provided without the need for an in-person physical exam.  This service lets us provide the care you need with " "a video conversation.  If a prescription is necessary we can send it directly to your pharmacy.  If lab work is needed we can place an order for that and you can then stop by our lab to have the test done at a later time.    Video visits are billed at different rates depending on your insurance coverage.  Please reach out to your insurance provider with any questions.    If during the course of the call the physician/provider feels a video visit is not appropriate, you will not be charged for this service.\"    Patient has given verbal consent for Video visit? Yes    How would you like to obtain your AVS? MyChart     If you are dropped from the video visit, the video invite should be resent to: Text to cell phone: [phone]     Will anyone else be joining your video visit? No          Vitals - Patient Reported  Weight (Patient Reported): 98 kg (216 lb)  Height (Patient Reported): 179.9 cm (5' 10.83\")  BMI (Based on Pt Reported Ht/Wt): 30.27  Pain Score: No Pain (0)    Braxton Byrd LPN      Video-Visit Details    Type of service:  Video Visit    Video Start Time: 5:00 PM  Video End Time: 5:30 PM    Originating Location (pt. Location): Home    Distant Location (provider location):  [facility]     Platform used for Video Visit: Natasha"

## 2020-09-16 ENCOUNTER — TELEPHONE (OUTPATIENT)
Dept: ONCOLOGY | Facility: CLINIC | Age: 75
End: 2020-09-16

## 2020-09-16 NOTE — TELEPHONE ENCOUNTER
Requested information from message below faxed to [employer] at [phone].     Successful transmission verified by [employer].     Nicole Serrano CMA    ----- Message from Sayra Montiel RN sent at 9/16/2020  2:31 PM CDT -----  Hi [facility] most wonderful people!    Hi team,     Can you pls. Fax the patient's face sheet  and [place] pathology report ([id number]) to [employer] at Fax: [phone]. Please call me at [phone] if you can't find this pathology report and I can help you.  You could also ask Shad and I am cc'ing her on this.    Thanks so much!    Sayra

## 2020-09-17 ENCOUNTER — TELEPHONE (OUTPATIENT)
Dept: VASCULAR SURGERY | Facility: CLINIC | Age: 75
End: 2020-09-17

## 2020-09-17 NOTE — TELEPHONE ENCOUNTER
Called and left a msg for pt asking that he return my call regarding an appt time change for tomorrow.     Left my direct line for him.     I did call pt back again in which I informed him that the doctor who he is seeing on Monday is well familiar with his dx.     Pt verbalized understanding however he states that the 220pm appt time will not work    Informed him that we can doublebook him for a 1pm time virtual visit appt.     He agrees to plan.     Does want to ask the doctor the quality of life on treatment with Y90,  as he understands that another option was radiation beam treatment.    I informed him that I will also address with the provider. Pt agrees to plan.     Yaima RITCHIE RN, BSN  Interventional Radiology/Vascular  Nurse Coordinator   Phone: [phone]  Fax: [phone]

## 2020-09-18 NOTE — TELEPHONE ENCOUNTER
DIAGNOSIS: Y90 consult, malignant choroidal melanoma, referred Miley   DATE RECEIVED: 9.21.20   NOTES STATUS DETAILS   OFFICE NOTE from referring provider Internal 9.11.20, 9.4.20  Dr. Jw Trent  [facility]   OFFICE NOTE from other specialist CE 9.11.20  Dr. Luz Curry  [facility]   DISCHARGE SUMMARY from hospital N/A    DISCHARGE REPORT from the ER N/A    OPERATIVE REPORT N/A    MEDICATION LIST Internal    XRAYS (IMAGES & REPORTS) Pacs 8.26.20, 6.26.20, 3.20.20  MR Abd    8.26.20, 6.26.20  CT Chest   PATHOLOGY  Slides & report N/A

## 2020-09-21 ENCOUNTER — PRE VISIT (OUTPATIENT)
Dept: INTERVENTIONAL RADIOLOGY/VASCULAR | Facility: CLINIC | Age: 75
End: 2020-09-21

## 2020-09-21 ENCOUNTER — VIRTUAL VISIT (OUTPATIENT)
Dept: RADIOLOGY | Facility: CLINIC | Age: 75
End: 2020-09-21
Attending: RADIOLOGY
Payer: COMMERCIAL

## 2020-09-21 DIAGNOSIS — C69.32 MALIGNANT MELANOMA OF CHOROID OF LEFT EYE (H): Primary | ICD-10-CM

## 2020-09-21 PROCEDURE — 40001009 ZZH VIDEO/TELEPHONE VISIT; NO CHARGE

## 2020-09-21 NOTE — PROGRESS NOTES
"Galileo Mayberry is a 75 year old male who is being evaluated via a billable video visit.      The patient has been notified of following:     \"This video visit will be conducted via a call between you and your physician/provider. We have found that certain health care needs can be provided without the need for an in-person physical exam.  This service lets us provide the care you need with a video conversation.  If a prescription is necessary we can send it directly to your pharmacy.  If lab work is needed we can place an order for that and you can then stop by our lab to have the test done at a later time.    Video visits are billed at different rates depending on your insurance coverage.  Please reach out to your insurance provider with any questions.    If during the course of the call the physician/provider feels a video visit is not appropriate, you will not be charged for this service.\"    Patient has given verbal consent for Video visit? Yes    How would you like to obtain your AVS? MyChart     If you are dropped from the video visit, the video invite should be resent to: Text to cell phone: [phone]     Will anyone else be joining your video visit? No         I have reviewed and updated the patient's allergies and pt confirmed any changes to their medication list.  Patient was asked to provide any patient recorded vital signs, height and/or weight.  Please see \"Patient Reported Vital Signs\" tab for that information.  Patient instructed to be in the virtual waiting room 10-15 minutes prior to appointment time.      Concerns: Patient has no new concerns.        Refills: None        OLEGARIO Wynn          Video-Visit Details    Type of service:  Video Visit    Video Start Time: 1225  Video End Time: 1255    Originating Location (pt. Location): Home    Distant Location (provider location):  [facility]     Platform used for Video Visit: [employer]          Interventional Radiology " Clinic Visit    Date of this visit: 9/25/2020    Galileo Mayberry  is referred by  for treatment recommendations. The patient requires evaluation for diagnosis of metastatic uveal melanoma     Primary Physician: Van Vranken, Benjamin E        History Of Present Illness:    Galileo Mayberry is a 75 year old male who presents with diagnosis of metastatic uveal melanoma.  For full details pleas refer to Dr. Trent's note from 9/11/2015, however, in brief.  Mr. Mayberry began noticing increased floaters in his left eye in fall of 2019.  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  He eventually underwent I-125 brachytherapy and was initiated on sunitinib given his high risk status.  Initial work up failed to show any evidence of metastatic disease.  However, MRI from August demonstrated numerous metastatic deposits which were eventually biopsied at [facility] and confirmed as metastatic uveal melanoma.      Review of Systems:    General: Negative for recent fever.  Skin: Negative for jaundice.  Eyes: Negative for jaundice.  Respiratory: Negative for shortness of breath.  Cardiovascular: Negative for chest pain.  Gastrointestinal: Negative for abdominal pain or swelling, nausea, vomiting, or diarrhea.  Musculoskeletal: Negative for ankle swelling.    Past Medical/Surgical History:    Past Medical History:   Diagnosis Date     Degenerative joint disease      Metastatic melanoma (H)     L eye     Nonsenile cataract      Past Surgical History:   Procedure Laterality Date     ------------OTHER-------------  2014    back surgery L4/L5      ------------OTHER-------------      knee surgery both 1961 Right, 1971 Left knee     AS REMOVAL OF KIDNEY STONE  2015     JOINT REPLACEMENT  2017    Both kneses replaced (2017 and 2018)     ROTATOR CUFF REPAIR RT/LT Right 2016     TURP  2000       Current Medications:    Current Outpatient Medications   Medication Sig Dispense Refill     acetaminophen  (TYLENOL) 500 MG tablet Take 1,000 mg by mouth       amoxicillin (AMOXIL) 500 MG capsule        Cholecalciferol (VITAMIN D3) 25 MCG (1000 UT) CAPS Takes 3 daily       diclofenac (VOLTAREN) 1 % topical gel        finasteride (PROSCAR) 5 MG tablet Take 5 mg by mouth       methylphenidate (RITALIN) 10 MG tablet as needed       methylPREDNISolone (MEDROL) 32 MG tablet Take 1 tab 12 hours before scan.  Take the second tab 2 hours before scan. 2 tablet 0     tamsulosin (FLOMAX) 0.4 MG capsule Take 0.4 mg by mouth       Testosterone 1.62 % GEL          Allergies:    Vardenafil and Contrast dye    Family History:    Family History   Problem Relation Age of Onset     Glaucoma Mother      Prostate Cancer Father      Cancer Sister      Macular Degeneration No family hx of        Social History:    Social History     Socioeconomic History     Marital status:      Spouse name: None     Number of children: None     Years of education: None     Highest education level: None   Occupational History     None   Social Needs     Financial resource strain: None     Food insecurity     Worry: None     Inability: None     Transportation needs     Medical: None     Non-medical: None   Tobacco Use     Smoking status: Never Smoker     Smokeless tobacco: Never Used   Substance and Sexual Activity     Alcohol Use     Alcohol/week: 1.0 standard drinks     Types: 1 Glasses of wine per week     Binge frequency: Never     Drug use: Never     Sexual activity: None   Lifestyle     Physical activity     Days per week: None     Minutes per session: None     Stress: None   Relationships     Social connections     Talks on phone: None     Gets together: None     Attends Mu-ism service: None     Active member of club or organization: None     Attends meetings of clubs or organizations: None     Relationship status: None     Intimate partner violence     Fear of current or ex partner: None     Emotionally abused: None     Physically abused: None      Forced sexual activity: None   Other Topics Concern     None   Social History Narrative     None       Physical Exam:    There were no vitals taken for this visit.     GENERAL APPEARANCE: healthy, alert and no distress  PSYCHIATRIC: mentation appears normal and affect normal.  EYES: No jaundice, left eye partially closed  SKIN: No jaundice.     Laboratory Studies:    Lab Results   Component Value Date    HGB 13.7 08/26/2020     Lab Results   Component Value Date     08/26/2020     Lab Results   Component Value Date    WBC 3.0 08/26/2020       No results found for: INR    Lab Results   Component Value Date    PROTTOTAL 6.8 08/26/2020      Lab Results   Component Value Date    ALBUMIN 3.6 08/26/2020     Lab Results   Component Value Date    BILITOTAL 1.7 08/26/2020     No results found for: BILICONJ   Lab Results   Component Value Date    ALKPHOS 52 08/26/2020     Lab Results   Component Value Date    AST 22 08/26/2020     Lab Results   Component Value Date    ALT 29 08/26/2020       Lab Results   Component Value Date    CR 1.21 08/26/2020     No results found for: GFR    No results found for: FETO    Imaging:     I reviewed the MRI from 8/26/2020 which demonstrates numerous small bilateral metastatic lesions.       ASSESSMENT:      Galileo Mayberry is a 75 year old male with metastatic uveal melanoma.     ECOG performance status: 0    I believe the patient is a suitable candidate for a radioembolization procedure.    I showed Mr. Mayberry the imaging and discussed the findings. I discussed with him that the goal of the procedure is disease control with a survival benefit rather than a cure given the nature of the disease. We also discussed that this would be performed in conjunction with systemic therapy.  Alternatives were reviewed, including surgery and external beam radiation, but the patient is not a surgical candidate.  I explained the radioembolization procedure  - that it is a two step procedure on  two different days that involves an angiogram and nuclear medicine study on each day, and that it requires insurance pre-approval. I explained that the first procedure involves mapping the arteries to the liver and embolizing any sidebranches that place the patient at risk of non-target radiation injury, then checking for shunting to the lungs. The second procedure, assuming relevant sidebranches were embolizable and the lung shunting is not too high, involves delivering the radiation beads intra-arterially and imaging the liver afterwards to assess the pattern of radiation distribution.   I explained that both procedures are performed under conscious sedation. We discussed what will happen before, during and after the procedure; what to expect in the post procedure recovery period; and what the follow-up will be. We discussed the risks of the procedure which include, but are not limited to, vascular injury causing bleeding or occlusion of blood vessels, groin hematoma, infection, biloma, liver failure, non-target radiation injury to structures such as gallbladder/bowel/pancreas/lung (with risks such as bowel ulceration, pancreatitis or pneumonitis), and incomplete treatment. Risks are increased the greater the deviation from normal lab values, especially albumin and total bilirubin, and also the larger the area we must treat. We also discussed the post-radioembolization syndrome which consists of varying degrees of pain, nausea, and lethargy. I also explained that new tumors may develop elsewhere given the nature of the disease. Most patients go home the same day, but occasionally circumstances are such that a longer admission may be required. I also informed the patient that I will be assisted during the procedure by a fellow and/or resident, and that sometimes a medical student may be observing. All of Mr. Mayberry's questions were answered and he agreed to proceed.     PLAN:  -Await PD-1 results of the  biopsy.  -Will tentatively plan to move ahead with DOMINGUEZ, however, will discuss further with Dr. Trent.      It was a pleasure seeing the patient.     Signed,    Alberto Storey M.D.  Department of Interventional Radiology  [facility]  Patient Care Team:  Van Vranken, Benjamin E, MD as PCP - [facility] Referring Physician  Mayra Gil MD as Referring Physician (Ophthalmology)  Jw Burden MD as MD (Hematology & Oncology)  JW BURDEN MD

## 2020-09-21 NOTE — LETTER
"    9/21/2020         RE: Galileo Mayberry  [address]  [facility] [address]        Dear Colleague,    Thank you for referring your patient, Galileo Mayberry, to the [facility]. Please see a copy of my visit note below.    Galileo Mayberry is a 75 year old male who is being evaluated via a billable video visit.      The patient has been notified of following:     \"This video visit will be conducted via a call between you and your physician/provider. We have found that certain health care needs can be provided without the need for an in-person physical exam.  This service lets us provide the care you need with a video conversation.  If a prescription is necessary we can send it directly to your pharmacy.  If lab work is needed we can place an order for that and you can then stop by our lab to have the test done at a later time.    Video visits are billed at different rates depending on your insurance coverage.  Please reach out to your insurance provider with any questions.    If during the course of the call the physician/provider feels a video visit is not appropriate, you will not be charged for this service.\"    Patient has given verbal consent for Video visit? Yes    How would you like to obtain your AVS? MyChart     If you are dropped from the video visit, the video invite should be resent to: Text to cell phone: [phone]     Will anyone else be joining your video visit? No         I have reviewed and updated the patient's allergies and pt confirmed any changes to their medication list.  Patient was asked to provide any patient recorded vital signs, height and/or weight.  Please see \"Patient Reported Vital Signs\" tab for that information.  Patient instructed to be in the virtual waiting room 10-15 minutes prior to appointment time.      Concerns: Patient has no new concerns.        Refills: None        OLEGARIO Wynn          Video-Visit Details    Type of service:  " Video Visit    Video Start Time: 1225  Video End Time: 1255    Originating Location (pt. Location): Home    Distant Location (provider location):  [facility]     Platform used for Video Visit: [employer]          Interventional Radiology Clinic Visit    Date of this visit: 9/25/2020    Galileo Mayberry  is referred by  for treatment recommendations. The patient requires evaluation for diagnosis of metastatic uveal melanoma     Primary Physician: Van Vranken, Benjamin E        History Of Present Illness:    Galileo Mayberry is a 75 year old male who presents with diagnosis of metastatic uveal melanoma.  For full details pleas refer to Dr. Trent's note from 9/11/2015, however, in brief.  Mr. Mayberry began noticing increased floaters in his left eye in fall of 2019.  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  He eventually underwent I-125 brachytherapy and was initiated on sunitinib given his high risk status.  Initial work up failed to show any evidence of metastatic disease.  However, MRI from August demonstrated numerous metastatic deposits which were eventually biopsied at [place] and confirmed as metastatic uveal melanoma.      Review of Systems:    General: Negative for recent fever.  Skin: Negative for jaundice.  Eyes: Negative for jaundice.  Respiratory: Negative for shortness of breath.  Cardiovascular: Negative for chest pain.  Gastrointestinal: Negative for abdominal pain or swelling, nausea, vomiting, or diarrhea.  Musculoskeletal: Negative for ankle swelling.    Past Medical/Surgical History:    Past Medical History:   Diagnosis Date     Degenerative joint disease      Metastatic melanoma (H)     L eye     Nonsenile cataract      Past Surgical History:   Procedure Laterality Date     ------------OTHER-------------  2014    back surgery L4/L5      ------------OTHER-------------      knee surgery both 1961 Right, 1971 Left knee     AS REMOVAL OF  KIDNEY STONE  2015     JOINT REPLACEMENT  2017    Both kneses replaced (2017 and 2018)     ROTATOR CUFF REPAIR RT/LT Right 2016     TURP  2000       Current Medications:    Current Outpatient Medications   Medication Sig Dispense Refill     acetaminophen (TYLENOL) 500 MG tablet Take 1,000 mg by mouth       amoxicillin (AMOXIL) 500 MG capsule        Cholecalciferol (VITAMIN D3) 25 MCG (1000 UT) CAPS Takes 3 daily       diclofenac (VOLTAREN) 1 % topical gel        finasteride (PROSCAR) 5 MG tablet Take 5 mg by mouth       methylphenidate (RITALIN) 10 MG tablet as needed       methylPREDNISolone (MEDROL) 32 MG tablet Take 1 tab 12 hours before scan.  Take the second tab 2 hours before scan. 2 tablet 0     tamsulosin (FLOMAX) 0.4 MG capsule Take 0.4 mg by mouth       Testosterone 1.62 % GEL          Allergies:    Vardenafil and Contrast dye    Family History:    Family History   Problem Relation Age of Onset     Glaucoma Mother      Prostate Cancer Father      Cancer Sister      Macular Degeneration No family hx of        Social History:    Social History     Socioeconomic History     Marital status:      Spouse name: None     Number of children: None     Years of education: None     Highest education level: None   Occupational History     None   Social Needs     Financial resource strain: None     Food insecurity     Worry: None     Inability: None     Transportation needs     Medical: None     Non-medical: None   Tobacco Use     Smoking status: Never Smoker     Smokeless tobacco: Never Used   Substance and Sexual Activity     Alcohol Use     Alcohol/week: 1.0 standard drinks     Types: 1 Glasses of wine per week     Binge frequency: Never     Drug use: Never     Sexual activity: None   Lifestyle     Physical activity     Days per week: None     Minutes per session: None     Stress: None   Relationships     Social connections     Talks on phone: None     Gets together: None     Attends Catholic service: None      Active member of club or organization: None     Attends meetings of clubs or organizations: None     Relationship status: None     Intimate partner violence     Fear of current or ex partner: None     Emotionally abused: None     Physically abused: None     Forced sexual activity: None   Other Topics Concern     None   Social History Narrative     None       Physical Exam:    There were no vitals taken for this visit.     GENERAL APPEARANCE: healthy, alert and no distress  PSYCHIATRIC: mentation appears normal and affect normal.  EYES: No jaundice, left eye partially closed  SKIN: No jaundice.     Laboratory Studies:    Lab Results   Component Value Date    HGB 13.7 08/26/2020     Lab Results   Component Value Date     08/26/2020     Lab Results   Component Value Date    WBC 3.0 08/26/2020       No results found for: INR    Lab Results   Component Value Date    PROTTOTAL 6.8 08/26/2020      Lab Results   Component Value Date    ALBUMIN 3.6 08/26/2020     Lab Results   Component Value Date    BILITOTAL 1.7 08/26/2020     No results found for: BILICONJ   Lab Results   Component Value Date    ALKPHOS 52 08/26/2020     Lab Results   Component Value Date    AST 22 08/26/2020     Lab Results   Component Value Date    ALT 29 08/26/2020       Lab Results   Component Value Date    CR 1.21 08/26/2020     No results found for: GFR    No results found for: FETO    Imaging:     I reviewed the MRI from 8/26/2020 which demonstrates numerous small bilateral metastatic lesions.       ASSESSMENT:      Galileo Mayberry is a 75 year old male with metastatic uveal melanoma.     ECOG performance status: 0    I believe the patient is a suitable candidate for a radioembolization procedure.    I showed Mr. Mayberry the imaging and discussed the findings. I discussed with him that the goal of the procedure is disease control with a survival benefit rather than a cure given the nature of the disease. We also discussed that this would  be performed in conjunction with systemic therapy.  Alternatives were reviewed, including surgery and external beam radiation, but the patient is not a surgical candidate.  I explained the radioembolization procedure  - that it is a two step procedure on two different days that involves an angiogram and nuclear medicine study on each day, and that it requires insurance pre-approval. I explained that the first procedure involves mapping the arteries to the liver and embolizing any sidebranches that place the patient at risk of non-target radiation injury, then checking for shunting to the lungs. The second procedure, assuming relevant sidebranches were embolizable and the lung shunting is not too high, involves delivering the radiation beads intra-arterially and imaging the liver afterwards to assess the pattern of radiation distribution.   I explained that both procedures are performed under conscious sedation. We discussed what will happen before, during and after the procedure; what to expect in the post procedure recovery period; and what the follow-up will be. We discussed the risks of the procedure which include, but are not limited to, vascular injury causing bleeding or occlusion of blood vessels, groin hematoma, infection, biloma, liver failure, non-target radiation injury to structures such as gallbladder/bowel/pancreas/lung (with risks such as bowel ulceration, pancreatitis or pneumonitis), and incomplete treatment. Risks are increased the greater the deviation from normal lab values, especially albumin and total bilirubin, and also the larger the area we must treat. We also discussed the post-radioembolization syndrome which consists of varying degrees of pain, nausea, and lethargy. I also explained that new tumors may develop elsewhere given the nature of the disease. Most patients go home the same day, but occasionally circumstances are such that a longer admission may be required. I also informed the  patient that I will be assisted during the procedure by a fellow and/or resident, and that sometimes a medical student may be observing. All of Mr. Mayberry's questions were answered and he agreed to proceed.     PLAN:  -Await PD-1 results of the biopsy.  -Will tentatively plan to move ahead with DOMINGUEZ, however, will discuss further with Dr. Trent.      It was a pleasure seeing the patient.     Signed,    Alberto Storey M.D.  Department of Interventional Radiology  [facility]  Patient Care Team:  Van Vranken, Benjamin E, MD as PCP - Sil Hernández MD as Referring Physician  Mayra Gil MD as Referring Physician (Ophthalmology)  Jw Burden MD as MD (Hematology & Oncology)

## 2020-09-22 ENCOUNTER — TELEPHONE (OUTPATIENT)
Dept: VASCULAR SURGERY | Facility: CLINIC | Age: 75
End: 2020-09-22

## 2020-09-22 NOTE — TELEPHONE ENCOUNTER
Called pt and informed him that I am following up on his consult with Dr. Storey.     Informed him that I am calling to f/up on his visit.    Pt states

## 2020-09-22 NOTE — TELEPHONE ENCOUNTER
Called pt and informed him that I am following up on his consult with Dr. Storey.     Informed him that I am calling to f/up on his visit.    Pt states that he's a  in which he informs me that since his diagnosis is hardik rare that all his treatment have been covered and we should have no problem getting authorization from the [facility]    He states that he's not had an issue.    I informed him that I will email him the consent as well as also f/up with the [facility].    He agrees to plan.     Yaima RITCHIE RN, BSN  Interventional Radiology/Vascular  Nurse Coordinator   Phone: [phone]  Fax: [phone]

## 2020-09-23 DIAGNOSIS — C78.7 LIVER METASTASES: ICD-10-CM

## 2020-09-23 DIAGNOSIS — C69.32 MALIGNANT MELANOMA OF CHOROID OF LEFT EYE (H): Primary | ICD-10-CM

## 2020-09-28 ENCOUNTER — TEAM CONFERENCE (OUTPATIENT)
Dept: VASCULAR SURGERY | Facility: CLINIC | Age: 75
End: 2020-09-28

## 2020-09-28 NOTE — TELEPHONE ENCOUNTER
Called [facility] x2; as there are referrals for Ophthalmology as well as Hem/Onc.    They state that with new request for services, we will need to contact [facility].         Called [facility]: [phone]  Spoke to Zahraa CULP    Requested for authorization for referral to Interventional Radiology    Explained that we saw the pt 9/21/2020 with Dr. Alberto Storey and that we are requesting for a formal Authorization for coverage of services.       She states that she will expedite this request and send information to his PCP to send authorization for IR services.     Yaima RITCHIE RN, BSN  Interventional Radiology/Vascular  Nurse Coordinator   Phone: [phone]  Fax: [phone]

## 2020-10-01 ENCOUNTER — VIRTUAL VISIT (OUTPATIENT)
Dept: ONCOLOGY | Facility: CLINIC | Age: 75
End: 2020-10-01
Attending: INTERNAL MEDICINE
Payer: COMMERCIAL

## 2020-10-01 DIAGNOSIS — C69.40 MELANOMA OF UVEA METASTATIC TO LIVER (H): ICD-10-CM

## 2020-10-01 DIAGNOSIS — C78.7 MELANOMA OF UVEA METASTATIC TO LIVER (H): ICD-10-CM

## 2020-10-01 PROCEDURE — 99215 OFFICE O/P EST HI 40 MIN: CPT | Mod: 95 | Performed by: INTERNAL MEDICINE

## 2020-10-01 PROCEDURE — 999N001193 HC VIDEO/TELEPHONE VISIT; NO CHARGE

## 2020-10-01 NOTE — LETTER
10/1/2020         RE: Galileo Mayberry  [address] [place] [address]        Dear Colleague,    Thank you for referring your patient, Galileo Mayberry, to the [facility]. Please see a copy of my visit note below.    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 11/2019, he is diagnosed with ocular melanoma, after a choroidal lesion was found in his Left eye.  Patient reports over the last several months he developed new floaters/spots in his L eye.  2. 11/25/2019, he was seen by ophthalmology who noted a left eye, elevated bilobed lesion, size 6.28mm x 18.31(T) x 17.04(L).   3. 12/14/19, CT-CAP showed no evidence of metastatic disease.  4. 1/10/2020: Patient referred to [facility]. B-scan ultrasound of left eye showed elevated bilobed lesion measuring 7.2-7.3 height with a base of 21.4 x 22.5 mm. Low to medium reflectivity. Difficult measurements due to location. Ciliary body involvement was noted. Ultrasound basal dimension measurement included subretinal fluid, and clinical measurement had to be done by transillumination due to anterior tumor location.  5. 1/15/2020, visual acuity right eye 20/20 -1, left eye 20/25 +2 nh. Visual fields counting fingers full bilaterally. Clinical fundus exam of left eye revealed choroidal/ciliary body malignant melanoma with basal measurement of 22 x 21 mm and height of 7.5 mm. Located in the Inferior left eye from 4:30-7:30, 15 mm to disc, 15 mm to fovea, +subretinal fluid, bilobed. The tumor without fluid appears to be 1-2 mm smaller in basal dimension (20 x 19 mm)  6. 2/10/2020, Iodine-125 24 mm plaque placement delivering 85 Gy to an 8 mm height.  7. 3/31/2020, he starts adjuvant sunitinib 25 mg daily for high risk disease, Class II molecular signature and PRAME mRNA positive, high-risk of early  metastasis. He completed 6 months of therapy.  8. 8/26/2020, MRI-liver shows interval development of numerous (more than 30) metastatic lesions in the liver in bilobar distribution. The largest in segment JUAN measures 1.85 cm.    HISTORY OF PRESENT ILLNESS  Mr. 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 He returns today in follow-up.  Favian continues with the ketogenic diet and feels great. He has more energy and notices an improvement in overall quality of life and stamina. He continues to lose weight and he feels good.    He has a number of trips coming up. He would like for us to work around his upcoming plans. He and his wife have some events with friends and family as well. We outlined a schedule of events, which makes sense for him as he  moves forward with his upcoming events.    ECOG performance status is 1.      Current Outpatient Medications   Medication Sig Dispense Refill     acetaminophen (TYLENOL) 500 MG tablet Take 1,000 mg by mouth       amoxicillin (AMOXIL) 500 MG capsule        Cholecalciferol (VITAMIN D3) 25 MCG (1000 UT) CAPS Takes 3 daily       diclofenac (VOLTAREN) 1 % topical gel        finasteride (PROSCAR) 5 MG tablet Take 5 mg by mouth       methylphenidate (RITALIN) 10 MG tablet as needed       methylPREDNISolone (MEDROL) 32 MG tablet Take 1 tab 12 hours before scan.  Take the second tab 2 hours before scan. 2 tablet 0     tamsulosin (FLOMAX) 0.4 MG capsule Take 0.4 mg by mouth       Testosterone 1.62 % GEL          REVIEW OF SYSTEMS  12-point ROS negative except as in HPI    Past Medical History:   Diagnosis Date     Degenerative joint disease      Metastatic melanoma (H)     L eye     Nonsenile cataract      Past Surgical History:   Procedure Laterality Date     ------------OTHER-------------  2014    back surgery L4/L5      ------------OTHER-------------      knee surgery both 1961 Right, 1971 Left knee     AS REMOVAL OF KIDNEY STONE  2015     JOINT REPLACEMENT  2017     Both kneses replaced (2017 and 2018)     ROTATOR CUFF REPAIR RT/LT Right 2016     TURP  2000     Family History   Problem Relation Age of Onset     Glaucoma Mother      Prostate Cancer Father      Cancer Sister      Macular Degeneration No family hx of        PHYSICAL EXAMINATION  There were no vitals taken for this visit.  Wt Readings from Last 3 Encounters:   03/27/20 100.7 kg (221 lb 14.4 oz)   02/27/20 99.3 kg (218 lb 14.4 oz)   12/19/19 102 kg (224 lb 14.4 oz)     Gen: Appears well, no distress, in the home office  HEENT: no scleral icterus, the sclera on the left is injected and pupil is dilated.  Lymph: No lymphadenopathy  CV: regular  Pulm: Good effort  Ext: no edema  Skin: no ecchymoses or hematomas  Neuro: no focal deficits, affect and cognition are normal    The rest of a comprehensive physical examination is deferred due to PHE (public health emergency) video visit restrictions.    ASSESSMENT AND PLAN  #1 Metastatic uveal melanoma  #2 Choroidal melanoma with ciliary body involvement, left eye, status-post plaque brachytherapy,  [employer] Class II and PRAME mRNA positive  #3 Hyperbilirubinemia, Tbili 1.7  #4 Mild neutropenia, ANC 1200  It was a pleasure to speak with Mr. Mayberry. We reviewed his plan for treatment and a likely schedule of events. He understands he is unlikely to have targetable mutations or PD-L1 positivity. For PD-L1 positive disease I typically recommend first line ipilimumab/nivolumab. However, the tumor is far more likely to have PD-L1 negativity. For PD-L1 negative disease would recommend radiation therapy and Ipi/Nivo versus chemotherapy and Ipi/Nivo, or clinical trial for participation    We reviewed a strategy of Y90 radioembolization to the liver followed within 2 days of Y90 with Ipilimumab 1 mg/kg and Nivolumab. [place] plan built to reflect his planned vacations.           1..We submitted the request for Caris testing on the [place] biopsy sample, but [facility] has not yet  "started processing a sample. We will look into the hang up and try to expedite PD-L1 staining.         2. Return to clinic 10/12 @ 1pm as add on for telephone visit.         3 Dr. Storey's team is working on approval and planning for mapping on 10/13 followed by radioembolization on 10/20/20. Patient elects to proceed. Given the amount of disease he has in liver I am recommending Y90 radioembolization.         4. Plan restaging MRI-liver 10/22 prior to return visit with me, then start Ipi/Nivo cycle 1 following visit. Cycle 2 on 11/12.         5. He will have MRI liver prior to visit with me on 12/3, then cycle 3 following visit. Then cycle 4 on 12/23.          6. He will have MRI liver pior to visit with me on 1/14/2021, then cycle 5 following visit.           Multiple questions answered to patient's apparent satisfaction.     Jw Reyes M.D.   of Medicine  Hematology, Oncology and Transplantation        Galileo Mayberry is a 75 year old male who is being evaluated via a billable video visit.      The patient has been notified of following:     \"This video visit will be conducted via a call between you and your physician/provider. We have found that certain health care needs can be provided without the need for an in-person physical exam.  This service lets us provide the care you need with a video conversation.  If a prescription is necessary we can send it directly to your pharmacy.  If lab work is needed we can place an order for that and you can then stop by our lab to have the test done at a later time.    Video visits are billed at different rates depending on your insurance coverage.  Please reach out to your insurance provider with any questions.    If during the course of the call the physician/provider feels a video visit is not appropriate, you will not be charged for this service.\"    Patient has given verbal consent for Video visit? Yes  How would you like to obtain " your AVS? Mail a copy  If you are dropped from the video visit, the video invite should be resent to: Text to cell phone: [phone]  Will anyone else be joining your video visit? Linda JOHNSON    Video-Visit Details    Type of service:  Video Visit    Video Start Time: 12:50 PM  Video End Time: 1:25 PM    Originating Location (pt. Location): Home    Distant Location (provider location):  [facility]     Platform used for Video Visit: Natasha          Again, thank you for allowing me to participate in the care of your patient.        Sincerely,        Jw Trent MD

## 2020-10-01 NOTE — PROGRESS NOTES
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 11/2019, he is diagnosed with ocular melanoma, after a choroidal lesion was found in his Left eye.  Patient reports over the last several months he developed new floaters/spots in his L eye.  2. 11/25/2019, he was seen by ophthalmology who noted a left eye, elevated bilobed lesion, size 6.28mm x 18.31(T) x 17.04(L).   3. 12/14/19, CT-CAP showed no evidence of metastatic disease.  4. 1/10/2020: Patient referred to [facility]. B-scan ultrasound of left eye showed elevated bilobed lesion measuring 7.2-7.3 height with a base of 21.4 x 22.5 mm. Low to medium reflectivity. Difficult measurements due to location. Ciliary body involvement was noted. Ultrasound basal dimension measurement included subretinal fluid, and clinical measurement had to be done by transillumination due to anterior tumor location.  5. 1/15/2020, visual acuity right eye 20/20 -1, left eye 20/25 +2 nh. Visual fields counting fingers full bilaterally. Clinical fundus exam of left eye revealed choroidal/ciliary body malignant melanoma with basal measurement of 22 x 21 mm and height of 7.5 mm. Located in the Inferior left eye from 4:30-7:30, 15 mm to disc, 15 mm to fovea, +subretinal fluid, bilobed. The tumor without fluid appears to be 1-2 mm smaller in basal dimension (20 x 19 mm)  6. 2/10/2020, Iodine-125 24 mm plaque placement delivering 85 Gy to an 8 mm height.  7. 3/31/2020, he starts adjuvant sunitinib 25 mg daily for high risk disease, Class II molecular signature and PRAME mRNA positive, high-risk of early metastasis. He completed 6 months of therapy.  8. 8/26/2020, MRI-liver shows interval development of numerous (more than 30) metastatic lesions in the liver in bilobar distribution. The largest in segment JUAN measures 1.85 cm.    HISTORY OF PRESENT ILLNESS    Shawanda is a 75 year old man with metastatic uveal melanoma. He returns today in follow-up.  Favian continues with the ketogenic diet and feels great. He has more energy and notices an improvement in overall quality of life and stamina. He continues to lose weight and he feels good.    He has a number of trips coming up. He would like for us to work around his upcoming plans. He and his wife have some events with friends and family as well. We outlined a schedule of events, which makes sense for him as he  moves forward with his upcoming events.    ECOG performance status is 1.      Current Outpatient Medications   Medication Sig Dispense Refill     acetaminophen (TYLENOL) 500 MG tablet Take 1,000 mg by mouth       amoxicillin (AMOXIL) 500 MG capsule        Cholecalciferol (VITAMIN D3) 25 MCG (1000 UT) CAPS Takes 3 daily       diclofenac (VOLTAREN) 1 % topical gel        finasteride (PROSCAR) 5 MG tablet Take 5 mg by mouth       methylphenidate (RITALIN) 10 MG tablet as needed       methylPREDNISolone (MEDROL) 32 MG tablet Take 1 tab 12 hours before scan.  Take the second tab 2 hours before scan. 2 tablet 0     tamsulosin (FLOMAX) 0.4 MG capsule Take 0.4 mg by mouth       Testosterone 1.62 % GEL          REVIEW OF SYSTEMS  12-point ROS negative except as in HPI    Past Medical History:   Diagnosis Date     Degenerative joint disease      Metastatic melanoma (H)     L eye     Nonsenile cataract      Past Surgical History:   Procedure Laterality Date     ------------OTHER-------------  2014    back surgery L4/L5      ------------OTHER-------------      knee surgery both 1961 Right, 1971 Left knee     AS REMOVAL OF KIDNEY STONE  2015     JOINT REPLACEMENT  2017    Both kneses replaced (2017 and 2018)     ROTATOR CUFF REPAIR RT/LT Right 2016     TURP  2000     Family History   Problem Relation Age of Onset     Glaucoma Mother      Prostate Cancer Father      Cancer Sister      Macular Degeneration No family hx of         PHYSICAL EXAMINATION  There were no vitals taken for this visit.  Wt Readings from Last 3 Encounters:   03/27/20 100.7 kg (221 lb 14.4 oz)   02/27/20 99.3 kg (218 lb 14.4 oz)   12/19/19 102 kg (224 lb 14.4 oz)     Gen: Appears well, no distress, in the home office  HEENT: no scleral icterus, the sclera on the left is injected and pupil is dilated.  Lymph: No lymphadenopathy  CV: regular  Pulm: Good effort  Ext: no edema  Skin: no ecchymoses or hematomas  Neuro: no focal deficits, affect and cognition are normal    The rest of a comprehensive physical examination is deferred due to PHE (public health emergency) video visit restrictions.    ASSESSMENT AND PLAN  #1 Metastatic uveal melanoma  #2 Choroidal melanoma with ciliary body involvement, left eye, status-post plaque brachytherapy,  [employer] Class II and PRAME mRNA positive  #3 Hyperbilirubinemia, Tbili 1.7  #4 Mild neutropenia, ANC 1200  It was a pleasure to speak with Mr. Mayberry. We reviewed his plan for treatment and a likely schedule of events. He understands he is unlikely to have targetable mutations or PD-L1 positivity. For PD-L1 positive disease I typically recommend first line ipilimumab/nivolumab. However, the tumor is far more likely to have PD-L1 negativity. For PD-L1 negative disease would recommend radiation therapy and Ipi/Nivo versus chemotherapy and Ipi/Nivo, or clinical trial for participation    We reviewed a strategy of Y90 radioembolization to the liver followed within 2 days of Y90 with Ipilimumab 1 mg/kg and Nivolumab. [place] plan built to reflect his planned vacations.           1..We submitted the request for Caris testing on the [place] biopsy sample, but [facility] has not yet started processing a sample. We will look into the hang up and try to expedite PD-L1 staining.         2. Return to clinic 10/12 @ 1pm as add on for telephone visit.         3 Dr. Storey's team is working on approval and planning for mapping on 10/13 followed  "by radioembolization on 10/20/20. Patient elects to proceed. Given the amount of disease he has in liver I am recommending Y90 radioembolization.         4. Plan restaging MRI-liver 10/22 prior to return visit with me, then start Ipi/Nivo cycle 1 following visit. Cycle 2 on 11/12.         5. He will have MRI liver prior to visit with me on 12/3, then cycle 3 following visit. Then cycle 4 on 12/23.          6. He will have MRI liver pior to visit with me on 1/14/2021, then cycle 5 following visit.           Multiple questions answered to patient's apparent satisfaction.     Jw Reyes M.D.   of Medicine  Hematology, Oncology and Transplantation        Galileo Mayberry is a 75 year old male who is being evaluated via a billable video visit.      The patient has been notified of following:     \"This video visit will be conducted via a call between you and your physician/provider. We have found that certain health care needs can be provided without the need for an in-person physical exam.  This service lets us provide the care you need with a video conversation.  If a prescription is necessary we can send it directly to your pharmacy.  If lab work is needed we can place an order for that and you can then stop by our lab to have the test done at a later time.    Video visits are billed at different rates depending on your insurance coverage.  Please reach out to your insurance provider with any questions.    If during the course of the call the physician/provider feels a video visit is not appropriate, you will not be charged for this service.\"    Patient has given verbal consent for Video visit? Yes  How would you like to obtain your AVS? Mail a copy  If you are dropped from the video visit, the video invite should be resent to: Text to cell phone: [phone]  Will anyone else be joining your video visit? No      Brina JOHNSON    Video-Visit Details    Type of service:  Video " Visit    Video Start Time: 12:50 PM  Video End Time: 1:25 PM    Originating Location (pt. Location): Home    Distant Location (provider location):  [facility]     Platform used for Video Visit: Natasha

## 2020-10-02 PROBLEM — C78.7 MELANOMA OF UVEA METASTATIC TO LIVER (H): Status: ACTIVE | Noted: 2020-10-02

## 2020-10-02 PROBLEM — C69.40 MELANOMA OF UVEA METASTATIC TO LIVER (H): Status: ACTIVE | Noted: 2020-10-02

## 2020-10-05 ENCOUNTER — TEAM CONFERENCE (OUTPATIENT)
Dept: VASCULAR SURGERY | Facility: CLINIC | Age: 75
End: 2020-10-05

## 2020-10-05 DIAGNOSIS — C69.40 MELANOMA OF UVEA METASTATIC TO LIVER (H): Primary | ICD-10-CM

## 2020-10-05 DIAGNOSIS — C78.7 MELANOMA OF UVEA METASTATIC TO LIVER (H): Primary | ICD-10-CM

## 2020-10-05 NOTE — TELEPHONE ENCOUNTER
Called [facility]: [phone]    [facility]  Flynn Davila: [phone]    : [facility]: [phone]    Called and left a msg inquiring on my 9/28 tele conversation with Zahraa.  Calling to f/up on the request for an auth for IR and treatment.     Explained that we are waiting on this referral and authorization so that we can go ahead and submit for treatment.     Left my directline for call back.     Yaima RITCHIE RN, BSN  Interventional Radiology/Vascular  Nurse Coordinator   Phone: [phone]  Fax: [phone]

## 2020-10-06 DIAGNOSIS — C69.32 MALIGNANT MELANOMA OF CHOROID OF LEFT EYE (H): Primary | ICD-10-CM

## 2020-10-06 DIAGNOSIS — C78.7 LIVER METASTASES: ICD-10-CM

## 2020-10-07 DIAGNOSIS — C69.40 MELANOMA OF UVEA METASTATIC TO LIVER (H): Primary | ICD-10-CM

## 2020-10-07 DIAGNOSIS — C78.7 MELANOMA OF UVEA METASTATIC TO LIVER (H): Primary | ICD-10-CM

## 2020-10-08 ENCOUNTER — TELEPHONE (OUTPATIENT)
Dept: INTERVENTIONAL RADIOLOGY/VASCULAR | Facility: CLINIC | Age: 75
End: 2020-10-08

## 2020-10-08 DIAGNOSIS — Z11.59 ENCOUNTER FOR SCREENING FOR OTHER VIRAL DISEASES: Primary | ICD-10-CM

## 2020-10-09 ENCOUNTER — TELEPHONE (OUTPATIENT)
Dept: VASCULAR SURGERY | Facility: CLINIC | Age: 75
End: 2020-10-09

## 2020-10-09 DIAGNOSIS — Z11.59 ENCOUNTER FOR SCREENING FOR OTHER VIRAL DISEASES: ICD-10-CM

## 2020-10-09 DIAGNOSIS — C69.40 MELANOMA OF UVEA METASTATIC TO LIVER (H): ICD-10-CM

## 2020-10-09 DIAGNOSIS — C78.7 MELANOMA OF UVEA METASTATIC TO LIVER (H): ICD-10-CM

## 2020-10-09 DIAGNOSIS — Z91.041 ALLERGY TO INTRAVENOUS CONTRAST: Primary | ICD-10-CM

## 2020-10-09 LAB
CORTIS SERPL-MCNC: 6.7 UG/DL (ref 4–22)
CRP SERPL-MCNC: <2.9 MG/L (ref 0–8)
ERYTHROCYTE [SEDIMENTATION RATE] IN BLOOD BY WESTERGREN METHOD: 8 MM/H (ref 0–20)
LDH SERPL L TO P-CCNC: 357 U/L (ref 85–227)

## 2020-10-09 PROCEDURE — 36415 COLL VENOUS BLD VENIPUNCTURE: CPT | Performed by: INTERNAL MEDICINE

## 2020-10-09 PROCEDURE — 85652 RBC SED RATE AUTOMATED: CPT | Performed by: INTERNAL MEDICINE

## 2020-10-09 PROCEDURE — 83090 ASSAY OF HOMOCYSTEINE: CPT | Performed by: INTERNAL MEDICINE

## 2020-10-09 PROCEDURE — 82728 ASSAY OF FERRITIN: CPT | Performed by: INTERNAL MEDICINE

## 2020-10-09 PROCEDURE — 82533 TOTAL CORTISOL: CPT | Performed by: INTERNAL MEDICINE

## 2020-10-09 PROCEDURE — 86140 C-REACTIVE PROTEIN: CPT | Performed by: INTERNAL MEDICINE

## 2020-10-09 PROCEDURE — U0003 INFECTIOUS AGENT DETECTION BY NUCLEIC ACID (DNA OR RNA); SEVERE ACUTE RESPIRATORY SYNDROME CORONAVIRUS 2 (SARS-COV-2) (CORONAVIRUS DISEASE [COVID-19]), AMPLIFIED PROBE TECHNIQUE, MAKING USE OF HIGH THROUGHPUT TECHNOLOGIES AS DESCRIBED BY CMS-2020-01-R: HCPCS | Performed by: RADIOLOGY

## 2020-10-09 PROCEDURE — 84550 ASSAY OF BLOOD/URIC ACID: CPT | Performed by: INTERNAL MEDICINE

## 2020-10-09 PROCEDURE — 83615 LACTATE (LD) (LDH) ENZYME: CPT | Performed by: INTERNAL MEDICINE

## 2020-10-09 PROCEDURE — 80061 LIPID PANEL: CPT | Performed by: INTERNAL MEDICINE

## 2020-10-09 RX ORDER — METHYLPREDNISOLONE 32 MG/1
TABLET ORAL
Qty: 2 TABLET | Refills: 0 | Status: ON HOLD | OUTPATIENT
Start: 2020-10-09 | End: 2020-01-01

## 2020-10-09 NOTE — TELEPHONE ENCOUNTER
Called pt and informed him that I did get communication from Dr. Storey last night.     Left a msg for pt.     Informed him that I wanted to update him on a few reminders.     Informed him that he will need covid 19 testing today 10/9 as this is 4 days prior to his Y90 mapping on Tues 10/13.     Informed him of prep and details for his Y90 mapping with Dr Storey.     Check in to [facility] at 9am for a 1030am start time  All prep reviewed with him.     Also I informed him that we are sending as script for a steriod methylprednisolone as he has a contrast allergy in which instructions were given over the phone.     Also will send instructions via [employer].     Asked him to return my call with any other questions.    Yaima RITCHIE RN, BSN  Interventional Radiology/Vascular  Nurse Coordinator   Phone: [phone]  Fax: [phone]

## 2020-10-10 LAB
CHOLEST SERPL-MCNC: 174 MG/DL
FERRITIN SERPL-MCNC: 26 NG/ML (ref 26–388)
HDLC SERPL-MCNC: 47 MG/DL
LDLC SERPL CALC-MCNC: 102 MG/DL
NONHDLC SERPL-MCNC: 127 MG/DL
SARS-COV-2 RNA SPEC QL NAA+PROBE: NOT DETECTED
SPECIMEN SOURCE: NORMAL
TRIGL SERPL-MCNC: 127 MG/DL
URATE SERPL-MCNC: 5.3 MG/DL (ref 3.5–7.2)

## 2020-10-12 ENCOUNTER — VIRTUAL VISIT (OUTPATIENT)
Dept: ONCOLOGY | Facility: CLINIC | Age: 75
End: 2020-10-12
Attending: INTERNAL MEDICINE
Payer: COMMERCIAL

## 2020-10-12 DIAGNOSIS — C78.7 MELANOMA OF UVEA METASTATIC TO LIVER (H): Primary | ICD-10-CM

## 2020-10-12 DIAGNOSIS — C69.32 MALIGNANT MELANOMA OF CHOROID OF LEFT EYE (H): ICD-10-CM

## 2020-10-12 DIAGNOSIS — C69.40 MELANOMA OF UVEA METASTATIC TO LIVER (H): Primary | ICD-10-CM

## 2020-10-12 PROCEDURE — 999N001193 HC VIDEO/TELEPHONE VISIT; NO CHARGE

## 2020-10-12 PROCEDURE — 99442 PR PHYSICIAN TELEPHONE EVALUATION 11-20 MIN: CPT | Mod: 95 | Performed by: INTERNAL MEDICINE

## 2020-10-12 RX ORDER — HEPARIN SODIUM 200 [USP'U]/100ML
1 INJECTION, SOLUTION INTRAVENOUS CONTINUOUS PRN
Status: CANCELLED | OUTPATIENT
Start: 2020-10-12

## 2020-10-12 NOTE — LETTER
10/12/2020         RE: Galileo Mayberry  [address] [place] [address]        Dear Colleague,    Thank you for referring your patient, Galileo Mayberry, to the [facility]. Please see a copy of my visit note below.    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 11/2019, he is diagnosed with ocular melanoma, after a choroidal lesion was found in his Left eye.  Patient reports over the last several months he developed new floaters/spots in his L eye.  2. 11/25/2019, he was seen by ophthalmology who noted a left eye, elevated bilobed lesion, size 6.28mm x 18.31(T) x 17.04(L).   3. 12/14/19, CT-CAP showed no evidence of metastatic disease.  4. 1/10/2020: Patient referred to [facility]. B-scan ultrasound of left eye showed elevated bilobed lesion measuring 7.2-7.3 height with a base of 21.4 x 22.5 mm. Low to medium reflectivity. Difficult measurements due to location. Ciliary body involvement was noted. Ultrasound basal dimension measurement included subretinal fluid, and clinical measurement had to be done by transillumination due to anterior tumor location.  5. 1/15/2020, visual acuity right eye 20/20 -1, left eye 20/25 +2 nh. Visual fields counting fingers full bilaterally. Clinical fundus exam of left eye revealed choroidal/ciliary body malignant melanoma with basal measurement of 22 x 21 mm and height of 7.5 mm. Located in the Inferior left eye from 4:30-7:30, 15 mm to disc, 15 mm to fovea, +subretinal fluid, bilobed. The tumor without fluid appears to be 1-2 mm smaller in basal dimension (20 x 19 mm)  6. 2/10/2020, Iodine-125 24 mm plaque placement delivering 85 Gy to an 8 mm height.  7. 3/31/2020, he starts adjuvant sunitinib 25 mg daily for high risk disease, Class II molecular signature and PRAME mRNA positive, high-risk of  early metastasis. He completed 6 months of therapy.  8. 8/26/2020, MRI-liver shows interval development of numerous (more than 30) metastatic lesions in the liver in bilobar distribution. The largest in segment JUAN measures 1.85 cm.    HISTORY OF PRESENT ILLNESS  Mr. 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 He returns today via telephone.    We obtainined his PD-L1 testing result, and it did show PD-L1 negative disease.    ECOG performance status is 1.      Current Outpatient Medications   Medication Sig Dispense Refill     Cholecalciferol (VITAMIN D3) 25 MCG (1000 UT) CAPS Takes 3 daily       diclofenac (VOLTAREN) 1 % topical gel        finasteride (PROSCAR) 5 MG tablet Take 5 mg by mouth       methylphenidate (RITALIN) 10 MG tablet as needed       tamsulosin (FLOMAX) 0.4 MG capsule Take 0.4 mg by mouth       Testosterone 1.62 % GEL        acetaminophen (TYLENOL) 500 MG tablet Take 1,000 mg by mouth       amoxicillin (AMOXIL) 500 MG capsule        methylPREDNISolone (MEDROL) 32 MG tablet Take 32 mg, 12 hours prior to your procedure, take another 32 mg, 2 hours prior to procedure due to contrast allergy. (Patient not taking: Reported on 10/12/2020) 2 tablet 0     methylPREDNISolone (MEDROL) 32 MG tablet Take 1 tab 12 hours before scan.  Take the second tab 2 hours before scan. (Patient not taking: Reported on 10/12/2020) 2 tablet 0       REVIEW OF SYSTEMS  12-point ROS negative except as in HPI    Past Medical History:   Diagnosis Date     Degenerative joint disease      Metastatic melanoma (H)     L eye     Nonsenile cataract      Past Surgical History:   Procedure Laterality Date     ------------OTHER-------------  2014    back surgery L4/L5      ------------OTHER-------------      knee surgery both 1961 Right, 1971 Left knee     AS REMOVAL OF KIDNEY STONE  2015     JOINT REPLACEMENT  2017    Both kneses replaced (2017 and 2018)     ROTATOR CUFF REPAIR RT/LT Right 2016     TURP  2000     Family  History   Problem Relation Age of Onset     Glaucoma Mother      Prostate Cancer Father      Cancer Sister      Macular Degeneration No family hx of        PHYSICAL EXAMINATION  There were no vitals taken for this visit.  Wt Readings from Last 3 Encounters:   03/27/20 100.7 kg (221 lb 14.4 oz)   02/27/20 99.3 kg (218 lb 14.4 oz)   12/19/19 102 kg (224 lb 14.4 oz)     Gen: Appears well, no distress, in the home office  HEENT: no scleral icterus, the sclera on the left is injected and pupil is dilated.  Lymph: No lymphadenopathy  CV: regular  Pulm: Good effort  Ext: no edema  Skin: no ecchymoses or hematomas  Neuro: no focal deficits, affect and cognition are normal    The rest of a comprehensive physical examination is deferred due to PHE (public health emergency) video visit restrictions.    ASSESSMENT AND PLAN  #1 Metastatic uveal melanoma  #2 Choroidal melanoma with ciliary body involvement, left eye, status-post plaque brachytherapy,  [employer] Class II and PRAME mRNA positive  #3 Hyperbilirubinemia, Tbili 1.7  #4 Mild neutropenia, ANC 1200  It was a pleasure to speak with Mr. Mayberry. We reviewed his plan for treatment and a likely schedule of events. He understands he is unlikely to have targetable mutations or PD-L1 positivity. For PD-L1 positive disease I typically recommend first line ipilimumab/nivolumab. However, the tumor is far more likely to have PD-L1 negativity. For PD-L1 negative disease would recommend radiation therapy and Ipi/Nivo versus chemotherapy and Ipi/Nivo, or clinical trial for participation    We reviewed a strategy of Y90 radioembolization to the liver followed within 2 days of Y90 with Ipilimumab 1 mg/kg and Nivolumab. [place] plan built to reflect his planned vacations.         1. PD-L1 is negative.         2. Angiography planned for tomorrow, radioembolization on 10/20/20. I am recommending Y90 radioembolization.  Patient elects to proceed.          3. Plan restaging MRI-liver  "10/22 prior to return visit with me, then start Ipi/Nivo cycle 1 following visit. Cycle 2 on 11/12.         4. He will have MRI liver prior to visit with me on 12/3, then cycle 3 following visit. Then cycle 4 on 12/23.          5. He will have MRI liver pior to visit with me on 1/14/2021, then cycle 5 following visit.           Multiple questions answered to patient's apparent satisfaction.     Jw Reyes M.D.   of Medicine  Hematology, Oncology and Transplantation      Galileo Mayberry is a 75 year old male who is being evaluated via a billable telephone visit.      The patient has been notified of following:     \"This telephone visit will be conducted via a call between you and your physician/provider. We have found that certain health care needs can be provided without the need for a physical exam.  This service lets us provide the care you need with a short phone conversation.  If a prescription is necessary we can send it directly to your pharmacy.  If lab work is needed we can place an order for that and you can then stop by our lab to have the test done at a later time.    Telephone visits are billed at different rates depending on your insurance coverage. During this emergency period, for some insurers they may be billed the same as an in-person visit.  Please reach out to your insurance provider with any questions.    If during the course of the call the physician/provider feels a telephone visit is not appropriate, you will not be charged for this service.\"    Patient has given verbal consent for Telephone visit?  Yes    What phone number would you like to be contacted at? [phone]    How would you like to obtain your AVS? MyChart    Vitals - Patient Reported  Weight (Patient Reported): 95.3 kg (210 lb)  Height (Patient Reported): 180.3 cm (5' 11\")  BMI (Based on Pt Reported Ht/Wt): 29.29  Pain Score: No Pain (0)    Sari Rivera CMA October 12, 2020  12:21 PM "     Phone call duration: 11 minutes          Again, thank you for allowing me to participate in the care of your patient.        Sincerely,        Jw Trent MD

## 2020-10-12 NOTE — PROGRESS NOTES
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 11/2019, he is diagnosed with ocular melanoma, after a choroidal lesion was found in his Left eye.  Patient reports over the last several months he developed new floaters/spots in his L eye.  2. 11/25/2019, he was seen by ophthalmology who noted a left eye, elevated bilobed lesion, size 6.28mm x 18.31(T) x 17.04(L).   3. 12/14/19, CT-CAP showed no evidence of metastatic disease.  4. 1/10/2020: Patient referred to [facility]. B-scan ultrasound of left eye showed elevated bilobed lesion measuring 7.2-7.3 height with a base of 21.4 x 22.5 mm. Low to medium reflectivity. Difficult measurements due to location. Ciliary body involvement was noted. Ultrasound basal dimension measurement included subretinal fluid, and clinical measurement had to be done by transillumination due to anterior tumor location.  5. 1/15/2020, visual acuity right eye 20/20 -1, left eye 20/25 +2 nh. Visual fields counting fingers full bilaterally. Clinical fundus exam of left eye revealed choroidal/ciliary body malignant melanoma with basal measurement of 22 x 21 mm and height of 7.5 mm. Located in the Inferior left eye from 4:30-7:30, 15 mm to disc, 15 mm to fovea, +subretinal fluid, bilobed. The tumor without fluid appears to be 1-2 mm smaller in basal dimension (20 x 19 mm)  6. 2/10/2020, Iodine-125 24 mm plaque placement delivering 85 Gy to an 8 mm height.  7. 3/31/2020, he starts adjuvant sunitinib 25 mg daily for high risk disease, Class II molecular signature and PRAME mRNA positive, high-risk of early metastasis. He completed 6 months of therapy.  8. 8/26/2020, MRI-liver shows interval development of numerous (more than 30) metastatic lesions in the liver in bilobar distribution. The largest in segment JUAN measures 1.85 cm.    HISTORY OF PRESENT ILLNESS    Shawanda is a 75 year old man with metastatic uveal melanoma. He returns today via telephone.    We obtainined his PD-L1 testing result, and it did show PD-L1 negative disease.    ECOG performance status is 1.      Current Outpatient Medications   Medication Sig Dispense Refill     Cholecalciferol (VITAMIN D3) 25 MCG (1000 UT) CAPS Takes 3 daily       diclofenac (VOLTAREN) 1 % topical gel        finasteride (PROSCAR) 5 MG tablet Take 5 mg by mouth       methylphenidate (RITALIN) 10 MG tablet as needed       tamsulosin (FLOMAX) 0.4 MG capsule Take 0.4 mg by mouth       Testosterone 1.62 % GEL        acetaminophen (TYLENOL) 500 MG tablet Take 1,000 mg by mouth       amoxicillin (AMOXIL) 500 MG capsule        methylPREDNISolone (MEDROL) 32 MG tablet Take 32 mg, 12 hours prior to your procedure, take another 32 mg, 2 hours prior to procedure due to contrast allergy. (Patient not taking: Reported on 10/12/2020) 2 tablet 0     methylPREDNISolone (MEDROL) 32 MG tablet Take 1 tab 12 hours before scan.  Take the second tab 2 hours before scan. (Patient not taking: Reported on 10/12/2020) 2 tablet 0       REVIEW OF SYSTEMS  12-point ROS negative except as in HPI    Past Medical History:   Diagnosis Date     Degenerative joint disease      Metastatic melanoma (H)     L eye     Nonsenile cataract      Past Surgical History:   Procedure Laterality Date     ------------OTHER-------------  2014    back surgery L4/L5      ------------OTHER-------------      knee surgery both 1961 Right, 1971 Left knee     AS REMOVAL OF KIDNEY STONE  2015     JOINT REPLACEMENT  2017    Both kneses replaced (2017 and 2018)     ROTATOR CUFF REPAIR RT/LT Right 2016     TURP  2000     Family History   Problem Relation Age of Onset     Glaucoma Mother      Prostate Cancer Father      Cancer Sister      Macular Degeneration No family hx of        PHYSICAL EXAMINATION  There were no vitals taken for this visit.  Wt Readings from Last 3 Encounters:   03/27/20  100.7 kg (221 lb 14.4 oz)   02/27/20 99.3 kg (218 lb 14.4 oz)   12/19/19 102 kg (224 lb 14.4 oz)     Gen: Appears well, no distress, in the home office  HEENT: no scleral icterus, the sclera on the left is injected and pupil is dilated.  Lymph: No lymphadenopathy  CV: regular  Pulm: Good effort  Ext: no edema  Skin: no ecchymoses or hematomas  Neuro: no focal deficits, affect and cognition are normal    The rest of a comprehensive physical examination is deferred due to PHE (public health emergency) video visit restrictions.    ASSESSMENT AND PLAN  #1 Metastatic uveal melanoma  #2 Choroidal melanoma with ciliary body involvement, left eye, status-post plaque brachytherapy,  [employer] Class II and PRAME mRNA positive  #3 Hyperbilirubinemia, Tbili 1.7  #4 Mild neutropenia, ANC 1200  It was a pleasure to speak with Mr. Mayberry. We reviewed his plan for treatment and a likely schedule of events. He understands he is unlikely to have targetable mutations or PD-L1 positivity. For PD-L1 positive disease I typically recommend first line ipilimumab/nivolumab. However, the tumor is far more likely to have PD-L1 negativity. For PD-L1 negative disease would recommend radiation therapy and Ipi/Nivo versus chemotherapy and Ipi/Nivo, or clinical trial for participation    We reviewed a strategy of Y90 radioembolization to the liver followed within 2 days of Y90 with Ipilimumab 1 mg/kg and Nivolumab. [place] plan built to reflect his planned vacations.         1. PD-L1 is negative.         2. Angiography planned for tomorrow, radioembolization on 10/20/20. I am recommending Y90 radioembolization.  Patient elects to proceed.          3. Plan restaging MRI-liver 10/22 prior to return visit with me, then start Ipi/Nivo cycle 1 following visit. Cycle 2 on 11/12.         4. He will have MRI liver prior to visit with me on 12/3, then cycle 3 following visit. Then cycle 4 on 12/23.          5. He will have MRI liver pior to visit  "with me on 1/14/2021, then cycle 5 following visit.           Multiple questions answered to patient's apparent satisfaction.     Jw Reyes M.D.   of Medicine  Hematology, Oncology and Transplantation      Galileo Mayberry is a 75 year old male who is being evaluated via a billable telephone visit.      The patient has been notified of following:     \"This telephone visit will be conducted via a call between you and your physician/provider. We have found that certain health care needs can be provided without the need for a physical exam.  This service lets us provide the care you need with a short phone conversation.  If a prescription is necessary we can send it directly to your pharmacy.  If lab work is needed we can place an order for that and you can then stop by our lab to have the test done at a later time.    Telephone visits are billed at different rates depending on your insurance coverage. During this emergency period, for some insurers they may be billed the same as an in-person visit.  Please reach out to your insurance provider with any questions.    If during the course of the call the physician/provider feels a telephone visit is not appropriate, you will not be charged for this service.\"    Patient has given verbal consent for Telephone visit?  Yes    What phone number would you like to be contacted at? [phone]    How would you like to obtain your AVS? MyChart    Vitals - Patient Reported  Weight (Patient Reported): 95.3 kg (210 lb)  Height (Patient Reported): 180.3 cm (5' 11\")  BMI (Based on Pt Reported Ht/Wt): 29.29  Pain Score: No Pain (0)    Sari Rivera CMA October 12, 2020  12:21 PM     Phone call duration: 11 minutes      "

## 2020-10-13 ENCOUNTER — APPOINTMENT (OUTPATIENT)
Dept: MEDSURG UNIT | Facility: CLINIC | Age: 75
End: 2020-10-13
Attending: RADIOLOGY
Payer: COMMERCIAL

## 2020-10-13 ENCOUNTER — TEAM CONFERENCE (OUTPATIENT)
Dept: VASCULAR SURGERY | Facility: CLINIC | Age: 75
End: 2020-10-13

## 2020-10-13 ENCOUNTER — APPOINTMENT (OUTPATIENT)
Dept: INTERVENTIONAL RADIOLOGY/VASCULAR | Facility: CLINIC | Age: 75
End: 2020-10-13
Attending: RADIOLOGY
Payer: COMMERCIAL

## 2020-10-13 ENCOUNTER — HOSPITAL ENCOUNTER (OUTPATIENT)
Dept: NUCLEAR MEDICINE | Facility: CLINIC | Age: 75
Setting detail: NUCLEAR MEDICINE
End: 2020-10-13
Attending: RADIOLOGY | Admitting: RADIOLOGY
Payer: COMMERCIAL

## 2020-10-13 ENCOUNTER — HOSPITAL ENCOUNTER (OUTPATIENT)
Facility: CLINIC | Age: 75
Discharge: HOME | End: 2020-10-13
Attending: RADIOLOGY | Admitting: RADIOLOGY
Payer: COMMERCIAL

## 2020-10-13 VITALS
DIASTOLIC BLOOD PRESSURE: 70 MMHG | RESPIRATION RATE: 18 BRPM | WEIGHT: 208 LBS | OXYGEN SATURATION: 96 % | SYSTOLIC BLOOD PRESSURE: 114 MMHG | HEART RATE: 54 BPM | TEMPERATURE: 97.7 F | HEIGHT: 71 IN | BODY MASS INDEX: 29.12 KG/M2

## 2020-10-13 DIAGNOSIS — C69.32 MALIGNANT MELANOMA OF CHOROID OF LEFT EYE (H): ICD-10-CM

## 2020-10-13 DIAGNOSIS — C78.7 LIVER METASTASES: ICD-10-CM

## 2020-10-13 LAB
ALBUMIN SERPL-MCNC: 3.9 G/DL (ref 3.4–5)
ALP SERPL-CCNC: 67 U/L (ref 40–150)
ALT SERPL W P-5'-P-CCNC: 51 U/L (ref 0–70)
ANION GAP SERPL CALCULATED.3IONS-SCNC: 6 MMOL/L (ref 3–14)
AST SERPL W P-5'-P-CCNC: 44 U/L (ref 0–45)
BILIRUB DIRECT SERPL-MCNC: 0.2 MG/DL (ref 0–0.2)
BILIRUB SERPL-MCNC: 0.6 MG/DL (ref 0.2–1.3)
BUN SERPL-MCNC: 19 MG/DL (ref 7–30)
CALCIUM SERPL-MCNC: 9.1 MG/DL (ref 8.5–10.1)
CHLORIDE SERPL-SCNC: 109 MMOL/L (ref 94–109)
CO2 SERPL-SCNC: 24 MMOL/L (ref 20–32)
CREAT SERPL-MCNC: 0.98 MG/DL (ref 0.66–1.25)
ERYTHROCYTE [DISTWIDTH] IN BLOOD BY AUTOMATED COUNT: 12.4 % (ref 10–15)
GFR SERPL CREATININE-BSD FRML MDRD: 74 ML/MIN/{1.73_M2}
GLUCOSE SERPL-MCNC: 113 MG/DL (ref 70–99)
HCT VFR BLD AUTO: 41 % (ref 40–53)
HGB BLD-MCNC: 13.7 G/DL (ref 13.3–17.7)
INR PPP: 1.01 (ref 0.86–1.14)
MCH RBC QN AUTO: 32.9 PG (ref 26.5–33)
MCHC RBC AUTO-ENTMCNC: 33.4 G/DL (ref 31.5–36.5)
MCV RBC AUTO: 99 FL (ref 78–100)
PLATELET # BLD AUTO: 275 10E9/L (ref 150–450)
POTASSIUM SERPL-SCNC: 4.1 MMOL/L (ref 3.4–5.3)
PROT SERPL-MCNC: 7.4 G/DL (ref 6.8–8.8)
RBC # BLD AUTO: 4.16 10E12/L (ref 4.4–5.9)
SODIUM SERPL-SCNC: 139 MMOL/L (ref 133–144)
WBC # BLD AUTO: 4.2 10E9/L (ref 4–11)

## 2020-10-13 PROCEDURE — 272N000143 HC KIT CR3

## 2020-10-13 PROCEDURE — C1887 CATHETER, GUIDING: HCPCS

## 2020-10-13 PROCEDURE — 250N000009 HC RX 250: Performed by: RADIOLOGY

## 2020-10-13 PROCEDURE — 78830 RP LOCLZJ TUM SPECT W/CT 1: CPT | Mod: 26 | Performed by: RADIOLOGY

## 2020-10-13 PROCEDURE — C1769 GUIDE WIRE: HCPCS

## 2020-10-13 PROCEDURE — 343N000001 HC RX 343: Performed by: RADIOLOGY

## 2020-10-13 PROCEDURE — 272N000566 HC SHEATH CR3

## 2020-10-13 PROCEDURE — 75774 ARTERY X-RAY EACH VESSEL: CPT | Mod: 26 | Performed by: RADIOLOGY

## 2020-10-13 PROCEDURE — 80076 HEPATIC FUNCTION PANEL: CPT | Performed by: RADIOLOGY

## 2020-10-13 PROCEDURE — 255N000002 HC RX 255 OP 636: Performed by: RADIOLOGY

## 2020-10-13 PROCEDURE — 75726 ARTERY X-RAYS ABDOMEN: CPT | Mod: 26 | Performed by: RADIOLOGY

## 2020-10-13 PROCEDURE — 75726 ARTERY X-RAYS ABDOMEN: CPT

## 2020-10-13 PROCEDURE — 999N000134 HC STATISTIC PP CARE STAGE 3

## 2020-10-13 PROCEDURE — 272N000147 HC KIT CR7

## 2020-10-13 PROCEDURE — 76380 CAT SCAN FOLLOW-UP STUDY: CPT | Mod: 26 | Performed by: RADIOLOGY

## 2020-10-13 PROCEDURE — 99153 MOD SED SAME PHYS/QHP EA: CPT

## 2020-10-13 PROCEDURE — 77261 THER RADIOLOGY TX PLNG SMPL: CPT | Mod: GC | Performed by: RADIOLOGY

## 2020-10-13 PROCEDURE — 36247 INS CATH ABD/L-EXT ART 3RD: CPT | Mod: GZ

## 2020-10-13 PROCEDURE — 258N000003 HC RX IP 258 OP 636: Performed by: PHYSICIAN ASSISTANT

## 2020-10-13 PROCEDURE — A9540 TC99M MAA: HCPCS | Performed by: RADIOLOGY

## 2020-10-13 PROCEDURE — 77300 RADIATION THERAPY DOSE PLAN: CPT | Mod: 26 | Performed by: RADIOLOGY

## 2020-10-13 PROCEDURE — 85027 COMPLETE CBC AUTOMATED: CPT | Performed by: RADIOLOGY

## 2020-10-13 PROCEDURE — 78830 RP LOCLZJ TUM SPECT W/CT 1: CPT

## 2020-10-13 PROCEDURE — 272N000504 HC NEEDLE CR4

## 2020-10-13 PROCEDURE — 76937 US GUIDE VASCULAR ACCESS: CPT | Mod: 26 | Performed by: RADIOLOGY

## 2020-10-13 PROCEDURE — 36247 INS CATH ABD/L-EXT ART 3RD: CPT | Mod: GC | Performed by: RADIOLOGY

## 2020-10-13 PROCEDURE — 85610 PROTHROMBIN TIME: CPT | Performed by: RADIOLOGY

## 2020-10-13 PROCEDURE — 80048 BASIC METABOLIC PNL TOTAL CA: CPT | Performed by: RADIOLOGY

## 2020-10-13 PROCEDURE — 99152 MOD SED SAME PHYS/QHP 5/>YRS: CPT

## 2020-10-13 PROCEDURE — 250N000011 HC RX IP 250 OP 636: Performed by: RADIOLOGY

## 2020-10-13 PROCEDURE — 37243 VASC EMBOLIZE/OCCLUDE ORGAN: CPT | Mod: GC | Performed by: RADIOLOGY

## 2020-10-13 PROCEDURE — 272N000199 HC ACCESSORY CR8

## 2020-10-13 PROCEDURE — 79445 NUCLEAR RX INTRA-ARTERIAL: CPT | Mod: 26 | Performed by: RADIOLOGY

## 2020-10-13 RX ORDER — FENTANYL CITRATE 50 UG/ML
25-50 INJECTION, SOLUTION INTRAMUSCULAR; INTRAVENOUS EVERY 5 MIN PRN
Status: DISCONTINUED | OUTPATIENT
Start: 2020-10-13 | End: 2020-10-13 | Stop reason: HOSPADM

## 2020-10-13 RX ORDER — ACETAMINOPHEN 500 MG
500 TABLET ORAL EVERY 6 HOURS PRN
Status: DISCONTINUED | OUTPATIENT
Start: 2020-10-13 | End: 2020-10-13 | Stop reason: HOSPADM

## 2020-10-13 RX ORDER — NALOXONE HYDROCHLORIDE 0.4 MG/ML
.1-.4 INJECTION, SOLUTION INTRAMUSCULAR; INTRAVENOUS; SUBCUTANEOUS
Status: DISCONTINUED | OUTPATIENT
Start: 2020-10-13 | End: 2020-10-13 | Stop reason: HOSPADM

## 2020-10-13 RX ORDER — IODIXANOL 320 MG/ML
150 INJECTION, SOLUTION INTRAVASCULAR ONCE
Status: COMPLETED | OUTPATIENT
Start: 2020-10-13 | End: 2020-10-13

## 2020-10-13 RX ORDER — QUINIDINE GLUCONATE 324 MG
240 TABLET, EXTENDED RELEASE ORAL
COMMUNITY

## 2020-10-13 RX ORDER — METHYLPREDNISOLONE 32 MG/1
32 TABLET ORAL ONCE
Status: DISCONTINUED | OUTPATIENT
Start: 2020-10-13 | End: 2020-10-13 | Stop reason: HOSPADM

## 2020-10-13 RX ORDER — SODIUM CHLORIDE 9 MG/ML
INJECTION, SOLUTION INTRAVENOUS CONTINUOUS
Status: DISCONTINUED | OUTPATIENT
Start: 2020-10-13 | End: 2020-10-13 | Stop reason: HOSPADM

## 2020-10-13 RX ORDER — FLUMAZENIL 0.1 MG/ML
0.2 INJECTION, SOLUTION INTRAVENOUS
Status: DISCONTINUED | OUTPATIENT
Start: 2020-10-13 | End: 2020-10-13 | Stop reason: HOSPADM

## 2020-10-13 RX ORDER — NICOTINE POLACRILEX 4 MG
15-30 LOZENGE BUCCAL
Status: DISCONTINUED | OUTPATIENT
Start: 2020-10-13 | End: 2020-10-13 | Stop reason: HOSPADM

## 2020-10-13 RX ORDER — DEXTROSE MONOHYDRATE 25 G/50ML
25-50 INJECTION, SOLUTION INTRAVENOUS
Status: DISCONTINUED | OUTPATIENT
Start: 2020-10-13 | End: 2020-10-13 | Stop reason: HOSPADM

## 2020-10-13 RX ORDER — MULTIVIT-MIN/IRON/FOLIC ACID/K 18-600-40
500 CAPSULE ORAL
COMMUNITY

## 2020-10-13 RX ORDER — LIDOCAINE 40 MG/G
CREAM TOPICAL
Status: DISCONTINUED | OUTPATIENT
Start: 2020-10-13 | End: 2020-10-13 | Stop reason: HOSPADM

## 2020-10-13 RX ADMIN — KIT FOR THE PREPARATION OF TECHNETIUM TC 99M ALBUMIN AGGREGATED 4.5 MILLICURIE: 2.5 INJECTION, POWDER, FOR SOLUTION INTRAVENOUS at 13:30

## 2020-10-13 RX ADMIN — FENTANYL CITRATE 50 MCG: 50 INJECTION, SOLUTION INTRAMUSCULAR; INTRAVENOUS at 10:50

## 2020-10-13 RX ADMIN — MIDAZOLAM 1 MG: 1 INJECTION INTRAMUSCULAR; INTRAVENOUS at 11:23

## 2020-10-13 RX ADMIN — SODIUM CHLORIDE: 9 INJECTION, SOLUTION INTRAVENOUS at 10:24

## 2020-10-13 RX ADMIN — LIDOCAINE HYDROCHLORIDE 9 ML: 10 INJECTION, SOLUTION EPIDURAL; INFILTRATION; INTRACAUDAL; PERINEURAL at 11:23

## 2020-10-13 RX ADMIN — FENTANYL CITRATE 50 MCG: 50 INJECTION, SOLUTION INTRAMUSCULAR; INTRAVENOUS at 11:23

## 2020-10-13 RX ADMIN — IODIXANOL 50 ML: 320 INJECTION, SOLUTION INTRAVASCULAR at 11:29

## 2020-10-13 RX ADMIN — MIDAZOLAM 1 MG: 1 INJECTION INTRAMUSCULAR; INTRAVENOUS at 10:50

## 2020-10-13 ASSESSMENT — MIFFLIN-ST. JEOR: SCORE: 1687.86

## 2020-10-13 NOTE — PRE-PROCEDURE
GENERAL PRE-PROCEDURE:   Procedure:  Visceral Angiography and SIRT  Date/Time:  10/13/2020 9:57 AM    Verbal consent obtained?: Yes    Written consent obtained?: Yes    Risks and benefits: Risks, benefits and alternatives were discussed    Consent given by:  Patient  Patient states understanding of procedure being performed: Yes    Patient's understanding of procedure matches consent: Yes    Procedure consent matches procedure scheduled: Yes    Expected level of sedation:  Moderate  Appropriately NPO:  Yes  ASA Class:  Class 3- Severe systemic disease, definite functional limitations  Mallampati  :  Grade 2- soft palate, base of uvula, tonsillar pillars, and portion of posterior pharyngeal wall visible  Lungs:  Lungs clear with good breath sounds bilaterally  Heart:  Normal heart sounds and rate  History & Physical reviewed:  History and physical reviewed and no updates needed  Statement of review:  I have reviewed the lab findings, diagnostic data, medications, and the plan for sedation

## 2020-10-13 NOTE — IP AVS SNAPSHOT
[facility] [address] [address]                                    After Visit Summary   10/13/2020    Galileo Mayberry    MRN: [id number]           After Visit Summary Signature Page    I have received my discharge instructions, and my questions have been answered. I have discussed any challenges I see with this plan with the nurse or doctor.    ..........................................................................................................................................  Patient/Patient Representative Signature      ..........................................................................................................................................  Patient Representative Print Name and Relationship to Patient    ..................................................               ................................................  Date                                   Time    ..........................................................................................................................................  Reviewed by Signature/Title    ...................................................              ..............................................  Date                                               Time          22EPIC Rev 08/18

## 2020-10-13 NOTE — IR NOTE
Interventional Radiology Intra-procedural Nursing Note    Patient Name: Galileo Mayberry  Medical Record Number: [id number]  Today's Date: October 13, 2020    Procedure: Visceral angiography and selective internal radiotherapy  Proceduralist: Dr Storey, Dr Monroy, Dr Moon    Sedation start time: 1050  Sedation end time: 1130  Sedation medications administered:   100 mcg of Fentanyl  2 mg of Versed  Total sedation time: 40 minutes     Procedure start time: 1050  Puncture time: 1051  Procedure end time: 1130    Report given to: ERIKA Mckenzie 2A    Other Notes: Pt arrived to IR room 1 from . Consent reviewed. Pt denies any questions or concerns regarding procedure. Pt positioned supine and monitored per protocol. Pt tolerated procedure without any noted complications. Pt transferred back to 2A.    Right groin accessed. Patient must lay flat with right lower extremity straight for 3 hours. Ambulation time is at 1430.      Danette Quintero

## 2020-10-13 NOTE — PROGRESS NOTES
Pt prepped for Y 90 mapping.PIV placed and NS infusing and labs sent.IV contrast discharge instructions reviewed and copy given to pt.Consent signed and questions answered.Groins shaved and pulses marked and charted.

## 2020-10-13 NOTE — IP AVS SNAPSHOT
MRN:[id number]                      After Visit Summary   10/13/2020    Galileo Mayberry    MRN: [id number]           Visit Information        Department      10/13/2020  9:16 AM [facility] [address] [place]          Review of your medicines      UNREVIEWED medicines. Ask your doctor about these medicines       Dose / Directions   acetaminophen 500 MG tablet  Commonly known as: TYLENOL      Dose: 1,000 mg  Take 1,000 mg by mouth  Refills: 0     amoxicillin 500 MG capsule  Commonly known as: AMOXIL      Refills: 0     diclofenac 1 % topical gel  Commonly known as: VOLTAREN      Refills: 0     Ferrous Gluconate 240 (27 Fe) MG Tabs      Dose: 240 mg  Take 240 mg by mouth  Refills: 0     finasteride 5 MG tablet  Commonly known as: PROSCAR      Dose: 5 mg  Take 5 mg by mouth  Refills: 0     methylphenidate 10 MG tablet  Commonly known as: RITALIN      as needed  Refills: 0     * methylPREDNISolone 32 MG tablet  Commonly known as: MEDROL  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  Take the second tab 2 hours before scan.  Quantity: 2 tablet  Refills: 0     * methylPREDNISolone 32 MG tablet  Commonly known as: MEDROL  Used for: Allergy to intravenous contrast      Take 32 mg, 12 hours prior to your procedure, take another 32 mg, 2 hours prior to procedure due to contrast allergy.  Quantity: 2 tablet  Refills: 0     tamsulosin 0.4 MG capsule  Commonly known as: FLOMAX      Dose: 0.4 mg  Take 0.4 mg by mouth  Refills: 0     Testosterone 1.62 % Gel      Refills: 0     Vitamin C 500 MG Caps      Dose: 500 mg  Take 500 mg by mouth  Refills: 0     vitamin D3 25 MCG (1000 UT) Caps      Takes 3 daily  Refills: 0         * This list has 2 medication(s) that are the same as other medications prescribed for you. Read the directions carefully, and ask your doctor or other care provider to review them with you.                  Protect others around  you: Learn how to safely use, store and throw away your medicines at www.disposemymeds.org.       Follow-ups after your visit       Your next 10 appointments already scheduled    Oct 19, 2020  6:30 AM  Procedure 6.5 hour with U2A ROOM 2  [facility] [address] [place] ([facility]) [address] [facility] [address]      Oct 19, 2020  8:30 AM  IR PERIPHERAL VISCERAL EMBOLIZATION with UUIR5  [facility] Interventional Radiology ([facility]) [address] [facility] [id number] [phone]   The day before the exam:    You may eat your regular diet.    You are encouraged to drink at least 8 eight ounce glasses of clear liquids.    Drink no alcoholic beverages for 24 hours before or after the exam.    The day of the exam:    Do not eat any solid food or milk products for 6 hours prior to the exam. You may drink clear liquids until 2 hours prior to the exam. Clear liquids include the following: water, Jell-O, clear broth, apple juice or any non-carbonated drink that you can see through (no pop!)    The morning of the exam you may take medications as directed with a sip of water.    You should not have received barium (x-ray contrast) within 48 hours of this exam.    Please wear loose clothing, such as a sweat suit or jogging clothes. Avoid snaps, zippers and other metal. We may ask you to undress and put on a hospital gown.    Please bring any scans or X-rays taken at other hospitals, if similar tests were done. Also bring a list of your medicines, including vitamins, minerals and over-the-counter drugs. It is safest to leave personal items at home.    Someone will need to drive you to and from the hospital.    Tell your doctor:    If you have allergies to x-ray contrast or iodine.    If you are or may be pregnant.    If you are taking Coumadin (or any other blood  thinners) 5 days prior to the exam for any special instructions.    If you are diabetic to determine if your insulin needs have to be adjusted for the exam.  Your doctor will:    Need to do a history and physical within 30 days before this procedure.    Determine whether you need a 12 lead EKG    Determine which medications (if any) that should not be taken the morning of the exam.    Obtain necessary laboratory tests prior to the exam (creatinine, Hgb/Hct, platelet count, and INR).    Following the exam:    You will need to remain on complete bedrest for up to 6 hours. The nurse will monitor your vital signs, puncture site, and the pulses and temperature of the arm or leg that was punctured.    If you were given sedation, you cannot drive for 24 hours after the procedure, and an adult must be with you until then.    If you have any questions, please call the Imaging Department where you will have your exam. Directions, parking instructions, and other information are available on our website, [email or web address].     Oct 19, 2020 12:00 PM  NM THERASPHERE THERAPY with UUNM2  [facility] ([facility] [place]) [address] [facility] [address] [phone]   How do I prepare for my exam? (Food and drink instructions)  No Food and Drink Restrictions, however please follow the instructions for your IR (Interventional Radiology) exam associated with this exam.    What should I wear: You should wear comfortable clothes. Leave your valuables at home.  You will be changed into procedural attire when you arrive for exam.    How long does the exam take:  Plan on being here for 5-6 hours.    What should I bring: Please bring a list of your medicines to the exam. (Include vitamins, minerals and over-the-counter drugs.) Please bring related prior results and films.    Do I need a :  Follow the instructions given to you for the Interventional  Radiology Exam    What do I need to tell my doctor?  Tell your doctor if you are breastfeeding or if there is any chance you may be pregnant.  If you have had a barium test within the past few days. Barium may change the results of certain exams.  If you think you may need sedation (medicine to help you relax).    What should I do after the exam: No restrictions, you may resume normal activities. The radioactive fluid will leave your body when you urinate (use the toilet). If you drink extra fluids, it will leave your body faster.    What is this test: The Interventional Radiologist will inject a radioactive activity material into your liver during the Interventional Radiology Exam to help treat metastatic liver cancer.   The procedure allows a concentrated dose of radiation to be delivered directly to the liver cancer, limiting damage to other tissue. Images will be taken in the Nuclear Medicine department after your Interventional radiology injection.    Who should I call with questions: Please call your Imaging Department at your exam site with any questions. Directions, parking instructions, and other information are available on our website, [email or web address].     Oct 22, 2020  9:00 AM  MR ABDOMEN W/O & W CONTRAST with URMR2  [facility] ([facility]) [address] [place] [address] [phone]   How do I prepare for my exam? (Food and drink instructions)  Do not eat or drink for 6 hours prior to exam.  **If you will be receiving sedation or general anesthesia, please see special notes below.**    How do I prepare for my exam? (Other instructions)  Take your medicines as usual, unless your doctor tells you not to.  You may or may not receive IV contrast for this exam pending the discretion of the Radiologist.    **If you will be receiving sedation or general anesthesia, please see special notes below.**    What should  I wear: The MRI machine uses a strong magnet. Due to increased risk of metallic materials/threading in clothing, for your safety, you will be requested to change into a hospital gown. Please remove any body piercings and hair extensions before you arrive. You will also remove watches, jewelry, hairpins, wallets, dentures, partial dental plates and hearing aids. You may wear contact lenses, and you may be able to wear your rings. We have a safe place to keep your personal items, but it is safer to leave them at home.    How long does the exam take: Most tests take 30 to 60 minutes.  HOWEVER, IF YOUR DOCTOR PRESCRIBES ANESTHESIA please plan on spending four to five hours in the recovery room.    What should I bring: Bring a list of your current medicines to your exam (including vitamins, minerals and over-the-counter drugs). Also bring the results of similar scans you may have had.  If you are a minor (under age 18) you will need to bring a parent or legal guardian with you to the exam.    Do I need a : **If you will be receiving sedation or general anesthesia, please see special notes below.**    What should I do after the exam: No restrictions, you may resume normal activities.    What is this test: MRI (magnetic resonance imaging) uses a strong magnet and radio waves to look inside the body. An MRA (magnetic resonance angiogram) does the same thing, but it lets us look at your blood vessels. A computer turns the radio waves into pictures showing cross sections of the body, much like slices of bread. This helps us see any problems more clearly. You may receive fluid (called  contrast ) before or during your scan. The fluid helps us see the pictures better. We give the fluid through an IV (small needle in your arm).    Who should I call with questions: If you have any questions, please contact your Imaging Department exam site. Directions, parking instructions, and other information are available on our  website, [email or web address].    How do I prepare if I m having sedation or anesthesia?  **IMPORTANT**  THE INSTRUCTIONS BELOW ARE ONLY FOR THOSE PATIENTS WHO HAVE BEEN TOLD THEY WILL RECEIVE SEDATION OR GENERAL ANESTHESIA DURING THEIR MRI PROCEDURE:    IF YOU WILL RECEIVE ORAL SEDATION (take medicine by mouth to help you relax during your exam):  You must get the medicine from your doctor before you arrive. Bring the medicine to the exam. Do not take it at home.  Arrive one hour early with a . Your  must remain on site for the duration of the exam. Your medicine may make you sleepy. After the exam, you may not drive, take a bus or take a taxi by yourself.    IF YOU WILL RECEIVE SEDATION WITH AN IV OR ANESTHESIA (deeper level of sedation ordered and administered by a health professional):  Arrive 1 1/2 hours early with a . Your  must remain on site for the duration of the exam. Your medicine may make you sleepy. After the exam, you may not drive, take a bus or take a taxi by yourself.  No eating 8 hours before your exam. You may have clear liquids up until 4 hours before your exam. (Clear liquids include water, clear tea, black coffee and fruit juice without pulp.)  You may spend up to four to five hours in the recovery room.     Oct 22, 2020 12:15 PM  (Arrive by 12:00 PM)  Video Visit with Jw Trent MD  [facility] ([facility]) [address] [place] [address] [phone]   [facility]  Note: this is not an onsite visit; there is no need to come to the facility.  Please have a list of all current medications available for appointment.         Care Instructions       Further instructions from your care team       [facility]   Interventional Radiology  Discharge Instructions Post Angiography for mapping of    Microspheres to the Liver    AFTER YOU GO HOME           Relax and take it easy for 24 hours.       Drink plenty of fluids.       Resume your regular diet, unless otherwise instructed by your Primary Physician.       DO NOT smoke for at least 24 hours, if you were given any sedation.       DO NOT drink alcoholic beverages the day of your procedure.       Do not drive or operate machinery at home or at work for 24 hours.          DO NOT do any strenuous exercise or lifting for at least 2 days following your procedure.       DO NOT take a bath or shower for at least 12 hours following your procedure.       DO NOT make any important or legal decisions for 24 hours following your procedure.    CALL THE PHYSICIAN IF:       - You start bleeding from the procedure site. lie down flat and hold pressure on the site. A small lump or bruise is common at the puncture site. Your physician will tell you if you need to return to the hospital.      - You develop numbness, coolness or a change in color of the leg that was punctured.      - You experience increased pain or redness at the puncture site.      - You develop hives or a rash or unexplained itching.      - You develop a temperature of 101 degrees F or greater.    Additional Information:           Support the puncture site for coughing, sneezing, or moving your bowels for the first 48 hours  No tub bath, hot tubs, or swimming for 5 days  No lotion or powder to the puncture site for 3 day    Closure Device:Mynx closure booklet given to pt            [facility] INTERVENTIONAL RADIOLOGY DEPARTMENT         Procedure Physician: Dr Storey/Dr Monroy  Date of Procedure:October 13, 2020       Telephone Numbers:   [phone]     Monday-Friday 8:00 to 4:30 pm                                        [phone]     After 4:30 pm Monday-Friday, Weekend and Holidays. Ask for the Interventional Radiologist on call. Someone is on call 24 hrs/day.              Additional Information About Your Visit       youcalchart Information    Normal gives you  "secure access to your electronic health record. If you see a primary care provider, you can also send messages to your care team and make appointments. If you have questions, please call your primary care clinic.  If you do not have a primary care provider, please call [phone] and they will assist you.       Care EveryWhere ID    This is your Care EveryWhere ID. This could be used by other organizations to access your [facility] medical records  [id number]       Your Vitals Were  Most recent update: 10/13/2020 12:38 PM    Blood Pressure   119/75 (BP Location: Left arm)          Pulse   58          Temperature   97.7  F (36.5  C) (Oral)          Respirations   18          Height   1.799 m (5' 10.83\")             Weight   94.3 kg (208 lb)    Pulse Oximetry   96%    BMI (Body Mass Index)   29.15 kg/m           Primary Care Provider Office Phone # Fax #    Benjamin E Van Vranken, [phone]      Equal Access to Services    PAULA MARIN : Hadii aad ku hadasho Soomaali, waaxda luqadaha, qaybta kaalmada adeegyada, waxay idiin haydharmeshn lupe jansenaraiain byrd . So [facility] [phone].    ATENCIÓN: Si habla español, tiene a contreras disposición servicios gratuitos de asistencia lingüística. Mauri al [phone].    We comply with applicable federal and state civil rights laws, including the [place] Human Rights Act. We do not discriminate on the basis of race, color, creed, Shinto, national origin, marital status, age, disability, sex, sexual orientation, or gender identity.       Thank you!    Thank you for choosing [facility] for your care. Our goal is always to provide you with excellent care. Hearing back from our patients is one way we can continue to improve our services. Please take a few minutes to complete the written survey that you may receive in the mail after you visit with us. Thank you!            Medication List      ASK your doctor about these medications          Morning Afternoon Evening " Bedtime As Needed    acetaminophen 500 MG tablet  Also known as: TYLENOL  INSTRUCTIONS: Take 1,000 mg by mouth                     amoxicillin 500 MG capsule  Also known as: AMOXIL                     diclofenac 1 % topical gel  Also known as: VOLTAREN                     Ferrous Gluconate 240 (27 Fe) MG Tabs  INSTRUCTIONS: Take 240 mg by mouth                     finasteride 5 MG tablet  Also known as: PROSCAR  INSTRUCTIONS: Take 5 mg by mouth                     methylphenidate 10 MG tablet  Also known as: RITALIN  INSTRUCTIONS: as needed                     * methylPREDNISolone 32 MG tablet  Also known as: MEDROL  INSTRUCTIONS: Take 1 tab 12 hours before scan.  Take the second tab 2 hours before scan.                     * methylPREDNISolone 32 MG tablet  Also known as: MEDROL  INSTRUCTIONS: Take 32 mg, 12 hours prior to your procedure, take another 32 mg, 2 hours prior to procedure due to contrast allergy.                     tamsulosin 0.4 MG capsule  Also known as: FLOMAX  INSTRUCTIONS: Take 0.4 mg by mouth                     Testosterone 1.62 % Gel                     Vitamin C 500 MG Caps  INSTRUCTIONS: Take 500 mg by mouth                     vitamin D3 25 MCG (1000 UT) Caps  INSTRUCTIONS: Takes 3 daily                        * This list has 2 medication(s) that are the same as other medications prescribed for you. Read the directions carefully, and ask your doctor or other care provider to review them with you.

## 2020-10-13 NOTE — DISCHARGE INSTRUCTIONS
[facility]   Interventional Radiology  Discharge Instructions Post Angiography for mapping of    Microspheres to the Liver    AFTER YOU GO HOME          Relax and take it easy for 24 hours.       Drink plenty of fluids.       Resume your regular diet, unless otherwise instructed by your Primary Physician.       DO NOT smoke for at least 24 hours, if you were given any sedation.       DO NOT drink alcoholic beverages the day of your procedure.       Do not drive or operate machinery at home or at work for 24 hours.          DO NOT do any strenuous exercise or lifting for at least 2 days following your procedure.       DO NOT take a bath or shower for at least 12 hours following your procedure.       DO NOT make any important or legal decisions for 24 hours following your procedure.    CALL THE PHYSICIAN IF:       - You start bleeding from the procedure site. lie down flat and hold pressure on the site. A small lump or bruise is common at the puncture site. Your physician will tell you if you need to return to the hospital.      - You develop numbness, coolness or a change in color of the leg that was punctured.      - You experience increased pain or redness at the puncture site.      - You develop hives or a rash or unexplained itching.      - You develop a temperature of 101 degrees F or greater.    Additional Information:           Support the puncture site for coughing, sneezing, or moving your bowels for the first 48 hours  No tub bath, hot tubs, or swimming for 5 days  No lotion or powder to the puncture site for 3 day    Closure Device:Mynx closure booklet given to pt            [facility] INTERVENTIONAL RADIOLOGY DEPARTMENT         Procedure Physician: Dr Storey/Dr Monroy  Date of Procedure:October 13, 2020       Telephone Numbers:   [phone]     Monday-Friday 8:00 to 4:30 pm                                        [phone]     After 4:30 pm Monday-Friday, Weekend and Holidays. Ask for the  Interventional Radiologist on call. Someone is on call 24 hrs/day.

## 2020-10-13 NOTE — TELEPHONE ENCOUNTER
[facility]    Contact Information:  Alberto Storey MD  [address] [place] [address]   [email or web address]   [phone]        Insurance Plan: [facility]  Plan Type: Fully Insured  Employer:  Secondary Insurance:  Secondary Plan Type:   CPT Code:   ICD-10: c59.40/c78.7  Place of Service: 22-Outpatient Hosp  Condition:       Case Status:  Approved    Primary Insurance Status:    Secondary Insurance Status:    Case Level:  Pre-Auth    Initial Call Date:  10/6/2020    Next Step:  case closed approved    Follow-Up:  10/13/2020- Julita contacted [facility] regarding patient WF 1945. Natalie showed that the LAYLA has been approved and the codes: 05768 83798 24558 38069 89255 90710 15205  20389 96310 33956  03730 have been added to the previous authorization number #[id number] and is valid until 3/19/2021.    Call reference number -Natalie#10:13am    Case Notes:  10/12/2020- Julita contacted [facility] regarding patient WF 1945. Due to the federal holiday all departments are closed. Julita will try again tomorrow.    10/8/2020- Julita submitted the secondary authorization request for patient WF 1945. The previous authorization #[id number] did not include the codes: 74534 14187 18071 43275 05322 50044 26524  94150 27711 66390 . Julita has scheduled a follow up call and will keep the office advised of the status and final determination.    10/8/2020- Julita submitted the secondary authorization request for patient WF 1945. The previous authorization #[id number] did not include the codes: 57873 09592 80896 93252 74115 66414 98589  67530 00772 58719 . Julita has scheduled a follow up call and will keep the office advised of the status and final determination.    10/8/2020- Julita submitted the secondary authorization request for patient WF 1945. The previous authorization #[id number] was  did not include the codes: 96663 24511 52244 78025 10916 32029 24437  20045 33200 82659  97900. [facility] has scheduled a follow up call and will keep the office advised of the status and final determination.    10/6/2020-Julita contacted [employer] regarding patient WF 1945. Hailee in the [facility] department showed has active coverage. If considered medically necessary, the procedure will be covered at 100% of the allowed amount. Hailee showed that there is a request for the Y-90 Radioembolization on file but they are waiting for the PCP to submit the order. An authorization number must be on file in order for [facility] to submit a LAYLA (secondary authorization request). Please contact the PCP so he/she can submit the order. Please keep Julita advised once the order has been submitted.    Prior Auth: [facility] has received a Pre-Cert request for patient WF. As soon as our investigation is complete, All parties will be notified of our findings.

## 2020-10-13 NOTE — PROGRESS NOTES
Patient tolerated recovery stage well. VSS, right groin site clean/dry/intact, no hematoma, and denies pain. Patient tolerated PO food and fluids. Teaching was done and discharge instructions were given.Booklet on mynx closure device also given. Patient ambulated, voided, and PIV was removed. Patient discharged from the hospital  to home with daughter.

## 2020-10-14 NOTE — TELEPHONE ENCOUNTER
Called pt and inquired with him to see how he is doing s/p Y90 mapping    He states that he's doing well.     We talked about his next treatment date in which I did consult with Dr. Storey    We will plan for Monday 11/16/2020  He is to check into [facility] waiting room at 6:30am for an 830am start time    All prep reviewed with him    [facility].    Talked about Covid, testing again    He will call with any other questions.     Yaima RITCHIE RN, BSN  Interventional Radiology/Vascular  Nurse Coordinator   Phone: [phone]  Fax: [phone]

## 2020-10-15 NOTE — TELEPHONE ENCOUNTER
Called pt and informed him that we have him scheduled for his 2nd Y90 treatment as requested.    Informed him that this is scheduled for Monday 11/16.    He is to check in at 630am for an 830am.     Informed him of all prep information.   We talked again of his covid testing and his steriod medication again.     [facility]'s [facility].     He verbalized understanding.     He will call with any other questions.     Yaima RITCHIE RN, BSN  Interventional Radiology/Vascular  Nurse Coordinator   Phone: [phone]  Fax: [phone]

## 2020-10-19 NOTE — IR NOTE
Patient Name: Galileo Mayberry  Medical Record Number: [id number]  Today's Date: 10/19/2020    Procedure: visceral angiogram and yitrum 90 delivery  Proceduralist: Remberto Storey    Procedure Start: 0840  Procedure end: 0810  Sedation medications administered: 150mcg fentanyl, 3mg versed     Report given to: Christine JAMES 2A  : n/a    Other Notes: Pt arrived to IR room 5 from . Consent reviewed. Pt denies any questions or concerns regarding procedure. Pt positioned supine and monitored per protocol. Pt tolerated procedure without any noted complications. Pt transferred back to .

## 2020-10-19 NOTE — PROGRESS NOTES
Prepped for Y-90 delivery.  Denies pain.  Daughter available post procedure for transport.  H & P current.  Labs pending.  Consent complete.

## 2020-10-19 NOTE — DISCHARGE INSTRUCTIONS
[facility]   Interventional Radiology  Discharge Instructions Post Angiography for Insertion of   Radioactive Microspheres to the Liver    AFTER YOU GO HOME          Relax and take it easy for 24 hours.       Drink plenty of fluids.       Resume your regular diet, unless otherwise instructed by your Primary Physician.       DO NOT smoke for at least 24 hours, if you were given any sedation.       DO NOT drink alcoholic beverages the day of your procedure.       Do not drive or operate machinery at home or at work for 24 hours.          DO NOT do any strenuous exercise or lifting for at least 2 days following your procedure.       DO NOT take a bath or shower for at least 12 hours following your procedure.       DO NOT make any important or legal decisions for 24 hours following your procedure.    CALL THE PHYSICIAN IF:       - You start bleeding from the procedure site. lie down flat and hold pressure on the site. A small lump or bruise is common at the puncture site. Your physician will tell you if you need to return to the hospital.      - You develop numbness, coolness or a change in color of the leg that was punctured.      - You experience increased pain or redness at the puncture site.      - You develop hives or a rash or unexplained itching.      - You develop a temperature of 101 degrees F or greater.    Additional Information:           Support the puncture site for coughing, sneezing, or moving your bowels for the first 48 hours  No tub bath, hot tubs, or swimming for 5 days  No lotion or powder to the puncture site for 3 day    Follow the Discharge Instructions for the Liver Brachytherapy; Contrast Instructions and Instructions for the [employer].     Closure Device:  mynx            [facility] INTERVENTIONAL RADIOLOGY DEPARTMENT         Procedure Physician:  Dr. Storey                            Date of Procedure:October 19, 2020       Telephone Numbers:   [phone]      Monday-Friday 8:00 to 4:30 pm                                        [phone]     After 4:30 pm Monday-Friday, Weekend and Holidays. Ask for the Interventional Radiologist on call. Someone is on call 24 hrs/day.

## 2020-10-19 NOTE — PRE-PROCEDURE
GENERAL PRE-PROCEDURE:   Procedure:  Visceral angiogram and Y90 delivery  Date/Time:  10/19/2020 7:13 AM    Written consent obtained?: Yes    Risks and benefits: Risks, benefits and alternatives were discussed    Consent given by:  Patient  Patient states understanding of procedure being performed: Yes    Patient's understanding of procedure matches consent: Yes    Procedure consent matches procedure scheduled: Yes    Expected level of sedation:  Moderate  Appropriately NPO:  Yes  ASA Class:  Class 2- mild systemic disease, no acute problems, no functional limitations  Mallampati  :  Grade 2- soft palate, base of uvula, tonsillar pillars, and portion of posterior pharyngeal wall visible  Lungs:  Lungs clear with good breath sounds bilaterally  Heart:  Normal heart sounds and rate  History & Physical reviewed:  History and physical reviewed and no updates needed  Statement of review:  I have reviewed the lab findings, diagnostic data, medications, and the plan for sedation

## 2020-10-19 NOTE — PROCEDURES
[facility]     Procedure: Right lobe radioembolization    Date/Time: 10/19/2020 10:55 AM  Performed by: Remberto Monroy MD  Authorized by: Remberto Monroy MD   IR Fellow Physician: Remberto Monroy    UNIVERSAL PROTOCOL   Site Marked: NA  Prior Images Obtained and Reviewed:  Yes  Required items: Required blood products, implants, devices and special equipment available    Patient identity confirmed:  Verbally with patient, arm band, provided demographic data and hospital-assigned identification number  Patient was reevaluated immediately before administering moderate or deep sedation or anesthesia  Confirmation Checklist:  Patient's identity using two indicators, relevant allergies, procedure was appropriate and matched the consent or emergent situation and correct equipment/implants were available  Time out: Immediately prior to the procedure a time out was called    Universal Protocol: the Joint Commission Universal Protocol was followed    Preparation: Patient was prepped and draped in usual sterile fashion    ESBL (mL):  2         ANESTHESIA    Anesthesia: Local infiltration  Local Anesthetic:  Lidocaine 1% without epinephrine  Anesthetic Total (mL):  8      SEDATION    Patient Sedated: Yes    Sedation Type:  Moderate (conscious) sedation  Vital signs: Vital signs monitored during sedation    See dictated procedure note for full details.  Findings: Right lobe Y90 delivery    Specimens: none    Complications: None    Condition: Stable    Plan: Bed rest for 3 hrs    PROCEDURE   Patient Tolerance:  Patient tolerated the procedure well with no immediate complications    Length of time physician/provider present for 1:1 monitoring during sedation: 30

## 2020-10-19 NOTE — IP AVS SNAPSHOT
[facility] Interventional Radiology  [address] [facility] [address]  Phone: [phone]                                    After Visit Summary   10/19/2020    Galileo Mayberry    MRN: [id number]           After Visit Summary Signature Page    I have received my discharge instructions, and my questions have been answered. I have discussed any challenges I see with this plan with the nurse or doctor.    ..........................................................................................................................................  Patient/Patient Representative Signature      ..........................................................................................................................................  Patient Representative Print Name and Relationship to Patient    ..................................................               ................................................  Date                                   Time    ..........................................................................................................................................  Reviewed by Signature/Title    ...................................................              ..............................................  Date                                               Time          22EPIC Rev 08/18

## 2020-10-19 NOTE — PROGRESS NOTES
Patient tolerated recovery stage well. VSS, right groin site clean/dry/intact, no hematoma, and denies pain. Patient tolerated PO food and fluids. Teaching was done and discharge instructions were given. Patient ambulated, voided, and PIV was removed. Awaiting transport & calling about his prescriptions.

## 2020-10-20 NOTE — TELEPHONE ENCOUNTER
Called pt to f/up on him sp Y90    Low grade fever today  Denies nausea.  Feeling better.     Informed him that I did get his VM in regards to his ordeal with getting his post discharge medications.    He states that he had to run all [facility] due to miscommunication with the staff. He informs me of the hassle and [facility] that [facility] as well as trying to talk to the pharmacist manager to find out where his medications were.     He continues to inform me that he should've been told that he couldn't work due to radiation exposure in which I did clarify to him that I did include all risks in my patient prep information sheet. I informed him that most people would read it and then decide to be off work or not.     Pt states that he did try paging me twice in which I informed him that I did not receive anything and would have responded.     He states that for the discharge medications for future reference should [facility] to the [facility] so that' it's nothing out of pocket for him to pay.    He states that for his next appt then we should go ahead and send [facility] to the [facility] to avoid this happening again.    I informed him that most often times we are unaware that pts will get post discharge medications, however will try and see if we can predict at his next treatment.     Pt goes on to inform me that he did write a long msg to Dr. Storey and would like a call back to talk    I informed him that I will try and see if I can connect with Dr. Storey, however will send a msg     He agrees to plan.     Yaima RITCHIE RN, BSN  Interventional Radiology/Vascular  Nurse Coordinator   Phone: [phone]  Fax: [phone]

## 2020-10-22 NOTE — LETTER
10/22/2020         RE: Galileo Mayberry  [address] [place] [address]        Dear Colleague,    Thank you for referring your patient, Galileo Mayberry, to the [facility]. Please see a copy of my visit note below.      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 11/2019, he is diagnosed with ocular melanoma, after a choroidal lesion was found in his Left eye.  Patient reports over the last several months he developed new floaters/spots in his L eye.  2. 11/25/2019, he was seen by ophthalmology who noted a left eye, elevated bilobed lesion, size 6.28mm x 18.31(T) x 17.04(L).   3. 12/14/19, CT-CAP showed no evidence of metastatic disease.  4. 1/10/2020: Patient referred to [facility]. B-scan ultrasound of left eye showed elevated bilobed lesion measuring 7.2-7.3 height with a base of 21.4 x 22.5 mm. Low to medium reflectivity. Difficult measurements due to location. Ciliary body involvement was noted. Ultrasound basal dimension measurement included subretinal fluid, and clinical measurement had to be done by transillumination due to anterior tumor location.  5. 1/15/2020, visual acuity right eye 20/20 -1, left eye 20/25 +2 nh. Visual fields counting fingers full bilaterally. Clinical fundus exam of left eye revealed choroidal/ciliary body malignant melanoma with basal measurement of 22 x 21 mm and height of 7.5 mm. Located in the Inferior left eye from 4:30-7:30, 15 mm to disc, 15 mm to fovea, +subretinal fluid, bilobed. The tumor without fluid appears to be 1-2 mm smaller in basal dimension (20 x 19 mm)  6. 2/10/2020, Iodine-125 24 mm plaque placement delivering 85 Gy to an 8 mm height.  7. 3/31/2020, he starts adjuvant sunitinib 25 mg daily for high risk disease, Class II molecular signature and PRAME mRNA positive, high-risk of  early metastasis. He completed 6 months of therapy.  8. 8/26/2020, MRI-liver shows interval development of numerous (more than 30) metastatic lesions in the liver in bilobar distribution. The largest in segment JUAN measures 1.85 cm.  9. 10/19/2020, he undergoes Y90 radioembolization with 72.2 mCi of Theraspheres to the right lobe of the liver; predominantly in hepatic segments 5 and 6, milder uptake in the region of segment 8  10. 10/23/2020, first cycle of Ipilimumab 1mg/kg and nivolumab 3mg/kg    HISTORY OF PRESENT ILLNESS  Mr. 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 He returns today via video.    He underwent radioembolization to the right hepatic lobe on 10/19. Baseline MRI-liver imaging was obtained today, which shows significant increase in size and number of the innumerable metastatic liver lesions since 8/26/2020.This serves as his baseline imaging. MRI also shows there are multiple metastatic bone lesions in the visualized thoracolumbar spine and pelvic bones.     The plan is for him to start Ipi/Nivo immunotherapy treatments tomorrow.    ECOG performance status is 1.      Current Outpatient Medications   Medication Sig Dispense Refill     acetaminophen (TYLENOL) 500 MG tablet Take 1,000 mg by mouth       Ascorbic Acid (VITAMIN C) 500 MG CAPS Take 500 mg by mouth       Cholecalciferol (VITAMIN D3) 25 MCG (1000 UT) CAPS Takes 3 daily       ciprofloxacin (CIPRO) 500 MG tablet Take 1 tablet (500 mg) by mouth daily for 14 days Take at least 2 hrs before or 4 hrs after aluminum, calcium, iron, zinc or magnesium containing products. 14 tablet 0     ciprofloxacin (CIPRO) 500 MG tablet Take 1 tablet (500 mg) by mouth daily for 14 days Take at least 2 hrs before or 4 hrs after aluminum, calcium, iron, zinc or magnesium containing products. 14 tablet 0     diclofenac (VOLTAREN) 1 % topical gel        Ferrous Gluconate 240 (27 Fe) MG TABS Take 240 mg by mouth       finasteride (PROSCAR) 5 MG tablet  Take 5 mg by mouth       methylphenidate (RITALIN) 10 MG tablet as needed       methylPREDNISolone (MEDROL DOSEPAK) 4 MG tablet therapy pack Take 1 tablet (4 mg) by mouth 2 times daily Take as directed on package. 21 tablet 0     methylPREDNISolone (MEDROL DOSEPAK) 4 MG tablet therapy pack Take 1 tablet (4 mg) by mouth 2 times daily Take as directed on package. 21 tablet 0     methylPREDNISolone (MEDROL) 32 MG tablet Please take 32 mg, 12 hours prior to procedure, take another 32 mg, 2 hours prior to procedure for contrast allergy. 2 tablet 0     ondansetron (ZOFRAN) 4 MG tablet Take 1-2 tablets (4-8 mg) by mouth every 6 hours as needed for nausea (vomiting) 40 tablet 0     ondansetron (ZOFRAN) 4 MG tablet Take 1-2 tablets (4-8 mg) by mouth every 6 hours as needed for nausea (vomiting) 40 tablet 0     oxyCODONE (ROXICODONE) 5 MG tablet Take 1-2 tablets (5-10 mg) by mouth every 6 hours as needed for moderate to severe pain 10 tablet 0     oxyCODONE (ROXICODONE) 5 MG tablet Take 1-2 tablets (5-10 mg) by mouth every 6 hours as needed for moderate to severe pain 10 tablet 0     pantoprazole (PROTONIX) 40 MG EC tablet Take 1 tablet (40 mg) by mouth daily 30 tablet 0     pantoprazole (PROTONIX) 40 MG EC tablet Take 1 tablet (40 mg) by mouth daily 30 tablet 0     tamsulosin (FLOMAX) 0.4 MG capsule Take 0.4 mg by mouth       Testosterone 1.62 % GEL          REVIEW OF SYSTEMS  12-point ROS negative except as in HPI    Past Medical History:   Diagnosis Date     Degenerative joint disease      Metastatic melanoma (H)     L eye     Nonsenile cataract      Past Surgical History:   Procedure Laterality Date     ------------OTHER-------------  2014    back surgery L4/L5      ------------OTHER-------------      knee surgery both 1961 Right, 1971 Left knee     AS REMOVAL OF KIDNEY STONE  2015     JOINT REPLACEMENT  2017    Both kneses replaced (2017 and 2018)     ROTATOR CUFF REPAIR RT/LT Right 2016     TURP  2000     Family History    Problem Relation Age of Onset     Glaucoma Mother      Prostate Cancer Father      Cancer Sister      Macular Degeneration No family hx of        PHYSICAL EXAMINATION  There were no vitals taken for this visit.  Wt Readings from Last 3 Encounters:   10/19/20 93.4 kg (206 lb)   10/13/20 94.3 kg (208 lb)   03/27/20 100.7 kg (221 lb 14.4 oz)   Gen: Appears well, no distress, in the home office  HEENT: no scleral icterus, the sclera on the left is injected and pupil is dilated.  Lymph: No lymphadenopathy  CV: regular  Pulm: Good effort  Ext: no edema  Skin: no ecchymoses or hematomas  Neuro: no focal deficits, affect and cognition are normal    The rest of a comprehensive physical examination is deferred due to PHE (public health emergency) video visit restrictions.    ASSESSMENT AND PLAN  #1 Metastatic uveal melanoma, to liver and bone  #2 Choroidal melanoma with ciliary body involvement, left eye, status-post plaque brachytherapy,  [employer] Class II and PRAME mRNA positive  It was a pleasure to meet with Mr. Mayberry today. He is a 75 year old man with metastatic uveal melanoma to liver and bone.  He received Y90 radioembolization to right hepatic lobe on 10/19 and he will receive his first dose of Ipi/Nivo tomorrow. We have previously discussed the nature of immunotherapy toxicity, the autoimmune nature of side effects, the holding of infusions for mild-moderate toxicity, and the use of steroids to manage severe toxicity. We reviewed his plan for treatment and planned schedule of events.          1. Plan start of Ipi 1mg/kg and Nivo 3mg/kg, cycle 1 tomorrow, 10/23.       2. Cycle 2 Ipi/Nivo on 11/13       3. 2nd Y90 treatment 11/16       4. MRI liver prior to visit with me on 12/3, then cycle 3 on 12/4.        5. Cycle 4 after 12/24.       6. MRI liver pior to visit with me on 1/14/2021, then cycle 5 following visit.           Multiple questions answered to patient's apparent satisfaction.     Evidio  "SAYRA Reyes.   of Medicine  Hematology, Oncology and Transplantation        Galileo Mayberry is a 75 year old male who is being evaluated via a billable video visit.      The patient has been notified of following:     \"This video visit will be conducted via a call between you and your physician/provider. We have found that certain health care needs can be provided without the need for an in-person physical exam.  This service lets us provide the care you need with a video conversation.  If a prescription is necessary we can send it directly to your pharmacy.  If lab work is needed we can place an order for that and you can then stop by our lab to have the test done at a later time.    Video visits are billed at different rates depending on your insurance coverage.  Please reach out to your insurance provider with any questions.    If during the course of the call the physician/provider feels a video visit is not appropriate, you will not be charged for this service.    Patient has given verbal consent for Video visit? Yes    How would you like to obtain your AVS? MyChart     If you are dropped from the video visit, the video invite should be resent to: Send to e-mail at: [email or web address]     Will anyone else be joining your video visit? No         I have reviewed and updated the patient's allergies and pt confirmed any changes to their medication list.  Patient was asked to provide any patient recorded vital signs, height and/or weight.  Please see \"Patient Reported Vital Signs\" tab for that information.  Patient instructed to be in the virtual waiting room 10-15 minutes prior to appointment time.    There were no vitals taken for this visit.    Concerns: Patient has no new concerns.    Refills: None    Tuan Juarez, EMT    Video-Visit Details    Type of service:  Video Visit    Video Start Time: 1:15 PM  Video End Time: 1:45 PM    Originating Location (pt. Location): " Home    Distant Location (provider location):  [facility]     Platform used for Video Visit: Natasha    Again, thank you for allowing me to participate in the care of your patient.        Sincerely,        Jw Trent MD

## 2020-10-22 NOTE — PROGRESS NOTES
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 11/2019, he is diagnosed with ocular melanoma, after a choroidal lesion was found in his Left eye.  Patient reports over the last several months he developed new floaters/spots in his L eye.  2. 11/25/2019, he was seen by ophthalmology who noted a left eye, elevated bilobed lesion, size 6.28mm x 18.31(T) x 17.04(L).   3. 12/14/19, CT-CAP showed no evidence of metastatic disease.  4. 1/10/2020: Patient referred to [facility]. B-scan ultrasound of left eye showed elevated bilobed lesion measuring 7.2-7.3 height with a base of 21.4 x 22.5 mm. Low to medium reflectivity. Difficult measurements due to location. Ciliary body involvement was noted. Ultrasound basal dimension measurement included subretinal fluid, and clinical measurement had to be done by transillumination due to anterior tumor location.  5. 1/15/2020, visual acuity right eye 20/20 -1, left eye 20/25 +2 nh. Visual fields counting fingers full bilaterally. Clinical fundus exam of left eye revealed choroidal/ciliary body malignant melanoma with basal measurement of 22 x 21 mm and height of 7.5 mm. Located in the Inferior left eye from 4:30-7:30, 15 mm to disc, 15 mm to fovea, +subretinal fluid, bilobed. The tumor without fluid appears to be 1-2 mm smaller in basal dimension (20 x 19 mm)  6. 2/10/2020, Iodine-125 24 mm plaque placement delivering 85 Gy to an 8 mm height.  7. 3/31/2020, he starts adjuvant sunitinib 25 mg daily for high risk disease, Class II molecular signature and PRAME mRNA positive, high-risk of early metastasis. He completed 6 months of therapy.  8. 8/26/2020, MRI-liver shows interval development of numerous (more than 30) metastatic lesions in the liver in bilobar distribution. The largest in segment JUAN measures 1.85 cm.  9. 10/19/2020, he undergoes Y90  radioembolization with 72.2 mCi of Theraspheres to the right lobe of the liver; predominantly in hepatic segments 5 and 6, milder uptake in the region of segment 8  10. 10/23/2020, first cycle of Ipilimumab 1mg/kg and nivolumab 3mg/kg    HISTORY OF PRESENT ILLNESS  Mr. 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 He returns today via video.    He underwent radioembolization to the right hepatic lobe on 10/19. Baseline MRI-liver imaging was obtained today, which shows significant increase in size and number of the innumerable metastatic liver lesions since 8/26/2020.This serves as his baseline imaging. MRI also shows there are multiple metastatic bone lesions in the visualized thoracolumbar spine and pelvic bones.     The plan is for him to start Ipi/Nivo immunotherapy treatments tomorrow.    ECOG performance status is 1.      Current Outpatient Medications   Medication Sig Dispense Refill     acetaminophen (TYLENOL) 500 MG tablet Take 1,000 mg by mouth       Ascorbic Acid (VITAMIN C) 500 MG CAPS Take 500 mg by mouth       Cholecalciferol (VITAMIN D3) 25 MCG (1000 UT) CAPS Takes 3 daily       ciprofloxacin (CIPRO) 500 MG tablet Take 1 tablet (500 mg) by mouth daily for 14 days Take at least 2 hrs before or 4 hrs after aluminum, calcium, iron, zinc or magnesium containing products. 14 tablet 0     ciprofloxacin (CIPRO) 500 MG tablet Take 1 tablet (500 mg) by mouth daily for 14 days Take at least 2 hrs before or 4 hrs after aluminum, calcium, iron, zinc or magnesium containing products. 14 tablet 0     diclofenac (VOLTAREN) 1 % topical gel        Ferrous Gluconate 240 (27 Fe) MG TABS Take 240 mg by mouth       finasteride (PROSCAR) 5 MG tablet Take 5 mg by mouth       methylphenidate (RITALIN) 10 MG tablet as needed       methylPREDNISolone (MEDROL DOSEPAK) 4 MG tablet therapy pack Take 1 tablet (4 mg) by mouth 2 times daily Take as directed on package. 21 tablet 0     methylPREDNISolone (MEDROL DOSEPAK)  4 MG tablet therapy pack Take 1 tablet (4 mg) by mouth 2 times daily Take as directed on package. 21 tablet 0     methylPREDNISolone (MEDROL) 32 MG tablet Please take 32 mg, 12 hours prior to procedure, take another 32 mg, 2 hours prior to procedure for contrast allergy. 2 tablet 0     ondansetron (ZOFRAN) 4 MG tablet Take 1-2 tablets (4-8 mg) by mouth every 6 hours as needed for nausea (vomiting) 40 tablet 0     ondansetron (ZOFRAN) 4 MG tablet Take 1-2 tablets (4-8 mg) by mouth every 6 hours as needed for nausea (vomiting) 40 tablet 0     oxyCODONE (ROXICODONE) 5 MG tablet Take 1-2 tablets (5-10 mg) by mouth every 6 hours as needed for moderate to severe pain 10 tablet 0     oxyCODONE (ROXICODONE) 5 MG tablet Take 1-2 tablets (5-10 mg) by mouth every 6 hours as needed for moderate to severe pain 10 tablet 0     pantoprazole (PROTONIX) 40 MG EC tablet Take 1 tablet (40 mg) by mouth daily 30 tablet 0     pantoprazole (PROTONIX) 40 MG EC tablet Take 1 tablet (40 mg) by mouth daily 30 tablet 0     tamsulosin (FLOMAX) 0.4 MG capsule Take 0.4 mg by mouth       Testosterone 1.62 % GEL          REVIEW OF SYSTEMS  12-point ROS negative except as in HPI    Past Medical History:   Diagnosis Date     Degenerative joint disease      Metastatic melanoma (H)     L eye     Nonsenile cataract      Past Surgical History:   Procedure Laterality Date     ------------OTHER-------------  2014    back surgery L4/L5      ------------OTHER-------------      knee surgery both 1961 Right, 1971 Left knee     AS REMOVAL OF KIDNEY STONE  2015     JOINT REPLACEMENT  2017    Both kneses replaced (2017 and 2018)     ROTATOR CUFF REPAIR RT/LT Right 2016     TURP  2000     Family History   Problem Relation Age of Onset     Glaucoma Mother      Prostate Cancer Father      Cancer Sister      Macular Degeneration No family hx of        PHYSICAL EXAMINATION  There were no vitals taken for this visit.  Wt Readings from Last 3 Encounters:   10/19/20 93.4  "kg (206 lb)   10/13/20 94.3 kg (208 lb)   03/27/20 100.7 kg (221 lb 14.4 oz)   Gen: Appears well, no distress, in the home office  HEENT: no scleral icterus, the sclera on the left is injected and pupil is dilated.  Lymph: No lymphadenopathy  CV: regular  Pulm: Good effort  Ext: no edema  Skin: no ecchymoses or hematomas  Neuro: no focal deficits, affect and cognition are normal    The rest of a comprehensive physical examination is deferred due to PHE (public health emergency) video visit restrictions.    ASSESSMENT AND PLAN  #1 Metastatic uveal melanoma, to liver and bone  #2 Choroidal melanoma with ciliary body involvement, left eye, status-post plaque brachytherapy,  [employer] Class II and PRAME mRNA positive  It was a pleasure to meet with Mr. Mayberry today. He is a 75 year old man with metastatic uveal melanoma to liver and bone.  He received Y90 radioembolization to right hepatic lobe on 10/19 and he will receive his first dose of Ipi/Nivo tomorrow. We have previously discussed the nature of immunotherapy toxicity, the autoimmune nature of side effects, the holding of infusions for mild-moderate toxicity, and the use of steroids to manage severe toxicity. We reviewed his plan for treatment and planned schedule of events.          1. Plan start of Ipi 1mg/kg and Nivo 3mg/kg, cycle 1 tomorrow, 10/23.       2. Cycle 2 Ipi/Nivo on 11/13       3. 2nd Y90 treatment 11/16       4. MRI liver prior to visit with me on 12/3, then cycle 3 on 12/4.        5. Cycle 4 after 12/24.       6. MRI liver pior to visit with me on 1/14/2021, then cycle 5 following visit.           Multiple questions answered to patient's apparent satisfaction.     Jw Reyes M.D.   of Medicine  Hematology, Oncology and Transplantation        Galileo Mayberry is a 75 year old male who is being evaluated via a billable video visit.      The patient has been notified of following:     \"This video visit will " "be conducted via a call between you and your physician/provider. We have found that certain health care needs can be provided without the need for an in-person physical exam.  This service lets us provide the care you need with a video conversation.  If a prescription is necessary we can send it directly to your pharmacy.  If lab work is needed we can place an order for that and you can then stop by our lab to have the test done at a later time.    Video visits are billed at different rates depending on your insurance coverage.  Please reach out to your insurance provider with any questions.    If during the course of the call the physician/provider feels a video visit is not appropriate, you will not be charged for this service.    Patient has given verbal consent for Video visit? Yes    How would you like to obtain your AVS? MyChart     If you are dropped from the video visit, the video invite should be resent to: Send to e-mail at: [email or web address]     Will anyone else be joining your video visit? No         I have reviewed and updated the patient's allergies and pt confirmed any changes to their medication list.  Patient was asked to provide any patient recorded vital signs, height and/or weight.  Please see \"Patient Reported Vital Signs\" tab for that information.  Patient instructed to be in the virtual waiting room 10-15 minutes prior to appointment time.    There were no vitals taken for this visit.    Concerns: Patient has no new concerns.    Refills: None    OLEGARIO Wynn    Video-Visit Details    Type of service:  Video Visit    Video Start Time: 1:15 PM  Video End Time: 1:45 PM    Originating Location (pt. Location): Home    Distant Location (provider location):  [facility]     Platform used for Video Visit: Natasha"

## 2020-10-23 NOTE — TELEPHONE ENCOUNTER
[facility].     1600: spoke to Kristin in infusion, patients medications for compazine and ativan were [place] [facility] and patient was being asked to pay out of pocket. Patient requested [facility] cover cost of these two medications. Per Kristin at 1600, patient has both medications in hand and cost was covered for this refill.     [facility] Pended new scripts for MD review/signature.

## 2020-10-23 NOTE — PROGRESS NOTES
Infusion Nursing Note:  Galileo Mayberry presents today for Cycle 1 Day 1 Nivolumab and Ipilimumab.    Patient seen by provider today: No, visit with Dr. Trent yesterday 10/22   present during visit today: Not Applicable.    Note: Patient arrives to infusion today feeling well, no new concerns overnight. Patient new to oncology infusion room and is receiving Nivolumab and Ipilimumab for the first time. Pt oriented to infusion room, including chair, nutrition station and call light. Pt received new pt folder in infusion. RN provided basic chemotherapy teaching and went over side effects of medications with patient. IB sent to [facility] to follow up with patient regarding teaching.     Spoke with MICHAEL Hernandez covering for Sayra Montiel today. Radha stated that she will make sure to follow up with patient by Monday. Patient aware to expect RNCC call and does not have any further concerns or questions at this time.     Pt instructed to call care coordinator, triage (or MD on call if after hours/weekends) with chills/temp >=100.5, questions/concerns. Pt stated understanding of plan. .    Intravenous Access:  Peripheral IV placed.    Treatment Conditions:  Lab Results   Component Value Date    HGB 13.6 10/22/2020     Lab Results   Component Value Date    WBC 4.8 10/22/2020      Lab Results   Component Value Date    ANEU 3.4 10/22/2020     Lab Results   Component Value Date     10/22/2020      Lab Results   Component Value Date     10/22/2020                   Lab Results   Component Value Date    POTASSIUM 3.7 10/22/2020           Lab Results   Component Value Date    MAG 2.4 10/22/2020            Lab Results   Component Value Date    CR 1.05 10/22/2020                   Lab Results   Component Value Date    FAUSTO 8.6 10/22/2020                Lab Results   Component Value Date    BILITOTAL 0.7 10/22/2020           Lab Results   Component Value Date    ALBUMIN 3.8 10/22/2020                     Lab Results   Component Value Date    ALT 39 10/22/2020           Lab Results   Component Value Date    AST 29 10/22/2020       Results reviewed, labs MET treatment parameters, ok to proceed with treatment.    Post Infusion Assessment:  Patient tolerated infusion without incident.  Blood return noted pre and post infusion.  Site patent and intact, free from redness, edema or discomfort.  No evidence of extravasations.  Access discontinued per protocol.     Discharge Plan:   Prescription refills given for Ativan and Compazine. Patient counseled on medications by pharmacy.   Discharge instructions reviewed with: Patient.  Patient and/or family verbalized understanding of discharge instructions and all questions answered.  Copy of AVS reviewed with patient and/or family.  Patient will return 11/13 for next appointment.  Patient discharged in stable condition accompanied by: self.  Departure Mode: Ambulatory.    Kristin Magaña RN

## 2020-10-23 NOTE — PATIENT INSTRUCTIONS
Contact Numbers  [facility]: [phone] (for symptom and scheduling needs)    Please call the [facility] Triage line if you experience a temperature greater than or equal to 100.4, shaking chills, have uncontrolled nausea, vomiting and/or diarrhea, dizziness, shortness of breath, chest pain, bleeding, unexplained bruising, or if you have any other new/concerning symptoms, questions or concerns.     If you are having any concerning symptoms or wish to speak to a provider before your next infusion visit, please call your care coordinator or triage to notify them so we can adequately serve you.     If you need a refill on a narcotic prescription or other medication, please call triage before your infusion appointment.          October 2020 Sunday Monday Tuesday Wednesday Thursday Friday Saturday                       1    VIDEO VISIT RETURN  12:30 PM   (30 min.)   Jw Burden MD   [facility] 2     3       4     5     6     7     8     9    LAB   1:00 PM   (15 min.)   BX LAB   [facility]    COVID SEROLOGY TEST LAB VISIT   1:30 PM   (15 min.)   [facility] COVID LAB   [facility] [place] 10       11     12    TELEPHONE VISIT RETURN   1:00 PM   (30 min.)   Jw Burden MD   [facility] 13    PROCEDURE - 6.5 HR   9:00 AM   (390 min.)   U2A ROOM 12   [facility] [address] [place] [address]    Admission   9:16 AM   Alberto Storey MD   [facility] [address] [place] [address]   (Discharge: 10/13/2020)    IR VISCERAL ANGIOGRAM  10:30 AM   (120 min.)   UUIR1   [facility] Interventional Radiology    IR SIRT (YANNI INT RAD THRPY)  10:35 AM   (15 min.)   UUIR1   [facility] Interventional Radiology    NM Y-90 SPECT MAPPING   1:30 PM   (60 min.)   UUNM3   [facility] Imaging 14     15    PRE-PROCEDURE COVID PCR   2:00 PM   (15 min.)    COVID LAB     [facility] 16     17       18     19    PROCEDURE - 6.5 HR   6:30 AM   (390 min.)   U2A ROOM 2   [facility] [place]    Admission   6:43 AM   Alberto Storey MD   [facility] Interventional Radiology   (Discharge: 10/19/2020)    IR VISCERAL EMOLIZATION   8:30 AM   (180 min.)   UUIR5   [facility] Interventional Radiology    NM THERASPHERE THERAPY  12:00 PM   (60 min.)   UUNM2   [facility] 20     21     22    MR ABDOMEN WWO   8:30 AM   (60 min.)   URMR2   [facility]    LAB  10:00 AM   (15 min.)   OP LAB UR   [facility]    VIDEO VISIT RETURN   1:00 PM   (30 min.)   Jw Burden MD   [facility] 23    [facility] ONC INFUSION 180   2:00 PM   (180 min.)   [facility] ONCOLOGY INFUSION   [facility] 24       25     26     27     28     29     30 31 November 2020 Sunday Monday Tuesday Wednesday Thursday Friday Saturday   1     2     3     4     5     6     7       8     9     10     11     12     13    [facility] MASONIC LAB DRAW   9:00 AM   (15 min.)   [facility] LAB DRAW   [facility] RETURN   9:15 AM   (50 min.)   Jalyn Greenberg PA-C   [facility] ONC INFUSION 120  10:30 AM   (120 min.)   UC ONCOLOGY INFUSION   [facility] 14       15     16    PROCEDURE - 6.5 HR   6:30 AM   (390 min.)   U2A ROOM 15   [facility] [place]    IR VISCERAL EMOLIZATION   8:30 AM   (120 min.)   UUIR1   [facility] Interventional Radiology    NM THERASPHERE THERAPY  10:30 AM   (60 min.)   UUNM3   [facility] 17     18     19     20     21       22     23     24     25     26     27     28       29     30                                              Lab Results:  No results found  for this or any previous visit (from the past 12 hour(s)).

## 2020-10-26 NOTE — PROGRESS NOTES
Writer called MAGGIE Bloom/JAYDE on identified   telling him I was calling from [facility] to see how he was doing. First chemotherapy last Friday, 10/23.  If he had any questions or problems to let us know. Our 24 hour resource  number left on - [phone].

## 2020-10-30 NOTE — PROGRESS NOTES
Patient called c/o fatigue and constipation.  Explained fatigue is normal and ordered miralax and senna.  Reviewed side effects, discussed diarrhea and cough.  Questions answered.      Sayra Montiel MBA, MSN, RN, ONC  RN Care Coordinator  [facility]

## 2020-11-10 NOTE — TELEPHONE ENCOUNTER
"Per pt's request via [facility], due to his last episode of having [facility].     Post discharge medications:     zofran 4 mg  Methylprednisolone 32 mg- for Ct contrast    Medrol dosepak  protonix 40 mg    [facility] at patient's request to be overnighted to him prior to his Y90 treatment on Mon 11/16 with Dr. Storey.     This has been communicated back to pt.    *the one exception is that pt is requesting pain medication to be changed from Oxycodone to Dilaudid in which this must be a hard copy\" that IR will need to print and give to pt.    Yaima RITCHIE RN, BSN  Interventional Radiology/Vascular  Nurse Coordinator   Phone: [phone]  Fax: [phone]        "

## 2020-11-10 NOTE — TELEPHONE ENCOUNTER
"Called pt. To fup on his itBit message.    Informed him that I wanted to talk to him   so that messages do not get misconstrued as well as to clarify a few things.     Informed him once again regarding pain medication, is  that the reason I cannot ask for another colleague of Dr Storey to prescribe pain medication is b/c they don't know his case or what we are treating pt for as.      Pt has not been treated with his second Y90 yet and pre-prescribing pain meds which is a controlled narcotic cannot happen.     I inquired on his pain in which he states that he doesn't have pain currently as well as he states that \"he's never had much pain\" before.     I questioned his request for pain medication in particular, as he states that he didn't want to go through the hassle of having to obtain pain medication IF it's needed.       I informed him that he can inquire about pain meds on  the day of treatment if he is having pain.     Pt states that the reason for him to go to a large facility is that there are multiple doctors that can cover for each other  when one doctor is out. I Once again, informed him that since he's not having pain, and never really did in the beginning, then he can inquire on pain meds on the day of the procedure.     Pt informs me that he's not sure as to what is causing him pain as he started  Immunotherapy treatment also right around the time of Y90 treatment.    He states that Dr. Trent did give him a handout explaining all his possible symptoms that he could have and he's not sure if his ongoing nausea and diarrhea is the result of this.     I also inquired on his concern regarding zofran as after reviewing his itBit msgs, he had sent the same msg requesting for a refill of compazine/zofran to us as well as Dr. Reyes's team.      I wanted to clarify what he needed as I explained that these medications are for nausea and am confused as to why he needed both.     Apparently, he was " "confused between the two medications and asked for a refill of \"anti-nausea\" medication and wasn't clear as to which was which.     We talked about the correct names for both medications and  He states that the zofran works better for him.     I informed him that our last conversation when we talked at f/up post treatment Y90,  he never complained about nausea. I referenced his last mychart message as he mentions:    Not sure how you could monitor my nausea when I am have had such a tough time even finding a time to have Dr. Storey speak with me before or after these treatments.          I informed him that he has my direct lnformation and not once contacted me regarding having nausea or pain or any other symptoms. If he di,d I would be able to help him and see what I can do by discussing this with Dr. Storey also.     He states that he's not sure if the sx are from immunotherapy or Y90. He states that these sx developed after his Immunotherapy treatment.  I informed him that apparently we will need to tease out his sx for better management however this is a discussion to be had with the doctors.    I informed him that normally we would want him to feel better by now but will bring this up to Dr. Storey's attention as he is getting treated for his 2nd round of Y90 on Mon 11/16.      Lastly, I kindly wanted to follow up on his last comment on mychart:     \"Seems to be a failure of patient care despite my best efforts to go above and beyond as a patient.\"    I informed him that I disagree entirely.     I informed him that  Dr. Storey has done more than gone above and beyond such that for all his treatments, he has made exceptions to attend and treat the patient in a timely manner,  when he  Is to be doing research and this includes pt's last Y90 treatment dates.     I explained that there is no other person who is more dedicated than Dr. Storey and reminded him that pt's next treatment Mon 11/16, Dr. Storey is coming in to " do this procedure due to pt's request to accommodate for pt's Thanksgiving plans as well.    I informed him that I will communicate our telephone call to Dr. Vitor Trent's team so that they are aware of his anti-nausea medication as well as inform Dr. Storey of our conversation.     The purpose is that since he feels that he didn't get a chance to talk with Dr. Storey, and without having notified me prior to, I will see if perhaps he and Dr. Storey and talk prior to his Y90 treatment on Mon 11/16.     I also informed him that per his request via [employer] all post-discharge [facility] with the request to have them mail out to him has been completed except the Cipro on which I am waiting on a response.     Once I do confirm that we need to prescribe Cipro then I will see what I can do to also send this medications [facility] the [facility] at his request.     He verbalized understanding.    He shares that he feels that he's doing his own patient advocacy and navigation alone in which I encouraged him that he is to notify us if there is something we can do and communicate to us when needed.     Yaima RITCHIE RN, BSN  Interventional Radiology/Vascular  Nurse Coordinator   Phone: [phone]  Fax: [phone]

## 2020-11-13 NOTE — LETTER
11/13/2020         RE: Galileo Mayberry  [address]  [place] [address]        Dear Colleague,    Thank you for referring your patient, Galileo Mayberry, to the [facility]. Please see a copy of my visit note below.    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 11/2019, he is diagnosed with ocular melanoma, after a choroidal lesion was found in his Left eye.  Patient reports over the last several months he developed new floaters/spots in his L eye.  2. 11/25/2019, he was seen by ophthalmology who noted a left eye, elevated bilobed lesion, size 6.28mm x 18.31(T) x 17.04(L).   3. 12/14/19, CT-CAP showed no evidence of metastatic disease.  4. 1/10/2020: Patient referred to [facility]. B-scan ultrasound of left eye showed elevated bilobed lesion measuring 7.2-7.3 height with a base of 21.4 x 22.5 mm. Low to medium reflectivity. Difficult measurements due to location. Ciliary body involvement was noted. Ultrasound basal dimension measurement included subretinal fluid, and clinical measurement had to be done by transillumination due to anterior tumor location.  5. 1/15/2020, visual acuity right eye 20/20 -1, left eye 20/25 +2 nh. Visual fields counting fingers full bilaterally. Clinical fundus exam of left eye revealed choroidal/ciliary body malignant melanoma with basal measurement of 22 x 21 mm and height of 7.5 mm. Located in the Inferior left eye from 4:30-7:30, 15 mm to disc, 15 mm to fovea, +subretinal fluid, bilobed. The tumor without fluid appears to be 1-2 mm smaller in basal dimension (20 x 19 mm)  6. 2/10/2020, Iodine-125 24 mm plaque placement delivering 85 Gy to an 8 mm height.  7. 3/31/2020, he starts adjuvant sunitinib 25 mg daily for high risk disease, Class II molecular signature and PRAME mRNA positive, high-risk of early metastasis.  He completed 6 months of therapy.  8. 8/26/2020, MRI-liver shows interval development of numerous (more than 30) metastatic lesions in the liver in bilobar distribution. The largest in segment JUAN measures 1.85 cm.  9. 10/19/2020, he undergoes Y90 radioembolization with 72.2 mCi of Theraspheres to the right lobe of the liver; predominantly in hepatic segments 5 and 6, milder uptake in the region of segment 8  10. 10/22/20, MR abdomen showed significantly increased in size and numbers of the innumerable  metastatic liver lesions since 8/26/2020 as well as multiple metastatic bone lesions in the visualized thoracolumbar spine and pelvic bones. We also obtainined his PD-L1 testing result, and it did show PD-L1 negative disease.  11. 10/23/2020, first cycle of Ipilimumab 1mg/kg and nivolumab 3mg/kg initiated with palliative intent.    Favian returns to clinic today for evaluation prior to cycle 2 of palliative ipilimumab/nivolumab.     Interim History:  Favian reports that his first cycle was tougher than he anticipated and he spent the first four days afterwards tired in bed. This has gradually improved somewhat, but he still complains of feeling run down and needing to nap on occasion. 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 He mentions he was able to get away to [facility] for a bit with his wife to [facility], which was a highlight for him.    Favian also experienced nausea and loss of appetite from the treatment. He found that Zofran was more effective for him than Compazine. He denies vomiting or abdominal pain. He mentions he struggled with some constipation, but that he is taking Miralax and had a fairly normal BM today, as well as yesterday.  Favian denies any fevers, chills or recent colds. He mentions having a very mild cough and some phlegm here and there. He also reports that he seems to get dyspneic with activity more often since feeling more fatigued. He denies chest pain, palpitations,  "diaphoresis. He denies headache, tremors, dizziness. He mentions a bit of brain fog at times. Favian denies any mouth sores. He mentions a mild skin rash on his left tolbert.    Favian expresses that he feels out of touch with the providers here at [facility] and \"like a number on a spreadsheet.\" He complains that he really struggled with the immunotherapy treatment and that it would be nice to have more check-ins and guidance from the medical team here.    ECOG performance status is 1.    Current Outpatient Medications   Medication Sig Dispense Refill     acetaminophen (TYLENOL) 500 MG tablet Take 1,000 mg by mouth       Ascorbic Acid (VITAMIN C) 500 MG CAPS Take 500 mg by mouth       Cholecalciferol (VITAMIN D3) 25 MCG (1000 UT) CAPS Takes 3 daily       diclofenac (VOLTAREN) 1 % topical gel        Ferrous Gluconate 240 (27 Fe) MG TABS Take 240 mg by mouth       finasteride (PROSCAR) 5 MG tablet Take 5 mg by mouth       LORazepam (ATIVAN) 0.5 MG tablet Take 1 tablet (0.5 mg) by mouth every 4 hours as needed (Anxiety, Nausea/Vomiting or Sleep) 30 tablet 3     methylphenidate (RITALIN) 10 MG tablet as needed       methylPREDNISolone (MEDROL DOSEPAK) 4 MG tablet therapy pack Take 1 tablet (4 mg) by mouth 2 times daily Take as directed on package. 21 tablet 0     ondansetron (ZOFRAN) 4 MG tablet Take 1-2 tablets (4-8 mg) by mouth every 6 hours as needed for nausea (vomiting) 40 tablet 0     pantoprazole (PROTONIX) 40 MG EC tablet Take 1 tablet (40 mg) by mouth daily 30 tablet 0     polyethylene glycol (MIRALAX) 17 GM/Dose powder Take 17 g (1 capful) by mouth 2 times daily 255 g 1     prochlorperazine (COMPAZINE) 10 MG tablet Take 1 tablet (10 mg) by mouth every 6 hours as needed (Nausea/Vomiting) 30 tablet 3     senna-docusate (SENNA S) 8.6-50 MG tablet Take 1 tablet by mouth 2 times daily as needed for constipation 60 tablet 1     tamsulosin (FLOMAX) 0.4 MG capsule Take 0.4 mg by mouth       Testosterone 1.62 % GEL        " methylPREDNISolone (MEDROL DOSEPAK) 4 MG tablet therapy pack Take 1 tablet (4 mg) by mouth 2 times daily Take as directed on package. 21 tablet 0     methylPREDNISolone (MEDROL) 32 MG tablet Please take 32 mg, 12 hours prior to procedure, take another 32 mg, 2 hours prior to procedure for contrast allergy. 2 tablet 0     ondansetron (ZOFRAN) 4 MG tablet Take 1-2 tablets (4-8 mg) by mouth every 6 hours as needed for nausea (vomiting) (Patient not taking: Reported on 11/13/2020) 40 tablet 0     oxyCODONE (ROXICODONE) 5 MG tablet Take 1-2 tablets (5-10 mg) by mouth every 6 hours as needed for moderate to severe pain (Patient not taking: Reported on 11/13/2020) 10 tablet 0     oxyCODONE (ROXICODONE) 5 MG tablet Take 1-2 tablets (5-10 mg) by mouth every 6 hours as needed for moderate to severe pain (Patient not taking: Reported on 11/13/2020) 10 tablet 0     pantoprazole (PROTONIX) 40 MG EC tablet Take 1 tablet (40 mg) by mouth daily (Patient not taking: Reported on 11/13/2020) 30 tablet 0     REVIEW OF SYSTEMS  12-point ROS negative except as in HPI    Past Medical History:   Diagnosis Date     Degenerative joint disease      Metastatic melanoma (H)     L eye     Nonsenile cataract      Past Surgical History:   Procedure Laterality Date     ------------OTHER-------------  2014    back surgery L4/L5      ------------OTHER-------------      knee surgery both 1961 Right, 1971 Left knee     AS REMOVAL OF KIDNEY STONE  2015     IR SIRT (SELECTIVE INTERNAL RADIO THERAPY)  10/13/2020     IR VISCERAL ANGIOGRAM  10/13/2020     IR VISCERAL EMBOLIZATION  10/19/2020     JOINT REPLACEMENT  2017    Both kneses replaced (2017 and 2018)     ROTATOR CUFF REPAIR RT/LT Right 2016     TURP  2000     Family History   Problem Relation Age of Onset     Glaucoma Mother      Prostate Cancer Father      Cancer Sister      Macular Degeneration No family hx of      PHYSICAL EXAMINATION  /71   Pulse 63   Temp 98.4  F (36.9  C) (Oral)    Resp 16   Wt 93.4 kg (206 lb)   SpO2 97%   BMI 28.73 kg/m    Wt Readings from Last 3 Encounters:   11/13/20 93.4 kg (206 lb)   10/19/20 93.4 kg (206 lb)   10/13/20 94.3 kg (208 lb)     Gen: Adult male sitting in exam room. No acute distress.  Head: Atraumatic, normocephalic. No facial asymmetry.  Eyes: No scleral icterus, the sclera on the left is injected and pupil is dilated. Wears glasses.  Oropharynx: MMM. No oral lesions or sores.  Neck: Supple. No lymphadenopathy.  CV: Regular rate and rhythm. Normal S1, S2. No appreciable murmurs.  Pulm: No increased work of breathing. Lungs CTA bilaterally.  GI: Bowel sounds normoactive. Abdomen is soft, non distended, non tender to palpation.  Ext: No edema. Moves all extremities without difficulty. Normal gait.  Skin: No ecchymoses or hematomas. Mild erythematous rash on left anterior shin, almost appears like a scabbed abrasion.  Neuro: Awake, alert, oriented x 3. No focal deficits, cognition is normal.   Psych: Pleasant affect, but anxious in the setting of his prognosis.    LABS:   11/13/2020 09:13   Sodium 139   Potassium 3.8   Chloride 106   Carbon Dioxide 28   Urea Nitrogen 14   Creatinine 1.01   GFR Estimate 71   GFR Estimate If Black 82   Calcium 8.7   Anion Gap 5   Magnesium 2.2   Phosphorus 3.8   Albumin 3.3 (L)   Protein Total 6.9   Bilirubin Total 0.7   Alkaline Phosphatase 78   ALT 50   AST 40   TSH 1.97   Glucose 109 (H)   WBC 3.5 (L)   Hemoglobin 13.7   Hematocrit 41.1   Platelet Count 181   RBC Count 4.31 (L)   MCV 95   MCH 31.8   MCHC 33.3   RDW 12.3   Diff Method Automated Method   % Neutrophils 69.6   % Lymphocytes 13.9   % Monocytes 10.7   % Eosinophils 3.8   % Basophils 1.7   % Immature Granulocytes 0.3   Nucleated RBCs 0   Absolute Neutrophil 2.4   Absolute Lymphocytes 0.5 (L)   Absolute Monocytes 0.4   Absolute Eosinophils 0.1   Absolute Basophils 0.1   Abs Immature Granulocytes 0.0   Absolute Nucleated RBC 0.0       ASSESSMENT AND PLAN  #Uveal  melanoma with metastasis to liver and bone  #Choroidal melanoma with ciliary body involvement, left eye, status-post plaque brachytherapy,  [employer] Class II and PRAME mRNA positive  Mr. Mayberry is a 75 year old man with uveal melanoma with metastasis to the liver and bone. 8/26/2020, MRI-liver shows interval development of numerous (more than 30) metastatic lesions in the liver in bilobar distribution. On 10/19/2020, he underwent Y90 radioembolization with 72.2 mCi of theraspheres to the right lobe of the liver. 10/22/20 MR abdomen showed significantly increased in size and numbers of the innumerable metastatic liver lesions, as well as multiple metastatic bone lesions in the visualized thoracolumbar spine and pelvic bones.     On 10/23/2020, patient received his first cycle of Ipilimumab 1mg/kg and nivolumab 3mg/kg with palliative intent. Patient tolerated C1 fairly well, except for increased fatigue, nausea and CHUYITA. No current signs of immune mediated toxicities.  We reviewed the most recent scans today, again, as Favian told me he did not know he had disease in his bones.  He had many questions about treatment in anticipation of the future which I answered to the best my ability, also acknowledging that we cannot predict how his disease will respond.  He is agreeable to continuing with treatment with cycle 2 today, but is requesting a follow-up next week which I have arranged.  I did encourage him to call our triage line or Sayra Montiel in the interim with concerns.     In summary:  -Labs reviewed. Patient will proceed with cycle 2 today  -Next Y-90 treatment is Monday with Dr. Storey  -Check-in appointment with Jalyn Greenberg PA-C 11/18  -MRI liver prior to visit with Dr. Trent 12/3, then cycle 3 following visit. I am requesting this is changed to in person if possible per Favian's request of feeling disconnected with his healthcare team.   -Cycle 4 on 12/23  -MRI liver pior to visit with Dr. Trent  1/14/2021, then cycle 5 following visit     #Nausea  Patient complained nausea post C1, which has gradually improved but has not resolved. Given timeline likely 2/2 treatment? Reports Zofran more effective than compazine. Denies symptoms of reflux or abdominal pain that could indicate gastritis. No other neuro deficits or concerns that would prompt brain imaging at this time.   -Continue Zofran PO    #Constipation  Patient experienced some constipation 2/2 Zofran and the Ferrous Sulfate he takes. However, had a normal BM today and yesterday. Discussed that Zofran can cause constipation and it is important that he stay on a bowel regimen of Miralax and Senna to stay ahead of the constipation.  -Bowel regimen of Senna + Miralax twice daily     #Skin Irritation  Very mild and localized. Almost resembles an abrasion. Not actively concerning for immune mediated toxicity.   -Recommended Cerave moisturizer, as well as OTC hydrocortisone cream PRN should it become itchy. Monitor    The patient was seen in conjunction with KAREEN Falk-S2 who served as a scribe for today's visit. I have reviewed and edited the above note, and agree with the above findings and plan.    Maryellen Martino, CNP on 11/13/2020 at 12:28 PM

## 2020-11-13 NOTE — NURSING NOTE
"Oncology Rooming Note    November 13, 2020 9:32 AM   Galileo Mayberry is a 75 year old male who presents for:    Chief Complaint   Patient presents with     Oncology Clinic Visit     Return; Melanoma     Initial Vitals: /71   Pulse 63   Temp 98.4  F (36.9  C) (Oral)   Resp 16   Wt 93.4 kg (206 lb)   SpO2 97%   BMI 28.73 kg/m   Estimated body mass index is 28.73 kg/m  as calculated from the following:    Height as of 10/19/20: 1.803 m (5' 11\").    Weight as of this encounter: 93.4 kg (206 lb). Body surface area is 2.16 meters squared.  No Pain (1) Comment: Data Unavailable   No LMP for male patient.  Allergies reviewed: Yes  Medications reviewed: Yes    Medications: MEDICATION REFILLS NEEDED TODAY. Provider was notified.  Pharmacy name entered into EPIC:    [facility] - [place], [place] - [id number] - [place] [id number]    Clinical concerns: Refill needed for Zofran. Pt would like to discuss changing pain medication. Maryellen was notified.      Brianna Salcedo CMA              "

## 2020-11-13 NOTE — PATIENT INSTRUCTIONS
Contact Numbers  [facility]: [phone]    After Hours:  [phone]  Triage: [phone]    Please call the [facility] Triage line if you experience a temperature greater than or equal to 100.5, shaking chills, have uncontrolled nausea, vomiting and/or diarrhea, dizziness, shortness of breath, chest pain, bleeding, unexplained bruising, or if you have any other new/concerning symptoms, questions or concerns.     If it is after hours, weekends, or holidays, please call the main hospital  at  [phone] and ask to speak to the Oncology doctor on call.     If you are having any concerning symptoms or wish to speak to a provider before your next infusion visit, please call your care coordinator or triage to notify them so we can adequately serve you.     If you need a refill on a narcotic prescription or other medication, please call triage before your infusion appointment.         November 2020 Sunday Monday Tuesday Wednesday Thursday Friday Saturday   1     2     3     4     5     6     7       8     9     10     11     12     13    PRE-PROCEDURE COVID PCR   8:00 AM   (15 min.)    COVID LAB   [facility] [place]    [facility] RETURN   8:55 AM   (50 min.)   Maryellen Martino CNP   [facility] LAB DRAW   9:00 AM   (15 min.)    [facility] LAB DRAW   [facility] ONC INFUSION 120  10:30 AM   (120 min.)    ONCOLOGY INFUSION   [facility] 14       15     16    PROCEDURE - 6.5 HR   6:30 AM   (390 min.)   U2A ROOM 15   [facility] [address] [place]    IR VISCERAL EMOLIZATION   8:30 AM   (120 min.)   UUIR1   [facility] Interventional Radiology    NM THERASPHERE THERAPY  10:30 AM   (60 min.)   UUNM3   [facility] 17     18    TELEPHONE VISIT RETURN  12:00 PM   (25 min.)   Jalyn Greenberg PA-C   [facility] 19      20     21       22     23     24     25     26     27     28       29 30 December 2020 Sunday Monday Tuesday Wednesday Thursday Friday Saturday             1     2    LAB  11:15 AM   (15 min.)   UU LAB GOLD WAITING   [facility]    MR ABDOMEN WWO  11:30 AM   (60 min.)   UUMR1   [facility] 3    VIDEO VISIT RETURN   2:00 PM   (30 min.)   Jw Burden MD   [facility] 4    [facility] ONC INFUSION 120  12:00 PM   (120 min.)   [facility] ONCOLOGY INFUSION   [facility] 5       6     7     8     9     10     11     12       13     14     15     16     17     18     19       20     21     22     23     24     25     26       27     28    [facility] ONC INFUSION 120  11:00 AM   (120 min.)   [facility] ONCOLOGY INFUSION   [facility] 29     30     31                              Recent Results (from the past 24 hour(s))   Phosphorus    Collection Time: 11/13/20  9:13 AM   Result Value Ref Range    Phosphorus 3.8 2.5 - 4.5 mg/dL   Magnesium    Collection Time: 11/13/20  9:13 AM   Result Value Ref Range    Magnesium 2.2 1.6 - 2.3 mg/dL   Comprehensive metabolic panel    Collection Time: 11/13/20  9:13 AM   Result Value Ref Range    Sodium 139 133 - 144 mmol/L    Potassium 3.8 3.4 - 5.3 mmol/L    Chloride 106 94 - 109 mmol/L    Carbon Dioxide 28 20 - 32 mmol/L    Anion Gap 5 3 - 14 mmol/L    Glucose 109 (H) 70 - 99 mg/dL    Urea Nitrogen 14 7 - 30 mg/dL    Creatinine 1.01 0.66 - 1.25 mg/dL    GFR Estimate 71 >60 mL/min/[1.73_m2]    GFR Estimate If Black 82 >60 mL/min/[1.73_m2]    Calcium 8.7 8.5 - 10.1 mg/dL    Bilirubin Total 0.7 0.2 - 1.3 mg/dL    Albumin 3.3 (L) 3.4 - 5.0 g/dL    Protein Total 6.9 6.8 - 8.8 g/dL    Alkaline Phosphatase 78 40 - 150 U/L    ALT 50 0 - 70 U/L    AST 40 0 - 45 U/L   CBC with platelets differential    Collection Time: 11/13/20   9:13 AM   Result Value Ref Range    WBC 3.5 (L) 4.0 - 11.0 10e9/L    RBC Count 4.31 (L) 4.4 - 5.9 10e12/L    Hemoglobin 13.7 13.3 - 17.7 g/dL    Hematocrit 41.1 40.0 - 53.0 %    MCV 95 78 - 100 fl    MCH 31.8 26.5 - 33.0 pg    MCHC 33.3 31.5 - 36.5 g/dL    RDW 12.3 10.0 - 15.0 %    Platelet Count 181 150 - 450 10e9/L    Diff Method Automated Method     % Neutrophils 69.6 %    % Lymphocytes 13.9 %    % Monocytes 10.7 %    % Eosinophils 3.8 %    % Basophils 1.7 %    % Immature Granulocytes 0.3 %    Nucleated RBCs 0 0 /100    Absolute Neutrophil 2.4 1.6 - 8.3 10e9/L    Absolute Lymphocytes 0.5 (L) 0.8 - 5.3 10e9/L    Absolute Monocytes 0.4 0.0 - 1.3 10e9/L    Absolute Eosinophils 0.1 0.0 - 0.7 10e9/L    Absolute Basophils 0.1 0.0 - 0.2 10e9/L    Abs Immature Granulocytes 0.0 0 - 0.4 10e9/L    Absolute Nucleated RBC 0.0    TSH with free T4 reflex    Collection Time: 11/13/20  9:13 AM   Result Value Ref Range    TSH 1.97 0.40 - 4.00 mU/L

## 2020-11-13 NOTE — PROGRESS NOTES
Infusion Nursing Note:  Galileo Mayberry presents today for C2D1 Opdivo-Yervoy.    Patient seen by provider today: Yes: Maryellen Martino, CNP    present during visit today: Not Applicable.    Note: Pt saw provider prior to infusion, ok for treatment.    Intravenous Access:  Peripheral IV placed.    Treatment Conditions:  Lab Results   Component Value Date    HGB 13.7 11/13/2020     Lab Results   Component Value Date    WBC 3.5 11/13/2020      Lab Results   Component Value Date    ANEU 2.4 11/13/2020     Lab Results   Component Value Date     11/13/2020      Lab Results   Component Value Date     11/13/2020                   Lab Results   Component Value Date    POTASSIUM 3.8 11/13/2020           Lab Results   Component Value Date    MAG 2.2 11/13/2020            Lab Results   Component Value Date    CR 1.01 11/13/2020                   Lab Results   Component Value Date    FAUSTO 8.7 11/13/2020                Lab Results   Component Value Date    BILITOTAL 0.7 11/13/2020           Lab Results   Component Value Date    ALBUMIN 3.3 11/13/2020                    Lab Results   Component Value Date    ALT 50 11/13/2020           Lab Results   Component Value Date    AST 40 11/13/2020       Results reviewed, labs MET treatment parameters, ok to proceed with treatment.      Post Infusion Assessment:  Patient tolerated infusion without incident.  Blood return noted pre and post infusion.  Site patent and intact, free from redness, edema or discomfort.  No evidence of extravasations.  Access discontinued per protocol.       Discharge Plan:   Patient declined prescription refills.  Discharge instructions reviewed with: Patient.  Patient and/or family verbalized understanding of discharge instructions and all questions answered.  Copy of AVS reviewed with patient and/or family.  Patient will return 11/18 to see Jalyn.  Patient discharged in stable condition accompanied by: self.  Departure Mode:  Ambulatory.    Marion Menendez RN

## 2020-11-13 NOTE — PROGRESS NOTES
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 11/2019, he is diagnosed with ocular melanoma, after a choroidal lesion was found in his Left eye.  Patient reports over the last several months he developed new floaters/spots in his L eye.  2. 11/25/2019, he was seen by ophthalmology who noted a left eye, elevated bilobed lesion, size 6.28mm x 18.31(T) x 17.04(L).   3. 12/14/19, CT-CAP showed no evidence of metastatic disease.  4. 1/10/2020: Patient referred to [facility]. B-scan ultrasound of left eye showed elevated bilobed lesion measuring 7.2-7.3 height with a base of 21.4 x 22.5 mm. Low to medium reflectivity. Difficult measurements due to location. Ciliary body involvement was noted. Ultrasound basal dimension measurement included subretinal fluid, and clinical measurement had to be done by transillumination due to anterior tumor location.  5. 1/15/2020, visual acuity right eye 20/20 -1, left eye 20/25 +2 nh. Visual fields counting fingers full bilaterally. Clinical fundus exam of left eye revealed choroidal/ciliary body malignant melanoma with basal measurement of 22 x 21 mm and height of 7.5 mm. Located in the Inferior left eye from 4:30-7:30, 15 mm to disc, 15 mm to fovea, +subretinal fluid, bilobed. The tumor without fluid appears to be 1-2 mm smaller in basal dimension (20 x 19 mm)  6. 2/10/2020, Iodine-125 24 mm plaque placement delivering 85 Gy to an 8 mm height.  7. 3/31/2020, he starts adjuvant sunitinib 25 mg daily for high risk disease, Class II molecular signature and PRAME mRNA positive, high-risk of early metastasis. He completed 6 months of therapy.  8. 8/26/2020, MRI-liver shows interval development of numerous (more than 30) metastatic lesions in the liver in bilobar distribution. The largest in segment JUAN measures 1.85 cm.  9. 10/19/2020, he undergoes Y90 radioembolization  "with 72.2 mCi of Theraspheres to the right lobe of the liver; predominantly in hepatic segments 5 and 6, milder uptake in the region of segment 8  10. 10/22/20, MR abdomen showed significantly increased in size and numbers of the innumerable  metastatic liver lesions since 8/26/2020 as well as multiple metastatic bone lesions in the visualized thoracolumbar spine and pelvic bones. We also obtainined his PD-L1 testing result, and it did show PD-L1 negative disease.  11. 10/23/2020, first cycle of Ipilimumab 1mg/kg and nivolumab 3mg/kg initiated with palliative intent.    Favian returns to clinic today for evaluation prior to cycle 2 of palliative ipilimumab/nivolumab.     Interim History:  Favian reports that his first cycle was tougher than he anticipated and he spent the first four days afterwards tired in bed. This has gradually improved somewhat, but he still complains of feeling run down and needing to nap on occasion. 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 He mentions he was able to get away to [facility] for a bit with his wife to [facility], which was a highlight for him.    Favian also experienced nausea and loss of appetite from the treatment. He found that Zofran was more effective for him than Compazine. He denies vomiting or abdominal pain. He mentions he struggled with some constipation, but that he is taking Miralax and had a fairly normal BM today, as well as yesterday.  Favian denies any fevers, chills or recent colds. He mentions having a very mild cough and some phlegm here and there. He also reports that he seems to get dyspneic with activity more often since feeling more fatigued. He denies chest pain, palpitations, diaphoresis. He denies headache, tremors, dizziness. He mentions a bit of brain fog at times. Favian denies any mouth sores. He mentions a mild skin rash on his left tolbert.    Favian expresses that he feels out of touch with the providers here at [facility] and \"like a " "number on a spreadsheet.\" He complains that he really struggled with the immunotherapy treatment and that it would be nice to have more check-ins and guidance from the medical team here.    ECOG performance status is 1.    Current Outpatient Medications   Medication Sig Dispense Refill     acetaminophen (TYLENOL) 500 MG tablet Take 1,000 mg by mouth       Ascorbic Acid (VITAMIN C) 500 MG CAPS Take 500 mg by mouth       Cholecalciferol (VITAMIN D3) 25 MCG (1000 UT) CAPS Takes 3 daily       diclofenac (VOLTAREN) 1 % topical gel        Ferrous Gluconate 240 (27 Fe) MG TABS Take 240 mg by mouth       finasteride (PROSCAR) 5 MG tablet Take 5 mg by mouth       LORazepam (ATIVAN) 0.5 MG tablet Take 1 tablet (0.5 mg) by mouth every 4 hours as needed (Anxiety, Nausea/Vomiting or Sleep) 30 tablet 3     methylphenidate (RITALIN) 10 MG tablet as needed       methylPREDNISolone (MEDROL DOSEPAK) 4 MG tablet therapy pack Take 1 tablet (4 mg) by mouth 2 times daily Take as directed on package. 21 tablet 0     ondansetron (ZOFRAN) 4 MG tablet Take 1-2 tablets (4-8 mg) by mouth every 6 hours as needed for nausea (vomiting) 40 tablet 0     pantoprazole (PROTONIX) 40 MG EC tablet Take 1 tablet (40 mg) by mouth daily 30 tablet 0     polyethylene glycol (MIRALAX) 17 GM/Dose powder Take 17 g (1 capful) by mouth 2 times daily 255 g 1     prochlorperazine (COMPAZINE) 10 MG tablet Take 1 tablet (10 mg) by mouth every 6 hours as needed (Nausea/Vomiting) 30 tablet 3     senna-docusate (SENNA S) 8.6-50 MG tablet Take 1 tablet by mouth 2 times daily as needed for constipation 60 tablet 1     tamsulosin (FLOMAX) 0.4 MG capsule Take 0.4 mg by mouth       Testosterone 1.62 % GEL        methylPREDNISolone (MEDROL DOSEPAK) 4 MG tablet therapy pack Take 1 tablet (4 mg) by mouth 2 times daily Take as directed on package. 21 tablet 0     methylPREDNISolone (MEDROL) 32 MG tablet Please take 32 mg, 12 hours prior to procedure, take another 32 mg, 2 hours " prior to procedure for contrast allergy. 2 tablet 0     ondansetron (ZOFRAN) 4 MG tablet Take 1-2 tablets (4-8 mg) by mouth every 6 hours as needed for nausea (vomiting) (Patient not taking: Reported on 11/13/2020) 40 tablet 0     oxyCODONE (ROXICODONE) 5 MG tablet Take 1-2 tablets (5-10 mg) by mouth every 6 hours as needed for moderate to severe pain (Patient not taking: Reported on 11/13/2020) 10 tablet 0     oxyCODONE (ROXICODONE) 5 MG tablet Take 1-2 tablets (5-10 mg) by mouth every 6 hours as needed for moderate to severe pain (Patient not taking: Reported on 11/13/2020) 10 tablet 0     pantoprazole (PROTONIX) 40 MG EC tablet Take 1 tablet (40 mg) by mouth daily (Patient not taking: Reported on 11/13/2020) 30 tablet 0     REVIEW OF SYSTEMS  12-point ROS negative except as in HPI    Past Medical History:   Diagnosis Date     Degenerative joint disease      Metastatic melanoma (H)     L eye     Nonsenile cataract      Past Surgical History:   Procedure Laterality Date     ------------OTHER-------------  2014    back surgery L4/L5      ------------OTHER-------------      knee surgery both 1961 Right, 1971 Left knee     AS REMOVAL OF KIDNEY STONE  2015     IR SIRT (SELECTIVE INTERNAL RADIO THERAPY)  10/13/2020     IR VISCERAL ANGIOGRAM  10/13/2020     IR VISCERAL EMBOLIZATION  10/19/2020     JOINT REPLACEMENT  2017    Both kneses replaced (2017 and 2018)     ROTATOR CUFF REPAIR RT/LT Right 2016     TURP  2000     Family History   Problem Relation Age of Onset     Glaucoma Mother      Prostate Cancer Father      Cancer Sister      Macular Degeneration No family hx of      PHYSICAL EXAMINATION  /71   Pulse 63   Temp 98.4  F (36.9  C) (Oral)   Resp 16   Wt 93.4 kg (206 lb)   SpO2 97%   BMI 28.73 kg/m    Wt Readings from Last 3 Encounters:   11/13/20 93.4 kg (206 lb)   10/19/20 93.4 kg (206 lb)   10/13/20 94.3 kg (208 lb)     Gen: Adult male sitting in exam room. No acute distress.  Head: Atraumatic,  normocephalic. No facial asymmetry.  Eyes: No scleral icterus, the sclera on the left is injected and pupil is dilated. Wears glasses.  Oropharynx: MMM. No oral lesions or sores.  Neck: Supple. No lymphadenopathy.  CV: Regular rate and rhythm. Normal S1, S2. No appreciable murmurs.  Pulm: No increased work of breathing. Lungs CTA bilaterally.  GI: Bowel sounds normoactive. Abdomen is soft, non distended, non tender to palpation.  Ext: No edema. Moves all extremities without difficulty. Normal gait.  Skin: No ecchymoses or hematomas. Mild erythematous rash on left anterior shin, almost appears like a scabbed abrasion.  Neuro: Awake, alert, oriented x 3. No focal deficits, cognition is normal.   Psych: Pleasant affect, but anxious in the setting of his prognosis.    LABS:   11/13/2020 09:13   Sodium 139   Potassium 3.8   Chloride 106   Carbon Dioxide 28   Urea Nitrogen 14   Creatinine 1.01   GFR Estimate 71   GFR Estimate If Black 82   Calcium 8.7   Anion Gap 5   Magnesium 2.2   Phosphorus 3.8   Albumin 3.3 (L)   Protein Total 6.9   Bilirubin Total 0.7   Alkaline Phosphatase 78   ALT 50   AST 40   TSH 1.97   Glucose 109 (H)   WBC 3.5 (L)   Hemoglobin 13.7   Hematocrit 41.1   Platelet Count 181   RBC Count 4.31 (L)   MCV 95   MCH 31.8   MCHC 33.3   RDW 12.3   Diff Method Automated Method   % Neutrophils 69.6   % Lymphocytes 13.9   % Monocytes 10.7   % Eosinophils 3.8   % Basophils 1.7   % Immature Granulocytes 0.3   Nucleated RBCs 0   Absolute Neutrophil 2.4   Absolute Lymphocytes 0.5 (L)   Absolute Monocytes 0.4   Absolute Eosinophils 0.1   Absolute Basophils 0.1   Abs Immature Granulocytes 0.0   Absolute Nucleated RBC 0.0       ASSESSMENT AND PLAN  #Uveal melanoma with metastasis to liver and bone  #Choroidal melanoma with ciliary body involvement, left eye, status-post plaque brachytherapy,  [employer] Class II and PRAME mRNA positive  Mr. Mayberry is a 75 year old man with uveal melanoma with metastasis to the  liver and bone. 8/26/2020, MRI-liver shows interval development of numerous (more than 30) metastatic lesions in the liver in bilobar distribution. On 10/19/2020, he underwent Y90 radioembolization with 72.2 mCi of theraspheres to the right lobe of the liver. 10/22/20 MR abdomen showed significantly increased in size and numbers of the innumerable metastatic liver lesions, as well as multiple metastatic bone lesions in the visualized thoracolumbar spine and pelvic bones.     On 10/23/2020, patient received his first cycle of Ipilimumab 1mg/kg and nivolumab 3mg/kg with palliative intent. Patient tolerated C1 fairly well, except for increased fatigue, nausea and CHUYITA. No current signs of immune mediated toxicities.  We reviewed the most recent scans today, again, as Favian told me he did not know he had disease in his bones.  He had many questions about treatment in anticipation of the future which I answered to the best my ability, also acknowledging that we cannot predict how his disease will respond.  He is agreeable to continuing with treatment with cycle 2 today, but is requesting a follow-up next week which I have arranged.  I did encourage him to call our triage line or Sayra Montiel in the interim with concerns.     In summary:  -Labs reviewed. Patient will proceed with cycle 2 today  -Next Y-90 treatment is Monday with Dr. Storey  -Check-in appointment with Jalyn Greenberg PA-C 11/18  -MRI liver prior to visit with Dr. Trent 12/3, then cycle 3 following visit. I am requesting this is changed to in person if possible per Favian's request of feeling disconnected with his healthcare team.   -Cycle 4 on 12/23  -MRI liver pior to visit with Dr. Trent 1/14/2021, then cycle 5 following visit     #Nausea  Patient complained nausea post C1, which has gradually improved but has not resolved. Given timeline likely 2/2 treatment? Reports Zofran more effective than compazine. Denies symptoms of reflux or abdominal pain  that could indicate gastritis. No other neuro deficits or concerns that would prompt brain imaging at this time.   -Continue Zofran PO    #Constipation  Patient experienced some constipation 2/2 Zofran and the Ferrous Sulfate he takes. However, had a normal BM today and yesterday. Discussed that Zofran can cause constipation and it is important that he stay on a bowel regimen of Miralax and Senna to stay ahead of the constipation.  -Bowel regimen of Senna + Miralax twice daily     #Skin Irritation  Very mild and localized. Almost resembles an abrasion. Not actively concerning for immune mediated toxicity.   -Recommended Cerave moisturizer, as well as OTC hydrocortisone cream PRN should it become itchy. Monitor    The patient was seen in conjunction with KAREEN Falk-S2 who served as a scribe for today's visit. I have reviewed and edited the above note, and agree with the above findings and plan.    Maryellen Martino, CNP on 11/13/2020 at 12:28 PM

## 2020-11-16 NOTE — TELEPHONE ENCOUNTER
Medication refill for Zofran [facility] with request to over night to pt by Dr.Shamar Raleigh RITCHIE RN, BSN  Interventional Radiology/Vascular  Nurse Coordinator   Phone: [phone]  Fax: [phone]

## 2020-11-16 NOTE — DISCHARGE INSTRUCTIONS
[facility]   Interventional Radiology  Discharge Instructions Post Angiography for Insertion of   Radioactive Microspheres to the Liver    AFTER YOU GO HOME          Relax and take it easy for 24 hours.       Drink plenty of fluids.       Resume your regular diet, unless otherwise instructed by your Primary Physician.       DO NOT smoke for at least 24 hours, if you were given any sedation.       DO NOT drink alcoholic beverages the day of your procedure.       Do not drive or operate machinery at home or at work for 24 hours.          DO NOT do any strenuous exercise or lifting for at least 2 days following your                         Procedure.       DO NOT take a bath or shower for at least 12 hours following your procedure.       DO NOT make any important or legal decisions for 24 hours following your procedure.    CALL THE PHYSICIAN IF:       - You start bleeding from the procedure site. lie down flat and hold pressure on the site. A small lump or bruise is common at the puncture site. Your physician will tell you if you need to return to the hospital.      - You develop numbness, coolness or a change in color of the leg that was punctured.      - You experience increased pain or redness at the puncture site.      - You develop hives or a rash or unexplained itching.      - You develop a temperature of 101 degrees F or greater.    Additional Information:           Support the puncture site for coughing, sneezing, or moving your bowels for the first 48 hours  No tub bath, hot tubs, or swimming for 5 days  No lotion or powder to the puncture site for 3 day    Follow the Discharge Instructions for the Liver Brachytherapy; Contrast Instructions and Instructions for the [employer].     Closure Device:  Mynx        Resume all prior home medications.  In addition:  Take Pantoprazole once a day  Take Steroids as directed on package  Take Ondansetron as needed for nausea  Take Dilaudid for  pain as needed.        [facility] INTERVENTIONAL RADIOLOGY DEPARTMENT         Procedure Physician: Dr. Storey                              Date of Procedure:November 16, 2020       Telephone Numbers:   [phone]     Monday-Friday 8:00 to 4:30 pm                                        [phone]     After 4:30 pm Monday-Friday, Weekend and Holidays. Ask for the Interventional Radiologist on call. Someone is on call 24 hrs/day.

## 2020-11-16 NOTE — IP AVS SNAPSHOT
[facility] [address] [address]                                    After Visit Summary   11/16/2020    Galileo Mayberry    MRN: [id number]           After Visit Summary Signature Page    I have received my discharge instructions, and my questions have been answered. I have discussed any challenges I see with this plan with the nurse or doctor.    ..........................................................................................................................................  Patient/Patient Representative Signature      ..........................................................................................................................................  Patient Representative Print Name and Relationship to Patient    ..................................................               ................................................  Date                                   Time    ..........................................................................................................................................  Reviewed by Signature/Title    ...................................................              ..............................................  Date                                               Time          22EPIC Rev 08/18

## 2020-11-16 NOTE — PROCEDURES
[facility]     Procedure: IR Procedure Note    Date/Time: 11/16/2020 9:24 AM  Performed by: Eleno Martinez MD  Authorized by: Eleno Martinez MD     UNIVERSAL PROTOCOL   Site Marked: NA  Prior Images Obtained and Reviewed:  Yes  Required items: Required blood products, implants, devices and special equipment available    Patient identity confirmed:  Verbally with patient, arm band, provided demographic data and hospital-assigned identification number  Patient was reevaluated immediately before administering moderate or deep sedation or anesthesia  Confirmation Checklist:  Patient's identity using two indicators, relevant allergies, procedure was appropriate and matched the consent or emergent situation and correct equipment/implants were available  Time out: Immediately prior to the procedure a time out was called    Universal Protocol: the Joint Commission Universal Protocol was followed    Preparation: Patient was prepped and draped in usual sterile fashion           ANESTHESIA    Anesthesia: Local infiltration  Local Anesthetic:  Lidocaine 1% without epinephrine      SEDATION    Patient Sedated: Yes    Sedation Type:  Moderate (conscious) sedation  Sedation:  Fentanyl and midazolam  Vital signs: Vital signs monitored during sedation    See dictated procedure note for full details.  Findings: y90 delivery to left hepatic lobe    Specimens: none    Complications: None    Condition: Stable    Plan: 3 hrs bed rest  [facility]    PROCEDURE   Patient Tolerance:  Patient tolerated the procedure well with no immediate complications    Length of time physician/provider present for 1:1 monitoring during sedation: 25

## 2020-11-16 NOTE — IP AVS SNAPSHOT
MRN:[id number]                      After Visit Summary   11/16/2020    Galileo Mayberry    MRN: [id number]           Visit Information        Department      11/16/2020  6:49 AM LTAC, located within [facility] [address] [place]          Review of your medicines      UNREVIEWED medicines. 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)      Dose: 5-10 mg  Take 1-2 tablets (5-10 mg) by mouth every 6 hours as needed for moderate to severe pain  Quantity: 10 tablet  Refills: 0     * oxyCODONE 5 MG tablet  Commonly known as: ROXICODONE  Used for: Choroidal lesion      Dose: 5-10 mg  Take 1-2 tablets (5-10 mg) by mouth every 6 hours as needed for moderate to severe pain  Quantity: 10 tablet  Refills: 0     * pantoprazole 40 MG EC tablet  Commonly known as: PROTONIX  Used for: Choroidal lesion      Dose: 40 mg  Take 1 tablet (40 mg) by mouth daily  Quantity: 30 tablet  Refills: 0     * pantoprazole 40 MG EC tablet  Commonly known as: PROTONIX  Used for: 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)      Dose: 40 mg  Take 1 tablet (40 mg) by mouth daily  Quantity: 30 tablet  Refills: 0     polyethylene glycol 17 GM/Dose powder  Commonly known as: MIRALAX  Used for: 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)      Dose: 1 capful  Take 17 g (1 capful) by mouth 2 times daily  Quantity: 255 g  Refills: 1     prochlorperazine 10 MG tablet  Commonly known as: COMPAZINE  Used for: 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), 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)      Dose: 10 mg  Take 1 tablet (10 mg) by mouth every 6 hours as needed (Nausea/Vomiting)  Quantity: 30 tablet  Refills: 3     senna-docusate 8.6-50 MG tablet  Commonly known as: Senna S  Used for: Melanoma of uvea metastatic to  liver (H)      Dose: 1 tablet  Take 1 tablet by mouth 2 times daily as needed for constipation  Quantity: 60 tablet  Refills: 1     tamsulosin 0.4 MG capsule  Commonly known as: FLOMAX      Dose: 0.4 mg  Take 0.4 mg by mouth  Refills: 0     Testosterone 1.62 % Gel      Refills: 0     Vitamin C 500 MG Caps      Dose: 500 mg  Take 500 mg by mouth  Refills: 0     vitamin D3 25 MCG (1000 UT) Caps      Takes 3 daily  Refills: 0         * This list has 9 medication(s) that are the same as other medications prescribed for you. Read the directions carefully, and ask your doctor or other care provider to review them with you.                  Protect others around you: Learn how to safely use, store and throw away your medicines at www.disposemymeds.org.       Follow-ups after your visit       Follow-up Appointments     Follow Up - post Y90 with Interventional Radiologist      Instruct patient to follow-up with Interventional Radiologist     We will call to schedule a 1 month clinic appointment and mri           Your next 10 appointments already scheduled    Nov 18, 2020 12:00 PM  Telephone Visit with Jalyn Greenberg PA-C  [facility] ([facility]) [address] [address] [phone]   [facility]  Note: this is not an onsite visit; there is no need to come to the facility.  Please have a list of all current medications available for appointment.      Dec 02, 2020 11:15 AM  LAB with UU LAB GOLD WAITING  [facility] ([facility]) [address] [address]   Please do not eat 10-12 hours before your appointment if you are coming in fasting for labs on lipids, cholesterol, or glucose (sugar). Does not apply to pregnant women. Water, tea and black coffee (with nothing added) is okay. Do not drink other fluids,  diet soda or gum. If you have concerns about taking your medications, please send a message by clicking on Secure Messaging, Message Your Care Team.     Dec 02, 2020 12:00 PM  (Arrive by 11:30 AM)  MR ABDOMEN W/O & W CONTRAST with UUMR1  [facility] ([facility] [place]) [address] [address]  [phone]   How do I prepare for my exam? (Food and drink instructions)  Do not eat or drink for 6 hours prior to exam.  **If you will be receiving sedation or general anesthesia, please see special notes below.**    How do I prepare for my exam? (Other instructions)  Take your medicines as usual, unless your doctor tells you not to.  You may or may not receive IV contrast for this exam pending the discretion of the Radiologist.    **If you will be receiving sedation or general anesthesia, please see special notes below.**    What should I wear: The MRI machine uses a strong magnet. Due to increased risk of metallic materials/threading in clothing, for your safety, you will be requested to change into a hospital gown. Please remove any body piercings and hair extensions before you arrive. You will also remove watches, jewelry, hairpins, wallets, dentures, partial dental plates and hearing aids. You may wear contact lenses, and you may be able to wear your rings. We have a safe place to keep your personal items, but it is safer to leave them at home.    How long does the exam take: Most tests take 30 to 60 minutes.  HOWEVER, IF YOUR DOCTOR PRESCRIBES ANESTHESIA please plan on spending four to five hours in the recovery room.    What should I bring: Bring a list of your current medicines to your exam (including vitamins, minerals and over-the-counter drugs). Also bring the results of similar scans you may have had.  If you are a minor (under age 18) you will need to bring a parent or legal guardian with you to the exam.    Do I need a  : **If you will be receiving sedation or general anesthesia, please see special notes below.**    What should I do after the exam: No restrictions, you may resume normal activities.    What is this test: MRI (magnetic resonance imaging) uses a strong magnet and radio waves to look inside the body. An MRA (magnetic resonance angiogram) does the same thing, but it lets us look at your blood vessels. A computer turns the radio waves into pictures showing cross sections of the body, much like slices of bread. This helps us see any problems more clearly. You may receive fluid (called  contrast ) before or during your scan. The fluid helps us see the pictures better. We give the fluid through an IV (small needle in your arm).    Who should I call with questions: If you have any questions, please contact your Imaging Department exam site. Directions, parking instructions, and other information are available on our website, InSample/imaging.    How do I prepare if I m having sedation or anesthesia?  **IMPORTANT**  THE INSTRUCTIONS BELOW ARE ONLY FOR THOSE PATIENTS WHO HAVE BEEN TOLD THEY WILL RECEIVE SEDATION OR GENERAL ANESTHESIA DURING THEIR MRI PROCEDURE:    IF YOU WILL RECEIVE ORAL SEDATION (take medicine by mouth to help you relax during your exam):  You must get the medicine from your doctor before you arrive. Bring the medicine to the exam. Do not take it at home.  Arrive one hour early with a . Your  must remain on site for the duration of the exam. Your medicine may make you sleepy. After the exam, you may not drive, take a bus or take a taxi by yourself.    IF YOU WILL RECEIVE SEDATION WITH AN IV OR ANESTHESIA (deeper level of sedation ordered and administered by a health professional):  Arrive 1 1/2 hours early with a . Your  must remain on site for the duration of the exam. Your medicine may make you sleepy. After the exam, you may not drive, take a bus or take a taxi by  yourself.  No eating 8 hours before your exam. You may have clear liquids up until 4 hours before your exam. (Clear liquids include water, clear tea, black coffee and fruit juice without pulp.)  You may spend up to four to five hours in the recovery room.     Dec 03, 2020  2:15 PM  (Arrive by 2:00 PM)  Return Visit with Jw Trent MD  [facility] ([facility]) [address] [place] [id number] [phone]      Dec 04, 2020 12:00 PM  Infusion 120 with UC ONCOLOGY INFUSION, UC 15 ATC  [facility] ([facility]) [address] [place] [address] [phone]      Dec 24, 2020 11:10 AM  Telephone Visit with Jalyn Greenberg PA-C  [facility] ([facility]) [address] [place] [address]  [phone]   [facility]  Note: this is not an onsite visit; there is no need to come to the facility.  Please have a list of all current medications available for appointment.      Dec 28, 2020 11:00 AM  Infusion 120 with UC ONCOLOGY INFUSION, UC 18 ATC  [facility] ([facility]) [address] [place] [address]  [phone]         Care Instructions       After Care Instructions     Discharge Instructions / education      *Review the medication instructions.  *Review patient education materials with the patient.   *Review Post-Y90 discharge instructions with patient and family.  *Review Contrast Discharge Instruction sheet with patient.  *If patient diabetic, discharge instruction sheet to be given to patient.         May discharge when      Discharge patient  when bed rest is completed IF:  *Vital signs are within the patient's normal limits.  *Patient is able to void and transfer with assistance.  *Patient is alert and  tolerates oral fluids.  *Patient must be discharged with a  and have someone to stay to assist them with bathroom privileges at home.           Further instructions from your care team       [facility]   Interventional Radiology  Discharge Instructions Post Angiography for Insertion of   Radioactive Microspheres to the Liver    AFTER YOU GO HOME          Relax and take it easy for 24 hours.       Drink plenty of fluids.       Resume your regular diet, unless otherwise instructed by your Primary Physician.       DO NOT smoke for at least 24 hours, if you were given any sedation.       DO NOT drink alcoholic beverages the day of your procedure.       Do not drive or operate machinery at home or at work for 24 hours.          DO NOT do any strenuous exercise or lifting for at least 2 days following your                         Procedure.       DO NOT take a bath or shower for at least 12 hours following your procedure.       DO NOT make any important or legal decisions for 24 hours following your procedure.    CALL THE PHYSICIAN IF:       - You start bleeding from the procedure site. lie down flat and hold pressure on the site. A small lump or bruise is common at the puncture site. Your physician will tell you if you need to return to the hospital.      - You develop numbness, coolness or a change in color of the leg that was punctured.      - You experience increased pain or redness at the puncture site.      - You develop hives or a rash or unexplained itching.      - You develop a temperature of 101 degrees F or greater.    Additional Information:           Support the puncture site for coughing, sneezing, or moving your bowels for the first 48 hours  No tub bath, hot tubs, or swimming for 5 days  No lotion or powder to the puncture site for 3 day    Follow the Discharge Instructions for the Liver Brachytherapy; Contrast Instructions and Instructions for the [employer].  "    Closure Device:  Mynx        Resume all prior home medications.  In addition:  Take Pantoprazole once a day  Take Steroids as directed on package  Take Ondansetron as needed for nausea  Take Dilaudid for pain as needed.        [facility] INTERVENTIONAL RADIOLOGY DEPARTMENT         Procedure Physician: Dr. Storey                              Date of Procedure:November 16, 2020       Telephone Numbers:   [phone]     Monday-Friday 8:00 to 4:30 pm                                        [phone]     After 4:30 pm Monday-Friday, Weekend and Holidays. Ask for the Interventional Radiologist on call. Someone is on call 24 hrs/day.                    Additional Information About Your Visit       [employer] Information    [employer] gives you secure access to your electronic health record. If you see a primary care provider, you can also send messages to your care team and make appointments. If you have questions, please call your primary care clinic.  If you do not have a primary care provider, please call [phone] and they will assist you.       Care EveryWhere ID    This is your Care EveryWhere ID. This could be used by other organizations to access your [place] medical records  [id number]       Your Vitals Were  Most recent update: 11/16/2020 10:53 AM    Blood Pressure   106/69          Pulse   53          Temperature   97.7  F (36.5  C) (Oral)          Respirations   16          Height   1.803 m (5' 11\")             Weight   90.3 kg (199 lb)    Pulse Oximetry   94%    BMI (Body Mass Index)   27.75 kg/m           Primary Care Provider Office Phone # Fax #    Benjamin E Van Vranken, [phone]      Equal Access to Services    [facility] AH: Hadii aad ku hadasho Soomaali, waaxda luqadaha, qaybta kaalmada adeegyada, waxay jean ortiz. So [facility] [phone].    ATENCIÓN: Si habla español, tiene a contreras disposición servicios gratuitos de asistencia lingüística. Llame al " [phone].    We comply with applicable federal and state civil rights laws, including the [place] Human Rights Act. We do not discriminate on the basis of race, color, creed, Restorationism, national origin, marital status, age, disability, sex, sexual orientation, or gender identity.       Thank you!    Thank you for choosing [facility] for your care. Our goal is always to provide you with excellent care. Hearing back from our patients is one way we can continue to improve our services. Please take a few minutes to complete the written survey that you may receive in the mail after you visit with us. Thank you!            Medication List      ASK your doctor about these medications          Morning Afternoon Evening Bedtime As Needed    acetaminophen 500 MG tablet  Also known as: TYLENOL  INSTRUCTIONS: Take 1,000 mg by mouth                     diclofenac 1 % topical gel  Also known as: VOLTAREN                     Ferrous Gluconate 240 (27 Fe) MG Tabs  INSTRUCTIONS: Take 240 mg by mouth                     finasteride 5 MG tablet  Also known as: PROSCAR  INSTRUCTIONS: Take 5 mg by mouth                     LORazepam 0.5 MG tablet  Also known as: ATIVAN  INSTRUCTIONS: Take 1 tablet (0.5 mg) by mouth every 4 hours as needed (Anxiety, Nausea/Vomiting or Sleep)                     methylphenidate 10 MG tablet  Also known as: RITALIN  INSTRUCTIONS: as needed                     * methylPREDNISolone 4 MG tablet therapy pack  Also known as: MEDROL DOSEPAK  INSTRUCTIONS: Take 1 tablet (4 mg) by mouth 2 times daily Take as directed on package.  Doctor's comments: 1 Medrol dosepak. Take as directed on the package                     * methylPREDNISolone 4 MG tablet therapy pack  Also known as: MEDROL DOSEPAK  INSTRUCTIONS: Take 1 tablet (4 mg) by mouth 2 times daily Take as directed on package.  Doctor's comments: Please send overnight per patient request                     * methylPREDNISolone 32 MG tablet  Also known as:  MEDROL  INSTRUCTIONS: Please take 32 mg, 12 hours prior to procedure, take another 32 mg, 2 hours prior to procedure for contrast allergy.                     * ondansetron 4 MG tablet  Also known as: ZOFRAN  INSTRUCTIONS: Take 1-2 tablets (4-8 mg) by mouth every 6 hours as needed for nausea (vomiting)  LAST TAKEN: Ask your nurse or doctor                     * ondansetron 4 MG tablet  Also known as: ZOFRAN  INSTRUCTIONS: Take 1-2 tablets (4-8 mg) by mouth every 6 hours as needed for nausea (vomiting)  Doctor's comments: Please send overnight per pt request  LAST TAKEN: Ask your nurse or doctor                     * oxyCODONE 5 MG tablet  Also known as: ROXICODONE  INSTRUCTIONS: Take 1-2 tablets (5-10 mg) by mouth every 6 hours as needed for moderate to severe pain                     * oxyCODONE 5 MG tablet  Also known as: ROXICODONE  INSTRUCTIONS: Take 1-2 tablets (5-10 mg) by mouth every 6 hours as needed for moderate to severe pain                     * pantoprazole 40 MG EC tablet  Also known as: PROTONIX  INSTRUCTIONS: Take 1 tablet (40 mg) by mouth daily  LAST TAKEN: Ask your nurse or doctor                     * pantoprazole 40 MG EC tablet  Also known as: PROTONIX  INSTRUCTIONS: Take 1 tablet (40 mg) by mouth daily  Doctor's comments: Please send over night per patient request  LAST TAKEN: Ask your nurse or doctor                     polyethylene glycol 17 GM/Dose powder  Also known as: MIRALAX  INSTRUCTIONS: Take 17 g (1 capful) by mouth 2 times daily                     prochlorperazine 10 MG tablet  Also known as: COMPAZINE  INSTRUCTIONS: Take 1 tablet (10 mg) by mouth every 6 hours as needed (Nausea/Vomiting)                     senna-docusate 8.6-50 MG tablet  Also known as: Senna S  INSTRUCTIONS: Take 1 tablet by mouth 2 times daily as needed for constipation                     tamsulosin 0.4 MG capsule  Also known as: FLOMAX  INSTRUCTIONS: Take 0.4 mg by mouth                     Testosterone 1.62 %  Gel                     Vitamin C 500 MG Caps  INSTRUCTIONS: Take 500 mg by mouth                     vitamin D3 25 MCG (1000 UT) Caps  INSTRUCTIONS: Takes 3 daily                        * This list has 9 medication(s) that are the same as other medications prescribed for you. Read the directions carefully, and ask your doctor or other care provider to review them with you.

## 2020-11-16 NOTE — PROGRESS NOTES
Patient Name: Galileo Mayberry  Medical Record Number: [id number]  Today's Date: 11/16/2020    Procedure: Visceral angiogram with theresphere delivery  Proceduralist: Dr. Juan Gooden    Procedure Start: 0850  Procedure end: 0920  Sedation medications administered: 100mcg fentanyl, 2mg versed     Report given to: Simin JAMES 2A  : MAGGIE    Other Notes: Pt arrived to IR room 1 from . Consent reviewed. Pt denies any questions or concerns regarding procedure. Pt positioned supine and monitored per protocol. Pt tolerated procedure without any noted complications. Pt transferred back to .    Mynx closure device deployed to right groin at 0915. Manual pressure held x5 minutes. Bedrest x3 hours until 1220.

## 2020-11-16 NOTE — PROGRESS NOTES
Tolerated bedrest without issues.  Tolerated food, fluids, ambulation and urination.  Right groin site CDI.  Reviewed discharge instructions with patient.  PIV removed.  Dilaudid Rx, Nuc. Med paperwork, discharge instructions, Mynx booklet and [employer] card all put into [place] Discharge Bag.  Discharged to nuclear medicine for post delivery scan.

## 2020-11-16 NOTE — PROGRESS NOTES
Arrived from home for a Y-90 delivery.  VSS.  Denies pain.  Consent obtained.  H&P current.  Ready for procedure.

## 2020-11-18 NOTE — LETTER
"    11/18/2020         RE: Galileo Mayberry  [address]  [facility] [address]        Dear Colleague,    Thank you for referring your patient, Galileo Mayberry, to the [facility]. Please see a copy of my visit note below.    Galileo Mayberry is a 75 year old male who is being evaluated via a billable telephone visit.      The patient has been notified of following:     \"This telephone visit will be conducted via a call between you and your physician/provider. We have found that certain health care needs can be provided without the need for a physical exam.  This service lets us provide the care you need with a short phone conversation.  If a prescription is necessary we can send it directly to your pharmacy.  If lab work is needed we can place an order for that and you can then stop by our lab to have the test done at a later time.    Telephone visits are billed at different rates depending on your insurance coverage. During this emergency period, for some insurers they may be billed the same as an in-person visit.  Please reach out to your insurance provider with any questions.    If during the course of the call the physician/provider feels a telephone visit is not appropriate, you will not be charged for this service.\"    Patient has given verbal consent for Telephone visit?  Yes    What phone number would you like to be contacted at? [phone]    How would you like to obtain your AVS? 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 11/2019, he is diagnosed with ocular melanoma, after a choroidal lesion was found in his Left eye.  Patient reports over the last several months he developed new " floaters/spots in his L eye.  2. 11/25/2019, he was seen by ophthalmology who noted a left eye, elevated bilobed lesion, size 6.28mm x 18.31(T) x 17.04(L).   3. 12/14/19, CT-CAP showed no evidence of metastatic disease.  4. 1/10/2020: Patient referred to [facility]. B-scan ultrasound of left eye showed elevated bilobed lesion measuring 7.2-7.3 height with a base of 21.4 x 22.5 mm. Low to medium reflectivity. Difficult measurements due to location. Ciliary body involvement was noted. Ultrasound basal dimension measurement included subretinal fluid, and clinical measurement had to be done by transillumination due to anterior tumor location.  5. 1/15/2020, visual acuity right eye 20/20 -1, left eye 20/25 +2 nh. Visual fields counting fingers full bilaterally. Clinical fundus exam of left eye revealed choroidal/ciliary body malignant melanoma with basal measurement of 22 x 21 mm and height of 7.5 mm. Located in the Inferior left eye from 4:30-7:30, 15 mm to disc, 15 mm to fovea, +subretinal fluid, bilobed. The tumor without fluid appears to be 1-2 mm smaller in basal dimension (20 x 19 mm)  6. 2/10/2020, Iodine-125 24 mm plaque placement delivering 85 Gy to an 8 mm height.  7. 3/31/2020, he starts adjuvant sunitinib 25 mg daily for high risk disease, Class II molecular signature and PRAME mRNA positive, high-risk of early metastasis. He completed 6 months of therapy.  8. 8/26/2020, MRI-liver shows interval development of numerous (more than 30) metastatic lesions in the liver in bilobar distribution. The largest in segment JUAN measures 1.85 cm.  9. 10/19/2020, he undergoes Y90 radioembolization with 72.2 mCi of Theraspheres to the right lobe of the liver; predominantly in hepatic segments 5 and 6, milder uptake in the region of segment 8  10. 10/22/20, MR abdomen showed significantly increased in size and numbers of the innumerable  metastatic liver lesions since 8/26/2020 as well as multiple metastatic bone lesions in  "the visualized thoracolumbar spine and pelvic bones. We also obtainined his PD-L1 testing result, and it did show PD-L1 negative disease.  11. 10/23/2020, first cycle of Ipilimumab 1mg/kg and nivolumab 3mg/kg initiated with palliative intent.  12. 11/13/2020 C2 completed.  13. Y-90 treatment 11/16 with Dr. Storey.    Favian calls into clinic today to check in on how C2 of ipilimumab/nivolumab and Y-90 treatment.     Interim History:  Favian reports that his treatment with Dr. Storey 11/16 went \"about as good as can be expected.\" He states that his C2 of immunotherapy (11/13) went well 11/13 through yesterday, however began to develop some nausea and fatigue today. He reports that week 2 of C1 was really hard on him and he feels anxious that this is starting to happen again. He developed nausea this morning and took Zofran (had not needed it days 1-5). He denies abdominal pain, vomiting. He mentions he has had regular bowel movements now that he has been on a regimen of taking Miralax and Senna twice daily. He states his appetite has also been decent until this morning. He was able to eat some eggs, but didn't necessarily feel hungry for them. He recalls having to force himself to eat during week 2 of the last cycle. He mentions he has lost 25lbs since his diagnosis. Favian states that his mood has been \"okay\", but he feels pretty anxious and apprehensive about treatments, since he felt so poorly after C1. He is receptive to meeting with Palliative care for extra support. His prognosis continues to be difficult to deal with.     Hunter mentions that last night he experienced increased urinary frequency and that he had to get up to go to the bathroom seven times, which is way above his baseline of two times nightly. He denies dysuria, hematuria or dark urine. No fevers, chills, back pain or flank pain.    Favian denies chest pain, palpitations, shortness of breath, cough. No headaches, tremors, lightheadedness, dizziness. " No recent skin rashes or lesions.    Current Outpatient Medications   Medication Sig Dispense Refill     acetaminophen (TYLENOL) 500 MG tablet Take 1,000 mg by mouth       Ascorbic Acid (VITAMIN C) 500 MG CAPS Take 500 mg by mouth       Cholecalciferol (VITAMIN D3) 25 MCG (1000 UT) CAPS Takes 3 daily       diclofenac (VOLTAREN) 1 % topical gel        Ferrous Gluconate 240 (27 Fe) MG TABS Take 240 mg by mouth       finasteride (PROSCAR) 5 MG tablet Take 5 mg by mouth       HYDROmorphone (DILAUDID) 2 MG tablet Take 1 tablet (2 mg) by mouth every 6 hours as needed for pain 10 tablet 0     LORazepam (ATIVAN) 0.5 MG tablet Take 1 tablet (0.5 mg) by mouth every 4 hours as needed (Anxiety, Nausea/Vomiting or Sleep) 30 tablet 3     methylphenidate (RITALIN) 10 MG tablet as needed       methylPREDNISolone (MEDROL DOSEPAK) 4 MG tablet therapy pack Take 1 tablet (4 mg) by mouth 2 times daily Take as directed on package. 21 tablet 0     methylPREDNISolone (MEDROL DOSEPAK) 4 MG tablet therapy pack Take 1 tablet (4 mg) by mouth 2 times daily Take as directed on package. 21 tablet 0     methylPREDNISolone (MEDROL) 32 MG tablet Please take 32 mg, 12 hours prior to procedure, take another 32 mg, 2 hours prior to procedure for contrast allergy. 2 tablet 0     ondansetron (ZOFRAN) 4 MG tablet Take 1-2 tablets (4-8 mg) by mouth every 6 hours as needed for nausea (vomiting) 40 tablet 0     ondansetron (ZOFRAN) 4 MG tablet Take 1-2 tablets (4-8 mg) by mouth every 6 hours as needed for nausea (vomiting) (Patient not taking: Reported on 11/13/2020) 40 tablet 0     oxyCODONE (ROXICODONE) 5 MG tablet Take 1-2 tablets (5-10 mg) by mouth every 6 hours as needed for moderate to severe pain (Patient not taking: Reported on 11/13/2020) 10 tablet 0     oxyCODONE (ROXICODONE) 5 MG tablet Take 1-2 tablets (5-10 mg) by mouth every 6 hours as needed for moderate to severe pain (Patient not taking: Reported on 11/13/2020) 10 tablet 0     pantoprazole  (PROTONIX) 40 MG EC tablet Take 1 tablet (40 mg) by mouth daily (Patient not taking: Reported on 11/13/2020) 30 tablet 0     pantoprazole (PROTONIX) 40 MG EC tablet Take 1 tablet (40 mg) by mouth daily 30 tablet 0     polyethylene glycol (MIRALAX) 17 GM/Dose powder Take 17 g (1 capful) by mouth 2 times daily 255 g 1     prochlorperazine (COMPAZINE) 10 MG tablet Take 1 tablet (10 mg) by mouth every 6 hours as needed (Nausea/Vomiting) 30 tablet 3     senna-docusate (SENNA S) 8.6-50 MG tablet Take 1 tablet by mouth 2 times daily as needed for constipation 60 tablet 1     tamsulosin (FLOMAX) 0.4 MG capsule Take 0.4 mg by mouth       Testosterone 1.62 % GEL        REVIEW OF SYSTEMS  12-point ROS negative except as in HPI    Past Medical History:   Diagnosis Date     Degenerative joint disease      Metastatic melanoma (H)     L eye     Nonsenile cataract      Past Surgical History:   Procedure Laterality Date     ------------OTHER-------------  2014    back surgery L4/L5      ------------OTHER-------------      knee surgery both 1961 Right, 1971 Left knee     AS REMOVAL OF KIDNEY STONE  2015     IR SIRT (SELECTIVE INTERNAL RADIO THERAPY)  10/13/2020     IR VISCERAL ANGIOGRAM  10/13/2020     IR VISCERAL EMBOLIZATION  10/19/2020     JOINT REPLACEMENT  2017    Both kneses replaced (2017 and 2018)     ROTATOR CUFF REPAIR RT/LT Right 2016     TURP  2000     Family History   Problem Relation Age of Onset     Glaucoma Mother      Prostate Cancer Father      Cancer Sister      Macular Degeneration No family hx of      Objective:  There were no vitals taken for this visit.  General: alert and no distress  Psych: Alert and oriented times; coherent speech, normal rate and volume, able to articulate logical thoughts, able to abstract reason, no tangential thoughts, no hallucinations or delusions  Patient's affect is appropriate.   Pulm: Speaking in full sentences, unlabored, no audible wheezes or cough.  The rest of a comprehensive  "physical examination is deferred due to PHE (public health emergency) video restrictions\"    LABS:  No new labs to review this visit.    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 Mayberry is a 75 year old man with uveal melanoma with metastasis to the liver and bone. 8/26/2020, MRI-liver shows interval development of numerous (more than 30) metastatic lesions in the liver in bilobar distribution. On 10/19/2020, he underwent Y90 radioembolization with 72.2 mCi of theraspheres to the right lobe of the liver. 10/22/20 MR abdomen showed significantly increased in size and numbers of the innumerable metastatic liver lesions, as well as multiple metastatic bone lesions in the visualized thoracolumbar spine and pelvic bones.     On 10/23/2020, patient received his first cycle of Ipilimumab 1mg/kg and nivolumab 3mg/kg with palliative intent. Patient tolerated C1 fairly well, except for increased fatigue, nausea and CHUYITA. No current signs of immune mediated toxicities. Favian was called today for a check-in after receiving C2 11/13/20 and Y-90 treatment 11/16 with Dr. Storey. Thus far, he is tolerating well, though is starting to have some fatigue and nausea and is nervous about the potential worsening of these symptoms in the near future.     In summary:  -MRI liver prior to visit with Dr. Trent 12/3, then cycle 3 following visit.   -Cycle 4 will tentatively be on 12/23  -MRI liver pior to visit with Dr. Trent 1/14/2021, then cycle 5 with nivo alone following visit     #Coping  Patient has had a difficult time processing his prognosis and is apprehensive about the side effects of his treatments.  -Provided a referral to Palliative Care team, which patient was very responsive to  -Discussed that our care coordinator Sayra will plan to check in " with him 11/20 by phone  -Provided triage line. Reminded patient he can call us with any new concerns and reassured him that we are here for him    #Weight Loss  #Loss of Appetite  Lost 25 lbs since initial diagnosis and complains of loss of appetite week 2 of his cycles.   -Referral provided for Dietician    #Nausea  Patient complained of nausea a few days after his C1 of nivo/ipi. He states that he felt well ~5 days after C2, but started feeling nauseated again this morning. Reports Zofran more effective than compazine. Denies symptoms of reflux or abdominal pain that could indicate gastritis. No other neuro deficits or concerns that would prompt brain imaging at this time.   -Continue Zofran PO    #Constipation  Patient experienced some constipation 2/2 Zofran and the Ferrous Sulfate he takes. However, has had regular bowel movements this cycle so far due to change in his bowel regimen made last week to Miralax + Senna.  -Bowel regimen of Senna + Miralax twice daily     #Urinary Frequency  Patient experienced urinary frequency last night well above his baseline (7 times last night vs. 2 at baseline). No hematuria or flank pain. No dysuria, fever/chills, back pain.  -Continue to monitor    #Skin Irritation  Very mild and localized. Almost resembles an abrasion. Not actively concerning for immune mediated toxicity.   -Recommended Cerave moisturizer, as well as OTC hydrocortisone cream PRN should it become itchy. Monitor    Jalyn Greenberg PA-C  [facility]  [address] [place] [id number] [phone]    The patient was seen in conjunction with ANDREW Falk who served as a scribe for today's visit. I have reviewed and edited the above note, and agree with the above findings and plan.      Again, thank you for allowing me to participate in the care of your patient.        Sincerely,        Jalyn Greenberg PA-C

## 2020-11-19 NOTE — TELEPHONE ENCOUNTER
Called pt's cell phone to follow up on him s/p Y90.    Left a msg asking that he return my call to further follow up.    Informed him that I will also start planning for his f/up appt as well.     Left direct line for him.     Yaima RITCHIE RN, BSN  Interventional Radiology/Vascular  Nurse Coordinator   Phone: [phone]  Fax: [phone]

## 2020-11-30 NOTE — LETTER
"    11/30/2020         RE: Galileo Mayberry  [address]  [facility] [address]        Dear Colleague,    Thank you for referring your patient, Galileo Mayberry, to the [facility]. Please see a copy of my visit note below.    Galileo Mayberry is a 75 year old male who is being evaluated via a billable telephone visit.      The patient has been notified of following:     \"This telephone visit will be conducted via a call between you and your physician/provider. We have found that certain health care needs can be provided without the need for a physical exam.  This service lets us provide the care you need with a short phone conversation.  If a prescription is necessary we can send it directly to your pharmacy.  If lab work is needed we can place an order for that and you can then stop by our lab to have the test done at a later time.    Telephone visits are billed at different rates depending on your insurance coverage. During this emergency period, for some insurers they may be billed the same as an in-person visit.  Please reach out to your insurance provider with any questions.    If during the course of the call the physician/provider feels a telephone visit is not appropriate, you will not be charged for this service.\"    Patient has given verbal consent for Telephone visit?  Yes    CLINICAL NUTRITION SERVICES - ASSESSMENT NOTE    Galileo Mayberry 75 year old referred for MNT related to uvea with liver metastasis     Time Spent: 45 minutes  Visit Type: phone  Referring Physician: Jalyn Greenberg, 11/18  Pt accompanied by: self      NUTRITION HISTORY  Factors affecting nutrition intake include:decreased appetite with nausea and constipation  Current diet: Keto diet  Current appetite/intake: giovanna      Favian tells me that he has been following the Ketosis diet for the past few months due to 'sugar feeding cancer'.  With this, combined with side effect to immunotherapy, he " "has lost ~25 lbs in 2 months.  He presents today to get advise on what diet would fit him best.    He complains of having very little energy and feels as though its from both treatment and poor nutrition.      He has been eating 3 meals/day.  He complains of feeling full, nauseated and constipated.      Diet Recall  Breakfast 2 poached eggs with 2 small tomatoes, 2 cups coffee with heavy cream  Am snack: 1 yogurt with mixed berries   Lunch Couple handfuls of mixed nuts OR celery and peanut butter   Dinner 4 oz pork chop OR vegetable salad with variety of vegetables   Snacks Jello   Beverages Water, diet sprite     ANTHROPOMETRICS  Height: 5'11\"  Weight: 199 lb/90kg  BMI: 28  Weight Status:  Overweight BMI 25-29.9  IBW: 172 lb (115%)  Weight History: down 10% wt x past 2 months  Wt Readings from Last 6 Encounters:   11/16/20 90.3 kg (199 lb)   11/13/20 93.4 kg (206 lb)   10/19/20 93.4 kg (206 lb)   10/13/20 94.3 kg (208 lb)   03/27/20 100.7 kg (221 lb 14.4 oz)   02/27/20 99.3 kg (218 lb 14.4 oz)       Dosing Weight: 90kg    Medications/vitamins/minerals/herbals:   Reviewed    Labs:   Labs reviewed    NUTRITION FOCUSED PHYSICAL ASSESSMENT FOR DIAGNOSING MALNUTRITION:  Not observed   Consult for education only    ASSESSED NUTRITION NEEDS:  Estimated Energy Needs: 2700 kcals (30 Kcal/Kg)  Justification: maintenance  Estimated Protein Needs: 100 grams protein (1.2 g pro/Kg)  Justification: increased needs for repletion    NUTRITION DIAGNOSIS:  Inadequate oral intake related to decreased appetite as evidenced by 10% wt loss.     INTERVENTIONS  Provided written & verbal education:   - Discussed strategies to help fortify meals and snacks with calories and protein.  - Encouraged to focus on smaller, more frequent meals.  Encouraged to have a protein source with each meal and snack.   Reviewed sources of protein.   - Advised pt to aim for at least 2700kcal and 100g protein daily.   - Reviewed common barriers to eating with " cancer treatment.  Discussed ways to cope with constipation and nausea.   - Reviewed pros and cons of Keto diet.  Reviewed the role of 'sugar feeding cancer'.  Encouraged Favian to avoid keto diet due to ongoing weight loss. Discussed that keto diet can also cause constipation due to low fiber and nausea due to heavy fat diet.    - Reviewed Mediterranean diet. Encouraged this style of eating for a balance of all macro and micro nutrients.     Provided pt with corresponding education materials/handouts on:  Mediterranean diet guidelines and Sources of Protein.     Pt verbalize understanding of materials provided during consult.   Patient Understanding: Excellent  Expected patient engagement: Excellent     Goals  1.  Aim for 5-6 small frequent meals  2.  Aim for 2700kcal and 100g protein/day  3. Weight maintenance     Follow-Up Plans: Pt has RD contact information for questions.      Fiona Briggs RD, LD            Again, thank you for allowing me to participate in the care of your patient.        Sincerely,        Fiona Briggs RD

## 2020-11-30 NOTE — PROGRESS NOTES
"Galileo Mayberry is a 75 year old male who is being evaluated via a billable telephone visit.      The patient has been notified of following:     \"This telephone visit will be conducted via a call between you and your physician/provider. We have found that certain health care needs can be provided without the need for a physical exam.  This service lets us provide the care you need with a short phone conversation.  If a prescription is necessary we can send it directly to your pharmacy.  If lab work is needed we can place an order for that and you can then stop by our lab to have the test done at a later time.    Telephone visits are billed at different rates depending on your insurance coverage. During this emergency period, for some insurers they may be billed the same as an in-person visit.  Please reach out to your insurance provider with any questions.    If during the course of the call the physician/provider feels a telephone visit is not appropriate, you will not be charged for this service.\"    Patient has given verbal consent for Telephone visit?  Yes    CLINICAL NUTRITION SERVICES - ASSESSMENT NOTE    Galileo Mayberry 75 year old referred for MNT related to uvea with liver metastasis     Time Spent: 45 minutes  Visit Type: phone  Referring Physician: Jalyn Greenberg, 11/18  Pt accompanied by: self      NUTRITION HISTORY  Factors affecting nutrition intake include:decreased appetite with nausea and constipation  Current diet: Keto diet  Current appetite/intake: giovanna Ballesteros tells me that he has been following the Ketosis diet for the past few months due to 'sugar feeding cancer'.  With this, combined with side effect to immunotherapy, he has lost ~25 lbs in 2 months.  He presents today to get advise on what diet would fit him best.    He complains of having very little energy and feels as though its from both treatment and poor nutrition.      He has been eating 3 meals/day.  He complains of feeling " "full, nauseated and constipated.      Diet Recall  Breakfast 2 poached eggs with 2 small tomatoes, 2 cups coffee with heavy cream  Am snack: 1 yogurt with mixed berries   Lunch Couple handfuls of mixed nuts OR celery and peanut butter   Dinner 4 oz pork chop OR vegetable salad with variety of vegetables   Snacks Jello   Beverages Water, diet sprite     ANTHROPOMETRICS  Height: 5'11\"  Weight: 199 lb/90kg  BMI: 28  Weight Status:  Overweight BMI 25-29.9  IBW: 172 lb (115%)  Weight History: down 10% wt x past 2 months  Wt Readings from Last 6 Encounters:   11/16/20 90.3 kg (199 lb)   11/13/20 93.4 kg (206 lb)   10/19/20 93.4 kg (206 lb)   10/13/20 94.3 kg (208 lb)   03/27/20 100.7 kg (221 lb 14.4 oz)   02/27/20 99.3 kg (218 lb 14.4 oz)       Dosing Weight: 90kg    Medications/vitamins/minerals/herbals:   Reviewed    Labs:   Labs reviewed    NUTRITION FOCUSED PHYSICAL ASSESSMENT FOR DIAGNOSING MALNUTRITION:  Not observed   Consult for education only    ASSESSED NUTRITION NEEDS:  Estimated Energy Needs: 2700 kcals (30 Kcal/Kg)  Justification: maintenance  Estimated Protein Needs: 100 grams protein (1.2 g pro/Kg)  Justification: increased needs for repletion    NUTRITION DIAGNOSIS:  Inadequate oral intake related to decreased appetite as evidenced by 10% wt loss.     INTERVENTIONS  Provided written & verbal education:   - Discussed strategies to help fortify meals and snacks with calories and protein.  - Encouraged to focus on smaller, more frequent meals.  Encouraged to have a protein source with each meal and snack.   Reviewed sources of protein.   - Advised pt to aim for at least 2700kcal and 100g protein daily.   - Reviewed common barriers to eating with cancer treatment.  Discussed ways to cope with constipation and nausea.   - Reviewed pros and cons of Keto diet.  Reviewed the role of 'sugar feeding cancer'.  Encouraged Favian to avoid keto diet due to ongoing weight loss. Discussed that keto diet can also cause " constipation due to low fiber and nausea due to heavy fat diet.    - Reviewed Mediterranean diet. Encouraged this style of eating for a balance of all macro and micro nutrients.     Provided pt with corresponding education materials/handouts on:  Mediterranean diet guidelines and Sources of Protein.     Pt verbalize understanding of materials provided during consult.   Patient Understanding: Excellent  Expected patient engagement: Excellent     Goals  1.  Aim for 5-6 small frequent meals  2.  Aim for 2700kcal and 100g protein/day  3. Weight maintenance     Follow-Up Plans: Pt has RD contact information for questions.      Fiona Briggs RD, LD

## 2020-12-01 NOTE — PROGRESS NOTES
"Galileo Mayberry is a 75 year old male who is being evaluated via a billable video visit.      The patient has been notified of following:     \"This video visit will be conducted via a call between you and your physician/provider. We have found that certain health care needs can be provided without the need for an in-person physical exam.  This service lets us provide the care you need with a video conversation.  If a prescription is necessary we can send it directly to your pharmacy.  If lab work is needed we can place an order for that and you can then stop by our lab to have the test done at a later time.    Video visits are billed at different rates depending on your insurance coverage.  Please reach out to your insurance provider with any questions.    If during the course of the call the physician/provider feels a video visit is not appropriate, you will not be charged for this service.\"    Patient has given verbal consent for Video visit? Yes  How would you like to obtain your AVS? MyChart  If you are dropped from the video visit, the video invite should be resent to: Send to e-mail at: [email or web address]  Will anyone else be joining your video visit? No       ALEX El        Video-Visit Details    Type of service:  Video Visit    Video Start Time: 15:15  Video End Time: 16:31    Originating Location (pt. Location): Home    Distant Location (provider location):  [facility] CANCER [facility]     Platform used for Video Visit: Natasha Adkins MD          Palliative Care Outpatient Clinic Consultation Note    Patient:  Galileo Mayberry    Chief Complaint: fatigue  The patient's primary care provider is:  Van Vranken, Benjamin E.     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 Now on " palliative intent ipilimumab and nivolumab. In his last oncology visit 11/18 he endorsed some apprehension about his prognosis and side effects of treatment.     Distressing Symptom/s: He reports that for the last several months he had been following a keto diet in hopes of slowing cancer progression. During that time he lost 25 lbs (since 9/2020), had significant nausea without emesis (requiring 2 tabs of zofran TID) and abdominal discomfort. He had issues with constipation that he was taking PRN Miralax and senna. He met with a dietician here several days ago who encouraged him to liberalize his diet. He reports that his abdominal discomfort and nausea have largely resolved (1 zofran a day now). He is also having a daily BM now. He reports that it was very freeing to get permission to stop the restrictive diet.     He denies any pain. He does endorse issues with fatigue. He takes one 0.5 mg ativan at night to help him fall asleep but notes that his fitbit says he doesn't reach REM sleep. He has been waking up around 2-3 occasionally. 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 He takes a hour nap in the afternoon several days a week and does not have as much energy as he used to throughout the day. He does about 3-4,000 steps a day    Patient's Disease Understanding: He has a very clear understanding of his prognosis and was able to quote % expectations of success with various immunotherapies. He says that given his dual immunotherapy and radioembolization that he expects to have 6-9 months. He had questions about what I thought his prognosis was and how he might die. We discussed that more important that the number of days he has left is 1. Would he change the way he currently lived if he knew time was short 2. Are there important tasks (will, HCD, letters) that he has been putting off that he should take care of now.     Coping:  He reports that his mood is good. He attended [employer]  "and spent 4 years in [place]. He has accepted that \"death is a part of life\". He is more worried about what life will be like for his wife after he passes since he takes care of most things around the house.     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  Spiritual Background: [employer]  Social History     Tobacco Use     Smoking status: Never Smoker     Smokeless tobacco: Never Used   Substance Use Topics     Alcohol Use     Alcohol/week: 1.0 standard drinks     Types: 1 Glasses of wine per week     Binge frequency: Never     Drug use: Never       Family History  Family History   Problem Relation Age of Onset     Glaucoma Mother      Prostate Cancer Father      Cancer Sister      Macular Degeneration No family hx of        Advance Care Planning:  Advance Directive:   No ACP docs    Allergies   Allergen Reactions     Vardenafil Other (See Comments) and Nausea     Contrast Dye Rash     8/26/20: Per patient, CT dye allergy     Current Outpatient Medications   Medication Sig Dispense Refill     acetaminophen (TYLENOL) 500 MG tablet Take 1,000 mg by mouth       Ascorbic Acid (VITAMIN C) 500 MG CAPS Take 500 mg by mouth       Cholecalciferol (VITAMIN D3) 25 MCG (1000 UT) CAPS Takes 3 daily       diclofenac (VOLTAREN) 1 % topical gel        Ferrous Gluconate 240 (27 Fe) MG TABS Take 240 mg by mouth       finasteride (PROSCAR) 5 MG tablet Take 5 mg by mouth       HYDROmorphone (DILAUDID) 2 MG tablet Take 1 tablet (2 mg) by mouth every 6 hours as needed for pain 10 tablet 0     LORazepam (ATIVAN) 0.5 MG tablet Take 1 tablet (0.5 mg) by mouth every 4 hours as needed (Anxiety, " Nausea/Vomiting or Sleep) 30 tablet 3     methylphenidate (RITALIN) 10 MG tablet as needed       methylPREDNISolone (MEDROL DOSEPAK) 4 MG tablet therapy pack Take 1 tablet (4 mg) by mouth 2 times daily Take as directed on package. 21 tablet 0     methylPREDNISolone (MEDROL DOSEPAK) 4 MG tablet therapy pack Take 1 tablet (4 mg) by mouth 2 times daily Take as directed on package. 21 tablet 0     methylPREDNISolone (MEDROL) 32 MG tablet Please take 32 mg, 12 hours prior to procedure, take another 32 mg, 2 hours prior to procedure for contrast allergy. 2 tablet 0     ondansetron (ZOFRAN) 4 MG tablet Take 1-2 tablets (4-8 mg) by mouth every 6 hours as needed for nausea (vomiting) 40 tablet 0     ondansetron (ZOFRAN) 4 MG tablet Take 1-2 tablets (4-8 mg) by mouth every 6 hours as needed for nausea (vomiting) (Patient not taking: Reported on 11/13/2020) 40 tablet 0     oxyCODONE (ROXICODONE) 5 MG tablet Take 1-2 tablets (5-10 mg) by mouth every 6 hours as needed for moderate to severe pain (Patient not taking: Reported on 11/13/2020) 10 tablet 0     oxyCODONE (ROXICODONE) 5 MG tablet Take 1-2 tablets (5-10 mg) by mouth every 6 hours as needed for moderate to severe pain (Patient not taking: Reported on 11/13/2020) 10 tablet 0     pantoprazole (PROTONIX) 40 MG EC tablet Take 1 tablet (40 mg) by mouth daily (Patient not taking: Reported on 11/13/2020) 30 tablet 0     pantoprazole (PROTONIX) 40 MG EC tablet Take 1 tablet (40 mg) by mouth daily 30 tablet 0     polyethylene glycol (MIRALAX) 17 GM/Dose powder Take 17 g (1 capful) by mouth 2 times daily 255 g 1     prochlorperazine (COMPAZINE) 10 MG tablet Take 1 tablet (10 mg) by mouth every 6 hours as needed (Nausea/Vomiting) 30 tablet 3     senna-docusate (SENNA S) 8.6-50 MG tablet Take 1 tablet by mouth 2 times daily as needed for constipation 60 tablet 1     tamsulosin (FLOMAX) 0.4 MG capsule Take 0.4 mg by mouth       Testosterone 1.62 % GEL        Past Medical History:    Diagnosis Date     Degenerative joint disease      Metastatic melanoma (H)     L eye     Nonsenile cataract      Past Surgical History:   Procedure Laterality Date     ------------OTHER-------------  2014    back surgery L4/L5      ------------OTHER-------------      knee surgery both 1961 Right, 1971 Left knee     AS REMOVAL OF KIDNEY STONE  2015     IR SIRT (SELECTIVE INTERNAL RADIO THERAPY)  10/13/2020     IR VISCERAL ANGIOGRAM  10/13/2020     IR VISCERAL EMBOLIZATION  10/19/2020     IR VISCERAL EMBOLIZATION  11/16/2020     JOINT REPLACEMENT  2017    Both kneses replaced (2017 and 2018)     ROTATOR CUFF REPAIR RT/LT Right 2016     TURP  2000       REVIEW OF SYSTEMS:   ROS: 10 point ROS neg other than the symptoms noted above in the HPI and here:  Palliative Symptom Review (0=no symptom/no concern, 1=mild, 2=moderate, 3=severe):      Pain: 0      Fatigue: 2      Nausea: 1      Constipation: 0      Diarrhea: 0      Depressive Symptoms: 0      Anxiety: 0      Drowsiness: 0      Poor Appetite: 0      Shortness of Breath: 0      Insomnia: 1      Other: -      Overall (0 good/no concerns, 3 very poor):  1    Physical Exam:  Gen alert, comfortable appearing, NAD.   Head NCAT.  Eyes anicteric without injection  Face symmetric, eyes conjugate  Mouth pink, moist appearing  Lungs unlabored, no cough, speaking full sentences  Skin no rashes or lesions evident on face/neck  Neuro Face symmetric, eyes conjugate; speech fluent.  Neuropsych exam normal including affect, sensorium, gross memory, thought processes, and fund of knowledge.     Data Reviewed:  LABS: 11/16 INR 1.04 Cr 1.01 GFR 71hbg 14.1 LFTs normal Alb 3.6  IMAGING: 10/22 MR Abd: IMPRESSION: In this patient with a history of metastatic malignant  melanoma status posttreatment changes of the y90 sphere delivery to  the right liver lobe;  1. Significantly increased in size and numbers of the innumerable  metastatic liver lesions since 8/26/2020.  2. Multiple  metastatic bone lesions in the visualized thoracolumbar  spine and pelvic bones.      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 Now on palliative intent ipilimumab and nivolumab    #Fatigue  -He had been prescribed Ritalin 10 mg in the past, we will try one in the morning and one at lunch for fatigue. He asked that we call to check in about this medication and potential titration in one week  -Stop Ativan at night and instead take the doxepin for sleep    #Nausea/Abdominal discomfort/constipation  -continue the more liberalized diet    #ACP  -we discussed a healthcare directive, he said that he had one from 8 years ago that he was trying to find but would like to do another updated version  -we started to discuss his preferences but will revisit this at our next meeting, referred him to [email or web address]    Plan for follow up in one month    Patient seen and discussed with Dr. Amanda Xiao MD  Palliative Care Fellow [phone]    My clinic is in the [facility]: [phone] (scheduling); [phone] (triage). Palliative After-Hours Answering Service: [phone].     Attending Note:  Patient seen and evaluated with Dr Xiao and I agree with/confirm their findings/recs in this note.     Thank you for involving us in the patient's care.   Mg Patel MD / Palliative Medicine / Text me via [facility].

## 2020-12-02 NOTE — PATIENT INSTRUCTIONS
Please start taking the Ritalin that you had previously been prescribed 10 mg in the morning and at noon (do not take after 1 PM). Please also take a [facility] instead of the Ativan dose.     Let's plan to follow up in 1 month to discuss your healthcare directive. This is a helpful website: [email or web address]      My clinic is in the [facility]: [phone] (scheduling); [phone] (triage). Palliative After-Hours Answering Service: [phone].          normal...

## 2020-12-02 NOTE — LETTER
"12/2/2020       RE: Galileo Mayberry  [address]  [place] [address]     Dear Colleague,    Thank you for referring your patient, Galileo Mayberry, to the [facility]INIC at [facility]. Please see a copy of my visit note below.    Galileo Mayberry is a 75 year old male who is being evaluated via a billable video visit.      The patient has been notified of following:     \"This video visit will be conducted via a call between you and your physician/provider. We have found that certain health care needs can be provided without the need for an in-person physical exam.  This service lets us provide the care you need with a video conversation.  If a prescription is necessary we can send it directly to your pharmacy.  If lab work is needed we can place an order for that and you can then stop by our lab to have the test done at a later time.    Video visits are billed at different rates depending on your insurance coverage.  Please reach out to your insurance provider with any questions.    If during the course of the call the physician/provider feels a video visit is not appropriate, you will not be charged for this service.\"    Patient has given verbal consent for Video visit? Yes  How would you like to obtain your AVS? MyChart  If you are dropped from the video visit, the video invite should be resent to: Send to e-mail at: [email or web address]  Will anyone else be joining your video visit? No       ALEX El        Video-Visit Details    Type of service:  Video Visit    Video Start Time: 15:15  Video End Time: 16:31    Originating Location (pt. Location): Home    Distant Location (provider location):  [facility]CER [facility]     Platform used for Video Visit: Natasha Adkins MD          Palliative Care Outpatient Clinic Consultation Note    Patient:  Galileo Mayberry    Chief Complaint: " fatigue  The patient's primary care provider is:  Van Vranken, Benjamin E.     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 Now on palliative intent ipilimumab and nivolumab. In his last oncology visit 11/18 he endorsed some apprehension about his prognosis and side effects of treatment.     Distressing Symptom/s: He reports that for the last several months he had been following a keto diet in hopes of slowing cancer progression. During that time he lost 25 lbs (since 9/2020), had significant nausea without emesis (requiring 2 tabs of zofran TID) and abdominal discomfort. He had issues with constipation that he was taking PRN Miralax and senna. He met with a dietician here several days ago who encouraged him to liberalize his diet. He reports that his abdominal discomfort and nausea have largely resolved (1 zofran a day now). He is also having a daily BM now. He reports that it was very freeing to get permission to stop the restrictive diet.     He denies any pain. He does endorse issues with fatigue. He takes one 0.5 mg ativan at night to help him fall asleep but notes that his fitbit says he doesn't reach REM sleep. He has been waking up around 2-3 occasionally. 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 He takes a hour nap in the afternoon several days a week and does not have as much energy as he used to throughout the day. He does about 3-4,000 steps a day    Patient's Disease Understanding: He has a very clear understanding of his prognosis and was able to quote % expectations of success with various immunotherapies. He says that given his dual immunotherapy and radioembolization that he expects to have 6-9 months. He had questions about what I thought his prognosis was and how he might die. We discussed that more  "important that the number of days he has left is 1. Would he change the way he currently lived if he knew time was short 2. Are there important tasks (will, HCD, letters) that he has been putting off that he should take care of now.     Coping:  He reports that his mood is good. He attended the [employer] and spent 4 years in [place]. He has accepted that \"death is a part of life\". He is more worried about what life will be like for his wife after he passes since he takes care of most things around the house.     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  Spiritual Background: [employer]  Social History     Tobacco Use     Smoking status: Never Smoker     Smokeless tobacco: Never Used   Substance Use Topics     Alcohol Use     Alcohol/week: 1.0 standard drinks     Types: 1 Glasses of wine per week     Binge frequency: Never     Drug use: Never       Family History  Family History   Problem Relation Age of Onset     Glaucoma Mother      Prostate Cancer Father      Cancer Sister      Macular Degeneration No family hx of        Advance Care Planning:  Advance Directive:   No ACP docs    Allergies   Allergen Reactions     Vardenafil Other (See Comments) and Nausea     Contrast Dye Rash     8/26/20: Per patient, CT dye allergy     Current Outpatient Medications   Medication Sig Dispense Refill     acetaminophen (TYLENOL) 500 MG tablet Take 1,000 mg by mouth       Ascorbic Acid (VITAMIN C) 500 MG CAPS Take 500 mg by mouth       Cholecalciferol (VITAMIN D3) 25 MCG (1000 UT) CAPS Takes 3 daily       diclofenac (VOLTAREN) 1 % topical gel        Ferrous Gluconate 240 (27 Fe) MG " TABS Take 240 mg by mouth       finasteride (PROSCAR) 5 MG tablet Take 5 mg by mouth       HYDROmorphone (DILAUDID) 2 MG tablet Take 1 tablet (2 mg) by mouth every 6 hours as needed for pain 10 tablet 0     LORazepam (ATIVAN) 0.5 MG tablet Take 1 tablet (0.5 mg) by mouth every 4 hours as needed (Anxiety, Nausea/Vomiting or Sleep) 30 tablet 3     methylphenidate (RITALIN) 10 MG tablet as needed       methylPREDNISolone (MEDROL DOSEPAK) 4 MG tablet therapy pack Take 1 tablet (4 mg) by mouth 2 times daily Take as directed on package. 21 tablet 0     methylPREDNISolone (MEDROL DOSEPAK) 4 MG tablet therapy pack Take 1 tablet (4 mg) by mouth 2 times daily Take as directed on package. 21 tablet 0     methylPREDNISolone (MEDROL) 32 MG tablet Please take 32 mg, 12 hours prior to procedure, take another 32 mg, 2 hours prior to procedure for contrast allergy. 2 tablet 0     ondansetron (ZOFRAN) 4 MG tablet Take 1-2 tablets (4-8 mg) by mouth every 6 hours as needed for nausea (vomiting) 40 tablet 0     ondansetron (ZOFRAN) 4 MG tablet Take 1-2 tablets (4-8 mg) by mouth every 6 hours as needed for nausea (vomiting) (Patient not taking: Reported on 11/13/2020) 40 tablet 0     oxyCODONE (ROXICODONE) 5 MG tablet Take 1-2 tablets (5-10 mg) by mouth every 6 hours as needed for moderate to severe pain (Patient not taking: Reported on 11/13/2020) 10 tablet 0     oxyCODONE (ROXICODONE) 5 MG tablet Take 1-2 tablets (5-10 mg) by mouth every 6 hours as needed for moderate to severe pain (Patient not taking: Reported on 11/13/2020) 10 tablet 0     pantoprazole (PROTONIX) 40 MG EC tablet Take 1 tablet (40 mg) by mouth daily (Patient not taking: Reported on 11/13/2020) 30 tablet 0     pantoprazole (PROTONIX) 40 MG EC tablet Take 1 tablet (40 mg) by mouth daily 30 tablet 0     polyethylene glycol (MIRALAX) 17 GM/Dose powder Take 17 g (1 capful) by mouth 2 times daily 255 g 1     prochlorperazine (COMPAZINE) 10 MG tablet Take 1 tablet (10 mg)  by mouth every 6 hours as needed (Nausea/Vomiting) 30 tablet 3     senna-docusate (SENNA S) 8.6-50 MG tablet Take 1 tablet by mouth 2 times daily as needed for constipation 60 tablet 1     tamsulosin (FLOMAX) 0.4 MG capsule Take 0.4 mg by mouth       Testosterone 1.62 % GEL        Past Medical History:   Diagnosis Date     Degenerative joint disease      Metastatic melanoma (H)     L eye     Nonsenile cataract      Past Surgical History:   Procedure Laterality Date     ------------OTHER-------------  2014    back surgery L4/L5      ------------OTHER-------------      knee surgery both 1961 Right, 1971 Left knee     AS REMOVAL OF KIDNEY STONE  2015     IR SIRT (SELECTIVE INTERNAL RADIO THERAPY)  10/13/2020     IR VISCERAL ANGIOGRAM  10/13/2020     IR VISCERAL EMBOLIZATION  10/19/2020     IR VISCERAL EMBOLIZATION  11/16/2020     JOINT REPLACEMENT  2017    Both kneses replaced (2017 and 2018)     ROTATOR CUFF REPAIR RT/LT Right 2016     TURP  2000       REVIEW OF SYSTEMS:   ROS: 10 point ROS neg other than the symptoms noted above in the HPI and here:  Palliative Symptom Review (0=no symptom/no concern, 1=mild, 2=moderate, 3=severe):      Pain: 0      Fatigue: 2      Nausea: 1      Constipation: 0      Diarrhea: 0      Depressive Symptoms: 0      Anxiety: 0      Drowsiness: 0      Poor Appetite: 0      Shortness of Breath: 0      Insomnia: 1      Other: -      Overall (0 good/no concerns, 3 very poor):  1    Physical Exam:  Gen alert, comfortable appearing, NAD.   Head NCAT.  Eyes anicteric without injection  Face symmetric, eyes conjugate  Mouth pink, moist appearing  Lungs unlabored, no cough, speaking full sentences  Skin no rashes or lesions evident on face/neck  Neuro Face symmetric, eyes conjugate; speech fluent.  Neuropsych exam normal including affect, sensorium, gross memory, thought processes, and fund of knowledge.     Data Reviewed:  LABS: 11/16 INR 1.04 Cr 1.01 GFR 71hbg 14.1 LFTs normal Alb 3.6  IMAGING:  10/22 MR Abd: IMPRESSION: In this patient with a history of metastatic malignant  melanoma status posttreatment changes of the y90 sphere delivery to  the right liver lobe;  1. Significantly increased in size and numbers of the innumerable  metastatic liver lesions since 8/26/2020.  2. Multiple metastatic bone lesions in the visualized thoracolumbar  spine and pelvic bones.      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 Now on palliative intent ipilimumab and nivolumab    #Fatigue  -He had been prescribed Ritalin 10 mg in the past, we will try one in the morning and one at lunch for fatigue. He asked that we call to check in about this medication and potential titration in one week  -Stop Ativan at night and instead take the doxepin for sleep    #Nausea/Abdominal discomfort/constipation  -continue the more liberalized diet    #ACP  -we discussed a healthcare directive, he said that he had one from 8 years ago that he was trying to find but would like to do another updated version  -we started to discuss his preferences but will revisit this at our next meeting, referred him to [email or web address]    Plan for follow up in one month    Patient seen and discussed with Dr. Amanda Xiao MD  Palliative Care Fellow [phone]    My clinic is in the [facility]: [phone] (scheduling); [phone] (triage). Palliative After-Hours Answering Service: [phone].     Attending Note:  Patient seen and evaluated with Dr Xiao and I agree with/confirm their findings/recs in this note.     Thank you for involving us in the patient's care.   Mg Patel MD / Palliative Medicine / Text me via [facility].

## 2020-12-03 NOTE — LETTER
"    12/3/2020         RE: Galileo Mayberry  [address]  [facility] [address]        Dear Colleague,    Thank you for referring your patient, Galileo Mayberry, to the [facility]. Please see a copy of my visit note below.    Galileo Mayberry is a 75 year old male who is being evaluated via a billable video visit.      The patient has been notified of following:     \"This video visit will be conducted via a call between you and your physician/provider. We have found that certain health care needs can be provided without the need for an in-person physical exam.  This service lets us provide the care you need with a video conversation.  If a prescription is necessary we can send it directly to your pharmacy.  If lab work is needed we can place an order for that and you can then stop by our lab to have the test done at a later time.    Video visits are billed at different rates depending on your insurance coverage.  Please reach out to your insurance provider with any questions.    If during the course of the call the physician/provider feels a video visit is not appropriate, you will not be charged for this service.\"    Patient has given verbal consent for Video visit? Yes  How would you like to obtain your AVS? MyChart  If you are dropped from the video visit, the video invite should be resent to: Text to cell phone: [phone]  Will anyone else be joining your video visit? No      Brina JOHNSON    Video-Visit Details    Type of service:  Video Visit    Video Start Time: 2:15 PM  Video End Time: 2:45 PM    Originating Location (pt. 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 11/2019, he is diagnosed with " ocular melanoma, after a choroidal lesion was found in his Left eye.  Patient reports over the last several months he developed new floaters/spots in his L eye.  2. 11/25/2019, he was seen by ophthalmology who noted a left eye, elevated bilobed lesion, size 6.28mm x 18.31(T) x 17.04(L).   3. 12/14/19, CT-CAP showed no evidence of metastatic disease.  4. 1/10/2020: Patient referred to [facility]. B-scan ultrasound of left eye showed elevated bilobed lesion measuring 7.2-7.3 height with a base of 21.4 x 22.5 mm. Low to medium reflectivity. Difficult measurements due to location. Ciliary body involvement was noted. Ultrasound basal dimension measurement included subretinal fluid, and clinical measurement had to be done by transillumination due to anterior tumor location.  5. 1/15/2020, visual acuity right eye 20/20 -1, left eye 20/25 +2 nh. Visual fields counting fingers full bilaterally. Clinical fundus exam of left eye revealed choroidal/ciliary body malignant melanoma with basal measurement of 22 x 21 mm and height of 7.5 mm. Located in the Inferior left eye from 4:30-7:30, 15 mm to disc, 15 mm to fovea, +subretinal fluid, bilobed. The tumor without fluid appears to be 1-2 mm smaller in basal dimension (20 x 19 mm).   6. 2/10/2020, Iodine-125 24 mm plaque placement delivering 85 Gy to an 8 mm height. FNA biopsy performed, and molecular testing shows Class II PRAME positive, high-risk of early metastasis.  7. 3/31/2020, he starts adjuvant sunitinib 25 mg daily for high risk disease. He completed 6 months of therapy.  8. 8/26/2020, MRI-liver shows interval development of numerous (more than 30) metastatic lesions in the liver in bilobar distribution. The largest in segment JUAN measures 1.85 cm.  9. 10/19/2020, he undergoes Y90 radioembolization with 72.2 mCi of Theraspheres to the right lobe of the liver; predominantly in hepatic segments 5 and 6, milder uptake in the region of segment 8  10. 10/23/2020, first cycle  of Ipilimumab 1mg/kg and nivolumab 3mg/kg  11. 11/16/2020, he has his 2nd Y90 radioembolization with 69 mCi of TheraSpheres to hepatic segments 4A and 4B.           HISTORY OF PRESENT ILLNESS  Mr. 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 He returns today via video.    He underwent Y90 radioembolization to the right hepatic lobe on 10/19. Ipi/Nivo on 10/23, then Y90 radioembolization to left hepatic lobe on 11/16. Plan is for him to receive cycle 2 of Ipi/Nivo immunotherapy tomorrow.    Review of the restaging MRI-liver imaging per my read shows one large tumor that has increased in size, while others are stable to slightly smaller. Several smaller lesions are no longer easily discernible on contrast-enhanced AP imaging or diffusion weighted imaging. Per my read, this is showing an early mixed response to radioimmunotherapy. Formal read pending at time of our visit.     He has found that the first cycle following the radiation therapy was a bit worse than the second cycle of Y90. He had also been losing weight on the Keto diet. He estimates he has dropped down about 25 lbs to 198 lbs. He then was able to meet dietician, Fiona Briggs, who recommended a mediterranean diet. He found the meeting helpful and feels he is tolerating treatments better overall since the dietary changes. He is taking1-2 docusate tabs as well as 1 cap Miralax daily. He feels stooling is normal, he denies any diarrhea.     He has noted an increase in fatigue with radiation. Last week he was sleeping 10-12 hours a day and now he is sleeping closer to 8 hours a day. He also was started on Doxepin for sleep and that is working better than the trazodone. The Doxepin allows him to have more REM sleep than trazodone, which kind of just knocked him out. 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 He plans to take a trip to see his grandchildren around 12/13.      ECOG performance status is 1.      REVIEW OF SYSTEMS  12-point ROS negative except as in HPI    Current Outpatient Medications   Medication     acetaminophen (TYLENOL) 500 MG tablet     Ascorbic Acid (VITAMIN C) 500 MG CAPS     Cholecalciferol (VITAMIN D3) 25 MCG (1000 UT) CAPS     diclofenac (VOLTAREN) 1 % topical gel     Ferrous Gluconate 240 (27 Fe) MG TABS     finasteride (PROSCAR) 5 MG tablet     HYDROmorphone (DILAUDID) 2 MG tablet     LORazepam (ATIVAN) 0.5 MG tablet     methylphenidate (RITALIN) 10 MG tablet     methylPREDNISolone (MEDROL DOSEPAK) 4 MG tablet therapy pack     methylPREDNISolone (MEDROL) 32 MG tablet     ondansetron (ZOFRAN) 4 MG tablet     polyethylene glycol (MIRALAX) 17 GM/Dose powder     prochlorperazine (COMPAZINE) 10 MG tablet     senna-docusate (SENNA S) 8.6-50 MG tablet     tamsulosin (FLOMAX) 0.4 MG capsule     Testosterone 1.62 % GEL     doxepin (SINEQUAN) 10 MG/ML (HIGH CONC) solution     oxyCODONE (ROXICODONE) 5 MG tablet     pantoprazole (PROTONIX) 40 MG EC tablet     No current facility-administered medications for this visit.        Past Medical History:   Diagnosis Date     Degenerative joint disease      Metastatic melanoma (H)     L eye     Nonsenile cataract      Past Surgical History:   Procedure Laterality Date     ------------OTHER-------------  2014    back surgery L4/L5      ------------OTHER-------------      knee surgery both 1961 Right, 1971 Left knee     AS REMOVAL OF KIDNEY STONE  2015     IR SIRT (SELECTIVE INTERNAL RADIO THERAPY)  10/13/2020     IR VISCERAL ANGIOGRAM  10/13/2020     IR VISCERAL EMBOLIZATION  10/19/2020     IR VISCERAL EMBOLIZATION  11/16/2020     JOINT REPLACEMENT  2017    Both kneses replaced (2017 and 2018)     ROTATOR CUFF REPAIR RT/LT Right 2016     TURP  2000     Family History   Problem Relation Age of Onset     Glaucoma Mother      Prostate Cancer Father      Cancer Sister      Macular Degeneration No family hx of        PHYSICAL  EXAMINATION  There were no vitals taken for this visit.  Wt Readings from Last 3 Encounters:   11/16/20 90.3 kg (199 lb)   11/13/20 93.4 kg (206 lb)   10/19/20 93.4 kg (206 lb)   Gen: Appears well, no distress, in the home office  HEENT: no scleral icterus, the sclera on the left is injected and pupil is dilated.  Lymph: No lymphadenopathy  CV: regular  Pulm: Good effort  Ext: no edema  Skin: no ecchymoses or hematomas  Neuro: no focal deficits, affect and cognition are normal    The rest of a comprehensive physical examination is deferred due to PHE (public health emergency) video visit restrictions.      IMAGING    PRE - 10/22/2020      POST - 12/2/2020      ASSESSMENT AND PLAN  #1 Metastatic uveal melanoma, to liver and bone  #2 Choroidal melanoma with ciliary body involvement, left eye, status-post plaque brachytherapy,  [employer] Class II and PRAME mRNA positive  It was a pleasure to meet with Mr. Mayberry today. He is a 75 year old man with metastatic uveal melanoma to liver and bone.  He underwent radioembolization to the right hepatic lobe on 10/19. Ipi/Nivo on 10/23, then Y90 to left hepatic lobe on 11/16. The plan is for him to receive cycle 2 of Ipi/Nivo immunotherapy tomorrow.    He has had a mixed response on the most recent imaging, but it is too early to read too much into changes on the MRI. However, I am seeing that some of the smaller lesions are less visible on diffusion weighted imaging. We reviewed his plan for treatment and planned schedule of events. We will continue with the planned infusion tomorrow, then he will take a trip by car to see his grandchildren. He knows to call us with any new or worsening symptoms, particularly related to rashes, breathing or shortness of breath, or diarrhea, all of which are common on immunotherapy.    We will add PET-CT for full body staging around 1/11/2021. Close look at the liver with MRI as scheduled on 1/13/21.    -Would add Zometa to his upcoming  infusions, and will add to plan. Jalyn can review with him at their upcoming appointment later in the month.           Multiple questions answered to patient's apparent satisfaction.     Jw Reyes M.D.   of Medicine  Hematology, Oncology and Transplantation

## 2020-12-03 NOTE — PROGRESS NOTES
"Galileo Mayberry is a 75 year old male who is being evaluated via a billable video visit.      The patient has been notified of following:     \"This video visit will be conducted via a call between you and your physician/provider. We have found that certain health care needs can be provided without the need for an in-person physical exam.  This service lets us provide the care you need with a video conversation.  If a prescription is necessary we can send it directly to your pharmacy.  If lab work is needed we can place an order for that and you can then stop by our lab to have the test done at a later time.    Video visits are billed at different rates depending on your insurance coverage.  Please reach out to your insurance provider with any questions.    If during the course of the call the physician/provider feels a video visit is not appropriate, you will not be charged for this service.\"    Patient has given verbal consent for Video visit? Yes  How would you like to obtain your AVS? MyChart  If you are dropped from the video visit, the video invite should be resent to: Text to cell phone: [phone]  Will anyone else be joining your video visit? No      Brina JOHNSON    Video-Visit Details    Type of service:  Video Visit    Video Start Time: 2:15 PM  Video End Time: 2:45 PM    Originating Location (pt. 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 11/2019, he is diagnosed with ocular melanoma, after a choroidal lesion was found in his Left eye.  Patient reports over the last several months he developed new floaters/spots in his L eye.  2. 11/25/2019, he was seen by ophthalmology who noted a left eye, elevated bilobed lesion, size 6.28mm " x 18.31(T) x 17.04(L).   3. 12/14/19, CT-CAP showed no evidence of metastatic disease.  4. 1/10/2020: Patient referred to [facility]. B-scan ultrasound of left eye showed elevated bilobed lesion measuring 7.2-7.3 height with a base of 21.4 x 22.5 mm. Low to medium reflectivity. Difficult measurements due to location. Ciliary body involvement was noted. Ultrasound basal dimension measurement included subretinal fluid, and clinical measurement had to be done by transillumination due to anterior tumor location.  5. 1/15/2020, visual acuity right eye 20/20 -1, left eye 20/25 +2 nh. Visual fields counting fingers full bilaterally. Clinical fundus exam of left eye revealed choroidal/ciliary body malignant melanoma with basal measurement of 22 x 21 mm and height of 7.5 mm. Located in the Inferior left eye from 4:30-7:30, 15 mm to disc, 15 mm to fovea, +subretinal fluid, bilobed. The tumor without fluid appears to be 1-2 mm smaller in basal dimension (20 x 19 mm).   6. 2/10/2020, Iodine-125 24 mm plaque placement delivering 85 Gy to an 8 mm height. FNA biopsy performed, and molecular testing shows Class II PRAME positive, high-risk of early metastasis.  7. 3/31/2020, he starts adjuvant sunitinib 25 mg daily for high risk disease. He completed 6 months of therapy.  8. 8/26/2020, MRI-liver shows interval development of numerous (more than 30) metastatic lesions in the liver in bilobar distribution. The largest in segment JUAN measures 1.85 cm.  9. 10/19/2020, he undergoes Y90 radioembolization with 72.2 mCi of Theraspheres to the right lobe of the liver; predominantly in hepatic segments 5 and 6, milder uptake in the region of segment 8  10. 10/23/2020, first cycle of Ipilimumab 1mg/kg and nivolumab 3mg/kg  11. 11/16/2020, he has his 2nd Y90 radioembolization with 69 mCi of TheraSpheres to hepatic segments 4A and 4B.           HISTORY OF PRESENT ILLNESS  Mr. 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 He  returns today via video.    He underwent Y90 radioembolization to the right hepatic lobe on 10/19. Ipi/Nivo on 10/23, then Y90 radioembolization to left hepatic lobe on 11/16. Plan is for him to receive cycle 2 of Ipi/Nivo immunotherapy tomorrow.    Review of the restaging MRI-liver imaging per my read shows one large tumor that has increased in size, while others are stable to slightly smaller. Several smaller lesions are no longer easily discernible on contrast-enhanced AP imaging or diffusion weighted imaging. Per my read, this is showing an early mixed response to radioimmunotherapy. Formal read pending at time of our visit.     He has found that the first cycle following the radiation therapy was a bit worse than the second cycle of Y90. He had also been losing weight on the Keto diet. He estimates he has dropped down about 25 lbs to 198 lbs. He then was able to meet dietician, Fiona Briggs, who recommended a mediterranean diet. He found the meeting helpful and feels he is tolerating treatments better overall since the dietary changes. He is taking1-2 docusate tabs as well as 1 cap Miralax daily. He feels stooling is normal, he denies any diarrhea.     He has noted an increase in fatigue with radiation. Last week he was sleeping 10-12 hours a day and now he is sleeping closer to 8 hours a day. He also was started on Doxepin for sleep and that is working better than the trazodone. The Doxepin allows him to have more REM sleep than trazodone, which kind of just knocked him out. 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 He plans to take a trip to see his grandchildren around 12/13.     ECOG performance status is 1.      REVIEW OF SYSTEMS  12-point ROS negative except as in HPI    Current Outpatient Medications   Medication     acetaminophen (TYLENOL) 500 MG tablet     Ascorbic Acid (VITAMIN C) 500 MG CAPS     Cholecalciferol (VITAMIN D3) 25  MCG (1000 UT) CAPS     diclofenac (VOLTAREN) 1 % topical gel     Ferrous Gluconate 240 (27 Fe) MG TABS     finasteride (PROSCAR) 5 MG tablet     HYDROmorphone (DILAUDID) 2 MG tablet     LORazepam (ATIVAN) 0.5 MG tablet     methylphenidate (RITALIN) 10 MG tablet     methylPREDNISolone (MEDROL DOSEPAK) 4 MG tablet therapy pack     methylPREDNISolone (MEDROL) 32 MG tablet     ondansetron (ZOFRAN) 4 MG tablet     polyethylene glycol (MIRALAX) 17 GM/Dose powder     prochlorperazine (COMPAZINE) 10 MG tablet     senna-docusate (SENNA S) 8.6-50 MG tablet     tamsulosin (FLOMAX) 0.4 MG capsule     Testosterone 1.62 % GEL     doxepin (SINEQUAN) 10 MG/ML (HIGH CONC) solution     oxyCODONE (ROXICODONE) 5 MG tablet     pantoprazole (PROTONIX) 40 MG EC tablet     No current facility-administered medications for this visit.        Past Medical History:   Diagnosis Date     Degenerative joint disease      Metastatic melanoma (H)     L eye     Nonsenile cataract      Past Surgical History:   Procedure Laterality Date     ------------OTHER-------------  2014    back surgery L4/L5      ------------OTHER-------------      knee surgery both 1961 Right, 1971 Left knee     AS REMOVAL OF KIDNEY STONE  2015     IR SIRT (SELECTIVE INTERNAL RADIO THERAPY)  10/13/2020     IR VISCERAL ANGIOGRAM  10/13/2020     IR VISCERAL EMBOLIZATION  10/19/2020     IR VISCERAL EMBOLIZATION  11/16/2020     JOINT REPLACEMENT  2017    Both kneses replaced (2017 and 2018)     ROTATOR CUFF REPAIR RT/LT Right 2016     TURP  2000     Family History   Problem Relation Age of Onset     Glaucoma Mother      Prostate Cancer Father      Cancer Sister      Macular Degeneration No family hx of        PHYSICAL EXAMINATION  There were no vitals taken for this visit.  Wt Readings from Last 3 Encounters:   11/16/20 90.3 kg (199 lb)   11/13/20 93.4 kg (206 lb)   10/19/20 93.4 kg (206 lb)   Gen: Appears well, no distress, in the home office  HEENT: no scleral icterus, the sclera  on the left is injected and pupil is dilated.  Lymph: No lymphadenopathy  CV: regular  Pulm: Good effort  Ext: no edema  Skin: no ecchymoses or hematomas  Neuro: no focal deficits, affect and cognition are normal    The rest of a comprehensive physical examination is deferred due to PHE (public health emergency) video visit restrictions.      IMAGING    PRE - 10/22/2020      POST - 12/2/2020      ASSESSMENT AND PLAN  #1 Metastatic uveal melanoma, to liver and bone  #2 Choroidal melanoma with ciliary body involvement, left eye, status-post plaque brachytherapy,  [employer] Class II and PRAME mRNA positive  It was a pleasure to meet with Mr. Mayberry today. He is a 75 year old man with metastatic uveal melanoma to liver and bone.  He underwent radioembolization to the right hepatic lobe on 10/19. Ipi/Nivo on 10/23, then Y90 to left hepatic lobe on 11/16. The plan is for him to receive cycle 2 of Ipi/Nivo immunotherapy tomorrow.    He has had a mixed response on the most recent imaging, but it is too early to read too much into changes on the MRI. However, I am seeing that some of the smaller lesions are less visible on diffusion weighted imaging. We reviewed his plan for treatment and planned schedule of events. We will continue with the planned infusion tomorrow, then he will take a trip by car to see his grandchildren. He knows to call us with any new or worsening symptoms, particularly related to rashes, breathing or shortness of breath, or diarrhea, all of which are common on immunotherapy.    We will add PET-CT for full body staging around 1/11/2021. Close look at the liver with MRI as scheduled on 1/13/21.    -Would add Zometa to his upcoming infusions, and will add to plan. Jalyn can review with him at their upcoming appointment later in the month.           Multiple questions answered to patient's apparent satisfaction.     Jw Reyes M.D.   of Medicine  Hematology,  Oncology and Transplantation

## 2020-12-04 NOTE — NURSING NOTE
Chief Complaint   Patient presents with     Blood Draw     labs drawn via PIV by RN in lab     /65 (BP Location: Right arm, Patient Position: Sitting, Cuff Size: Adult Large)   Pulse 61   Temp 98.4  F (36.9  C) (Oral)   Resp 18   Wt 95.9 kg (211 lb 6.4 oz)   SpO2 97%   BMI 29.48 kg/m      PIV placed to LFA by RN in lab. Line flushed with normal saline. Pt tolerated well.  Checked in for next appointment.    Tonja Collins RN on 12/4/2020 at 12:40 PM

## 2020-12-04 NOTE — PROGRESS NOTES
Infusion Nursing Note:  Galileo Mayberry presents today for Cycle 3 Day 1 Nivolumab, Carlosrvmildred.    Patient seen by provider today: No   present during visit today: Not Applicable.    Note: Evaluated by Dr. Trent yesterday.  Favian reports no changes over night.    Intravenous Access:  Peripheral IV placed in lab    Treatment Conditions:  Lab Results   Component Value Date    HGB 13.3 12/02/2020     Lab Results   Component Value Date    WBC 4.5 12/02/2020      Lab Results   Component Value Date    ANEU 3.3 12/02/2020     Lab Results   Component Value Date     12/02/2020      Lab Results   Component Value Date     12/02/2020                   Lab Results   Component Value Date    POTASSIUM 4.1 12/02/2020           Lab Results   Component Value Date    MAG 2.2 11/13/2020            Lab Results   Component Value Date    CR 1.01 12/02/2020                   Lab Results   Component Value Date    FAUSTO 8.8 12/02/2020                Lab Results   Component Value Date    BILITOTAL 0.4 12/02/2020           Lab Results   Component Value Date    ALBUMIN 3.1 12/02/2020                    Lab Results   Component Value Date    ALT 36 12/02/2020           Lab Results   Component Value Date    AST 34 12/02/2020     TSH   Date Value Ref Range Status   11/13/2020 1.97 0.40 - 4.00 mU/L Final         Post Infusion Assessment:  Patient tolerated infusion without incident.  Blood return noted pre and post infusion.  Site patent and intact, free from redness, edema or discomfort.  No evidence of extravasations.  Access discontinued per protocol.       Discharge Plan:   Patient declined prescription refills.  AVS to patient via [id number].  Patient will return 12/24/20 PA, 12/28 infusion for next appointment.   Patient discharged in stable condition accompanied by: self.  Departure Mode: Ambulatory.  Face to Face time: 0.    Elvira Palacios RN

## 2020-12-07 NOTE — PROGRESS NOTES
Favian called to say he has been unable to obtain the Doxepin 6mg that Dr. Xiao had prescribed on 12.02.2020.    [facility]  And that they had faxed this info to Dr. Xiao.  He did not know which fax number they faxed too.    [facility] at  [phone] option 1.  The representative stated a pharmacist had discontinued the medication as it is non formulary.  The representative directed me to the [facility] formulary on line.  Doxepin is listed as a formulary med, however, it does not list which dosage is covered.    Representative was unable to assist any further due to the time of day (1630)    Will route to [facility]CC to follow up with [facility]

## 2020-12-08 PROBLEM — C79.51: Status: ACTIVE | Noted: 2020-01-01

## 2020-12-08 PROBLEM — C69.32 MALIGNANT MELANOMA OF CHOROID OF LEFT EYE (H): Status: ACTIVE | Noted: 2019-12-10

## 2020-12-08 NOTE — PROGRESS NOTES
Sent script for liquid doxepin to see if [facility] covers that instead - attempted to call [facility] to discuss but on hold for over 30 mins.     Spoke with patient - he advised he spoke to them yesterday, he reports that they have 10mg tabs and offering alternatives. Explained I sent a liquid form of this med instead. He will wait to hear from pharmacy about this.     If liquid unavailable will check with MD about 10mg tabs.     Spoke with pharmacy and liquid should be available - however Epic showing transmission error, will ask MD to sign.         [facility] number patient advised of #[phone], #3 for pharmacy.    #[phone] another option for contact phone.

## 2020-12-14 NOTE — LETTER
"    12/14/2020         RE: Galileo Mayberry  [address]  [facility] [address]        Dear Colleague,    Thank you for referring your patient, Galileo Mayberry, to the [facility]. Please see a copy of my visit note below.    Galileo Mayberry is a 75 year old male who is being evaluated via a billable telephone visit.      The patient has been notified of following:     \"This telephone visit will be conducted via a call between you and your physician/provider. We have found that certain health care needs can be provided without the need for a physical exam.  This service lets us provide the care you need with a short phone conversation.  If a prescription is necessary we can send it directly to your pharmacy.  If lab work is needed we can place an order for that and you can then stop by our lab to have the test done at a later time.    Telephone visits are billed at different rates depending on your insurance coverage. During this emergency period, for some insurers they may be billed the same as an in-person visit.  Please reach out to your insurance provider with any questions.    If during the course of the call the physician/provider feels a telephone visit is not appropriate, you will not be charged for this service.\"    Patient has given verbal consent for Telephone visit?  Yes    What phone number would you like to be contacted at? [phone]    How would you like to obtain your [facility]? [facility]    Phone call duration: 15 minutes    I have reviewed and updated the patient's allergies and medication list. Patient was asked if they had any patient reported vital signs to present, if yes, please see documented vitals.  Patient was also asked for their current weight and height, if presented, documented in vitals.      Concerns: No concerns.    Refills: Refill of Zofran.       Brianna Salcedo CMA (AAMA)    S: Mr. Mayberry is a pleasant 75 year old who returns following y90 " therapy to the right (10/19/2020) and left (11/16/20).  He reports he had significant fatigue for approximately 3 days following the procedure.  However, he has recovered from this.  He has been receiving Ipi/Nivo immunotherapy from Dr. Trent, and completed 3 courses.  He is currently visiting his eldest daughter and her family.    As you remember  Mr. Mayberry began noticing increased floaters in his left eye in fall of 2019.  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  He eventually underwent I-125 brachytherapy and was initiated on sunitinib given his high risk status.  Initial work up failed to show any evidence of metastatic disease.  However, MRI from August demonstrated numerous metastatic deposits which were eventually biopsied at [place] and confirmed as metastatic uveal melanoma.     O:  Lab Results   Component Value Date    WBC 4.5 12/02/2020     Lab Results   Component Value Date    RBC 4.27 12/02/2020     Lab Results   Component Value Date    HGB 13.3 12/02/2020     Lab Results   Component Value Date    HCT 40.5 12/02/2020     Lab Results   Component Value Date    MCV 95 12/02/2020     Lab Results   Component Value Date    MCH 31.1 12/02/2020     Lab Results   Component Value Date    MCHC 32.8 12/02/2020     Lab Results   Component Value Date    RDW 12.8 12/02/2020     Lab Results   Component Value Date     12/02/2020     Last Comprehensive Metabolic Panel:  Sodium   Date Value Ref Range Status   12/02/2020 140 133 - 144 mmol/L Final     Potassium   Date Value Ref Range Status   12/02/2020 4.1 3.4 - 5.3 mmol/L Final     Chloride   Date Value Ref Range Status   12/02/2020 108 94 - 109 mmol/L Final     Carbon Dioxide   Date Value Ref Range Status   12/02/2020 29 20 - 32 mmol/L Final     Anion Gap   Date Value Ref Range Status   12/02/2020 3 3 - 14 mmol/L Final     Glucose   Date Value Ref Range Status   12/02/2020 86 70 - 99 mg/dL Final     Urea Nitrogen   Date Value  Ref Range Status   12/02/2020 14 7 - 30 mg/dL Final     Creatinine   Date Value Ref Range Status   12/02/2020 1.01 0.66 - 1.25 mg/dL Final     GFR Estimate   Date Value Ref Range Status   12/02/2020 71 >60 mL/min/[1.73_m2] Final     Comment:     Non  GFR Calc  Starting 12/18/2018, serum creatinine based estimated GFR (eGFR) will be   calculated using the Chronic Kidney Disease Epidemiology Collaboration   (CKD-EPI) equation.       Calcium   Date Value Ref Range Status   12/02/2020 8.8 8.5 - 10.1 mg/dL Final     Bilirubin Total   Date Value Ref Range Status   12/02/2020 0.4 0.2 - 1.3 mg/dL Final     Alkaline Phosphatase   Date Value Ref Range Status   12/02/2020 91 40 - 150 U/L Final     ALT   Date Value Ref Range Status   12/02/2020 36 0 - 70 U/L Final     AST   Date Value Ref Range Status   12/02/2020 34 0 - 45 U/L Final     MRI: I reviewed his MRI from 12/2/20 which demonstrates a nice treatment response with decreased internal enhancement.      A/P: Mr. Mayberry is doing well following y90 therapy to the right (10/19/2020) and left (11/16/20).  He had significant fatigue for 3 days following treatment but now has recovered.  He has received 3 cycles of Ipi/Nivo.  I will plan to follow up with him in 2 months with imaging and clinic at that time.     Again, thank you for allowing me to participate in the care of your patient.        Sincerely,        Alberto Storey MD

## 2020-12-14 NOTE — PROGRESS NOTES
"Galileo Mayberry is a 75 year old male who is being evaluated via a billable telephone visit.      The patient has been notified of following:     \"This telephone visit will be conducted via a call between you and your physician/provider. We have found that certain health care needs can be provided without the need for a physical exam.  This service lets us provide the care you need with a short phone conversation.  If a prescription is necessary we can send it directly to your pharmacy.  If lab work is needed we can place an order for that and you can then stop by our lab to have the test done at a later time.    Telephone visits are billed at different rates depending on your insurance coverage. During this emergency period, for some insurers they may be billed the same as an in-person visit.  Please reach out to your insurance provider with any questions.    If during the course of the call the physician/provider feels a telephone visit is not appropriate, you will not be charged for this service.\"    Patient has given verbal consent for Telephone visit?  Yes    What phone number would you like to be contacted at? [phone]    How would you like to obtain your AVS? MyChart    Phone call duration: 15 minutes    I have reviewed and updated the patient's allergies and medication list. Patient was asked if they had any patient reported vital signs to present, if yes, please see documented vitals.  Patient was also asked for their current weight and height, if presented, documented in vitals.      Concerns: No concerns.    Refills: Refill of Zofran.       Brianna Slacedo CMA (AAMA)    S: Mr. Mayberry is a pleasant 75 year old who returns following y90 therapy to the right (10/19/2020) and left (11/16/20).  He reports he had significant fatigue for approximately 3 days following the procedure.  However, he has recovered from this.  He has been receiving Ipi/Nivo immunotherapy from Dr. Trent, and completed 3 courses.  " He is currently visiting his eldest daughter and her family.    As you remember  Mr. Mayberry began noticing increased floaters in his left eye in fall of 2019.  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  He eventually underwent I-125 brachytherapy and was initiated on sunitinib given his high risk status.  Initial work up failed to show any evidence of metastatic disease.  However, MRI from August demonstrated numerous metastatic deposits which were eventually biopsied at [place] and confirmed as metastatic uveal melanoma.     O:  Lab Results   Component Value Date    WBC 4.5 12/02/2020     Lab Results   Component Value Date    RBC 4.27 12/02/2020     Lab Results   Component Value Date    HGB 13.3 12/02/2020     Lab Results   Component Value Date    HCT 40.5 12/02/2020     Lab Results   Component Value Date    MCV 95 12/02/2020     Lab Results   Component Value Date    MCH 31.1 12/02/2020     Lab Results   Component Value Date    MCHC 32.8 12/02/2020     Lab Results   Component Value Date    RDW 12.8 12/02/2020     Lab Results   Component Value Date     12/02/2020     Last Comprehensive Metabolic Panel:  Sodium   Date Value Ref Range Status   12/02/2020 140 133 - 144 mmol/L Final     Potassium   Date Value Ref Range Status   12/02/2020 4.1 3.4 - 5.3 mmol/L Final     Chloride   Date Value Ref Range Status   12/02/2020 108 94 - 109 mmol/L Final     Carbon Dioxide   Date Value Ref Range Status   12/02/2020 29 20 - 32 mmol/L Final     Anion Gap   Date Value Ref Range Status   12/02/2020 3 3 - 14 mmol/L Final     Glucose   Date Value Ref Range Status   12/02/2020 86 70 - 99 mg/dL Final     Urea Nitrogen   Date Value Ref Range Status   12/02/2020 14 7 - 30 mg/dL Final     Creatinine   Date Value Ref Range Status   12/02/2020 1.01 0.66 - 1.25 mg/dL Final     GFR Estimate   Date Value Ref Range Status   12/02/2020 71 >60 mL/min/[1.73_m2] Final     Comment:     Non  GFR  Calc  Starting 12/18/2018, serum creatinine based estimated GFR (eGFR) will be   calculated using the Chronic Kidney Disease Epidemiology Collaboration   (CKD-EPI) equation.       Calcium   Date Value Ref Range Status   12/02/2020 8.8 8.5 - 10.1 mg/dL Final     Bilirubin Total   Date Value Ref Range Status   12/02/2020 0.4 0.2 - 1.3 mg/dL Final     Alkaline Phosphatase   Date Value Ref Range Status   12/02/2020 91 40 - 150 U/L Final     ALT   Date Value Ref Range Status   12/02/2020 36 0 - 70 U/L Final     AST   Date Value Ref Range Status   12/02/2020 34 0 - 45 U/L Final     MRI: I reviewed his MRI from 12/2/20 which demonstrates a nice treatment response with decreased internal enhancement.      A/P: Mr. Mayberry is doing well following y90 therapy to the right (10/19/2020) and left (11/16/20).  He had significant fatigue for 3 days following treatment but now has recovered.  He has received 3 cycles of Ipi/Nivo.  I will plan to follow up with him in 2 months with imaging and clinic at that time.

## 2020-12-15 NOTE — PROGRESS NOTES
Attempted to call patient to check in about doxepin - but was unable to reach him. Left VM asking for call/Vayusat message with update. Mychart message also sent.

## 2020-12-24 NOTE — PROGRESS NOTES
"Galileo Mayberry is a 75 year old male who is being evaluated via a billable telephone visit.      The patient has been notified of following:     \"This telephone visit will be conducted via a call between you and your physician/provider. We have found that certain health care needs can be provided without the need for a physical exam.  This service lets us provide the care you need with a short phone conversation.  If a prescription is necessary we can send it directly to your pharmacy.  If lab work is needed we can place an order for that and you can then stop by our lab to have the test done at a later time.    Telephone visits are billed at different rates depending on your insurance coverage. During this emergency period, for some insurers they may be billed the same as an in-person visit.  Please reach out to your insurance provider with any questions.    If during the course of the call the physician/provider feels a telephone visit is not appropriate, you will not be charged for this service.\"    Patient has given verbal consent for Telephone visit?  Yes    What phone number would you like to be contacted at? [phone]    How would you like to obtain your AVS? Robbie    Vitals - Patient Reported  Weight (Patient Reported): 90.3 kg (199 lb)  Height (Patient Reported): 180.3 cm (5' 11\")  BMI (Based on Pt Reported Ht/Wt): 27.75  Pain Score: No Pain (0)        I have reviewed and updated patient's allergy and medication list.    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 11/2019, he is diagnosed with ocular melanoma, after a choroidal lesion was found in his Left eye.  Patient reports over the last several months he developed new floaters/spots in his L eye.  2. 11/25/2019, he was seen by " ophthalmology who noted a left eye, elevated bilobed lesion, size 6.28mm x 18.31(T) x 17.04(L).   3. 12/14/19, CT-CAP showed no evidence of metastatic disease.  4. 1/10/2020: Patient referred to [facility]. B-scan ultrasound of left eye showed elevated bilobed lesion measuring 7.2-7.3 height with a base of 21.4 x 22.5 mm. Low to medium reflectivity. Difficult measurements due to location. Ciliary body involvement was noted. Ultrasound basal dimension measurement included subretinal fluid, and clinical measurement had to be done by transillumination due to anterior tumor location.  5. 1/15/2020, visual acuity right eye 20/20 -1, left eye 20/25 +2 nh. Visual fields counting fingers full bilaterally. Clinical fundus exam of left eye revealed choroidal/ciliary body malignant melanoma with basal measurement of 22 x 21 mm and height of 7.5 mm. Located in the Inferior left eye from 4:30-7:30, 15 mm to disc, 15 mm to fovea, +subretinal fluid, bilobed. The tumor without fluid appears to be 1-2 mm smaller in basal dimension (20 x 19 mm)  6. 2/10/2020, Iodine-125 24 mm plaque placement delivering 85 Gy to an 8 mm height.  7. 3/31/2020, he starts adjuvant sunitinib 25 mg daily for high risk disease, Class II molecular signature and PRAME mRNA positive, high-risk of early metastasis. He completed 6 months of therapy.  8. 8/26/2020, MRI-liver shows interval development of numerous (more than 30) metastatic lesions in the liver in bilobar distribution. The largest in segment JUAN measures 1.85 cm.  9. 10/19/2020, he undergoes Y90 radioembolization with 72.2 mCi of Theraspheres to the right lobe of the liver; predominantly in hepatic segments 5 and 6, milder uptake in the region of segment 8  10. 10/22/20, MR abdomen showed significantly increased in size and numbers of the innumerable  metastatic liver lesions since 8/26/2020 as well as multiple metastatic bone lesions in the visualized thoracolumbar spine and pelvic bones. We  also obtainined his PD-L1 testing result, and it did show PD-L1 negative disease.  11. 10/23/2020, first cycle of Ipilimumab 1mg/kg and nivolumab 3mg/kg initiated with palliative intent.  12. 11/13/2020 C2 ipi/nivo  13. Y-90 treatment 11/16 with Dr. Storey.  14. 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 12/4/2020 cycle 3 ipi/nivo    Interim History:  -Has been feeling pretty good overall. Had 2 bad days with some fatigue and weakness.  -Energy is fair. Shoveled snow this morning.   -Has an appt with [facility] in early January with eye doctor. Vision seems more blurry lately.   -Got new hearing aides.   -Has some dyspnea with exertion associated with fatigue.   -Has had intermittent nausea without vomiting. Has been taking Zofran and Tums with some relief.   -No longer taking medication to help him sleep.  -Stopped Metamucil and Senna and feels bowels are working okay without medication.   -Has noticed a musty smell in his urine for the past month. No dysuria or hematuria recently.   -Has all over body itching that is constant, but mild. Denies associated rash.   -Last dental visit was about 3 weeks.    Review of Systems:  Patient denies any of the following except if noted above: fevers, chills, hearing changes, chest pain, dyspnea at rest, abdominal pain, vomiting, diarrhea, constipation, other urinary concerns, headaches, numbness, tingling, issues with sleep or mood. He also denies lumps, bumps, rashes or skin lesions, bleeding or bruising issues.    Current Outpatient Medications   Medication Sig Dispense Refill     acetaminophen (TYLENOL) 500 MG tablet Take 1,000 mg by mouth       Ascorbic Acid (VITAMIN C) 500 MG CAPS Take 500 mg by mouth       Cholecalciferol (VITAMIN D3) 25 MCG (1000 UT) CAPS Takes 3 daily       diclofenac (VOLTAREN) 1 % topical gel        doxepin (SINEQUAN) 10 MG/ML (HIGH CONC) solution Take 0.6 mLs (6 mg) by mouth At Bedtime 18 mL 1     Ferrous Gluconate 240 (27 Fe)  MG TABS Take 240 mg by mouth       finasteride (PROSCAR) 5 MG tablet Take 5 mg by mouth       HYDROmorphone (DILAUDID) 2 MG tablet Take 1 tablet (2 mg) by mouth every 6 hours as needed for pain 10 tablet 0     LORazepam (ATIVAN) 0.5 MG tablet Take 1 tablet (0.5 mg) by mouth every 4 hours as needed (Anxiety, Nausea/Vomiting or Sleep) 30 tablet 3     methylphenidate (RITALIN) 10 MG tablet as needed       methylPREDNISolone (MEDROL DOSEPAK) 4 MG tablet therapy pack Take 1 tablet (4 mg) by mouth 2 times daily Take as directed on package. 21 tablet 0     methylPREDNISolone (MEDROL) 32 MG tablet Please take 32 mg, 12 hours prior to procedure, take another 32 mg, 2 hours prior to procedure for contrast allergy. 2 tablet 0     ondansetron (ZOFRAN) 4 MG tablet Take 1-2 tablets (4-8 mg) by mouth every 6 hours as needed for nausea (vomiting) 40 tablet 0     polyethylene glycol (MIRALAX) 17 GM/Dose powder Take 17 g (1 capful) by mouth 2 times daily 255 g 1     prochlorperazine (COMPAZINE) 10 MG tablet Take 1 tablet (10 mg) by mouth every 6 hours as needed (Nausea/Vomiting) 30 tablet 3     senna-docusate (SENNA S) 8.6-50 MG tablet Take 1 tablet by mouth 2 times daily as needed for constipation 60 tablet 1     tamsulosin (FLOMAX) 0.4 MG capsule Take 0.4 mg by mouth       Testosterone 1.62 % GEL        REVIEW OF SYSTEMS  12-point ROS negative except as in HPI    Past Medical History:   Diagnosis Date     Degenerative joint disease      Metastatic melanoma (H)     L eye     Nonsenile cataract      Past Surgical History:   Procedure Laterality Date     ------------OTHER-------------  2014    back surgery L4/L5      ------------OTHER-------------      knee surgery both 1961 Right, 1971 Left knee     AS REMOVAL OF KIDNEY STONE  2015     IR SIRT (SELECTIVE INTERNAL RADIO THERAPY)  10/13/2020     IR VISCERAL ANGIOGRAM  10/13/2020     IR VISCERAL EMBOLIZATION  10/19/2020     IR VISCERAL EMBOLIZATION  11/16/2020     JOINT REPLACEMENT   "2017    Both kneses replaced (2017 and 2018)     ROTATOR CUFF REPAIR RT/LT Right 2016     TURP  2000     Family History   Problem Relation Age of Onset     Glaucoma Mother      Prostate Cancer Father      Cancer Sister      Macular Degeneration No family hx of      Objective:  There were no vitals taken for this visit.  General: alert and no distress  Psych: Alert and oriented times; coherent speech, normal rate and volume, able to articulate logical thoughts, able to abstract reason, no tangential thoughts, no hallucinations or delusions  Patient's affect is appropriate.   Pulm: Speaking in full sentences, unlabored, no audible wheezes or cough.  The rest of a comprehensive physical examination is deferred due to PHE (public health emergency) video restrictions\"    LABS:   12/23/2020 11:59   WBC 4.6   Hemoglobin 14.1   Hematocrit 42.2   Platelet Count 180   RBC Count 4.60   MCV 92   MCH 30.7   MCHC 33.4   RDW 13.3   Diff Method Automated Method   % Neutrophils 64.5   % Lymphocytes 20.0   % Monocytes 11.2   % Eosinophils 3.9   % Basophils 0.4   Absolute Neutrophil 3.0   Absolute Lymphocytes 0.9   Absolute Monocytes 0.5   Absolute Eosinophils 0.2   Absolute Basophils 0.0       ASSESSMENT AND PLAN  #Uveal melanoma with metastasis to liver and bone  #Choroidal melanoma with ciliary body involvement, left eye, status-post plaque brachytherapy,  [employer] Class II and PRAME mRNA positive  Mr. Mayberry is a 75 year old man with uveal melanoma with metastasis to the liver and bone. 8/26/2020, MRI-liver shows interval development of numerous (more than 30) metastatic lesions in the liver in bilobar distribution. On 10/19/2020, he underwent Y90 radioembolization with 72.2 mCi of theraspheres to the right lobe of the liver. 10/22/20 MR abdomen showed significantly increased in size and numbers of the innumerable metastatic liver lesions, as well as multiple metastatic bone lesions in the visualized thoracolumbar spine " and pelvic bones.     On 10/23/2020, patient received his first cycle of Ipilimumab 1mg/kg and nivolumab 3mg/kg with palliative intent. He is tolerating treatment fairly well. He underwent Y90 treatment on 11/16/20. Abdominal MRI in early December showed likely pseudoprogression.  He will proceed with cycle 4 ipi/nivo next week. He will follow-up with Dr. Trent in mid-January with an abdominal MRI and PET/CT.     Bone mets.Patient will start on Zometa next week. He is already taking vitamin D, but will start on calcium 500-600 mg bid. We discussed the potential for body aches, flu like symptoms, hypocalcemia, and the rare risk of osteonecrosis of the jaw. He recently saw his dentist and denies any dental issues.     Vaccination. Patient will receive the influenza vaccine next week.    Pruritus. Secondary to immune therapy. Recommend using Aveeno lotion and may use topical and/or Benadryl. May also use oatmeal baths.     Jalyn Greenberg PA-C  [facility]  [address] [place] [id number] [phone]

## 2020-12-24 NOTE — Clinical Note
"    12/24/2020         RE: Galileo Mayberry  [address]  [facility] [address]        Dear Colleague,    Thank you for referring your patient, Galileo Mayberry, to the [facility]. Please see a copy of my visit note below.    Galileo Mayebrry is a 75 year old male who is being evaluated via a billable telephone visit.      The patient has been notified of following:     \"This telephone visit will be conducted via a call between you and your physician/provider. We have found that certain health care needs can be provided without the need for a physical exam.  This service lets us provide the care you need with a short phone conversation.  If a prescription is necessary we can send it directly to your pharmacy.  If lab work is needed we can place an order for that and you can then stop by our lab to have the test done at a later time.    Telephone visits are billed at different rates depending on your insurance coverage. During this emergency period, for some insurers they may be billed the same as an in-person visit.  Please reach out to your insurance provider with any questions.    If during the course of the call the physician/provider feels a telephone visit is not appropriate, you will not be charged for this service.\"    Patient has given verbal consent for Telephone visit?  Yes    What phone number would you like to be contacted at? [phone]    How would you like to obtain your AVS? MyChart    Vitals - Patient Reported  Weight (Patient Reported): 90.3 kg (199 lb)  Height (Patient Reported): 180.3 cm (5' 11\")  BMI (Based on Pt Reported Ht/Wt): 27.75  Pain Score: No Pain (0)        I have reviewed and updated patient's allergy and medication list.    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 11/2019, he is diagnosed with ocular melanoma, after a choroidal lesion was found in his Left eye.  Patient reports over the last several months he developed new floaters/spots in his L eye.  2. 11/25/2019, he was seen by ophthalmology who noted a left eye, elevated bilobed lesion, size 6.28mm x 18.31(T) x 17.04(L).   3. 12/14/19, CT-CAP showed no evidence of metastatic disease.  4. 1/10/2020: Patient referred to [facility]. B-scan ultrasound of left eye showed elevated bilobed lesion measuring 7.2-7.3 height with a base of 21.4 x 22.5 mm. Low to medium reflectivity. Difficult measurements due to location. Ciliary body involvement was noted. Ultrasound basal dimension measurement included subretinal fluid, and clinical measurement had to be done by transillumination due to anterior tumor location.  5. 1/15/2020, visual acuity right eye 20/20 -1, left eye 20/25 +2 nh. Visual fields counting fingers full bilaterally. Clinical fundus exam of left eye revealed choroidal/ciliary body malignant melanoma with basal measurement of 22 x 21 mm and height of 7.5 mm. Located in the Inferior left eye from 4:30-7:30, 15 mm to disc, 15 mm to fovea, +subretinal fluid, bilobed. The tumor without fluid appears to be 1-2 mm smaller in basal dimension (20 x 19 mm)  6. 2/10/2020, Iodine-125 24 mm plaque placement delivering 85 Gy to an 8 mm height.  7. 3/31/2020, he starts adjuvant sunitinib 25 mg daily for high risk disease, Class II molecular signature and PRAME mRNA positive, high-risk of early metastasis. He completed 6 months of therapy.  8. 8/26/2020, MRI-liver shows interval development of numerous (more than 30) metastatic lesions in the liver in bilobar distribution. The largest in segment JUAN measures 1.85 cm.  9. 10/19/2020, he undergoes Y90 radioembolization with 72.2 mCi of Theraspheres to the right lobe of the liver; predominantly in hepatic segments 5 and 6, milder uptake in  the region of segment 8  10. 10/22/20, MR abdomen showed significantly increased in size and numbers of the innumerable  metastatic liver lesions since 8/26/2020 as well as multiple metastatic bone lesions in the visualized thoracolumbar spine and pelvic bones. We also obtainined his PD-L1 testing result, and it did show PD-L1 negative disease.  11. 10/23/2020, first cycle of Ipilimumab 1mg/kg and nivolumab 3mg/kg initiated with palliative intent.  12. 11/13/2020 C2 completed.  13. Y-90 treatment 11/16 with Dr. Storey.    Interim History:  -Has been feeling pretty good overall. Had 2 bad days with some fatigue and weakness.  -Energy is fair. Shoveled snow this morning.   -Has an appt with [place] in early January with eye doctor. Vision seems more blurry lately.   -Got new hearing aides.   -Has some dyspnea with exertion associated with fatigue.   -Has had intermittent nausea without vomiting. Has been taking Zofran and Tums with some relief.   -No longer taking medication to help him sleep.  -Stopped Metamucil and Senna and feels bowels are working okay without medication.   -Has noticed a musty smell in his urine for the past month. No dysuria or hematuria recently.   -Has all over body itching that is constant, but mild. Denies associated rash.   -Last dental visit was about 3 weeks.    Zometa- calcium  Flu shot on Monday    Pruritus. Recommend using Aveeno lotion and may use topical and/or Benadryl. May oatmeal baths.     Review of Systems:  Patient denies any of the following except if noted above: fevers, chills, hearing changes, chest pain, dyspnea at rest, abdominal pain, vomiting, diarrhea, constipation, other urinary concerns, headaches, numbness, tingling, issues with sleep or mood. He also denies lumps, bumps, rashes or skin lesions, bleeding or bruising issues.      Current Outpatient Medications   Medication Sig Dispense Refill     acetaminophen (TYLENOL) 500 MG tablet Take 1,000 mg by mouth       Ascorbic  Acid (VITAMIN C) 500 MG CAPS Take 500 mg by mouth       Cholecalciferol (VITAMIN D3) 25 MCG (1000 UT) CAPS Takes 3 daily       diclofenac (VOLTAREN) 1 % topical gel        doxepin (SINEQUAN) 10 MG/ML (HIGH CONC) solution Take 0.6 mLs (6 mg) by mouth At Bedtime 18 mL 1     Ferrous Gluconate 240 (27 Fe) MG TABS Take 240 mg by mouth       finasteride (PROSCAR) 5 MG tablet Take 5 mg by mouth       HYDROmorphone (DILAUDID) 2 MG tablet Take 1 tablet (2 mg) by mouth every 6 hours as needed for pain 10 tablet 0     LORazepam (ATIVAN) 0.5 MG tablet Take 1 tablet (0.5 mg) by mouth every 4 hours as needed (Anxiety, Nausea/Vomiting or Sleep) 30 tablet 3     methylphenidate (RITALIN) 10 MG tablet as needed       methylPREDNISolone (MEDROL DOSEPAK) 4 MG tablet therapy pack Take 1 tablet (4 mg) by mouth 2 times daily Take as directed on package. 21 tablet 0     methylPREDNISolone (MEDROL) 32 MG tablet Please take 32 mg, 12 hours prior to procedure, take another 32 mg, 2 hours prior to procedure for contrast allergy. 2 tablet 0     ondansetron (ZOFRAN) 4 MG tablet Take 1-2 tablets (4-8 mg) by mouth every 6 hours as needed for nausea (vomiting) 40 tablet 0     polyethylene glycol (MIRALAX) 17 GM/Dose powder Take 17 g (1 capful) by mouth 2 times daily 255 g 1     prochlorperazine (COMPAZINE) 10 MG tablet Take 1 tablet (10 mg) by mouth every 6 hours as needed (Nausea/Vomiting) 30 tablet 3     senna-docusate (SENNA S) 8.6-50 MG tablet Take 1 tablet by mouth 2 times daily as needed for constipation 60 tablet 1     tamsulosin (FLOMAX) 0.4 MG capsule Take 0.4 mg by mouth       Testosterone 1.62 % GEL        REVIEW OF SYSTEMS  12-point ROS negative except as in HPI    Past Medical History:   Diagnosis Date     Degenerative joint disease      Metastatic melanoma (H)     L eye     Nonsenile cataract      Past Surgical History:   Procedure Laterality Date     ------------OTHER-------------  2014    back surgery L4/L5       "------------OTHER-------------      knee surgery both 1961 Right, 1971 Left knee     AS REMOVAL OF KIDNEY STONE  2015     IR SIRT (SELECTIVE INTERNAL RADIO THERAPY)  10/13/2020     IR VISCERAL ANGIOGRAM  10/13/2020     IR VISCERAL EMBOLIZATION  10/19/2020     IR VISCERAL EMBOLIZATION  11/16/2020     JOINT REPLACEMENT  2017    Both kneses replaced (2017 and 2018)     ROTATOR CUFF REPAIR RT/LT Right 2016     TURP  2000     Family History   Problem Relation Age of Onset     Glaucoma Mother      Prostate Cancer Father      Cancer Sister      Macular Degeneration No family hx of      Objective:  There were no vitals taken for this visit.  General: alert and no distress  Psych: Alert and oriented times; coherent speech, normal rate and volume, able to articulate logical thoughts, able to abstract reason, no tangential thoughts, no hallucinations or delusions  Patient's affect is appropriate.   Pulm: Speaking in full sentences, unlabored, no audible wheezes or cough.  The rest of a comprehensive physical examination is deferred due to PHE (public health emergency) video restrictions\"    LABS:  No new labs to review this visit.    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 Mayberry is a 75 year old man with uveal melanoma with metastasis to the liver and bone. 8/26/2020, MRI-liver shows interval development of numerous (more than 30) metastatic lesions in the liver in bilobar distribution. On 10/19/2020, he underwent Y90 radioembolization with 72.2 mCi of theraspheres to the right lobe of the liver. 10/22/20 MR abdomen showed significantly increased in size and numbers of the innumerable metastatic liver lesions, as well as multiple metastatic bone lesions in the visualized thoracolumbar spine and pelvic bones.     On 10/23/2020, patient received " his first cycle of Ipilimumab 1mg/kg and nivolumab 3mg/kg with palliative intent. Patient tolerated C1 fairly well, except for increased fatigue, nausea and CHUYITA. No current signs of immune mediated toxicities. Favian was called today for a check-in after receiving C2 11/13/20 and Y-90 treatment 11/16 with Dr. Storey. Thus far, he is tolerating well, though is starting to have some fatigue and nausea and is nervous about the potential worsening of these symptoms in the near future.     In summary:  -MRI liver prior to visit with Dr. Trent 12/3, then cycle 3 following visit.   -Cycle 4 will tentatively be on 12/23  -MRI liver pior to visit with Dr. Trent 1/14/2021, then cycle 5 with nivo alone following visit     #Coping  Patient has had a difficult time processing his prognosis and is apprehensive about the side effects of his treatments.  -Provided a referral to Palliative Care team, which patient was very responsive to  -Discussed that our care coordinator Sayra will plan to check in with him 11/20 by phone  -Provided triage line. Reminded patient he can call us with any new concerns and reassured him that we are here for him    #Weight Loss  #Loss of Appetite  Lost 25 lbs since initial diagnosis and complains of loss of appetite week 2 of his cycles.   -Referral provided for Dietician    #Nausea  Patient complained of nausea a few days after his C1 of nivo/ipi. He states that he felt well ~5 days after C2, but started feeling nauseated again this morning. Reports Zofran more effective than compazine. Denies symptoms of reflux or abdominal pain that could indicate gastritis. No other neuro deficits or concerns that would prompt brain imaging at this time.   -Continue Zofran PO    #Constipation  Patient experienced some constipation 2/2 Zofran and the Ferrous Sulfate he takes. However, has had regular bowel movements this cycle so far due to change in his bowel regimen made last week to Miralax + Senna.  -Bowel  regimen of Senna + Miralax twice daily     #Urinary Frequency  Patient experienced urinary frequency last night well above his baseline (7 times last night vs. 2 at baseline). No hematuria or flank pain. No dysuria, fever/chills, back pain.  -Continue to monitor    #Skin Irritation  Very mild and localized. Almost resembles an abrasion. Not actively concerning for immune mediated toxicity.   -Recommended Cerave moisturizer, as well as OTC hydrocortisone cream PRN should it become itchy. Monitor    Jalyn Greenberg PA-C  [facility] [place] [address]  [phone]                Again, thank you for allowing me to participate in the care of your patient.        Sincerely,        Jalyn Greenberg PA-C

## 2020-12-28 NOTE — PATIENT INSTRUCTIONS
[facility] Triage and after hours / weekends / holidays:  [phone]    Please call the triage or after hours line if you experience a temperature greater than or equal to 100.5, shaking chills, have uncontrolled nausea, vomiting and/or diarrhea, dizziness, shortness of breath, chest pain, bleeding, unexplained bruising, or if you have any other new/concerning symptoms, questions or concerns.      If you are having any concerning symptoms or wish to speak to a provider before your next infusion visit, please call your care coordinator or triage to notify them so we can adequately serve you.     If you need a refill on a narcotic prescription or other medication, please call before your infusion appointment.           December 2020 Sunday Monday Tuesday Wednesday Thursday Friday Saturday             1     2    MR ABDOMEN WWO  11:30 AM   (60 min.)   [email or web address]   [facility]    VIDEO VISIT NEW   2:30 PM   (75 min.)   Stacey Xiao MD   [facility] 3    VIDEO VISIT RETURN   2:00 PM   (30 min.)   Jw Burden MD   [facility] 4    LAB PERIPHERAL  11:30 AM   (15 min.)    [facility] DRAW   [facility] ONC INFUSION 120  12:00 PM   (120 min.)   [facility] ONCOL[facility]Y INFUSION   [facility] 5       6     7     8     9     10     11     12       13     14    TELEPHONE VISIT RETURN  12:30 PM   (20 min.)   Alberto Storey MD   [facility] 15     16     17     18     19       20     21     22     23    LAB  11:30 AM   (15 min.)   EC LAB   [facility] 24    TELEPHONE VISIT RETURN  11:10 AM   (50 min.)   Jalyn Greenberg PA-C   [facility] 25     26       27     28    [facility] ONC INFUSION 120  11:00 AM   (120 min.)   [facility] ONCOLOGY INFUSION   [facility]  29     30 31 January 2021 Sunday Monday Tuesday Wednesday Thursday Friday Saturday                            1     2       3     4     5     6    VIDEO VISIT RETURN  12:30 PM   (75 min.)   Stacey Xiao MD   [facility] 7     8     9       10     11    LAB   6:30 AM   (15 min.)   [facility] LAB GOLD WAITING   [facility]    MR ABDOMEN WWO   7:00 AM   (60 min.)   UUMR1   [facility]    PET ONCOLOGY WHOLE BODY  10:30 AM   (45 min.)   [id number]   [facility] 12     13     14    VIDEO VISIT RETURN  12:30 PM   (30 min.)   Jw Burden MD   [facility] 15    [facility] ONC INFUSION 120  11:00 AM   (120 min.)   [facility] ONCOLOGY INFUSION   [facility] 16       17     18     19     20     21     22     23       24     25    TELEPHONE VISIT RETURN   4:40 PM   (20 min.)   Alberto Storey MD   [facility] 26     27     28     29     30       31                                                No results found for this or any previous visit (from the past 24 hour(s)).

## 2020-12-28 NOTE — PROGRESS NOTES
Infusion Nursing Note:  Galileo Mayberry presents today for cycle 4, day 1 opdivo, yervoy, zometa, flu shot.    Patient seen by provider today: No  Visit with KAREEN Clemons on 12/24/2020 with no changes   present during visit today: Not Applicable.    Note: Zometa is new for patient today. Handout from [employer] given and potential side effects reviewed. Patient recently had a dental cleaning and exam. Patient instructed to start on a calcium with vitamin d supplement and [facility], KAREEN Clemons inbasketed.      Intravenous Access:  Peripheral IV placed.    Treatment Conditions:  Lab Results   Component Value Date    HGB 14.1 12/23/2020     Lab Results   Component Value Date    WBC 4.6 12/23/2020      Lab Results   Component Value Date    ANEU 3.0 12/23/2020     Lab Results   Component Value Date     12/23/2020      Lab Results   Component Value Date     12/23/2020                   Lab Results   Component Value Date    POTASSIUM 4.2 12/23/2020           Lab Results   Component Value Date    MAG 2.1 12/23/2020            Lab Results   Component Value Date    CR 1.03 12/23/2020                   Lab Results   Component Value Date    FAUSTO 9.2 12/23/2020                Lab Results   Component Value Date    BILITOTAL 0.6 12/23/2020           Lab Results   Component Value Date    ALBUMIN 3.4 12/23/2020                    Lab Results   Component Value Date    ALT 39 12/23/2020           Lab Results   Component Value Date    AST 43 12/23/2020       Results reviewed, labs MET treatment parameters, ok to proceed with treatment.      Post Infusion Assessment:  Patient tolerated infusion without incident.  Patient tolerated injection without incident. Flu shot given without incident into left deltoid  Blood return noted pre and post infusion.  Site patent and intact, free from redness, edema or discomfort.  No evidence of extravasations.  Access discontinued  per protocol.       Discharge Plan:   Patient declined prescription refills.  Discharge instructions reviewed with: Patient.  Patient and/or family verbalized understanding of discharge instructions and all questions answered.  AVS to patient via [employer].  Patient will return 1/14/2020 for next appointment with Dr Trent.   Patient discharged in stable condition accompanied by: self.  Departure Mode: Ambulatory.  Face to Face time: 0.    ERIKA Blanco      1.  Has the patient received the information for the influenza vaccine? YES    2.  Does the patient have any of the following contraindications?     Allergy to eggs? No     Allergic reaction to previous influenza vaccines? No     Any other problems to previous influenza vaccines? No     Paralyzed by Guillain-[place] syndrome? No     Currently pregnant? NO     Current moderate or severe illness? No     Allergy to contact lens solution? No    3.  The vaccine has been administered in the usual fashion and the patient was instructed to wait 20 minutes before leaving the building in the event of an allergic reaction: YES    Vaccination given by Unique Torres RN.  Recorded by Unique Torres RN

## 2021-01-01 ENCOUNTER — HOSPITAL ENCOUNTER (OUTPATIENT)
Dept: PET IMAGING | Facility: CLINIC | Age: 76
Setting detail: NUCLEAR MEDICINE
Discharge: HOME | End: 2021-04-27
Attending: INTERNAL MEDICINE | Admitting: INTERNAL MEDICINE
Payer: COMMERCIAL

## 2021-01-01 ENCOUNTER — VIRTUAL VISIT (OUTPATIENT)
Dept: PALLIATIVE CARE | Facility: CLINIC | Age: 76
End: 2021-01-01
Attending: INTERNAL MEDICINE
Payer: COMMERCIAL

## 2021-01-01 ENCOUNTER — APPOINTMENT (OUTPATIENT)
Dept: RADIATION ONCOLOGY | Facility: CLINIC | Age: 76
End: 2021-01-01
Attending: RADIOLOGY
Payer: COMMERCIAL

## 2021-01-01 ENCOUNTER — APPOINTMENT (OUTPATIENT)
Dept: GENERAL RADIOLOGY | Facility: CLINIC | Age: 76
End: 2021-01-01
Attending: EMERGENCY MEDICINE
Payer: COMMERCIAL

## 2021-01-01 ENCOUNTER — HEALTH MAINTENANCE LETTER (OUTPATIENT)
Age: 76
End: 2021-01-01

## 2021-01-01 ENCOUNTER — VIRTUAL VISIT (OUTPATIENT)
Dept: ONCOLOGY | Facility: CLINIC | Age: 76
End: 2021-01-01
Attending: INTERNAL MEDICINE
Payer: COMMERCIAL

## 2021-01-01 ENCOUNTER — APPOINTMENT (OUTPATIENT)
Dept: LAB | Facility: CLINIC | Age: 76
End: 2021-01-01
Attending: INTERNAL MEDICINE
Payer: COMMERCIAL

## 2021-01-01 ENCOUNTER — MYC MEDICAL ADVICE (OUTPATIENT)
Dept: ONCOLOGY | Facility: CLINIC | Age: 76
End: 2021-01-01

## 2021-01-01 ENCOUNTER — TELEPHONE (OUTPATIENT)
Dept: OPHTHALMOLOGY | Facility: CLINIC | Age: 76
End: 2021-01-01

## 2021-01-01 ENCOUNTER — HOSPITAL ENCOUNTER (OUTPATIENT)
Facility: CLINIC | Age: 76
End: 2021-01-01
Admitting: INTERNAL MEDICINE
Payer: COMMERCIAL

## 2021-01-01 ENCOUNTER — HOSPITAL ENCOUNTER (EMERGENCY)
Facility: CLINIC | Age: 76
Discharge: HOME | End: 2021-07-30
Attending: EMERGENCY MEDICINE | Admitting: EMERGENCY MEDICINE
Payer: COMMERCIAL

## 2021-01-01 ENCOUNTER — HOSPITAL ENCOUNTER (OUTPATIENT)
Dept: MRI IMAGING | Facility: CLINIC | Age: 76
End: 2021-01-11
Attending: INTERNAL MEDICINE
Payer: COMMERCIAL

## 2021-01-01 ENCOUNTER — HOSPITAL ENCOUNTER (OUTPATIENT)
Dept: MRI IMAGING | Facility: CLINIC | Age: 76
Discharge: HOME | End: 2021-09-15
Attending: RADIOLOGY | Admitting: RADIOLOGY
Payer: COMMERCIAL

## 2021-01-01 ENCOUNTER — LAB (OUTPATIENT)
Dept: LAB | Facility: CLINIC | Age: 76
End: 2021-01-01
Payer: COMMERCIAL

## 2021-01-01 ENCOUNTER — OFFICE VISIT (OUTPATIENT)
Dept: OPHTHALMOLOGY | Facility: CLINIC | Age: 76
End: 2021-01-01

## 2021-01-01 ENCOUNTER — APPOINTMENT (OUTPATIENT)
Dept: LAB | Facility: CLINIC | Age: 76
End: 2021-01-01
Payer: COMMERCIAL

## 2021-01-01 ENCOUNTER — ANCILLARY PROCEDURE (OUTPATIENT)
Dept: CT IMAGING | Facility: CLINIC | Age: 76
End: 2021-01-01
Attending: PHYSICIAN ASSISTANT
Payer: COMMERCIAL

## 2021-01-01 ENCOUNTER — OFFICE VISIT (OUTPATIENT)
Dept: RADIATION ONCOLOGY | Facility: CLINIC | Age: 76
End: 2021-01-01
Attending: INTERNAL MEDICINE
Payer: COMMERCIAL

## 2021-01-01 ENCOUNTER — OFFICE VISIT (OUTPATIENT)
Dept: OPHTHALMOLOGY | Facility: CLINIC | Age: 76
End: 2021-01-01
Attending: OPHTHALMOLOGY
Payer: COMMERCIAL

## 2021-01-01 ENCOUNTER — TRANSCRIBE ORDERS (OUTPATIENT)
Dept: OTHER | Age: 76
End: 2021-01-01

## 2021-01-01 ENCOUNTER — ANCILLARY PROCEDURE (OUTPATIENT)
Dept: ULTRASOUND IMAGING | Facility: CLINIC | Age: 76
End: 2021-01-01
Attending: INTERNAL MEDICINE
Payer: COMMERCIAL

## 2021-01-01 ENCOUNTER — TELEPHONE (OUTPATIENT)
Dept: ONCOLOGY | Facility: CLINIC | Age: 76
End: 2021-01-01

## 2021-01-01 ENCOUNTER — TELEPHONE (OUTPATIENT)
Dept: RADIATION ONCOLOGY | Facility: CLINIC | Age: 76
End: 2021-01-01

## 2021-01-01 ENCOUNTER — HOSPITAL ENCOUNTER (OUTPATIENT)
Dept: MRI IMAGING | Facility: CLINIC | Age: 76
Discharge: HOME | End: 2021-06-14
Attending: INTERNAL MEDICINE | Admitting: INTERNAL MEDICINE
Payer: COMMERCIAL

## 2021-01-01 ENCOUNTER — HOSPITAL ENCOUNTER (OUTPATIENT)
Dept: MRI IMAGING | Facility: CLINIC | Age: 76
End: 2021-03-23
Attending: INTERNAL MEDICINE
Payer: COMMERCIAL

## 2021-01-01 ENCOUNTER — HOSPITAL ENCOUNTER (OUTPATIENT)
Dept: PET IMAGING | Facility: CLINIC | Age: 76
Discharge: HOME | End: 2021-03-02
Attending: INTERNAL MEDICINE | Admitting: INTERNAL MEDICINE
Payer: COMMERCIAL

## 2021-01-01 ENCOUNTER — HOSPITAL ENCOUNTER (OUTPATIENT)
Dept: MRI IMAGING | Facility: CLINIC | Age: 76
End: 2021-06-03
Attending: INTERNAL MEDICINE
Payer: COMMERCIAL

## 2021-01-01 ENCOUNTER — TRANSFERRED RECORDS (OUTPATIENT)
Dept: HEALTH INFORMATION MANAGEMENT | Facility: CLINIC | Age: 76
End: 2021-01-01

## 2021-01-01 ENCOUNTER — PATIENT OUTREACH (OUTPATIENT)
Dept: ONCOLOGY | Facility: CLINIC | Age: 76
End: 2021-01-01

## 2021-01-01 ENCOUNTER — ONCOLOGY VISIT (OUTPATIENT)
Dept: RADIATION ONCOLOGY | Facility: CLINIC | Age: 76
End: 2021-01-01

## 2021-01-01 ENCOUNTER — TELEPHONE (OUTPATIENT)
Dept: PALLIATIVE CARE | Facility: CLINIC | Age: 76
End: 2021-01-01

## 2021-01-01 ENCOUNTER — VIRTUAL VISIT (OUTPATIENT)
Dept: RADIOLOGY | Facility: CLINIC | Age: 76
End: 2021-01-01
Attending: RADIOLOGY
Payer: COMMERCIAL

## 2021-01-01 ENCOUNTER — ANCILLARY PROCEDURE (OUTPATIENT)
Dept: PET IMAGING | Facility: CLINIC | Age: 76
End: 2021-01-01
Attending: INTERNAL MEDICINE

## 2021-01-01 ENCOUNTER — ANESTHESIA (OUTPATIENT)
Dept: SURGERY | Facility: AMBULATORY SURGERY CENTER | Age: 76
End: 2021-01-01
Payer: COMMERCIAL

## 2021-01-01 ENCOUNTER — HOSPITAL ENCOUNTER (OUTPATIENT)
Facility: AMBULATORY SURGERY CENTER | Age: 76
End: 2021-07-26
Attending: OPHTHALMOLOGY
Payer: COMMERCIAL

## 2021-01-01 ENCOUNTER — OFFICE VISIT (OUTPATIENT)
Dept: INFUSION THERAPY | Facility: CLINIC | Age: 76
End: 2021-01-01
Attending: INTERNAL MEDICINE
Payer: COMMERCIAL

## 2021-01-01 ENCOUNTER — HOSPITAL ENCOUNTER (OUTPATIENT)
Dept: PET IMAGING | Facility: CLINIC | Age: 76
Setting detail: NUCLEAR MEDICINE
Discharge: HOME | End: 2021-06-03
Attending: INTERNAL MEDICINE | Admitting: INTERNAL MEDICINE
Payer: COMMERCIAL

## 2021-01-01 ENCOUNTER — TELEPHONE (OUTPATIENT)
Dept: MEDSURG UNIT | Facility: CLINIC | Age: 76
End: 2021-01-01

## 2021-01-01 ENCOUNTER — ANESTHESIA EVENT (OUTPATIENT)
Dept: SURGERY | Facility: AMBULATORY SURGERY CENTER | Age: 76
End: 2021-01-01
Payer: COMMERCIAL

## 2021-01-01 VITALS
HEART RATE: 81 BPM | OXYGEN SATURATION: 93 % | WEIGHT: 190.2 LBS | SYSTOLIC BLOOD PRESSURE: 94 MMHG | BODY MASS INDEX: 26.53 KG/M2 | DIASTOLIC BLOOD PRESSURE: 49 MMHG

## 2021-01-01 VITALS
RESPIRATION RATE: 16 BRPM | BODY MASS INDEX: 30.49 KG/M2 | DIASTOLIC BLOOD PRESSURE: 76 MMHG | TEMPERATURE: 97.9 F | SYSTOLIC BLOOD PRESSURE: 148 MMHG | WEIGHT: 218.6 LBS | HEART RATE: 65 BPM | OXYGEN SATURATION: 98 %

## 2021-01-01 VITALS
WEIGHT: 202.5 LBS | SYSTOLIC BLOOD PRESSURE: 143 MMHG | DIASTOLIC BLOOD PRESSURE: 71 MMHG | OXYGEN SATURATION: 98 % | TEMPERATURE: 97.4 F | HEART RATE: 57 BPM | RESPIRATION RATE: 16 BRPM | BODY MASS INDEX: 28.24 KG/M2

## 2021-01-01 VITALS
DIASTOLIC BLOOD PRESSURE: 62 MMHG | HEART RATE: 68 BPM | TEMPERATURE: 98.3 F | BODY MASS INDEX: 28.54 KG/M2 | RESPIRATION RATE: 16 BRPM | OXYGEN SATURATION: 96 % | WEIGHT: 204.6 LBS | SYSTOLIC BLOOD PRESSURE: 111 MMHG

## 2021-01-01 VITALS
OXYGEN SATURATION: 96 % | BODY MASS INDEX: 28.45 KG/M2 | HEART RATE: 67 BPM | RESPIRATION RATE: 18 BRPM | SYSTOLIC BLOOD PRESSURE: 131 MMHG | TEMPERATURE: 97.9 F | WEIGHT: 204 LBS | DIASTOLIC BLOOD PRESSURE: 75 MMHG

## 2021-01-01 VITALS
OXYGEN SATURATION: 100 % | BODY MASS INDEX: 29.99 KG/M2 | TEMPERATURE: 97.8 F | HEART RATE: 70 BPM | WEIGHT: 215 LBS | SYSTOLIC BLOOD PRESSURE: 144 MMHG | DIASTOLIC BLOOD PRESSURE: 69 MMHG

## 2021-01-01 VITALS
WEIGHT: 205.6 LBS | BODY MASS INDEX: 28.68 KG/M2 | OXYGEN SATURATION: 95 % | TEMPERATURE: 97.9 F | SYSTOLIC BLOOD PRESSURE: 107 MMHG | HEART RATE: 60 BPM | DIASTOLIC BLOOD PRESSURE: 63 MMHG

## 2021-01-01 VITALS
SYSTOLIC BLOOD PRESSURE: 107 MMHG | WEIGHT: 206.3 LBS | RESPIRATION RATE: 16 BRPM | HEART RATE: 54 BPM | OXYGEN SATURATION: 94 % | BODY MASS INDEX: 28.77 KG/M2 | DIASTOLIC BLOOD PRESSURE: 61 MMHG | TEMPERATURE: 97.7 F

## 2021-01-01 VITALS
OXYGEN SATURATION: 98 % | SYSTOLIC BLOOD PRESSURE: 97 MMHG | HEART RATE: 62 BPM | RESPIRATION RATE: 16 BRPM | TEMPERATURE: 97.7 F | DIASTOLIC BLOOD PRESSURE: 57 MMHG | BODY MASS INDEX: 29.01 KG/M2 | WEIGHT: 208 LBS

## 2021-01-01 VITALS
HEIGHT: 71 IN | DIASTOLIC BLOOD PRESSURE: 73 MMHG | HEART RATE: 62 BPM | BODY MASS INDEX: 27.86 KG/M2 | WEIGHT: 199 LBS | TEMPERATURE: 97.8 F | SYSTOLIC BLOOD PRESSURE: 113 MMHG | OXYGEN SATURATION: 95 % | RESPIRATION RATE: 16 BRPM

## 2021-01-01 VITALS
SYSTOLIC BLOOD PRESSURE: 99 MMHG | HEART RATE: 55 BPM | DIASTOLIC BLOOD PRESSURE: 52 MMHG | OXYGEN SATURATION: 94 % | TEMPERATURE: 98.4 F | BODY MASS INDEX: 27.86 KG/M2 | HEIGHT: 71 IN | RESPIRATION RATE: 16 BRPM | WEIGHT: 199 LBS

## 2021-01-01 VITALS
HEART RATE: 101 BPM | WEIGHT: 190.7 LBS | RESPIRATION RATE: 16 BRPM | TEMPERATURE: 97.3 F | BODY MASS INDEX: 26.6 KG/M2 | SYSTOLIC BLOOD PRESSURE: 107 MMHG | DIASTOLIC BLOOD PRESSURE: 68 MMHG | OXYGEN SATURATION: 94 %

## 2021-01-01 VITALS
OXYGEN SATURATION: 95 % | SYSTOLIC BLOOD PRESSURE: 137 MMHG | BODY MASS INDEX: 30.64 KG/M2 | WEIGHT: 219.7 LBS | RESPIRATION RATE: 16 BRPM | DIASTOLIC BLOOD PRESSURE: 77 MMHG | TEMPERATURE: 98.1 F | HEART RATE: 96 BPM

## 2021-01-01 VITALS
DIASTOLIC BLOOD PRESSURE: 68 MMHG | RESPIRATION RATE: 16 BRPM | WEIGHT: 212.7 LBS | OXYGEN SATURATION: 96 % | SYSTOLIC BLOOD PRESSURE: 144 MMHG | HEART RATE: 67 BPM | HEIGHT: 71 IN | BODY MASS INDEX: 29.78 KG/M2

## 2021-01-01 VITALS
DIASTOLIC BLOOD PRESSURE: 69 MMHG | OXYGEN SATURATION: 98 % | TEMPERATURE: 97.9 F | SYSTOLIC BLOOD PRESSURE: 131 MMHG | WEIGHT: 215.1 LBS | HEART RATE: 68 BPM | BODY MASS INDEX: 30.11 KG/M2 | HEIGHT: 71 IN | RESPIRATION RATE: 16 BRPM

## 2021-01-01 VITALS
SYSTOLIC BLOOD PRESSURE: 137 MMHG | OXYGEN SATURATION: 98 % | RESPIRATION RATE: 16 BRPM | TEMPERATURE: 98 F | DIASTOLIC BLOOD PRESSURE: 77 MMHG | HEART RATE: 68 BPM

## 2021-01-01 VITALS
SYSTOLIC BLOOD PRESSURE: 137 MMHG | DIASTOLIC BLOOD PRESSURE: 69 MMHG | OXYGEN SATURATION: 99 % | TEMPERATURE: 98 F | WEIGHT: 218 LBS | RESPIRATION RATE: 18 BRPM | HEART RATE: 63 BPM | BODY MASS INDEX: 30.4 KG/M2

## 2021-01-01 VITALS
SYSTOLIC BLOOD PRESSURE: 96 MMHG | HEART RATE: 61 BPM | OXYGEN SATURATION: 94 % | DIASTOLIC BLOOD PRESSURE: 58 MMHG | WEIGHT: 205.3 LBS | BODY MASS INDEX: 28.63 KG/M2 | RESPIRATION RATE: 16 BRPM | TEMPERATURE: 97.4 F

## 2021-01-01 VITALS
OXYGEN SATURATION: 97 % | HEART RATE: 64 BPM | SYSTOLIC BLOOD PRESSURE: 138 MMHG | RESPIRATION RATE: 16 BRPM | TEMPERATURE: 98.7 F | DIASTOLIC BLOOD PRESSURE: 79 MMHG | BODY MASS INDEX: 29.78 KG/M2 | WEIGHT: 213.5 LBS

## 2021-01-01 VITALS — BODY MASS INDEX: 27.09 KG/M2 | RESPIRATION RATE: 18 BRPM | WEIGHT: 194.2 LBS

## 2021-01-01 VITALS
RESPIRATION RATE: 16 BRPM | DIASTOLIC BLOOD PRESSURE: 60 MMHG | TEMPERATURE: 97.8 F | SYSTOLIC BLOOD PRESSURE: 98 MMHG | OXYGEN SATURATION: 96 % | WEIGHT: 187.5 LBS | BODY MASS INDEX: 26.15 KG/M2 | HEART RATE: 78 BPM

## 2021-01-01 DIAGNOSIS — C78.7 MELANOMA OF UVEA METASTATIC TO LIVER (H): ICD-10-CM

## 2021-01-01 DIAGNOSIS — C69.32 MALIGNANT MELANOMA OF CHOROID OF LEFT EYE (H): ICD-10-CM

## 2021-01-01 DIAGNOSIS — C79.51 MALIGNANT MELANOMA METASTATIC TO BONE (H): ICD-10-CM

## 2021-01-01 DIAGNOSIS — R18.8 OTHER ASCITES: Primary | ICD-10-CM

## 2021-01-01 DIAGNOSIS — M77.02 MEDIAL EPICONDYLITIS OF ELBOW, LEFT: ICD-10-CM

## 2021-01-01 DIAGNOSIS — C69.40 MELANOMA OF UVEA METASTATIC TO LIVER (H): ICD-10-CM

## 2021-01-01 DIAGNOSIS — C78.7 MELANOMA OF UVEA METASTATIC TO LIVER (H): Primary | ICD-10-CM

## 2021-01-01 DIAGNOSIS — C69.40 MELANOMA OF UVEA METASTATIC TO LIVER (H): Primary | ICD-10-CM

## 2021-01-01 DIAGNOSIS — Z11.59 ENCOUNTER FOR SCREENING FOR OTHER VIRAL DISEASES: ICD-10-CM

## 2021-01-01 DIAGNOSIS — R06.09 DYSPNEA ON EXERTION: Primary | ICD-10-CM

## 2021-01-01 DIAGNOSIS — C79.51 BONE METASTASIS: ICD-10-CM

## 2021-01-01 DIAGNOSIS — Z71.89 ADVANCE CARE PLANNING: ICD-10-CM

## 2021-01-01 DIAGNOSIS — Z98.890 POSTSURGICAL STATE, EYE: Primary | ICD-10-CM

## 2021-01-01 DIAGNOSIS — C79.51 MALIGNANT MELANOMA METASTATIC TO BONE (H): Primary | ICD-10-CM

## 2021-01-01 DIAGNOSIS — C69.32 MALIGNANT MELANOMA OF CHOROID OF LEFT EYE (H): Primary | ICD-10-CM

## 2021-01-01 DIAGNOSIS — R05.9 COUGH: ICD-10-CM

## 2021-01-01 DIAGNOSIS — H35.89 OTHER SPECIFIED RETINAL DISORDERS: Primary | ICD-10-CM

## 2021-01-01 DIAGNOSIS — H43.312 VITREOUS STRANDS OF LEFT EYE: Primary | ICD-10-CM

## 2021-01-01 DIAGNOSIS — G89.3 CANCER ASSOCIATED PAIN: ICD-10-CM

## 2021-01-01 DIAGNOSIS — G89.3 NEOPLASM RELATED PAIN: ICD-10-CM

## 2021-01-01 DIAGNOSIS — H25.13 AGE-RELATED NUCLEAR CATARACT OF BOTH EYES: ICD-10-CM

## 2021-01-01 DIAGNOSIS — M25.512 PAIN IN JOINT OF LEFT SHOULDER: ICD-10-CM

## 2021-01-01 DIAGNOSIS — R20.2 PARESTHESIAS: ICD-10-CM

## 2021-01-01 DIAGNOSIS — C79.51 BONE METASTASIS: Primary | ICD-10-CM

## 2021-01-01 DIAGNOSIS — R63.0 ANOREXIA SYMPTOM: ICD-10-CM

## 2021-01-01 DIAGNOSIS — R06.09 DYSPNEA ON EXERTION: ICD-10-CM

## 2021-01-01 DIAGNOSIS — Z79.899 ENCOUNTER FOR LONG-TERM (CURRENT) USE OF MEDICATIONS: ICD-10-CM

## 2021-01-01 DIAGNOSIS — K59.00 CONSTIPATION, UNSPECIFIED CONSTIPATION TYPE: ICD-10-CM

## 2021-01-01 DIAGNOSIS — G47.01 INSOMNIA DUE TO MEDICAL CONDITION: ICD-10-CM

## 2021-01-01 DIAGNOSIS — C79.49 MALIGNANT NEOPLASM METASTATIC TO EPIDURAL SPACE (H): ICD-10-CM

## 2021-01-01 DIAGNOSIS — M79.18 RHOMBOID MUSCLE PAIN: ICD-10-CM

## 2021-01-01 DIAGNOSIS — Z96.1 PSEUDOPHAKIA: ICD-10-CM

## 2021-01-01 DIAGNOSIS — Z91.041 CONTRAST MEDIA ALLERGY: ICD-10-CM

## 2021-01-01 LAB
ALBUMIN SERPL-MCNC: 3 G/DL (ref 3.4–5)
ALBUMIN SERPL-MCNC: 3.1 G/DL (ref 3.4–5)
ALBUMIN SERPL-MCNC: 3.2 G/DL (ref 3.4–5)
ALBUMIN SERPL-MCNC: 3.2 G/DL (ref 3.4–5)
ALBUMIN SERPL-MCNC: 3.3 G/DL (ref 3.4–5)
ALBUMIN SERPL-MCNC: 3.4 G/DL (ref 3.4–5)
ALBUMIN SERPL-MCNC: 3.5 G/DL (ref 3.4–5)
ALP SERPL-CCNC: 115 U/L (ref 40–150)
ALP SERPL-CCNC: 125 U/L (ref 40–150)
ALP SERPL-CCNC: 128 U/L (ref 40–150)
ALP SERPL-CCNC: 131 U/L (ref 40–150)
ALP SERPL-CCNC: 135 U/L (ref 40–150)
ALP SERPL-CCNC: 148 U/L (ref 40–150)
ALP SERPL-CCNC: 151 U/L (ref 40–150)
ALP SERPL-CCNC: 152 U/L (ref 40–150)
ALP SERPL-CCNC: 152 U/L (ref 40–150)
ALP SERPL-CCNC: 155 U/L (ref 40–150)
ALP SERPL-CCNC: 171 U/L (ref 40–150)
ALP SERPL-CCNC: 183 U/L (ref 40–150)
ALP SERPL-CCNC: 194 U/L (ref 40–150)
ALP SERPL-CCNC: 208 U/L (ref 40–150)
ALP SERPL-CCNC: 210 U/L (ref 40–150)
ALT SERPL W P-5'-P-CCNC: 20 U/L (ref 0–70)
ALT SERPL W P-5'-P-CCNC: 21 U/L (ref 0–70)
ALT SERPL W P-5'-P-CCNC: 22 U/L (ref 0–70)
ALT SERPL W P-5'-P-CCNC: 25 U/L (ref 0–70)
ALT SERPL W P-5'-P-CCNC: 28 U/L (ref 0–70)
ALT SERPL W P-5'-P-CCNC: 29 U/L (ref 0–70)
ALT SERPL W P-5'-P-CCNC: 30 U/L (ref 0–70)
ALT SERPL W P-5'-P-CCNC: 33 U/L (ref 0–70)
ALT SERPL W P-5'-P-CCNC: 35 U/L (ref 0–70)
ALT SERPL W P-5'-P-CCNC: 38 U/L (ref 0–70)
ALT SERPL W P-5'-P-CCNC: 40 U/L (ref 0–70)
ANION GAP SERPL CALCULATED.3IONS-SCNC: 12 MMOL/L (ref 3–14)
ANION GAP SERPL CALCULATED.3IONS-SCNC: 2 MMOL/L (ref 3–14)
ANION GAP SERPL CALCULATED.3IONS-SCNC: 3 MMOL/L (ref 3–14)
ANION GAP SERPL CALCULATED.3IONS-SCNC: 5 MMOL/L (ref 3–14)
ANION GAP SERPL CALCULATED.3IONS-SCNC: 5 MMOL/L (ref 3–14)
ANION GAP SERPL CALCULATED.3IONS-SCNC: 6 MMOL/L (ref 3–14)
ANION GAP SERPL CALCULATED.3IONS-SCNC: 7 MMOL/L (ref 3–14)
ANION GAP SERPL CALCULATED.3IONS-SCNC: 8 MMOL/L (ref 3–14)
ANION GAP SERPL CALCULATED.3IONS-SCNC: 9 MMOL/L (ref 3–14)
ANISOCYTOSIS BLD QL SMEAR: SLIGHT
AST SERPL W P-5'-P-CCNC: 103 U/L (ref 0–45)
AST SERPL W P-5'-P-CCNC: 136 U/L (ref 0–45)
AST SERPL W P-5'-P-CCNC: 152 U/L (ref 0–45)
AST SERPL W P-5'-P-CCNC: 42 U/L (ref 0–45)
AST SERPL W P-5'-P-CCNC: 43 U/L (ref 0–45)
AST SERPL W P-5'-P-CCNC: 47 U/L (ref 0–45)
AST SERPL W P-5'-P-CCNC: 50 U/L (ref 0–45)
AST SERPL W P-5'-P-CCNC: 52 U/L (ref 0–45)
AST SERPL W P-5'-P-CCNC: 55 U/L (ref 0–45)
AST SERPL W P-5'-P-CCNC: 64 U/L (ref 0–45)
AST SERPL W P-5'-P-CCNC: 66 U/L (ref 0–45)
AST SERPL W P-5'-P-CCNC: 68 U/L (ref 0–45)
AST SERPL W P-5'-P-CCNC: 82 U/L (ref 0–45)
AST SERPL W P-5'-P-CCNC: 84 U/L (ref 0–45)
AST SERPL W P-5'-P-CCNC: 86 U/L (ref 0–45)
BASOPHILS # BLD AUTO: 0 10E3/UL (ref 0–0.2)
BASOPHILS # BLD AUTO: 0 10E3/UL (ref 0–0.2)
BASOPHILS # BLD AUTO: 0 10E9/L (ref 0–0.2)
BASOPHILS # BLD AUTO: 0.1 10E3/UL (ref 0–0.2)
BASOPHILS # BLD AUTO: 0.1 10E9/L (ref 0–0.2)
BASOPHILS NFR BLD AUTO: 0 %
BASOPHILS NFR BLD AUTO: 0.5 %
BASOPHILS NFR BLD AUTO: 0.7 %
BASOPHILS NFR BLD AUTO: 1 %
BASOPHILS NFR BLD AUTO: 1.1 %
BASOPHILS NFR BLD AUTO: 1.2 %
BASOPHILS NFR BLD AUTO: 1.2 %
BASOPHILS NFR BLD AUTO: 1.3 %
BILIRUB SERPL-MCNC: 0.4 MG/DL (ref 0.2–1.3)
BILIRUB SERPL-MCNC: 0.5 MG/DL (ref 0.2–1.3)
BILIRUB SERPL-MCNC: 0.6 MG/DL (ref 0.2–1.3)
BILIRUB SERPL-MCNC: 0.7 MG/DL (ref 0.2–1.3)
BILIRUB SERPL-MCNC: 0.7 MG/DL (ref 0.2–1.3)
BILIRUB SERPL-MCNC: 0.8 MG/DL (ref 0.2–1.3)
BILIRUB SERPL-MCNC: 0.8 MG/DL (ref 0.2–1.3)
BILIRUB SERPL-MCNC: 0.9 MG/DL (ref 0.2–1.3)
BILIRUB SERPL-MCNC: 0.9 MG/DL (ref 0.2–1.3)
BILIRUB SERPL-MCNC: 1.4 MG/DL (ref 0.2–1.3)
BILIRUB SERPL-MCNC: 2.9 MG/DL (ref 0.2–1.3)
BUN SERPL-MCNC: 15 MG/DL (ref 7–30)
BUN SERPL-MCNC: 15 MG/DL (ref 7–30)
BUN SERPL-MCNC: 16 MG/DL (ref 7–30)
BUN SERPL-MCNC: 16 MG/DL (ref 7–30)
BUN SERPL-MCNC: 17 MG/DL (ref 7–30)
BUN SERPL-MCNC: 17 MG/DL (ref 7–30)
BUN SERPL-MCNC: 18 MG/DL (ref 7–30)
BUN SERPL-MCNC: 18 MG/DL (ref 7–30)
BUN SERPL-MCNC: 19 MG/DL (ref 7–30)
BUN SERPL-MCNC: 19 MG/DL (ref 7–30)
BUN SERPL-MCNC: 20 MG/DL (ref 7–30)
BUN SERPL-MCNC: 22 MG/DL (ref 7–30)
BUN SERPL-MCNC: 25 MG/DL (ref 7–30)
CALCIUM SERPL-MCNC: 8.4 MG/DL (ref 8.5–10.1)
CALCIUM SERPL-MCNC: 8.7 MG/DL (ref 8.5–10.1)
CALCIUM SERPL-MCNC: 8.7 MG/DL (ref 8.5–10.1)
CALCIUM SERPL-MCNC: 8.8 MG/DL (ref 8.5–10.1)
CALCIUM SERPL-MCNC: 8.9 MG/DL (ref 8.5–10.1)
CALCIUM SERPL-MCNC: 9 MG/DL (ref 8.5–10.1)
CALCIUM SERPL-MCNC: 9.3 MG/DL (ref 8.5–10.1)
CALCIUM SERPL-MCNC: 9.3 MG/DL (ref 8.5–10.1)
CHLORIDE BLD-SCNC: 102 MMOL/L (ref 94–109)
CHLORIDE BLD-SCNC: 105 MMOL/L (ref 94–109)
CHLORIDE BLD-SCNC: 107 MMOL/L (ref 94–109)
CHLORIDE BLD-SCNC: 92 MMOL/L (ref 94–109)
CHLORIDE SERPL-SCNC: 105 MMOL/L (ref 94–109)
CHLORIDE SERPL-SCNC: 106 MMOL/L (ref 94–109)
CHLORIDE SERPL-SCNC: 107 MMOL/L (ref 94–109)
CHLORIDE SERPL-SCNC: 107 MMOL/L (ref 94–109)
CHLORIDE SERPL-SCNC: 108 MMOL/L (ref 94–109)
CHLORIDE SERPL-SCNC: 109 MMOL/L (ref 94–109)
CHLORIDE SERPL-SCNC: 109 MMOL/L (ref 94–109)
CHLORIDE SERPL-SCNC: 110 MMOL/L (ref 94–109)
CK SERPL-CCNC: 29 U/L (ref 30–300)
CO2 SERPL-SCNC: 23 MMOL/L (ref 20–32)
CO2 SERPL-SCNC: 24 MMOL/L (ref 20–32)
CO2 SERPL-SCNC: 25 MMOL/L (ref 20–32)
CO2 SERPL-SCNC: 26 MMOL/L (ref 20–32)
CO2 SERPL-SCNC: 26 MMOL/L (ref 20–32)
CO2 SERPL-SCNC: 27 MMOL/L (ref 20–32)
CO2 SERPL-SCNC: 28 MMOL/L (ref 20–32)
CO2 SERPL-SCNC: 29 MMOL/L (ref 20–32)
CREAT SERPL-MCNC: 0.84 MG/DL (ref 0.66–1.25)
CREAT SERPL-MCNC: 0.84 MG/DL (ref 0.66–1.25)
CREAT SERPL-MCNC: 0.86 MG/DL (ref 0.66–1.25)
CREAT SERPL-MCNC: 0.87 MG/DL (ref 0.66–1.25)
CREAT SERPL-MCNC: 0.89 MG/DL (ref 0.66–1.25)
CREAT SERPL-MCNC: 0.89 MG/DL (ref 0.66–1.25)
CREAT SERPL-MCNC: 0.9 MG/DL (ref 0.66–1.25)
CREAT SERPL-MCNC: 0.95 MG/DL (ref 0.66–1.25)
CREAT SERPL-MCNC: 0.97 MG/DL (ref 0.66–1.25)
CREAT SERPL-MCNC: 0.98 MG/DL (ref 0.66–1.25)
CREAT SERPL-MCNC: 0.98 MG/DL (ref 0.66–1.25)
CREAT SERPL-MCNC: 1 MG/DL (ref 0.66–1.25)
CREAT SERPL-MCNC: 1.05 MG/DL (ref 0.66–1.25)
CREAT SERPL-MCNC: 1.1 MG/DL (ref 0.66–1.25)
CREAT SERPL-MCNC: 1.36 MG/DL (ref 0.66–1.25)
DIFFERENTIAL METHOD BLD: ABNORMAL
DIFFERENTIAL METHOD BLD: NORMAL
EOSINOPHIL # BLD AUTO: 0 10E3/UL (ref 0–0.7)
EOSINOPHIL # BLD AUTO: 0 10E9/L (ref 0–0.7)
EOSINOPHIL # BLD AUTO: 0.1 10E9/L (ref 0–0.7)
EOSINOPHIL # BLD AUTO: 0.2 10E3/UL (ref 0–0.7)
EOSINOPHIL # BLD AUTO: 0.2 10E3/UL (ref 0–0.7)
EOSINOPHIL # BLD AUTO: 0.2 10E9/L (ref 0–0.7)
EOSINOPHIL # BLD AUTO: 0.2 10E9/L (ref 0–0.7)
EOSINOPHIL # BLD AUTO: 0.3 10E9/L (ref 0–0.7)
EOSINOPHIL NFR BLD AUTO: 0 %
EOSINOPHIL NFR BLD AUTO: 0.2 %
EOSINOPHIL NFR BLD AUTO: 0.9 %
EOSINOPHIL NFR BLD AUTO: 2.4 %
EOSINOPHIL NFR BLD AUTO: 3 %
EOSINOPHIL NFR BLD AUTO: 4 %
EOSINOPHIL NFR BLD AUTO: 4 %
EOSINOPHIL NFR BLD AUTO: 4.1 %
EOSINOPHIL NFR BLD AUTO: 4.2 %
EOSINOPHIL NFR BLD AUTO: 4.5 %
EOSINOPHIL NFR BLD AUTO: 5.6 %
EOSINOPHIL NFR BLD AUTO: 6.3 %
EOSINOPHIL NFR BLD AUTO: 6.5 %
ERYTHROCYTE [DISTWIDTH] IN BLOOD BY AUTOMATED COUNT: 13 % (ref 10–15)
ERYTHROCYTE [DISTWIDTH] IN BLOOD BY AUTOMATED COUNT: 13.2 % (ref 10–15)
ERYTHROCYTE [DISTWIDTH] IN BLOOD BY AUTOMATED COUNT: 13.4 % (ref 10–15)
ERYTHROCYTE [DISTWIDTH] IN BLOOD BY AUTOMATED COUNT: 13.8 % (ref 10–15)
ERYTHROCYTE [DISTWIDTH] IN BLOOD BY AUTOMATED COUNT: 14.2 % (ref 10–15)
ERYTHROCYTE [DISTWIDTH] IN BLOOD BY AUTOMATED COUNT: 14.3 % (ref 10–15)
ERYTHROCYTE [DISTWIDTH] IN BLOOD BY AUTOMATED COUNT: 14.4 % (ref 10–15)
ERYTHROCYTE [DISTWIDTH] IN BLOOD BY AUTOMATED COUNT: 14.6 % (ref 10–15)
ERYTHROCYTE [DISTWIDTH] IN BLOOD BY AUTOMATED COUNT: 14.7 % (ref 10–15)
ERYTHROCYTE [DISTWIDTH] IN BLOOD BY AUTOMATED COUNT: 14.9 % (ref 10–15)
ERYTHROCYTE [DISTWIDTH] IN BLOOD BY AUTOMATED COUNT: 17.3 % (ref 10–15)
ERYTHROCYTE [DISTWIDTH] IN BLOOD BY AUTOMATED COUNT: 17.4 % (ref 10–15)
ERYTHROCYTE [DISTWIDTH] IN BLOOD BY AUTOMATED COUNT: 18.3 % (ref 10–15)
GFR SERPL CREATININE-BSD FRML MDRD: 49 ML/MIN/1.73M2
GFR SERPL CREATININE-BSD FRML MDRD: 64 ML/MIN/1.73M2
GFR SERPL CREATININE-BSD FRML MDRD: 67 ML/MIN/{1.73_M2}
GFR SERPL CREATININE-BSD FRML MDRD: 72 ML/MIN/{1.73_M2}
GFR SERPL CREATININE-BSD FRML MDRD: 73 ML/MIN/{1.73_M2}
GFR SERPL CREATININE-BSD FRML MDRD: 74 ML/MIN/1.73M2
GFR SERPL CREATININE-BSD FRML MDRD: 74 ML/MIN/{1.73_M2}
GFR SERPL CREATININE-BSD FRML MDRD: 76 ML/MIN/1.73M2
GFR SERPL CREATININE-BSD FRML MDRD: 82 ML/MIN/{1.73_M2}
GFR SERPL CREATININE-BSD FRML MDRD: 83 ML/MIN/{1.73_M2}
GFR SERPL CREATININE-BSD FRML MDRD: 83 ML/MIN/{1.73_M2}
GFR SERPL CREATININE-BSD FRML MDRD: 84 ML/MIN/{1.73_M2}
GFR SERPL CREATININE-BSD FRML MDRD: 84 ML/MIN/{1.73_M2}
GLUCOSE BLD-MCNC: 103 MG/DL (ref 70–99)
GLUCOSE BLD-MCNC: 109 MG/DL (ref 70–99)
GLUCOSE BLD-MCNC: 115 MG/DL (ref 70–99)
GLUCOSE BLD-MCNC: 159 MG/DL (ref 70–99)
GLUCOSE SERPL-MCNC: 102 MG/DL (ref 70–99)
GLUCOSE SERPL-MCNC: 111 MG/DL (ref 70–99)
GLUCOSE SERPL-MCNC: 111 MG/DL (ref 70–99)
GLUCOSE SERPL-MCNC: 115 MG/DL (ref 70–99)
GLUCOSE SERPL-MCNC: 120 MG/DL (ref 70–99)
GLUCOSE SERPL-MCNC: 125 MG/DL (ref 70–99)
GLUCOSE SERPL-MCNC: 127 MG/DL (ref 70–99)
GLUCOSE SERPL-MCNC: 146 MG/DL (ref 70–99)
GLUCOSE SERPL-MCNC: 73 MG/DL (ref 70–99)
GLUCOSE SERPL-MCNC: 84 MG/DL (ref 70–99)
GLUCOSE SERPL-MCNC: 89 MG/DL (ref 70–99)
GLUCOSE SERPL-MCNC: 92 MG/DL (ref 70–99)
HCT VFR BLD AUTO: 36.6 % (ref 40–53)
HCT VFR BLD AUTO: 37.6 % (ref 40–53)
HCT VFR BLD AUTO: 37.6 % (ref 40–53)
HCT VFR BLD AUTO: 37.9 % (ref 40–53)
HCT VFR BLD AUTO: 38.5 % (ref 40–53)
HCT VFR BLD AUTO: 38.9 % (ref 40–53)
HCT VFR BLD AUTO: 40 % (ref 40–53)
HCT VFR BLD AUTO: 41.2 % (ref 40–53)
HCT VFR BLD AUTO: 42.2 % (ref 40–53)
HCT VFR BLD AUTO: 42.6 % (ref 40–53)
HCT VFR BLD AUTO: 42.7 % (ref 40–53)
HCT VFR BLD AUTO: 43.2 % (ref 40–53)
HCT VFR BLD AUTO: 43.4 % (ref 40–53)
HGB BLD-MCNC: 12.1 G/DL (ref 13.3–17.7)
HGB BLD-MCNC: 12.3 G/DL (ref 13.3–17.7)
HGB BLD-MCNC: 12.4 G/DL (ref 13.3–17.7)
HGB BLD-MCNC: 12.6 G/DL (ref 13.3–17.7)
HGB BLD-MCNC: 12.6 G/DL (ref 13.3–17.7)
HGB BLD-MCNC: 12.9 G/DL (ref 13.3–17.7)
HGB BLD-MCNC: 13.5 G/DL (ref 13.3–17.7)
HGB BLD-MCNC: 13.5 G/DL (ref 13.3–17.7)
HGB BLD-MCNC: 14 G/DL (ref 13.3–17.7)
HGB BLD-MCNC: 14 G/DL (ref 13.3–17.7)
HGB BLD-MCNC: 14.1 G/DL (ref 13.3–17.7)
HGB BLD-MCNC: 14.1 G/DL (ref 13.3–17.7)
HGB BLD-MCNC: 14.2 G/DL (ref 13.3–17.7)
HOLD SPECIMEN: NORMAL
IMM GRANULOCYTES # BLD: 0 10E3/UL
IMM GRANULOCYTES # BLD: 0 10E3/UL
IMM GRANULOCYTES # BLD: 0 10E9/L (ref 0–0.4)
IMM GRANULOCYTES # BLD: 0.1 10E3/UL
IMM GRANULOCYTES NFR BLD: 0 %
IMM GRANULOCYTES NFR BLD: 0.2 %
IMM GRANULOCYTES NFR BLD: 0.3 %
IMM GRANULOCYTES NFR BLD: 0.3 %
IMM GRANULOCYTES NFR BLD: 0.4 %
IMM GRANULOCYTES NFR BLD: 0.4 %
IMM GRANULOCYTES NFR BLD: 0.5 %
IMM GRANULOCYTES NFR BLD: 0.6 %
IMM GRANULOCYTES NFR BLD: 1 %
IMM GRANULOCYTES NFR BLD: 1 %
LDH SERPL L TO P-CCNC: 1189 U/L (ref 85–227)
LDH SERPL L TO P-CCNC: 1339 U/L (ref 85–227)
LDH SERPL L TO P-CCNC: 1742 U/L (ref 85–227)
LDH SERPL L TO P-CCNC: 2034 U/L (ref 85–227)
LDH SERPL L TO P-CCNC: 2080 U/L (ref 85–227)
LDH SERPL L TO P-CCNC: 2167 U/L (ref 85–227)
LDH SERPL L TO P-CCNC: 2390 U/L (ref 85–227)
LDH SERPL L TO P-CCNC: 801 U/L (ref 85–227)
LDH SERPL L TO P-CCNC: 810 U/L (ref 85–227)
LDH SERPL L TO P-CCNC: 811 U/L (ref 85–227)
LDH SERPL L TO P-CCNC: 928 U/L (ref 85–227)
LYMPHOCYTES # BLD AUTO: 0.3 10E9/L (ref 0.8–5.3)
LYMPHOCYTES # BLD AUTO: 0.5 10E9/L (ref 0.8–5.3)
LYMPHOCYTES # BLD AUTO: 0.6 10E3/UL (ref 0.8–5.3)
LYMPHOCYTES # BLD AUTO: 0.7 10E9/L (ref 0.8–5.3)
LYMPHOCYTES # BLD AUTO: 0.7 10E9/L (ref 0.8–5.3)
LYMPHOCYTES # BLD AUTO: 0.8 10E9/L (ref 0.8–5.3)
LYMPHOCYTES # BLD AUTO: 0.9 10E3/UL (ref 0.8–5.3)
LYMPHOCYTES # BLD AUTO: 0.9 10E3/UL (ref 0.8–5.3)
LYMPHOCYTES # BLD AUTO: 0.9 10E9/L (ref 0.8–5.3)
LYMPHOCYTES NFR BLD AUTO: 12.2 %
LYMPHOCYTES NFR BLD AUTO: 13.4 %
LYMPHOCYTES NFR BLD AUTO: 14 %
LYMPHOCYTES NFR BLD AUTO: 14.7 %
LYMPHOCYTES NFR BLD AUTO: 15 %
LYMPHOCYTES NFR BLD AUTO: 18.4 %
LYMPHOCYTES NFR BLD AUTO: 19.1 %
LYMPHOCYTES NFR BLD AUTO: 20.2 %
LYMPHOCYTES NFR BLD AUTO: 21 %
LYMPHOCYTES NFR BLD AUTO: 21.4 %
LYMPHOCYTES NFR BLD AUTO: 21.8 %
LYMPHOCYTES NFR BLD AUTO: 22.3 %
LYMPHOCYTES NFR BLD AUTO: 7.1 %
MAGNESIUM SERPL-MCNC: 2.2 MG/DL (ref 1.6–2.3)
MAGNESIUM SERPL-MCNC: 2.3 MG/DL (ref 1.6–2.3)
MAGNESIUM SERPL-MCNC: 2.3 MG/DL (ref 1.6–2.3)
MAGNESIUM SERPL-MCNC: 2.4 MG/DL (ref 1.6–2.3)
MAGNESIUM SERPL-MCNC: 2.4 MG/DL (ref 1.6–2.3)
MAGNESIUM SERPL-MCNC: 2.6 MG/DL (ref 1.6–2.3)
MCH RBC QN AUTO: 28.8 PG (ref 26.5–33)
MCH RBC QN AUTO: 28.8 PG (ref 26.5–33)
MCH RBC QN AUTO: 28.9 PG (ref 26.5–33)
MCH RBC QN AUTO: 29 PG (ref 26.5–33)
MCH RBC QN AUTO: 29 PG (ref 26.5–33)
MCH RBC QN AUTO: 29.2 PG (ref 26.5–33)
MCH RBC QN AUTO: 29.3 PG (ref 26.5–33)
MCH RBC QN AUTO: 29.4 PG (ref 26.5–33)
MCH RBC QN AUTO: 29.5 PG (ref 26.5–33)
MCH RBC QN AUTO: 29.9 PG (ref 26.5–33)
MCH RBC QN AUTO: 30 PG (ref 26.5–33)
MCHC RBC AUTO-ENTMCNC: 32.2 G/DL (ref 31.5–36.5)
MCHC RBC AUTO-ENTMCNC: 32.6 G/DL (ref 31.5–36.5)
MCHC RBC AUTO-ENTMCNC: 32.7 G/DL (ref 31.5–36.5)
MCHC RBC AUTO-ENTMCNC: 32.7 G/DL (ref 31.5–36.5)
MCHC RBC AUTO-ENTMCNC: 32.8 G/DL (ref 31.5–36.5)
MCHC RBC AUTO-ENTMCNC: 32.8 G/DL (ref 31.5–36.5)
MCHC RBC AUTO-ENTMCNC: 33 G/DL (ref 31.5–36.5)
MCHC RBC AUTO-ENTMCNC: 33.1 G/DL (ref 31.5–36.5)
MCHC RBC AUTO-ENTMCNC: 33.2 G/DL (ref 31.5–36.5)
MCHC RBC AUTO-ENTMCNC: 33.6 G/DL (ref 31.5–36.5)
MCHC RBC AUTO-ENTMCNC: 33.8 G/DL (ref 31.5–36.5)
MCV RBC AUTO: 87 FL (ref 78–100)
MCV RBC AUTO: 88 FL (ref 78–100)
MCV RBC AUTO: 89 FL (ref 78–100)
MCV RBC AUTO: 90 FL (ref 78–100)
MCV RBC AUTO: 92 FL (ref 78–100)
METAMYELOCYTES # BLD: 0.3 10E9/L
METAMYELOCYTES NFR BLD MANUAL: 3.5 %
MONOCYTES # BLD AUTO: 0.2 10E9/L (ref 0–1.3)
MONOCYTES # BLD AUTO: 0.4 10E9/L (ref 0–1.3)
MONOCYTES # BLD AUTO: 0.5 10E3/UL (ref 0–1.3)
MONOCYTES # BLD AUTO: 0.5 10E9/L (ref 0–1.3)
MONOCYTES # BLD AUTO: 0.6 10E3/UL (ref 0–1.3)
MONOCYTES # BLD AUTO: 0.6 10E3/UL (ref 0–1.3)
MONOCYTES # BLD AUTO: 0.6 10E9/L (ref 0–1.3)
MONOCYTES # BLD AUTO: 0.6 10E9/L (ref 0–1.3)
MONOCYTES # BLD AUTO: 0.8 10E9/L (ref 0–1.3)
MONOCYTES NFR BLD AUTO: 10 %
MONOCYTES NFR BLD AUTO: 10.3 %
MONOCYTES NFR BLD AUTO: 11.3 %
MONOCYTES NFR BLD AUTO: 11.7 %
MONOCYTES NFR BLD AUTO: 11.8 %
MONOCYTES NFR BLD AUTO: 12.8 %
MONOCYTES NFR BLD AUTO: 13 %
MONOCYTES NFR BLD AUTO: 13.3 %
MONOCYTES NFR BLD AUTO: 13.5 %
MONOCYTES NFR BLD AUTO: 15 %
MONOCYTES NFR BLD AUTO: 3.9 %
MONOCYTES NFR BLD AUTO: 9.7 %
MONOCYTES NFR BLD AUTO: 9.9 %
MYELOCYTES # BLD: 0.1 10E9/L
MYELOCYTES NFR BLD MANUAL: 1.7 %
NEUTROPHILS # BLD AUTO: 1.9 10E9/L (ref 1.6–8.3)
NEUTROPHILS # BLD AUTO: 2.3 10E9/L (ref 1.6–8.3)
NEUTROPHILS # BLD AUTO: 2.3 10E9/L (ref 1.6–8.3)
NEUTROPHILS # BLD AUTO: 2.5 10E9/L (ref 1.6–8.3)
NEUTROPHILS # BLD AUTO: 2.6 10E3/UL (ref 1.6–8.3)
NEUTROPHILS # BLD AUTO: 2.6 10E9/L (ref 1.6–8.3)
NEUTROPHILS # BLD AUTO: 2.8 10E9/L (ref 1.6–8.3)
NEUTROPHILS # BLD AUTO: 2.9 10E9/L (ref 1.6–8.3)
NEUTROPHILS # BLD AUTO: 3 10E3/UL (ref 1.6–8.3)
NEUTROPHILS # BLD AUTO: 3.5 10E9/L (ref 1.6–8.3)
NEUTROPHILS # BLD AUTO: 3.6 10E9/L (ref 1.6–8.3)
NEUTROPHILS # BLD AUTO: 3.9 10E3/UL (ref 1.6–8.3)
NEUTROPHILS # BLD AUTO: 5 10E9/L (ref 1.6–8.3)
NEUTROPHILS NFR BLD AUTO: 59.4 %
NEUTROPHILS NFR BLD AUTO: 60.2 %
NEUTROPHILS NFR BLD AUTO: 60.7 %
NEUTROPHILS NFR BLD AUTO: 62 %
NEUTROPHILS NFR BLD AUTO: 62.1 %
NEUTROPHILS NFR BLD AUTO: 62.4 %
NEUTROPHILS NFR BLD AUTO: 63.9 %
NEUTROPHILS NFR BLD AUTO: 66.9 %
NEUTROPHILS NFR BLD AUTO: 67.7 %
NEUTROPHILS NFR BLD AUTO: 68 %
NEUTROPHILS NFR BLD AUTO: 70 %
NEUTROPHILS NFR BLD AUTO: 70 %
NEUTROPHILS NFR BLD AUTO: 87.8 %
NRBC # BLD AUTO: 0 10*3/UL
NRBC # BLD AUTO: 0 10E3/UL
NRBC # BLD AUTO: 0.3 10*3/UL
NRBC BLD AUTO-RTO: 0 /100
NRBC BLD AUTO-RTO: 4 /100
PHOSPHATE SERPL-MCNC: 3.1 MG/DL (ref 2.5–4.5)
PHOSPHATE SERPL-MCNC: 3.9 MG/DL (ref 2.5–4.5)
PLAT MORPH BLD: NORMAL
PLATELET # BLD AUTO: 105 10E9/L (ref 150–450)
PLATELET # BLD AUTO: 132 10E9/L (ref 150–450)
PLATELET # BLD AUTO: 134 10E3/UL (ref 150–450)
PLATELET # BLD AUTO: 140 10E9/L (ref 150–450)
PLATELET # BLD AUTO: 141 10E3/UL (ref 150–450)
PLATELET # BLD AUTO: 142 10E9/L (ref 150–450)
PLATELET # BLD AUTO: 146 10E9/L (ref 150–450)
PLATELET # BLD AUTO: 146 10E9/L (ref 150–450)
PLATELET # BLD AUTO: 156 10E9/L (ref 150–450)
PLATELET # BLD AUTO: 166 10E9/L (ref 150–450)
PLATELET # BLD AUTO: 167 10E3/UL (ref 150–450)
PLATELET # BLD AUTO: 167 10E9/L (ref 150–450)
PLATELET # BLD AUTO: 170 10E9/L (ref 150–450)
PLATELET # BLD EST: ABNORMAL 10*3/UL
POTASSIUM BLD-SCNC: 4.3 MMOL/L (ref 3.4–5.3)
POTASSIUM BLD-SCNC: 4.4 MMOL/L (ref 3.4–5.3)
POTASSIUM BLD-SCNC: 4.6 MMOL/L (ref 3.4–5.3)
POTASSIUM BLD-SCNC: 4.7 MMOL/L (ref 3.4–5.3)
POTASSIUM SERPL-SCNC: 3.9 MMOL/L (ref 3.4–5.3)
POTASSIUM SERPL-SCNC: 4 MMOL/L (ref 3.4–5.3)
POTASSIUM SERPL-SCNC: 4.1 MMOL/L (ref 3.4–5.3)
POTASSIUM SERPL-SCNC: 4.2 MMOL/L (ref 3.4–5.3)
POTASSIUM SERPL-SCNC: 4.3 MMOL/L (ref 3.4–5.3)
POTASSIUM SERPL-SCNC: 4.4 MMOL/L (ref 3.4–5.3)
POTASSIUM SERPL-SCNC: 4.6 MMOL/L (ref 3.4–5.3)
PROMYELOCYTES # BLD MANUAL: 0.3 10E9/L
PROMYELOCYTES NFR BLD MANUAL: 3.5 %
PROT SERPL-MCNC: 6.8 G/DL (ref 6.8–8.8)
PROT SERPL-MCNC: 6.9 G/DL (ref 6.8–8.8)
PROT SERPL-MCNC: 7 G/DL (ref 6.8–8.8)
PROT SERPL-MCNC: 7.1 G/DL (ref 6.8–8.8)
PROT SERPL-MCNC: 7.2 G/DL (ref 6.8–8.8)
PROT SERPL-MCNC: 7.3 G/DL (ref 6.8–8.8)
PROT SERPL-MCNC: 7.4 G/DL (ref 6.8–8.8)
PROT SERPL-MCNC: 7.5 G/DL (ref 6.8–8.8)
RBC # BLD AUTO: 4.2 10E12/L (ref 4.4–5.9)
RBC # BLD AUTO: 4.2 10E6/UL (ref 4.4–5.9)
RBC # BLD AUTO: 4.25 10E12/L (ref 4.4–5.9)
RBC # BLD AUTO: 4.29 10E6/UL (ref 4.4–5.9)
RBC # BLD AUTO: 4.32 10E12/L (ref 4.4–5.9)
RBC # BLD AUTO: 4.46 10E12/L (ref 4.4–5.9)
RBC # BLD AUTO: 4.51 10E12/L (ref 4.4–5.9)
RBC # BLD AUTO: 4.65 10E12/L (ref 4.4–5.9)
RBC # BLD AUTO: 4.7 10E12/L (ref 4.4–5.9)
RBC # BLD AUTO: 4.82 10E12/L (ref 4.4–5.9)
RBC # BLD AUTO: 4.83 10E12/L (ref 4.4–5.9)
RBC # BLD AUTO: 4.83 10E6/UL (ref 4.4–5.9)
RBC # BLD AUTO: 4.86 10E12/L (ref 4.4–5.9)
RBC MORPH BLD: NORMAL
SARS-COV-2 RNA RESP QL NAA+PROBE: NEGATIVE
SARS-COV-2 RNA RESP QL NAA+PROBE: NEGATIVE
SODIUM SERPL-SCNC: 128 MMOL/L (ref 133–144)
SODIUM SERPL-SCNC: 135 MMOL/L (ref 133–144)
SODIUM SERPL-SCNC: 136 MMOL/L (ref 133–144)
SODIUM SERPL-SCNC: 137 MMOL/L (ref 133–144)
SODIUM SERPL-SCNC: 138 MMOL/L (ref 133–144)
SODIUM SERPL-SCNC: 138 MMOL/L (ref 133–144)
SODIUM SERPL-SCNC: 139 MMOL/L (ref 133–144)
SODIUM SERPL-SCNC: 139 MMOL/L (ref 133–144)
SODIUM SERPL-SCNC: 140 MMOL/L (ref 133–144)
SODIUM SERPL-SCNC: 141 MMOL/L (ref 133–144)
SODIUM SERPL-SCNC: 141 MMOL/L (ref 133–144)
SODIUM SERPL-SCNC: 142 MMOL/L (ref 133–144)
TSH SERPL DL<=0.005 MIU/L-ACNC: 3.3 MU/L (ref 0.4–4)
TSH SERPL DL<=0.005 MIU/L-ACNC: 3.56 MU/L (ref 0.4–4)
TSH SERPL DL<=0.005 MIU/L-ACNC: 3.63 MU/L (ref 0.4–4)
TSH SERPL DL<=0.005 MIU/L-ACNC: 3.65 MU/L (ref 0.4–4)
TSH SERPL DL<=0.005 MIU/L-ACNC: 3.84 MU/L (ref 0.4–4)
WBC # BLD AUTO: 3.2 10E9/L (ref 4–11)
WBC # BLD AUTO: 3.5 10E9/L (ref 4–11)
WBC # BLD AUTO: 3.8 10E9/L (ref 4–11)
WBC # BLD AUTO: 4 10E9/L (ref 4–11)
WBC # BLD AUTO: 4.1 10E9/L (ref 4–11)
WBC # BLD AUTO: 4.2 10E3/UL (ref 4–11)
WBC # BLD AUTO: 4.3 10E9/L (ref 4–11)
WBC # BLD AUTO: 4.5 10E3/UL (ref 4–11)
WBC # BLD AUTO: 4.5 10E9/L (ref 4–11)
WBC # BLD AUTO: 4.7 10E9/L (ref 4–11)
WBC # BLD AUTO: 5 10E9/L (ref 4–11)
WBC # BLD AUTO: 5.5 10E3/UL (ref 4–11)
WBC # BLD AUTO: 7.4 10E9/L (ref 4–11)

## 2021-01-01 PROCEDURE — 85025 COMPLETE CBC W/AUTO DIFF WBC: CPT | Performed by: EMERGENCY MEDICINE

## 2021-01-01 PROCEDURE — 258N000003 HC RX IP 258 OP 636: Performed by: INTERNAL MEDICINE

## 2021-01-01 PROCEDURE — P9047 ALBUMIN (HUMAN), 25%, 50ML: HCPCS | Performed by: INTERNAL MEDICINE

## 2021-01-01 PROCEDURE — 96413 CHEMO IV INFUSION 1 HR: CPT

## 2021-01-01 PROCEDURE — 83735 ASSAY OF MAGNESIUM: CPT | Performed by: INTERNAL MEDICINE

## 2021-01-01 PROCEDURE — 83615 LACTATE (LD) (LDH) ENZYME: CPT | Performed by: PHYSICIAN ASSISTANT

## 2021-01-01 PROCEDURE — 250N000009 HC RX 250: Performed by: OPHTHALMOLOGY

## 2021-01-01 PROCEDURE — G0463 HOSPITAL OUTPT CLINIC VISIT: HCPCS | Mod: 25

## 2021-01-01 PROCEDURE — 77412 RADIATION TX DELIVERY LVL 3: CPT | Performed by: RADIOLOGY

## 2021-01-01 PROCEDURE — 83615 LACTATE (LD) (LDH) ENZYME: CPT | Performed by: INTERNAL MEDICINE

## 2021-01-01 PROCEDURE — 96417 CHEMO IV INFUS EACH ADDL SEQ: CPT

## 2021-01-01 PROCEDURE — 74176 CT ABD & PELVIS W/O CONTRAST: CPT | Mod: 26 | Performed by: RADIOLOGY

## 2021-01-01 PROCEDURE — 66982 XCAPSL CTRC RMVL CPLX WO ECP: CPT | Mod: LT

## 2021-01-01 PROCEDURE — 250N000011 HC RX IP 250 OP 636: Performed by: INTERNAL MEDICINE

## 2021-01-01 PROCEDURE — 77387 GUIDANCE FOR RADJ TX DLVR: CPT | Performed by: RADIOLOGY

## 2021-01-01 PROCEDURE — 72156 MRI NECK SPINE W/O & W/DYE: CPT | Mod: 26 | Performed by: RADIOLOGY

## 2021-01-01 PROCEDURE — 99215 OFFICE O/P EST HI 40 MIN: CPT | Performed by: INTERNAL MEDICINE

## 2021-01-01 PROCEDURE — 99215 OFFICE O/P EST HI 40 MIN: CPT | Mod: GT | Performed by: INTERNAL MEDICINE

## 2021-01-01 PROCEDURE — 99214 OFFICE O/P EST MOD 30 MIN: CPT | Mod: GT | Performed by: INTERNAL MEDICINE

## 2021-01-01 PROCEDURE — 250N000011 HC RX IP 250 OP 636: Performed by: PHYSICIAN ASSISTANT

## 2021-01-01 PROCEDURE — 85025 COMPLETE CBC W/AUTO DIFF WBC: CPT | Performed by: INTERNAL MEDICINE

## 2021-01-01 PROCEDURE — 67031 LASER SURGERY EYE STRANDS: CPT | Mod: LT | Performed by: OPHTHALMOLOGY

## 2021-01-01 PROCEDURE — A9552 F18 FDG: HCPCS | Performed by: INTERNAL MEDICINE

## 2021-01-01 PROCEDURE — 72040 X-RAY EXAM NECK SPINE 2-3 VW: CPT

## 2021-01-01 PROCEDURE — 77427 RADIATION TX MANAGEMENT X5: CPT | Performed by: RADIOLOGY

## 2021-01-01 PROCEDURE — 92014 COMPRE OPH EXAM EST PT 1/>: CPT | Mod: 24 | Performed by: OPHTHALMOLOGY

## 2021-01-01 PROCEDURE — 250N000013 HC RX MED GY IP 250 OP 250 PS 637: Performed by: EMERGENCY MEDICINE

## 2021-01-01 PROCEDURE — A9585 GADOBUTROL INJECTION: HCPCS

## 2021-01-01 PROCEDURE — 71250 CT THORAX DX C-: CPT | Mod: 26 | Performed by: RADIOLOGY

## 2021-01-01 PROCEDURE — 80053 COMPREHEN METABOLIC PANEL: CPT | Performed by: INTERNAL MEDICINE

## 2021-01-01 PROCEDURE — 78816 PET IMAGE W/CT FULL BODY: CPT | Mod: PS

## 2021-01-01 PROCEDURE — 77336 RADIATION PHYSICS CONSULT: CPT | Performed by: RADIOLOGY

## 2021-01-01 PROCEDURE — 272N000706 US PARACENTESIS

## 2021-01-01 PROCEDURE — 343N000001 HC RX 343: Performed by: INTERNAL MEDICINE

## 2021-01-01 PROCEDURE — 73030 X-RAY EXAM OF SHOULDER: CPT | Mod: LT

## 2021-01-01 PROCEDURE — 250N000013 HC RX MED GY IP 250 OP 250 PS 637: Performed by: INTERNAL MEDICINE

## 2021-01-01 PROCEDURE — 71260 CT THORAX DX C+: CPT | Mod: 26

## 2021-01-01 PROCEDURE — 96365 THER/PROPH/DIAG IV INF INIT: CPT

## 2021-01-01 PROCEDURE — 99204 OFFICE O/P NEW MOD 45 MIN: CPT | Mod: 57 | Performed by: OPHTHALMOLOGY

## 2021-01-01 PROCEDURE — 72157 MRI CHEST SPINE W/O & W/DYE: CPT

## 2021-01-01 PROCEDURE — 250N000009 HC RX 250: Performed by: INTERNAL MEDICINE

## 2021-01-01 PROCEDURE — 77300 RADIATION THERAPY DOSE PLAN: CPT | Mod: 26 | Performed by: RADIOLOGY

## 2021-01-01 PROCEDURE — 99024 POSTOP FOLLOW-UP VISIT: CPT | Mod: GC | Performed by: OPHTHALMOLOGY

## 2021-01-01 PROCEDURE — G0463 HOSPITAL OUTPT CLINIC VISIT: HCPCS | Performed by: RADIOLOGY

## 2021-01-01 PROCEDURE — 77280 THER RAD SIMULAJ FIELD SMPL: CPT | Mod: 26 | Performed by: RADIOLOGY

## 2021-01-01 PROCEDURE — 82040 ASSAY OF SERUM ALBUMIN: CPT | Performed by: PHYSICIAN ASSISTANT

## 2021-01-01 PROCEDURE — 99215 OFFICE O/P EST HI 40 MIN: CPT | Mod: 25 | Performed by: RADIOLOGY

## 2021-01-01 PROCEDURE — 99215 OFFICE O/P EST HI 40 MIN: CPT | Mod: 95 | Performed by: PHYSICIAN ASSISTANT

## 2021-01-01 PROCEDURE — 77290 THER RAD SIMULAJ FIELD CPLX: CPT | Performed by: RADIOLOGY

## 2021-01-01 PROCEDURE — 74177 CT ABD & PELVIS W/CONTRAST: CPT

## 2021-01-01 PROCEDURE — 99207 PR SERVICE NOT STAFFED W/SUPERV PROV: CPT | Mod: GC | Performed by: OPHTHALMOLOGY

## 2021-01-01 PROCEDURE — 36415 COLL VENOUS BLD VENIPUNCTURE: CPT | Performed by: INTERNAL MEDICINE

## 2021-01-01 PROCEDURE — 77334 RADIATION TREATMENT AID(S): CPT | Performed by: RADIOLOGY

## 2021-01-01 PROCEDURE — 255N000002 HC RX 255 OP 636: Performed by: INTERNAL MEDICINE

## 2021-01-01 PROCEDURE — 80053 COMPREHEN METABOLIC PANEL: CPT

## 2021-01-01 PROCEDURE — U0005 INFEC AGEN DETEC AMPLI PROBE: HCPCS | Mod: 90 | Performed by: PATHOLOGY

## 2021-01-01 PROCEDURE — 36415 COLL VENOUS BLD VENIPUNCTURE: CPT | Performed by: PHYSICIAN ASSISTANT

## 2021-01-01 PROCEDURE — 70553 MRI BRAIN STEM W/O & W/DYE: CPT | Mod: 26 | Performed by: STUDENT IN AN ORGANIZED HEALTH CARE EDUCATION/TRAINING PROGRAM

## 2021-01-01 PROCEDURE — 77295 3-D RADIOTHERAPY PLAN: CPT | Mod: 26 | Performed by: RADIOLOGY

## 2021-01-01 PROCEDURE — 78816 PET IMAGE W/CT FULL BODY: CPT | Mod: 26 | Performed by: RADIOLOGY

## 2021-01-01 PROCEDURE — 77014 PR CT GUIDE FOR PLACEMENT RADIATION THERAPY FIELDS: CPT | Mod: 26 | Performed by: RADIOLOGY

## 2021-01-01 PROCEDURE — 96415 CHEMO IV INFUSION ADDL HR: CPT

## 2021-01-01 PROCEDURE — 78816 PET IMAGE W/CT FULL BODY: CPT | Mod: 26

## 2021-01-01 PROCEDURE — 99214 OFFICE O/P EST MOD 30 MIN: CPT | Performed by: RADIOLOGY

## 2021-01-01 PROCEDURE — 96372 THER/PROPH/DIAG INJ SC/IM: CPT | Mod: 59 | Performed by: INTERNAL MEDICINE

## 2021-01-01 PROCEDURE — 77300 RADIATION THERAPY DOSE PLAN: CPT | Performed by: RADIOLOGY

## 2021-01-01 PROCEDURE — 36415 COLL VENOUS BLD VENIPUNCTURE: CPT

## 2021-01-01 PROCEDURE — 72158 MRI LUMBAR SPINE W/O & W/DYE: CPT

## 2021-01-01 PROCEDURE — 78816 PET IMAGE W/CT FULL BODY: CPT

## 2021-01-01 PROCEDURE — 77290 THER RAD SIMULAJ FIELD CPLX: CPT | Mod: 26 | Performed by: RADIOLOGY

## 2021-01-01 PROCEDURE — 96367 TX/PROPH/DG ADDL SEQ IV INF: CPT

## 2021-01-01 PROCEDURE — G0463 HOSPITAL OUTPT CLINIC VISIT: HCPCS

## 2021-01-01 PROCEDURE — 999N001193 HC VIDEO/TELEPHONE VISIT; NO CHARGE

## 2021-01-01 PROCEDURE — 258N000003 HC RX IP 258 OP 636: Performed by: PHYSICIAN ASSISTANT

## 2021-01-01 PROCEDURE — 83735 ASSAY OF MAGNESIUM: CPT | Performed by: EMERGENCY MEDICINE

## 2021-01-01 PROCEDURE — 77263 THER RADIOLOGY TX PLNG CPLX: CPT | Performed by: RADIOLOGY

## 2021-01-01 PROCEDURE — 99213 OFFICE O/P EST LOW 20 MIN: CPT | Mod: GT | Performed by: INTERNAL MEDICINE

## 2021-01-01 PROCEDURE — 71250 CT THORAX DX C-: CPT

## 2021-01-01 PROCEDURE — 84443 ASSAY THYROID STIM HORMONE: CPT | Performed by: INTERNAL MEDICINE

## 2021-01-01 PROCEDURE — 999N000127 HC STATISTIC PERIPHERAL IV START W US GUIDANCE

## 2021-01-01 PROCEDURE — 72158 MRI LUMBAR SPINE W/O & W/DYE: CPT | Mod: 26 | Performed by: RADIOLOGY

## 2021-01-01 PROCEDURE — 77332 RADIATION TREATMENT AID(S): CPT | Mod: 26 | Performed by: RADIOLOGY

## 2021-01-01 PROCEDURE — 99417 PROLNG OP E/M EACH 15 MIN: CPT | Performed by: RADIOLOGY

## 2021-01-01 PROCEDURE — 83735 ASSAY OF MAGNESIUM: CPT | Performed by: PHYSICIAN ASSISTANT

## 2021-01-01 PROCEDURE — 96377 APPLICATON ON-BODY INJECTOR: CPT | Mod: 59 | Performed by: INTERNAL MEDICINE

## 2021-01-01 PROCEDURE — 84100 ASSAY OF PHOSPHORUS: CPT | Performed by: INTERNAL MEDICINE

## 2021-01-01 PROCEDURE — 77295 3-D RADIOTHERAPY PLAN: CPT | Performed by: RADIOLOGY

## 2021-01-01 PROCEDURE — 83615 LACTATE (LD) (LDH) ENZYME: CPT

## 2021-01-01 PROCEDURE — 92015 DETERMINE REFRACTIVE STATE: CPT

## 2021-01-01 PROCEDURE — A9585 GADOBUTROL INJECTION: HCPCS | Performed by: INTERNAL MEDICINE

## 2021-01-01 PROCEDURE — 77280 THER RAD SIMULAJ FIELD SMPL: CPT | Performed by: RADIOLOGY

## 2021-01-01 PROCEDURE — 72156 MRI NECK SPINE W/O & W/DYE: CPT

## 2021-01-01 PROCEDURE — 74177 CT ABD & PELVIS W/CONTRAST: CPT | Mod: 26

## 2021-01-01 PROCEDURE — 96375 TX/PRO/DX INJ NEW DRUG ADDON: CPT

## 2021-01-01 PROCEDURE — 82550 ASSAY OF CK (CPK): CPT | Performed by: EMERGENCY MEDICINE

## 2021-01-01 PROCEDURE — 82040 ASSAY OF SERUM ALBUMIN: CPT | Performed by: EMERGENCY MEDICINE

## 2021-01-01 PROCEDURE — A9581 GADOXETATE DISODIUM INJ: HCPCS | Performed by: INTERNAL MEDICINE

## 2021-01-01 PROCEDURE — 255N000002 HC RX 255 OP 636

## 2021-01-01 PROCEDURE — 77332 RADIATION TREATMENT AID(S): CPT | Performed by: RADIOLOGY

## 2021-01-01 PROCEDURE — 85004 AUTOMATED DIFF WBC COUNT: CPT

## 2021-01-01 PROCEDURE — 96372 THER/PROPH/DIAG INJ SC/IM: CPT | Performed by: INTERNAL MEDICINE

## 2021-01-01 PROCEDURE — 77332 RADIATION TREATMENT AID(S): CPT | Mod: XU | Performed by: RADIOLOGY

## 2021-01-01 PROCEDURE — 74183 MRI ABD W/O CNTR FLWD CNTR: CPT

## 2021-01-01 PROCEDURE — A9585 GADOBUTROL INJECTION: HCPCS | Performed by: RADIOLOGY

## 2021-01-01 PROCEDURE — 72157 MRI CHEST SPINE W/O & W/DYE: CPT | Mod: 26 | Performed by: RADIOLOGY

## 2021-01-01 PROCEDURE — U0003 INFECTIOUS AGENT DETECTION BY NUCLEIC ACID (DNA OR RNA); SEVERE ACUTE RESPIRATORY SYNDROME CORONAVIRUS 2 (SARS-COV-2) (CORONAVIRUS DISEASE [COVID-19]), AMPLIFIED PROBE TECHNIQUE, MAKING USE OF HIGH THROUGHPUT TECHNOLOGIES AS DESCRIBED BY CMS-2020-01-R: HCPCS | Mod: 90 | Performed by: PATHOLOGY

## 2021-01-01 PROCEDURE — 255N000002 HC RX 255 OP 636: Performed by: RADIOLOGY

## 2021-01-01 PROCEDURE — 49083 ABD PARACENTESIS W/IMAGING: CPT

## 2021-01-01 PROCEDURE — 36415 COLL VENOUS BLD VENIPUNCTURE: CPT | Performed by: EMERGENCY MEDICINE

## 2021-01-01 PROCEDURE — 99285 EMERGENCY DEPT VISIT HI MDM: CPT

## 2021-01-01 PROCEDURE — 70553 MRI BRAIN STEM W/O & W/DYE: CPT

## 2021-01-01 PROCEDURE — G0463 HOSPITAL OUTPT CLINIC VISIT: HCPCS | Mod: 25 | Performed by: RADIOLOGY

## 2021-01-01 PROCEDURE — 99215 OFFICE O/P EST HI 40 MIN: CPT | Mod: 95 | Performed by: INTERNAL MEDICINE

## 2021-01-01 PROCEDURE — 74183 MRI ABD W/O CNTR FLWD CNTR: CPT | Mod: 26 | Performed by: RADIOLOGY

## 2021-01-01 PROCEDURE — 77334 RADIATION TREATMENT AID(S): CPT | Mod: 26 | Performed by: RADIOLOGY

## 2021-01-01 PROCEDURE — 76519 ECHO EXAM OF EYE: CPT | Performed by: OPHTHALMOLOGY

## 2021-01-01 PROCEDURE — 49083 ABD PARACENTESIS W/IMAGING: CPT | Mod: GC | Performed by: RADIOLOGY

## 2021-01-01 PROCEDURE — 66982 XCAPSL CTRC RMVL CPLX WO ECP: CPT | Mod: 79 | Performed by: OPHTHALMOLOGY

## 2021-01-01 PROCEDURE — 71250 CT THORAX DX C-: CPT | Performed by: RADIOLOGY

## 2021-01-01 PROCEDURE — 77427 RADIATION TX MANAGEMENT X5: CPT | Mod: GC | Performed by: RADIOLOGY

## 2021-01-01 PROCEDURE — 80053 COMPREHEN METABOLIC PANEL: CPT | Performed by: EMERGENCY MEDICINE

## 2021-01-01 DEVICE — EYE IMP IOL AMO PCL TECNIS ZCB00 21.5: Type: IMPLANTABLE DEVICE | Site: EYE | Status: FUNCTIONAL

## 2021-01-01 RX ORDER — HEPARIN SODIUM (PORCINE) LOCK FLUSH IV SOLN 100 UNIT/ML 100 UNIT/ML
5 SOLUTION INTRAVENOUS
Status: CANCELLED | OUTPATIENT
Start: 2021-01-01

## 2021-01-01 RX ORDER — ALBUTEROL SULFATE 90 UG/1
1-2 AEROSOL, METERED RESPIRATORY (INHALATION)
Status: CANCELLED
Start: 2021-01-01

## 2021-01-01 RX ORDER — SODIUM CHLORIDE 9 MG/ML
1000 INJECTION, SOLUTION INTRAVENOUS CONTINUOUS PRN
Status: CANCELLED
Start: 2021-01-01

## 2021-01-01 RX ORDER — NALOXONE HYDROCHLORIDE 0.4 MG/ML
0.2 INJECTION, SOLUTION INTRAMUSCULAR; INTRAVENOUS; SUBCUTANEOUS
Status: CANCELLED | OUTPATIENT
Start: 2021-01-01

## 2021-01-01 RX ORDER — GABAPENTIN 100 MG/1
CAPSULE ORAL
Qty: 20 CAPSULE | Refills: 0 | Status: SHIPPED | OUTPATIENT
Start: 2021-01-01 | End: 2021-01-01

## 2021-01-01 RX ORDER — DIPHENHYDRAMINE HYDROCHLORIDE 50 MG/ML
50 INJECTION INTRAMUSCULAR; INTRAVENOUS
Status: CANCELLED
Start: 2021-01-01

## 2021-01-01 RX ORDER — ALBUTEROL SULFATE 0.83 MG/ML
2.5 SOLUTION RESPIRATORY (INHALATION)
Status: CANCELLED | OUTPATIENT
Start: 2021-01-01

## 2021-01-01 RX ORDER — LORAZEPAM 2 MG/ML
0.5 INJECTION INTRAMUSCULAR EVERY 4 HOURS PRN
Status: CANCELLED
Start: 2021-01-01

## 2021-01-01 RX ORDER — HYDROMORPHONE HYDROCHLORIDE 1 MG/ML
.2-.4 INJECTION, SOLUTION INTRAMUSCULAR; INTRAVENOUS; SUBCUTANEOUS EVERY 5 MIN PRN
Status: DISCONTINUED | OUTPATIENT
Start: 2021-01-01 | End: 2021-01-01 | Stop reason: HOSPADM

## 2021-01-01 RX ORDER — GADOBUTROL 604.72 MG/ML
10 INJECTION INTRAVENOUS ONCE
Status: COMPLETED | OUTPATIENT
Start: 2021-01-01 | End: 2021-01-01

## 2021-01-01 RX ORDER — ACETAMINOPHEN 325 MG/1
975 TABLET ORAL ONCE
Status: COMPLETED | OUTPATIENT
Start: 2021-01-01 | End: 2021-01-01

## 2021-01-01 RX ORDER — MEPERIDINE HYDROCHLORIDE 25 MG/ML
25 INJECTION INTRAMUSCULAR; INTRAVENOUS; SUBCUTANEOUS EVERY 30 MIN PRN
Status: CANCELLED | OUTPATIENT
Start: 2021-01-01

## 2021-01-01 RX ORDER — ALBUMIN (HUMAN) 12.5 G/50ML
12.5 SOLUTION INTRAVENOUS ONCE
Status: CANCELLED | OUTPATIENT
Start: 2021-01-01 | End: 2021-01-01

## 2021-01-01 RX ORDER — EPINEPHRINE 1 MG/ML
0.3 INJECTION, SOLUTION, CONCENTRATE INTRAVENOUS EVERY 5 MIN PRN
Status: CANCELLED | OUTPATIENT
Start: 2021-01-01

## 2021-01-01 RX ORDER — ALBUMIN (HUMAN) 12.5 G/50ML
12.5 SOLUTION INTRAVENOUS
Status: CANCELLED | OUTPATIENT
Start: 2021-01-01

## 2021-01-01 RX ORDER — HEPARIN SODIUM,PORCINE 10 UNIT/ML
5 VIAL (ML) INTRAVENOUS
Status: CANCELLED | OUTPATIENT
Start: 2021-01-01

## 2021-01-01 RX ORDER — ZOLEDRONIC ACID 0.04 MG/ML
4 INJECTION, SOLUTION INTRAVENOUS ONCE
Status: CANCELLED | OUTPATIENT
Start: 2021-01-01 | End: 2021-01-01

## 2021-01-01 RX ORDER — PROCHLORPERAZINE MALEATE 10 MG
10 TABLET ORAL EVERY 6 HOURS PRN
Qty: 30 TABLET | Refills: 5 | Status: SHIPPED | OUTPATIENT
Start: 2021-01-01 | End: 2021-01-01

## 2021-01-01 RX ORDER — SPIRONOLACTONE 25 MG/1
50 TABLET ORAL 2 TIMES DAILY PRN
Qty: 60 TABLET | Refills: 1 | Status: SHIPPED | OUTPATIENT
Start: 2021-01-01

## 2021-01-01 RX ORDER — ALPRAZOLAM 0.25 MG
TABLET ORAL
COMMUNITY
Start: 2021-01-01

## 2021-01-01 RX ORDER — EPINEPHRINE 1 MG/ML
0.3 INJECTION, SOLUTION INTRAMUSCULAR; SUBCUTANEOUS EVERY 5 MIN PRN
Status: CANCELLED | OUTPATIENT
Start: 2021-01-01

## 2021-01-01 RX ORDER — BALANCED SALT SOLUTION 6.4; .75; .48; .3; 3.9; 1.7 MG/ML; MG/ML; MG/ML; MG/ML; MG/ML; MG/ML
SOLUTION OPHTHALMIC PRN
Status: DISCONTINUED | OUTPATIENT
Start: 2021-01-01 | End: 2021-01-01 | Stop reason: HOSPADM

## 2021-01-01 RX ORDER — CYCLOPENTOLAT/TROPIC/PHENYLEPH 1%-1%-2.5%
1 DROPS (EA) OPHTHALMIC (EYE)
Status: COMPLETED | OUTPATIENT
Start: 2021-01-01 | End: 2021-01-01

## 2021-01-01 RX ORDER — LORAZEPAM 2 MG/ML
1 INJECTION INTRAMUSCULAR EVERY 6 HOURS PRN
Status: CANCELLED
Start: 2021-01-01

## 2021-01-01 RX ORDER — GADOBUTROL 604.72 MG/ML
9.5 INJECTION INTRAVENOUS ONCE
Status: COMPLETED | OUTPATIENT
Start: 2021-01-01 | End: 2021-01-01

## 2021-01-01 RX ORDER — ONDANSETRON 2 MG/ML
4 INJECTION INTRAMUSCULAR; INTRAVENOUS EVERY 30 MIN PRN
Status: DISCONTINUED | OUTPATIENT
Start: 2021-01-01 | End: 2021-01-01 | Stop reason: HOSPADM

## 2021-01-01 RX ORDER — METHYLPREDNISOLONE SODIUM SUCCINATE 125 MG/2ML
125 INJECTION, POWDER, LYOPHILIZED, FOR SOLUTION INTRAMUSCULAR; INTRAVENOUS
Status: CANCELLED
Start: 2021-01-01

## 2021-01-01 RX ORDER — SODIUM CHLORIDE, SODIUM LACTATE, POTASSIUM CHLORIDE, CALCIUM CHLORIDE 600; 310; 30; 20 MG/100ML; MG/100ML; MG/100ML; MG/100ML
500 INJECTION, SOLUTION INTRAVENOUS CONTINUOUS
Status: DISCONTINUED | OUTPATIENT
Start: 2021-01-01 | End: 2021-01-01 | Stop reason: HOSPADM

## 2021-01-01 RX ORDER — ONDANSETRON 4 MG/1
4 TABLET, ORALLY DISINTEGRATING ORAL EVERY 30 MIN PRN
Status: DISCONTINUED | OUTPATIENT
Start: 2021-01-01 | End: 2021-01-01 | Stop reason: HOSPADM

## 2021-01-01 RX ORDER — PALONOSETRON 0.05 MG/ML
0.25 INJECTION, SOLUTION INTRAVENOUS ONCE
Status: CANCELLED
Start: 2021-01-01

## 2021-01-01 RX ORDER — IOPAMIDOL 755 MG/ML
60-135 INJECTION, SOLUTION INTRAVASCULAR ONCE
Status: COMPLETED | OUTPATIENT
Start: 2021-01-01 | End: 2021-01-01

## 2021-01-01 RX ORDER — DIPHENHYDRAMINE HCL 25 MG
50 CAPSULE ORAL ONCE
Status: COMPLETED | OUTPATIENT
Start: 2021-01-01 | End: 2021-01-01

## 2021-01-01 RX ORDER — HYDROMORPHONE HYDROCHLORIDE 2 MG/1
1 TABLET ORAL EVERY 6 HOURS PRN
Qty: 30 TABLET | Refills: 0 | Status: SHIPPED | OUTPATIENT
Start: 2021-01-01

## 2021-01-01 RX ORDER — ONDANSETRON 2 MG/ML
INJECTION INTRAMUSCULAR; INTRAVENOUS PRN
Status: DISCONTINUED | OUTPATIENT
Start: 2021-01-01 | End: 2021-01-01

## 2021-01-01 RX ORDER — NALOXONE HYDROCHLORIDE 0.4 MG/ML
.1-.4 INJECTION, SOLUTION INTRAMUSCULAR; INTRAVENOUS; SUBCUTANEOUS
Status: CANCELLED | OUTPATIENT
Start: 2021-01-01

## 2021-01-01 RX ORDER — MEPERIDINE HYDROCHLORIDE 25 MG/ML
12.5 INJECTION INTRAMUSCULAR; INTRAVENOUS; SUBCUTANEOUS
Status: DISCONTINUED | OUTPATIENT
Start: 2021-01-01 | End: 2021-01-01 | Stop reason: HOSPADM

## 2021-01-01 RX ORDER — GADOBUTROL 604.72 MG/ML
10 INJECTION INTRAVENOUS ONCE
Status: DISCONTINUED | OUTPATIENT
Start: 2021-01-01 | End: 2021-01-01 | Stop reason: HOSPADM

## 2021-01-01 RX ORDER — TETRACAINE HYDROCHLORIDE 5 MG/ML
SOLUTION OPHTHALMIC PRN
Status: DISCONTINUED | OUTPATIENT
Start: 2021-01-01 | End: 2021-01-01 | Stop reason: HOSPADM

## 2021-01-01 RX ORDER — ZOLEDRONIC ACID 0.04 MG/ML
4 INJECTION, SOLUTION INTRAVENOUS ONCE
Status: COMPLETED | OUTPATIENT
Start: 2021-01-01 | End: 2021-01-01

## 2021-01-01 RX ORDER — LIDOCAINE HYDROCHLORIDE 10 MG/ML
20 INJECTION, SOLUTION EPIDURAL; INFILTRATION; INTRACAUDAL; PERINEURAL ONCE
Status: CANCELLED | OUTPATIENT
Start: 2021-01-01 | End: 2021-01-01

## 2021-01-01 RX ORDER — DEXAMETHASONE SODIUM PHOSPHATE 4 MG/ML
INJECTION, SOLUTION INTRA-ARTICULAR; INTRALESIONAL; INTRAMUSCULAR; INTRAVENOUS; SOFT TISSUE PRN
Status: DISCONTINUED | OUTPATIENT
Start: 2021-01-01 | End: 2021-01-01

## 2021-01-01 RX ORDER — HYDROCORTISONE ACETATE 25 MG/1
SUPPOSITORY RECTAL
COMMUNITY
Start: 2021-01-01

## 2021-01-01 RX ORDER — POLYVINYL ALCOHOL 14 MG/ML
SOLUTION/ DROPS OPHTHALMIC
COMMUNITY
Start: 2021-01-01

## 2021-01-01 RX ORDER — LIDOCAINE HYDROCHLORIDE 10 MG/ML
20 INJECTION, SOLUTION EPIDURAL; INFILTRATION; INTRACAUDAL; PERINEURAL ONCE
Status: COMPLETED | OUTPATIENT
Start: 2021-01-01 | End: 2021-01-01

## 2021-01-01 RX ORDER — FUROSEMIDE 20 MG
20 TABLET ORAL 2 TIMES DAILY PRN
Qty: 60 TABLET | Refills: 1 | Status: SHIPPED | OUTPATIENT
Start: 2021-01-01

## 2021-01-01 RX ORDER — LIDOCAINE 40 MG/G
CREAM TOPICAL
Status: CANCELLED | OUTPATIENT
Start: 2021-01-01

## 2021-01-01 RX ORDER — OXYCODONE HYDROCHLORIDE 5 MG/1
5-10 TABLET ORAL EVERY 4 HOURS PRN
Status: DISCONTINUED | OUTPATIENT
Start: 2021-01-01 | End: 2021-01-01 | Stop reason: HOSPADM

## 2021-01-01 RX ORDER — MELOXICAM 15 MG/1
TABLET ORAL
COMMUNITY
Start: 2021-01-01

## 2021-01-01 RX ORDER — LABETALOL HYDROCHLORIDE 5 MG/ML
10 INJECTION, SOLUTION INTRAVENOUS
Status: DISCONTINUED | OUTPATIENT
Start: 2021-01-01 | End: 2021-01-01 | Stop reason: HOSPADM

## 2021-01-01 RX ORDER — MOXIFLOXACIN IN NACL,ISO-OS/PF 0.3MG/0.3
SYRINGE (ML) INTRAOCULAR PRN
Status: DISCONTINUED | OUTPATIENT
Start: 2021-01-01 | End: 2021-01-01 | Stop reason: HOSPADM

## 2021-01-01 RX ORDER — PROPARACAINE HYDROCHLORIDE 5 MG/ML
1 SOLUTION/ DROPS OPHTHALMIC ONCE
Status: COMPLETED | OUTPATIENT
Start: 2021-01-01 | End: 2021-01-01

## 2021-01-01 RX ORDER — DEXMEDETOMIDINE HYDROCHLORIDE 4 UG/ML
INJECTION, SOLUTION INTRAVENOUS PRN
Status: DISCONTINUED | OUTPATIENT
Start: 2021-01-01 | End: 2021-01-01

## 2021-01-01 RX ORDER — DIAZEPAM 10 MG/2ML
2.5 INJECTION, SOLUTION INTRAMUSCULAR; INTRAVENOUS
Status: DISCONTINUED | OUTPATIENT
Start: 2021-01-01 | End: 2021-01-01 | Stop reason: HOSPADM

## 2021-01-01 RX ORDER — PREDNISOLONE ACETATE 10 MG/ML
1 SUSPENSION/ DROPS OPHTHALMIC 4 TIMES DAILY
Qty: 10 ML | Refills: 1 | Status: SHIPPED | OUTPATIENT
Start: 2021-01-01

## 2021-01-01 RX ORDER — MORPHINE SULFATE 15 MG/1
TABLET, FILM COATED, EXTENDED RELEASE ORAL
COMMUNITY
Start: 2021-01-01

## 2021-01-01 RX ORDER — TIZANIDINE 2 MG/1
2 TABLET ORAL 3 TIMES DAILY PRN
Qty: 20 TABLET | Refills: 0 | Status: SHIPPED | OUTPATIENT
Start: 2021-01-01

## 2021-01-01 RX ORDER — FENTANYL CITRATE 50 UG/ML
25-50 INJECTION, SOLUTION INTRAMUSCULAR; INTRAVENOUS EVERY 5 MIN PRN
Status: DISCONTINUED | OUTPATIENT
Start: 2021-01-01 | End: 2021-01-01 | Stop reason: HOSPADM

## 2021-01-01 RX ORDER — GABAPENTIN 100 MG/1
CAPSULE ORAL
Qty: 20 CAPSULE | Refills: 0 | Status: SHIPPED | OUTPATIENT
Start: 2021-01-01

## 2021-01-01 RX ORDER — FENTANYL CITRATE 50 UG/ML
INJECTION, SOLUTION INTRAMUSCULAR; INTRAVENOUS PRN
Status: DISCONTINUED | OUTPATIENT
Start: 2021-01-01 | End: 2021-01-01

## 2021-01-01 RX ORDER — TIMOLOL 5 MG/ML
SOLUTION/ DROPS OPHTHALMIC PRN
Status: DISCONTINUED | OUTPATIENT
Start: 2021-01-01 | End: 2021-01-01 | Stop reason: HOSPADM

## 2021-01-01 RX ORDER — SODIUM CHLORIDE, SODIUM LACTATE, POTASSIUM CHLORIDE, CALCIUM CHLORIDE 600; 310; 30; 20 MG/100ML; MG/100ML; MG/100ML; MG/100ML
INJECTION, SOLUTION INTRAVENOUS CONTINUOUS
Status: DISCONTINUED | OUTPATIENT
Start: 2021-01-01 | End: 2021-01-01 | Stop reason: HOSPADM

## 2021-01-01 RX ORDER — DEXAMETHASONE SODIUM PHOSPHATE 10 MG/ML
4 INJECTION, SOLUTION INTRAMUSCULAR; INTRAVENOUS EVERY 10 MIN PRN
Status: DISCONTINUED | OUTPATIENT
Start: 2021-01-01 | End: 2021-01-01 | Stop reason: HOSPADM

## 2021-01-01 RX ORDER — METHOCARBAMOL 750 MG/1
TABLET, FILM COATED ORAL
COMMUNITY
Start: 2021-01-01

## 2021-01-01 RX ORDER — TIZANIDINE 2 MG/1
2 TABLET ORAL ONCE
Status: COMPLETED | OUTPATIENT
Start: 2021-01-01 | End: 2021-01-01

## 2021-01-01 RX ORDER — DIPHENHYDRAMINE HCL 25 MG
50 CAPSULE ORAL ONCE
Status: CANCELLED
Start: 2021-01-01

## 2021-01-01 RX ORDER — LIDOCAINE HYDROCHLORIDE 10 MG/ML
INJECTION, SOLUTION EPIDURAL; INFILTRATION; INTRACAUDAL; PERINEURAL PRN
Status: DISCONTINUED | OUTPATIENT
Start: 2021-01-01 | End: 2021-01-01 | Stop reason: HOSPADM

## 2021-01-01 RX ORDER — ALBUTEROL SULFATE 5 MG/ML
2.5 SOLUTION RESPIRATORY (INHALATION)
Status: CANCELLED | OUTPATIENT
Start: 2021-01-01

## 2021-01-01 RX ORDER — MOXIFLOXACIN 5 MG/ML
1 SOLUTION/ DROPS OPHTHALMIC 3 TIMES DAILY
Qty: 3 ML | Refills: 1 | Status: SHIPPED | OUTPATIENT
Start: 2021-01-01 | End: 2021-01-01

## 2021-01-01 RX ORDER — KETOROLAC TROMETHAMINE 30 MG/ML
30 INJECTION, SOLUTION INTRAMUSCULAR; INTRAVENOUS EVERY 6 HOURS PRN
Status: DISCONTINUED | OUTPATIENT
Start: 2021-01-01 | End: 2021-01-01 | Stop reason: HOSPADM

## 2021-01-01 RX ORDER — FENTANYL CITRATE 50 UG/ML
25-50 INJECTION, SOLUTION INTRAMUSCULAR; INTRAVENOUS EVERY 5 MIN PRN
Status: ACTIVE | OUTPATIENT
Start: 2021-01-01 | End: 2021-01-01

## 2021-01-01 RX ORDER — ALBUTEROL SULFATE 0.83 MG/ML
2.5 SOLUTION RESPIRATORY (INHALATION) EVERY 4 HOURS PRN
Status: DISCONTINUED | OUTPATIENT
Start: 2021-01-01 | End: 2021-01-01 | Stop reason: HOSPADM

## 2021-01-01 RX ORDER — LIDOCAINE 40 MG/G
CREAM TOPICAL
Status: DISCONTINUED | OUTPATIENT
Start: 2021-01-01 | End: 2021-01-01 | Stop reason: HOSPADM

## 2021-01-01 RX ORDER — TIZANIDINE 2 MG/1
2 TABLET ORAL 3 TIMES DAILY PRN
Qty: 20 TABLET | Refills: 0 | Status: SHIPPED | OUTPATIENT
Start: 2021-01-01 | End: 2021-01-01

## 2021-01-01 RX ORDER — GABAPENTIN 100 MG/1
CAPSULE ORAL
COMMUNITY
Start: 2021-01-01 | End: 2021-01-01

## 2021-01-01 RX ORDER — ALBUMIN (HUMAN) 12.5 G/50ML
12.5 SOLUTION INTRAVENOUS
Status: DISCONTINUED | OUTPATIENT
Start: 2021-01-01 | End: 2021-01-01 | Stop reason: HOSPADM

## 2021-01-01 RX ORDER — IBUPROFEN 400 MG/1
400 TABLET, FILM COATED ORAL ONCE
Status: COMPLETED | OUTPATIENT
Start: 2021-01-01 | End: 2021-01-01

## 2021-01-01 RX ORDER — PALONOSETRON 0.05 MG/ML
0.25 INJECTION, SOLUTION INTRAVENOUS ONCE
Status: COMPLETED | OUTPATIENT
Start: 2021-01-01 | End: 2021-01-01

## 2021-01-01 RX ORDER — GADOBUTROL 604.72 MG/ML
10 INJECTION INTRAVENOUS ONCE
Status: DISCONTINUED | OUTPATIENT
Start: 2021-01-01 | End: 2021-01-01

## 2021-01-01 RX ORDER — MIRTAZAPINE 15 MG/1
TABLET, FILM COATED ORAL
COMMUNITY
Start: 2021-01-01

## 2021-01-01 RX ORDER — ZOLEDRONIC ACID 0.04 MG/ML
4 INJECTION, SOLUTION INTRAVENOUS ONCE
Status: DISCONTINUED | OUTPATIENT
Start: 2021-01-01 | End: 2021-01-01 | Stop reason: CLARIF

## 2021-01-01 RX ADMIN — ZOLEDRONIC ACID 4 MG: 0.04 INJECTION, SOLUTION INTRAVENOUS at 13:43

## 2021-01-01 RX ADMIN — SODIUM CHLORIDE 480 MG: 9 INJECTION, SOLUTION INTRAVENOUS at 15:13

## 2021-01-01 RX ADMIN — SODIUM CHLORIDE 480 MG: 9 INJECTION, SOLUTION INTRAVENOUS at 13:09

## 2021-01-01 RX ADMIN — FLUDEOXYGLUCOSE F-18 12.24 MCI.: 500 INJECTION, SOLUTION INTRAVENOUS at 09:46

## 2021-01-01 RX ADMIN — GADOXETATE DISODIUM 10 ML: 181.43 INJECTION, SOLUTION INTRAVENOUS at 08:00

## 2021-01-01 RX ADMIN — ZOLEDRONIC ACID 4 MG: 0.04 INJECTION, SOLUTION INTRAVENOUS at 12:23

## 2021-01-01 RX ADMIN — FENTANYL CITRATE 50 MCG: 50 INJECTION, SOLUTION INTRAMUSCULAR; INTRAVENOUS at 10:03

## 2021-01-01 RX ADMIN — Medication 1 DROP: at 08:55

## 2021-01-01 RX ADMIN — SODIUM CHLORIDE 250 ML: 9 INJECTION, SOLUTION INTRAVENOUS at 12:52

## 2021-01-01 RX ADMIN — ONDANSETRON 4 MG: 2 INJECTION INTRAMUSCULAR; INTRAVENOUS at 09:44

## 2021-01-01 RX ADMIN — SODIUM CHLORIDE 300 MG: 900 INJECTION, SOLUTION INTRAVENOUS at 08:27

## 2021-01-01 RX ADMIN — FLUDEOXYGLUCOSE F-18 12.06 MCI.: 500 INJECTION, SOLUTION INTRAVENOUS at 09:56

## 2021-01-01 RX ADMIN — SODIUM CHLORIDE 250 ML: 9 INJECTION, SOLUTION INTRAVENOUS at 13:52

## 2021-01-01 RX ADMIN — SODIUM CHLORIDE 250 ML: 9 INJECTION, SOLUTION INTRAVENOUS at 14:28

## 2021-01-01 RX ADMIN — TIZANIDINE 2 MG: 2 TABLET ORAL at 19:31

## 2021-01-01 RX ADMIN — DEXMEDETOMIDINE HYDROCHLORIDE 12 MCG: 4 INJECTION, SOLUTION INTRAVENOUS at 10:06

## 2021-01-01 RX ADMIN — Medication 1 DROP: at 09:00

## 2021-01-01 RX ADMIN — GADOBUTROL 9.5 ML: 604.72 INJECTION INTRAVENOUS at 14:36

## 2021-01-01 RX ADMIN — SODIUM CHLORIDE 480 MG: 9 INJECTION, SOLUTION INTRAVENOUS at 14:23

## 2021-01-01 RX ADMIN — SODIUM CHLORIDE 400 MG: 900 INJECTION, SOLUTION INTRAVENOUS at 13:21

## 2021-01-01 RX ADMIN — IOPAMIDOL 127 ML: 755 INJECTION, SOLUTION INTRAVENOUS at 11:05

## 2021-01-01 RX ADMIN — SODIUM CHLORIDE 250 ML: 9 INJECTION, SOLUTION INTRAVENOUS at 12:48

## 2021-01-01 RX ADMIN — SODIUM CHLORIDE 300 MG: 900 INJECTION, SOLUTION INTRAVENOUS at 12:48

## 2021-01-01 RX ADMIN — SODIUM CHLORIDE 250 ML: 9 INJECTION, SOLUTION INTRAVENOUS at 15:50

## 2021-01-01 RX ADMIN — SODIUM CHLORIDE 250 ML: 9 INJECTION, SOLUTION INTRAVENOUS at 08:26

## 2021-01-01 RX ADMIN — PROPARACAINE HYDROCHLORIDE 1 DROP: 5 SOLUTION/ DROPS OPHTHALMIC at 08:55

## 2021-01-01 RX ADMIN — FLUDEOXYGLUCOSE F-18 12.38 MCI.: 500 INJECTION, SOLUTION INTRAVENOUS at 09:58

## 2021-01-01 RX ADMIN — SODIUM CHLORIDE 80 MG: 9 INJECTION, SOLUTION INTRAVENOUS at 13:25

## 2021-01-01 RX ADMIN — DENOSUMAB 120 MG: 120 INJECTION SUBCUTANEOUS at 13:58

## 2021-01-01 RX ADMIN — SODIUM CHLORIDE 300 MG: 900 INJECTION, SOLUTION INTRAVENOUS at 14:29

## 2021-01-01 RX ADMIN — SODIUM CHLORIDE 100 MG: 9 INJECTION, SOLUTION INTRAVENOUS at 13:36

## 2021-01-01 RX ADMIN — ALBUMIN HUMAN 12.5 G: 0.25 SOLUTION INTRAVENOUS at 14:24

## 2021-01-01 RX ADMIN — ZOLEDRONIC ACID 4 MG: 0.04 INJECTION, SOLUTION INTRAVENOUS at 10:54

## 2021-01-01 RX ADMIN — GADOBUTROL 8.5 ML: 604.72 INJECTION INTRAVENOUS at 07:16

## 2021-01-01 RX ADMIN — GADOBUTROL 9 ML: 604.72 INJECTION INTRAVENOUS at 16:01

## 2021-01-01 RX ADMIN — DEXAMETHASONE SODIUM PHOSPHATE 4 MG: 4 INJECTION, SOLUTION INTRA-ARTICULAR; INTRALESIONAL; INTRAMUSCULAR; INTRAVENOUS; SOFT TISSUE at 09:44

## 2021-01-01 RX ADMIN — PALONOSETRON 0.25 MG: 0.05 INJECTION, SOLUTION INTRAVENOUS at 09:04

## 2021-01-01 RX ADMIN — SODIUM CHLORIDE 250 ML: 9 INJECTION, SOLUTION INTRAVENOUS at 12:18

## 2021-01-01 RX ADMIN — GADOBUTROL 10 ML: 604.72 INJECTION INTRAVENOUS at 11:33

## 2021-01-01 RX ADMIN — SODIUM CHLORIDE 100 MG: 9 INJECTION, SOLUTION INTRAVENOUS at 09:01

## 2021-01-01 RX ADMIN — SODIUM CHLORIDE 250 MG: 900 INJECTION, SOLUTION INTRAVENOUS at 12:49

## 2021-01-01 RX ADMIN — DENOSUMAB 120 MG: 120 INJECTION SUBCUTANEOUS at 16:04

## 2021-01-01 RX ADMIN — SODIUM CHLORIDE, SODIUM LACTATE, POTASSIUM CHLORIDE, CALCIUM CHLORIDE: 600; 310; 30; 20 INJECTION, SOLUTION INTRAVENOUS at 09:00

## 2021-01-01 RX ADMIN — SODIUM CHLORIDE 480 MG: 9 INJECTION, SOLUTION INTRAVENOUS at 13:14

## 2021-01-01 RX ADMIN — Medication 0.25 MG: at 09:42

## 2021-01-01 RX ADMIN — ZOLEDRONIC ACID 4 MG: 0.04 INJECTION, SOLUTION INTRAVENOUS at 13:53

## 2021-01-01 RX ADMIN — SODIUM CHLORIDE 100 MG: 9 INJECTION, SOLUTION INTRAVENOUS at 15:09

## 2021-01-01 RX ADMIN — SODIUM CHLORIDE 250 ML: 9 INJECTION, SOLUTION INTRAVENOUS at 15:13

## 2021-01-01 RX ADMIN — APRACLONIDINE HYDROCHLORIDE 1 DROP: 10 SOLUTION/ DROPS OPHTHALMIC at 11:24

## 2021-01-01 RX ADMIN — DEXAMETHASONE SODIUM PHOSPHATE: 10 INJECTION, SOLUTION INTRAMUSCULAR; INTRAVENOUS at 09:18

## 2021-01-01 RX ADMIN — PACLITAXEL 297 MG: 6 INJECTION, SOLUTION INTRAVENOUS at 10:00

## 2021-01-01 RX ADMIN — LIDOCAINE HYDROCHLORIDE 10 ML: 10 INJECTION, SOLUTION EPIDURAL; INFILTRATION; INTRACAUDAL; PERINEURAL at 14:08

## 2021-01-01 RX ADMIN — FAMOTIDINE 40 MG: 10 INJECTION INTRAVENOUS at 09:13

## 2021-01-01 RX ADMIN — SODIUM CHLORIDE 250 ML: 9 INJECTION, SOLUTION INTRAVENOUS at 13:43

## 2021-01-01 RX ADMIN — FENTANYL CITRATE 50 MCG: 50 INJECTION, SOLUTION INTRAMUSCULAR; INTRAVENOUS at 09:47

## 2021-01-01 RX ADMIN — DIPHENHYDRAMINE HYDROCHLORIDE 50 MG: 25 CAPSULE ORAL at 09:03

## 2021-01-01 RX ADMIN — SODIUM CHLORIDE 250 ML: 9 INJECTION, SOLUTION INTRAVENOUS at 10:54

## 2021-01-01 RX ADMIN — PEGFILGRASTIM 6 MG: KIT SUBCUTANEOUS at 14:00

## 2021-01-01 RX ADMIN — ACETAMINOPHEN 975 MG: 325 TABLET ORAL at 09:00

## 2021-01-01 RX ADMIN — IBUPROFEN 400 MG: 400 TABLET ORAL at 19:31

## 2021-01-01 RX ADMIN — SODIUM CHLORIDE 480 MG: 9 INJECTION, SOLUTION INTRAVENOUS at 14:11

## 2021-01-01 RX ADMIN — SODIUM CHLORIDE 480 MG: 9 INJECTION, SOLUTION INTRAVENOUS at 15:51

## 2021-01-01 RX ADMIN — SODIUM CHLORIDE 250 ML: 9 INJECTION, SOLUTION INTRAVENOUS at 12:23

## 2021-01-01 RX ADMIN — Medication 1 DROP: at 09:09

## 2021-01-01 RX ADMIN — Medication 0.25 MG: at 09:32

## 2021-01-01 RX ADMIN — DENOSUMAB 120 MG: 120 INJECTION SUBCUTANEOUS at 09:03

## 2021-01-01 RX ADMIN — SODIUM CHLORIDE 250 ML: 9 INJECTION, SOLUTION INTRAVENOUS at 09:05

## 2021-01-01 ASSESSMENT — REFRACTION_MANIFEST
OD_AXIS: 005
OS_SPHERE: +0.25
OS_CYLINDER: +0.75
OD_CYLINDER: +1.00
OS_AXIS: 156
OD_ADD: +2.50
OD_SPHERE: +2.25
OD_AXIS: 005
OS_ADD: +2.50
OS_AXIS: 156
OD_CYLINDER: +1.00
OS_SPHERE: -1.00
OD_SPHERE: +3.50
OS_CYLINDER: +0.75

## 2021-01-01 ASSESSMENT — VISUAL ACUITY
METHOD: SNELLEN - LINEAR
OD_CC: 20/25
OS_CC+: +2
METHOD: SNELLEN - LINEAR
CORRECTION_TYPE: GLASSES
OS_CC: 20/40
METHOD: SNELLEN - LINEAR
OD_CC+: -1
OS_PH_CC: 20/30
OD_CC+: -2/+2
OD_CC: 20/20
OS_SC: 20/40
OS_PH_CC+: +1
CORRECTION_TYPE: GLASSES
OS_CC+: -2
OS_CC: 20/30
OD_CC: 20/20
OS_CC+: +1
CORRECTION_TYPE: GLASSES
METHOD: SNELLEN - LINEAR
OS_CC: 20/40

## 2021-01-01 ASSESSMENT — REFRACTION_WEARINGRX
OD_SPHERE: +1.50
OS_AXIS: 010
OS_ADD: +2.50
OS_AXIS: 010
OD_AXIS: 010
OD_CYLINDER: +1.00
OD_CYLINDER: +1.00
OS_SPHERE: +1.50
OS_CYLINDER: +0.25
SPECS_TYPE: TRIFOCALS
OS_SPHERE: +1.50
OS_CYLINDER: +0.25
OD_ADD: +2.50
OS_ADD: +2.50
OD_ADD: +2.50
OD_AXIS: 010
OD_SPHERE: +1.50
SPECS_TYPE: TRIFOCALS

## 2021-01-01 ASSESSMENT — CONF VISUAL FIELD
OD_NORMAL: 1
OD_NORMAL: 1
METHOD: COUNTING FINGERS
OS_NORMAL: 1
OS_NORMAL: 1

## 2021-01-01 ASSESSMENT — SLIT LAMP EXAM - LIDS
COMMENTS: NORMAL

## 2021-01-01 ASSESSMENT — MIFFLIN-ST. JEOR
SCORE: 1717.82
SCORE: 1706.93
SCORE: 1644.79
SCORE: 1644.79

## 2021-01-01 ASSESSMENT — ENCOUNTER SYMPTOMS
DIARRHEA: 0
BACK PAIN: 0
RESPIRATORY NEGATIVE: 1
WEIGHT LOSS: 1
NUMBNESS: 1
SHORTNESS OF BREATH: 0
INSOMNIA: 1
MUSCULOSKELETAL NEGATIVE: 1
NERVOUS/ANXIOUS: 0
CONSTITUTIONAL NEGATIVE: 1
MYALGIAS: 1
HEARTBURN: 1
NECK PAIN: 0
DYSURIA: 1
TINGLING: 1
SHORTNESS OF BREATH: 1
EYE PAIN: 0
BLURRED VISION: 1
FREQUENCY: 1
ABDOMINAL DISTENTION: 1
CHILLS: 0
HEADACHES: 0
NAUSEA: 1
DOUBLE VISION: 1
DOUBLE VISION: 0
DIAPHORESIS: 0
SEIZURES: 0
DEPRESSION: 0
BRUISES/BLEEDS EASILY: 1
CARDIOVASCULAR NEGATIVE: 1
BLURRED VISION: 0
SORE THROAT: 0
BLOOD IN STOOL: 0
WEAKNESS: 1
INSOMNIA: 0
FALLS: 1
CONSTIPATION: 1
FEVER: 0
VOMITING: 0
DIZZINESS: 0
COUGH: 1
APPETITE CHANGE: 1
MEMORY LOSS: 1

## 2021-01-01 ASSESSMENT — EXTERNAL EXAM - RIGHT EYE
OD_EXAM: NORMAL

## 2021-01-01 ASSESSMENT — TONOMETRY
IOP_METHOD: TONOPEN
OD_IOP_MMHG: 15
OD_IOP_MMHG: 17
OS_IOP_MMHG: 14
IOP_METHOD: TONOPEN
IOP_METHOD: TONOPEN
OS_IOP_MMHG: 14
OD_IOP_MMHG: 10
IOP_METHOD: TONOPEN
OS_IOP_MMHG: 15
OS_IOP_MMHG: 09

## 2021-01-01 ASSESSMENT — PAIN SCALES - GENERAL
PAINLEVEL: MILD PAIN (3)
PAINLEVEL: NO PAIN (0)
PAINLEVEL: NO PAIN (1)
PAINLEVEL: NO PAIN (0)
PAINLEVEL: MILD PAIN (2)
PAINLEVEL: NO PAIN (0)
PAINLEVEL: MILD PAIN (2)

## 2021-01-01 ASSESSMENT — REFRACTION_FINALRX
OS_VPRISM: 1 BD
OS_HPRISM: 2 BO
OS_HPRISM: 2 BO
OS_HPRISM: 2BO
OS_VPRISM: 1 BD
OS_VPRISM: 1 BD

## 2021-01-01 ASSESSMENT — CUP TO DISC RATIO
OS_RATIO: 0.3
OD_RATIO: 0.3
OS_RATIO: 0.3
OS_RATIO: 0.3

## 2021-01-01 ASSESSMENT — EXTERNAL EXAM - LEFT EYE
OS_EXAM: NORMAL

## 2021-01-07 NOTE — TELEPHONE ENCOUNTER
Call placed to patient regarding symptomatic MyChart message. He states over the past week the bottom of his right rib has been sore, tender 3-4/10 (sharp, constant pain). Movement aggravates the pain.     Denies fever, chills, swelling, bruising, vomiting, appetite changes, urinary symptoms.     Paged Dr. Trent as update.    Future Appointments   Date Time Provider Department Center   1/11/2021  7:00 AM [email or web address] [facility]   1/11/2021 10:30 AM UUPET1 [facility]   1/11/2021 11:30 AM [facility] LAB GOLD WAITING [facility]   1/14/2021 12:45 PM Jw Burden MD [facility]   1/15/2021 11:00 AM [facility] ONCOLOGY INFUSION [facility]   1/25/2021  4:40 PM Alberto Storey MD [facility]   2/12/2021 11:20 AM Mg Patel MD [facility]

## 2021-01-14 NOTE — LETTER
"    1/14/2021         RE: Galileo Mayberry  [address]  [facility] [address]        Dear Colleague,    Thank you for referring your patient, Galileo Mayberry, to the [facility]. Please see a copy of my visit note below.    Favian is a 75 year old who is being evaluated via a billable video visit.      How would you like to obtain your AVS? MyChart  If the video visit is dropped, the invitation should be resent by: Send to e-mail at: [email or web address]  Will anyone else be joining your video visit? Yes: WIFE. How would they like to receive their invitation? Text to cell phone: [phone]      Vitals - Patient Reported  Weight (Patient Reported): 91.6 kg (202 lb)  Height (Patient Reported): 180.3 cm (5' 11\")  BMI (Based on Pt Reported Ht/Wt): 28.17  Pain Score: No Pain (1)  Pain Loc: (PATIENT REPORTS PAIN IN THE LOWER RIGHT ABDOMEN)      I have reviewed and updated patient's allergy and medication list.    Concerns:NONE   Refills: NONE      Jesusita Dexter CMA    Video-Visit Details    Type of service:  Video Visit    Video Start Time: 12:50 PM    Video End Time:1:45 PM    Originating Location (pt. 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 11/2019, he is diagnosed with ocular melanoma, after a choroidal lesion was found in his Left eye.  Patient reports over the last several months he developed new floaters/spots in his L eye.  2. 11/25/2019, he was seen by ophthalmology who noted a left eye, elevated bilobed lesion, size 6.28mm x 18.31(T) x 17.04(L).   3. 12/14/19, CT-CAP showed no evidence of metastatic disease.  4. 1/10/2020: Patient referred to [facility]. B-scan ultrasound of left eye showed elevated " bilobed lesion measuring 7.2-7.3 height with a base of 21.4 x 22.5 mm. Low to medium reflectivity. Difficult measurements due to location. Ciliary body involvement was noted. Ultrasound basal dimension measurement included subretinal fluid, and clinical measurement had to be done by transillumination due to anterior tumor location.  5. 1/15/2020, visual acuity right eye 20/20 -1, left eye 20/25 +2 nh. Visual fields counting fingers full bilaterally. Clinical fundus exam of left eye revealed choroidal/ciliary body malignant melanoma with basal measurement of 22 x 21 mm and height of 7.5 mm. Located in the Inferior left eye from 4:30-7:30, 15 mm to disc, 15 mm to fovea, +subretinal fluid, bilobed. The tumor without fluid appears to be 1-2 mm smaller in basal dimension (20 x 19 mm).   6. 2/10/2020, Iodine-125 24 mm plaque placement delivering 85 Gy to an 8 mm height. FNA biopsy performed, and molecular testing shows Class II PRAME positive, high-risk of early metastasis.  7. 3/31/2020, he starts adjuvant sunitinib 25 mg daily for high risk disease. He completed 6 months of therapy.  8. 8/26/2020, MRI-liver shows interval development of numerous (more than 30) metastatic lesions in the liver in bilobar distribution. The largest in segment JUAN measures 1.85 cm.  9. 10/19/2020, he undergoes Y90 radioembolization with 72.2 mCi of Theraspheres to the right lobe of the liver; predominantly in hepatic segments 5 and 6, milder uptake in the region of segment 8  10. 10/23/2020, first cycle of Ipilimumab 1mg/kg and nivolumab 3mg/kg  11. 11/16/2020, he has his 2nd Y90 radioembolization with 69 mCi of TheraSpheres to hepatic segments 4A and 4B.         History of Present Illness:  Mr. 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 He returns today via video visit.     He underwent Y90 radioembolization to the right hepatic lobe on 10/19. Ipi/Nivo on 10/23, then Y90 radioembolization to left hepatic lobe on 11/16. He  has received 4 cycles of Ipi/Nivo and returns for review of restaging scans.    Restaging PET-CT and MRI-liver on 1/11/21 show progressive disease in the bone and liver.     ECOG performance status is 1.    Review of Systems:  12-point ROS negative except as in HPI    Current Outpatient Medications   Medication Sig Dispense Refill     acetaminophen (TYLENOL) 500 MG tablet Take 1,000 mg by mouth       Ascorbic Acid (VITAMIN C) 500 MG CAPS Take 500 mg by mouth       calcium carbonate (OS-FAUSTO) 500 MG tablet Take 1 tablet (500 mg) by mouth 2 times daily 60 tablet 3     Cholecalciferol (VITAMIN D3) 25 MCG (1000 UT) CAPS Takes 3 daily       diclofenac (VOLTAREN) 1 % topical gel        doxepin (SINEQUAN) 10 MG/ML (HIGH CONC) solution Take 0.6 mLs (6 mg) by mouth At Bedtime 18 mL 1     Ferrous Gluconate 240 (27 Fe) MG TABS Take 240 mg by mouth       finasteride (PROSCAR) 5 MG tablet Take 5 mg by mouth       HYDROmorphone (DILAUDID) 2 MG tablet Take 1 tablet (2 mg) by mouth every 6 hours as needed for pain 10 tablet 0     LORazepam (ATIVAN) 0.5 MG tablet Take 1 tablet (0.5 mg) by mouth every 4 hours as needed (Anxiety, Nausea/Vomiting or Sleep) 30 tablet 3     methylphenidate (RITALIN) 10 MG tablet as needed       methylPREDNISolone (MEDROL DOSEPAK) 4 MG tablet therapy pack Take 1 tablet (4 mg) by mouth 2 times daily Take as directed on package. 21 tablet 0     methylPREDNISolone (MEDROL) 32 MG tablet Please take 32 mg, 12 hours prior to procedure, take another 32 mg, 2 hours prior to procedure for contrast allergy. 2 tablet 0     ondansetron (ZOFRAN) 4 MG tablet Take 1-2 tablets (4-8 mg) by mouth every 6 hours as needed for nausea (vomiting) 40 tablet 0     polyethylene glycol (MIRALAX) 17 GM/Dose powder Take 17 g (1 capful) by mouth 2 times daily 255 g 1     prochlorperazine (COMPAZINE) 10 MG tablet Take 1 tablet (10 mg) by mouth every 6 hours as needed (Nausea/Vomiting) 30 tablet 3     senna-docusate (SENNA S) 8.6-50 MG  tablet Take 1 tablet by mouth 2 times daily as needed for constipation 60 tablet 1     tamsulosin (FLOMAX) 0.4 MG capsule Take 0.4 mg by mouth       Testosterone 1.62 % GEL          Past Medical History:   Diagnosis Date     Degenerative joint disease      Metastatic melanoma (H)     L eye     Nonsenile cataract      Past Surgical History:   Procedure Laterality Date     ------------OTHER-------------  2014    back surgery L4/L5      ------------OTHER-------------      knee surgery both 1961 Right, 1971 Left knee     AS REMOVAL OF KIDNEY STONE  2015     IR SIRT (SELECTIVE INTERNAL RADIO THERAPY)  10/13/2020     IR VISCERAL ANGIOGRAM  10/13/2020     IR VISCERAL EMBOLIZATION  10/19/2020     IR VISCERAL EMBOLIZATION  11/16/2020     JOINT REPLACEMENT  2017    Both kneses replaced (2017 and 2018)     ROTATOR CUFF REPAIR RT/LT Right 2016     TURP  2000     Family History   Problem Relation Age of Onset     Glaucoma Mother      Prostate Cancer Father      Cancer Sister      Macular Degeneration No family hx of        Physical Examination:  There were no vitals taken for this visit.  Wt Readings from Last 5 Encounters:   12/28/20 96.3 kg (212 lb 6.4 oz)   12/04/20 93.5 kg (206 lb 1.6 oz)   11/16/20 90.3 kg (199 lb)   11/13/20 93.4 kg (206 lb)   10/19/20 93.4 kg (206 lb)     GENERAL: Healthy, alert and no distress  EYES: Eyes grossly normal to inspection.  No discharge or erythema, or obvious scleral/conjunctival abnormalities.  HENT: Normal cephalic/atraumatic.  External ears, nose and mouth without ulcers or lesions.  No nasal drainage visible.  NECK: No asymmetry, visible masses or scars  RESP: No audible wheeze, cough, or visible cyanosis.  No visible retractions or increased work of breathing.    SKIN: Visible skin clear. No significant rash, abnormal pigmentation or lesions.  NEURO: Cranial nerves grossly intact.  Mentation and speech appropriate for age.  PSYCH: Mentation appears normal, affect normal/bright,  judgement and insight intact, normal speech and appearance well-groomed.    The rest of a comprehensive physical examination is deferred due to PHE (public health emergency) video visit restrictions.    Imaging:  PET-CT scan, 1/11/2021  IMPRESSION: In this patient with history of metastatic uveal melanoma  with bone and liver involvement. s/p Y90 liver radioembolization,  systemic sunitinib and now undergoing palliative therapy with  ipilimumab and nivolumab, there is evidence of disease progression:     1. Multiple new solid pulmonary nodules as detailed above, too small  to evaluate by PET for hypermetabolism but concerning for pulmonary  metastasis.  2. FDG avid lesions within the hepatic segments 3, 5 and 6 concerning  for disease progression.  3. Numerous hypermetabolic foci within the spine, pelvis, ribs and  left humerus compatible with osseous metastatic disease.   4. FDG avid focus at the T10 vertebral body effaces the spinal canal.  Recommend formal thoracic MRI with contrast to assess for  myelomalacia.   5. FDG avid focus of the lateral margin of the spleen could represent  a splenic metastasis.  6. Nonobstructing left renal nephrolithiasis.    MRI-Abdomen, 1/11/2021  IMPRESSION: In this patient with a history of metastatic malignant  melanoma status posttreatment changes of the y90 delivery into the  bilateral hepatic lobes;  1. Increased in size and numbers of the innumerable metastatic liver  lesions since 1/11/2021.  2. Increase in size and numbers of the multiple metastatic bone  lesions in the visualized thoracolumbar spine and pelvic bones since  prior MRI.      ASSESSMENT/PLAN:    #1 Metastatic uveal melanoma, to liver and bone  #2 Choroidal melanoma with ciliary body involvement, left eye, status-post plaque brachytherapy,  [employer] Class II and PRAME mRNA positive  It was a pleasure to meet with Mr. Mayberry today. He is a 75 year old man with metastatic uveal melanoma to liver and bone.   He underwent radioembolization to the right hepatic lobe on 10/19. Ipi/Nivo on 10/23, then Y90 to left hepatic lobe on 11/16. The plan is for him to receive nivolumab maintenance tomorrow.    We reviewed the scans together with his wife and daughter. Patient understands there is progression on his scans after 4 cycles of Ipilimumab and nivolumab with Y90 radioembolization. While there appears to have been significant effect of Y90 on the liver metastases there are large areas of consolidative tumor in the far left hepatic lobe (segment 2/4) and right inferior hepatic lobe (segment 5/6) seen on PET-CT and on restricted diffusion imaging on MRI. His disease has progressed rapidly in bone as well. He has recently started Zometa for known bony involvement.     We reviewed the pros and cons of continuation of nivolumab, versus chemotherapy options and best supportive care. We also discussed the importance of balancing quality of life concerns and pain control issues. After weighing his options patient prefers to remain on nivolumab maintenance therapy to confirm the status of his disease in another 8 weeks. At this time liver function tests are largely normal with only mild elevation of alkaline phosphatase to 171 and AST 50, and normal ALT of 35.    -Continue nivolumab maintenance  -Zometa already approved. Plan for him to receive it with his upcoming nivolumab infusion.  -Continue Alleve OTC q12h  -Dilaudid 1 mg PO q6h as needed for pain (sent to [facility] pharmacy)  -PET-CT in 8 weeks with return to see me          Jw Reyes M.D.   of Medicine  Hematology, Oncology and Transplantation

## 2021-01-15 NOTE — PROGRESS NOTES
Infusion Nursing Note:  Galileo Mayberry presents today for Cycle 5 Day 1 Nivolumab   Patient seen by provider yesterday- Dr. Guillermo   present during visit today: Not Applicable.    Note:     Pt denies any issues or concerns overnight.     Pt denies any issues or concerns overnight. Zometa was released in error; clarified with pharmacist that he received this on 12/28/2020. Favian was under the impression that he was to receive Zometa today. Reviewed past visits/notes with patient in infusion and with providers. Reviewed with patient that Zometa will be due in 2 weeks; but since he is back 4 weeks for Nivo he can likely combine the appointments. Pt would like to discuss with provider and this writer notified Jalyn  Yari of pt's request and she will follow up with him. Message sent to Sayra Montiel to follow up.     Intravenous Access:  Peripheral IV placed.    Treatment Conditions:  Lab Results   Component Value Date    HGB 14.1 01/11/2021     Lab Results   Component Value Date    WBC 4.3 01/11/2021      Lab Results   Component Value Date    ANEU 2.6 01/11/2021     Lab Results   Component Value Date     01/11/2021      Lab Results   Component Value Date     01/11/2021                   Lab Results   Component Value Date    POTASSIUM 4.2 01/11/2021            Lab Results   Component Value Date    CR 1.00 01/11/2021                   Lab Results   Component Value Date    FAUSTO 9.0 01/11/2021                Lab Results   Component Value Date    BILITOTAL 0.9 01/11/2021           Lab Results   Component Value Date    ALBUMIN 3.3 01/11/2021                    Lab Results   Component Value Date    ALT 35 01/11/2021           Lab Results   Component Value Date    AST 50 01/11/2021       Results reviewed, labs MET treatment parameters, ok to proceed with treatment.      Post Infusion Assessment:  Patient tolerated infusion without incident.  Blood return noted pre and post infusion.  No evidence  of extravasations.       Discharge Plan:   Patient declined prescription refills.  Discharge instructions reviewed with: Patient.  AVS to patient via [employer].  Patient will return depending on what he decides as to if he will come back in 2 weeks for Zometa or wait and come in a month for the both infusions.   Departure Mode: Ambulatory.    Tonya Lucia RN

## 2021-01-25 NOTE — PROGRESS NOTES
"Favian is a 75 year old who is being evaluated via a billable telephone visit.      What phone number would you like to be contacted at? [phone]  How would you like to obtain your AVS? MyChart     Vitals - Patient Reported  Weight (Patient Reported): 90.7 kg (200 lb)  Height (Patient Reported): 180.3 cm (5' 11\")  BMI (Based on Pt Reported Ht/Wt): 27.89  Pain Score: Mild Pain (2)  Pain Loc: (PATIENT REPORTS PAIN IN LIVER AREA)      I have reviewed and updated patient's allergy and medication list.    Concerns: COVID19 VACCINE  Refills: NONE      Jesusita Dexter [facility]    Phone call duration: 35 minutes    S: Mr. Mayberry is a pleasant 75 year old who returns following y90 therapy to the right (10/19/2020) and left (11/16/20).  He has developed some pain that is pinpoint about the 12th right rib.  This is likely from the development of bony metastases.  He has started zometa for this and it is improving.  He is feeling a bit down because of the fast progression of his disease, however, is otherwise doing well.  He is going to continue to Nivo, but is also very interested in discussing with palliative.       As you remember  Mr. Mayberry began noticing increased floaters in his left eye in fall of 2019.  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  He eventually underwent I-125 brachytherapy and was initiated on sunitinib given his high risk status.  Initial work up failed to show any evidence of metastatic disease.  However, MRI from August demonstrated numerous metastatic deposits which were eventually biopsied at [facility] and confirmed as metastatic uveal melanoma.    O:    Lab Results   Component Value Date    WBC 4.3 01/11/2021     Lab Results   Component Value Date    RBC 4.70 01/11/2021     Lab Results   Component Value Date    HGB 14.1 01/11/2021     Lab Results   Component Value Date    HCT 43.2 01/11/2021     Lab Results   Component Value Date    MCV 92 01/11/2021     Lab Results "   Component Value Date    MCH 30.0 01/11/2021     Lab Results   Component Value Date    MCHC 32.6 01/11/2021     Lab Results   Component Value Date    RDW 13.0 01/11/2021     Lab Results   Component Value Date     01/11/2021     Last Comprehensive Metabolic Panel:  Sodium   Date Value Ref Range Status   01/11/2021 142 133 - 144 mmol/L Final     Potassium   Date Value Ref Range Status   01/11/2021 4.2 3.4 - 5.3 mmol/L Final     Chloride   Date Value Ref Range Status   01/11/2021 110 (H) 94 - 109 mmol/L Final     Carbon Dioxide   Date Value Ref Range Status   01/11/2021 26 20 - 32 mmol/L Final     Anion Gap   Date Value Ref Range Status   01/11/2021 5 3 - 14 mmol/L Final     Glucose   Date Value Ref Range Status   01/11/2021 73 70 - 99 mg/dL Final     Urea Nitrogen   Date Value Ref Range Status   01/11/2021 22 7 - 30 mg/dL Final     Creatinine   Date Value Ref Range Status   01/11/2021 1.00 0.66 - 1.25 mg/dL Final     GFR Estimate   Date Value Ref Range Status   01/11/2021 72 >60 mL/min/[1.73_m2] Final     Comment:     Non  GFR Calc  Starting 12/18/2018, serum creatinine based estimated GFR (eGFR) will be   calculated using the Chronic Kidney Disease Epidemiology Collaboration   (CKD-EPI) equation.       Calcium   Date Value Ref Range Status   01/11/2021 9.0 8.5 - 10.1 mg/dL Final     Bilirubin Total   Date Value Ref Range Status   01/11/2021 0.9 0.2 - 1.3 mg/dL Final     Alkaline Phosphatase   Date Value Ref Range Status   01/11/2021 171 (H) 40 - 150 U/L Final     ALT   Date Value Ref Range Status   01/11/2021 35 0 - 70 U/L Final     AST   Date Value Ref Range Status   01/11/2021 50 (H) 0 - 45 U/L Final     MRI: I reviewed the MRI from 1/11/21 which demonstrates a positive response to much of the liver, however, the segment 4/5 inferior lesion appears to remain.    PET: I reviewed the PET from 1/11/21 which demonstrates significant progression particularly in the bones.    A/p:  Mr. Mayberry  unfortunately has experienced significant progression.  He is continuing with Nivo but is also interested in discussing options with palliative.  I will tentatively schedule him for a follow up in 3 months.  However, this may be canceled depending on his course.

## 2021-01-25 NOTE — LETTER
"    1/25/2021         RE: Galileo Mayberry  [address]  [place] [address]        Dear Colleague,    Thank you for referring your patient, Galileo Mayberry, to the [facility]. Please see a copy of my visit note below.    Favian is a 75 year old who is being evaluated via a billable telephone visit.      What phone number would you like to be contacted at? [phone]  How would you like to obtain your AVS? MyChart     Vitals - Patient Reported  Weight (Patient Reported): 90.7 kg (200 lb)  Height (Patient Reported): 180.3 cm (5' 11\")  BMI (Based on Pt Reported Ht/Wt): 27.89  Pain Score: Mild Pain (2)  Pain Loc: (PATIENT REPORTS PAIN IN LIVER AREA)      I have reviewed and updated patient's allergy and medication list.    Concerns: COVID19 VACCINE  Refills: NONE      Jesusita Dexter [facility]    Phone call duration: 35 minutes    S: Mr. Mayberry is a pleasant 75 year old who returns following y90 therapy to the right (10/19/2020) and left (11/16/20).  He has developed some pain that is pinpoint about the 12th right rib.  This is likely from the development of bony metastases.  He has started zometa for this and it is improving.  He is feeling a bit down because of the fast progression of his disease, however, is otherwise doing well.  He is going to continue to Nivo, but is also very interested in discussing with palliative.       As you remember  Mr. Mayberry began noticing increased floaters in his left eye in fall of 2019.  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  He eventually underwent I-125 brachytherapy and was initiated on sunitinib given his high risk status.  Initial work up failed to show any evidence of metastatic disease.  However, MRI from August demonstrated numerous metastatic deposits which were eventually biopsied at [place] and confirmed as metastatic uveal melanoma.    O:    Lab Results   Component Value Date    WBC 4.3 01/11/2021 "     Lab Results   Component Value Date    RBC 4.70 01/11/2021     Lab Results   Component Value Date    HGB 14.1 01/11/2021     Lab Results   Component Value Date    HCT 43.2 01/11/2021     Lab Results   Component Value Date    MCV 92 01/11/2021     Lab Results   Component Value Date    MCH 30.0 01/11/2021     Lab Results   Component Value Date    MCHC 32.6 01/11/2021     Lab Results   Component Value Date    RDW 13.0 01/11/2021     Lab Results   Component Value Date     01/11/2021     Last Comprehensive Metabolic Panel:  Sodium   Date Value Ref Range Status   01/11/2021 142 133 - 144 mmol/L Final     Potassium   Date Value Ref Range Status   01/11/2021 4.2 3.4 - 5.3 mmol/L Final     Chloride   Date Value Ref Range Status   01/11/2021 110 (H) 94 - 109 mmol/L Final     Carbon Dioxide   Date Value Ref Range Status   01/11/2021 26 20 - 32 mmol/L Final     Anion Gap   Date Value Ref Range Status   01/11/2021 5 3 - 14 mmol/L Final     Glucose   Date Value Ref Range Status   01/11/2021 73 70 - 99 mg/dL Final     Urea Nitrogen   Date Value Ref Range Status   01/11/2021 22 7 - 30 mg/dL Final     Creatinine   Date Value Ref Range Status   01/11/2021 1.00 0.66 - 1.25 mg/dL Final     GFR Estimate   Date Value Ref Range Status   01/11/2021 72 >60 mL/min/[1.73_m2] Final     Comment:     Non  GFR Calc  Starting 12/18/2018, serum creatinine based estimated GFR (eGFR) will be   calculated using the Chronic Kidney Disease Epidemiology Collaboration   (CKD-EPI) equation.       Calcium   Date Value Ref Range Status   01/11/2021 9.0 8.5 - 10.1 mg/dL Final     Bilirubin Total   Date Value Ref Range Status   01/11/2021 0.9 0.2 - 1.3 mg/dL Final     Alkaline Phosphatase   Date Value Ref Range Status   01/11/2021 171 (H) 40 - 150 U/L Final     ALT   Date Value Ref Range Status   01/11/2021 35 0 - 70 U/L Final     AST   Date Value Ref Range Status   01/11/2021 50 (H) 0 - 45 U/L Final     MRI: I reviewed the MRI from  1/11/21 which demonstrates a positive response to much of the liver, however, the segment 4/5 inferior lesion appears to remain.    PET: I reviewed the PET from 1/11/21 which demonstrates significant progression particularly in the bones.    A/p:  Mr. Mayberry unfortunately has experienced significant progression.  He is continuing with Nivo but is also interested in discussing options with palliative.  I will tentatively schedule him for a follow up in 3 months.  However, this may be canceled depending on his course.        Again, thank you for allowing me to participate in the care of your patient.        Sincerely,        Alberto Storey MD

## 2021-01-29 NOTE — NURSING NOTE
Chief Complaint   Patient presents with     Blood Draw     labs drawn with IV start by rn in lab.  vital signs taken.     Labs drawn with IV start by RN in lab.  Vital signs taken.  Pt checked in to next appointment.    Nikki Bauer RN

## 2021-01-29 NOTE — PATIENT INSTRUCTIONS
Contact Numbers  [facility]: [phone] (for symptom and scheduling needs)    Please call the [facility] Triage line if you experience a temperature greater than or equal to 100.4, shaking chills, have uncontrolled nausea, vomiting and/or diarrhea, dizziness, shortness of breath, chest pain, bleeding, unexplained bruising, or if you have any other new/concerning symptoms, questions or concerns.     If you are having any concerning symptoms or wish to speak to a provider before your next infusion visit, please call your care coordinator or triage to notify them so we can adequately serve you.     If you need a refill on a narcotic prescription or other medication, please call triage before your infusion appointment.

## 2021-01-29 NOTE — PROGRESS NOTES
Infusion Nursing Note:  Galileo Mayberry presents today for Zometa.    Patient seen by provider today: No   present during visit today: Not Applicable.    Note:   Patient feels well today.  Patient denies fevers, cough or shortness of breath.  Patient has a red flat spotted rash on his shins with a few scaly spots.  Patient states that rash is itchy.  Reviewed rash with Dr. Trent.    ANDREW Trent/Carolina Antony, RN at 1215 on 1/29/21  Patient to try Benadryl cream or hydrocortisone cream.  Patient to notify us if rash worsens or does not improve.    Reviewed plan with patient.  He verbalized understanding.    Intravenous Access:  Peripheral IV placed in lab.    Treatment Conditions:  Lab Results   Component Value Date    HGB 14.1 01/29/2021     Lab Results   Component Value Date    WBC 4.7 01/29/2021      Lab Results   Component Value Date    ANEU 2.9 01/29/2021     Lab Results   Component Value Date     01/29/2021      Lab Results   Component Value Date     01/29/2021                   Lab Results   Component Value Date    POTASSIUM 3.9 01/29/2021      Lab Results   Component Value Date    CR 0.84 01/29/2021                   Lab Results   Component Value Date    FAUSTO 9.0 01/29/2021                Lab Results   Component Value Date    BILITOTAL 0.7 01/29/2021           Lab Results   Component Value Date    ALBUMIN 3.3 01/29/2021                    Lab Results   Component Value Date    ALT 38 01/29/2021           Lab Results   Component Value Date    AST 55 01/29/2021       Results reviewed, labs MET treatment parameters, ok to proceed with treatment.      Post Infusion Assessment:  Patient tolerated infusion without incident.  Blood return noted pre and post infusion.  Site patent and intact, free from redness, edema or discomfort.  No evidence of extravasations.  Access discontinued per protocol.       Discharge Plan:   Patient declined prescription refills.  Discharge instructions  reviewed with: Patient.  Patient and/or family verbalized understanding of discharge instructions and all questions answered.  AVS to patient via [employer].  Patient will return 2/12/21 for Nivolumab and 2/26/21 for Zometa.   Patient discharged in stable condition accompanied by: self.  Departure Mode: Ambulatory.    Carolina Antony RN

## 2021-02-12 NOTE — LETTER
2/12/2021       RE: Galileo Mayberry  [address] [place] [address]     Dear Colleague,    Thank you for referring your patient, Galileo Mayberry, to the [facility] at [facility]. Please see a copy of my visit note below.    Favian is a 76 year old who is being evaluated via a billable video visit.      How would you like to obtain your AVS? MyChart  If the video visit is dropped, the invitation should be resent by: Text to cell phone: [phone]  Will anyone else be joining your video visit? No     ALEX El      Palliative Care Outpatient Clinic    (This note was transcribed using voice recognition software. While I review and edit the transcription, I may miss errors, and the software sometimes does unexpected capitalizations and formatting that I miss. Please let me know of any serious mistranscriptions and I will addend this note.)    Patient ID:  Medical - He has widespread uveal melanoma  Late 2020-21 receiving Y90 for local control of his bulky liver mets and ipi/nivo systemically.    Jan 2021 scans showed widespread progression, plan is to continue nivo as 'maintenance' therapy    Social - Lives with wife. AF . . Has 1 dtr in [place], 1 in [facility]. [facility] is 100% [facility].    Care Planning - full code, no HCD on our chart but he is completing one at home. 2/12/21 Code status discussion.      History:  History gathered today from: patient, medical chart    Last visit with Dr Trent--discussed what to do, he is continuing nivolumab and zometa.  Scans and Dr Trent f/u early march    He is having some side effects of the infusions--tiredness, exhaustion, sleeps most of the day for a few days afterwards.    Inbetween infusions--continues to drive school bus, mild-only pain.    Has 3-5 day episodes of more pain which spontaneously comes on/subsides.   Takes hydromorphone  prn sparingly, works when he needs it.    We discuss EOL prep, planning.  His major worry is his wife's coping, well being, social isolation after he's gone. He does talk with her about these concerns. Will, etc all done. He has a strong chente and feels at peace with dying.     He feels his energy is slipping a little and we discussed assuming this is the strongest he is and getting all those tasks done asap.    PE: There were no vitals taken for this visit.   Wt Readings from Last 3 Encounters:   01/29/21 99.2 kg (218 lb 9.6 oz)   12/28/20 96.3 kg (212 lb 6.4 oz)   12/04/20 93.5 kg (206 lb 1.6 oz)     Gen alert, comfortable appearing, NAD.   Head NCAT.  Eyes anicteric without injection  Face symmetric, eyes conjugate  Mouth pink, moist appearing  Lungs unlabored, no cough, speaking full sentences  Skin no rashes or lesions evident on face/neck  Neuro Face symmetric, eyes conjugate; speech fluent.  Neuropsych exam normal including affect, sensorium, gross memory, thought processes, and fund of knowledge.     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 s/p Y90 liver radioembolization,  systemic sunitinib and now undergoing palliative therapy with  ipilimumab and nivolumab, there is evidence of disease progression:     1. Multiple new solid pulmonary nodules as detailed above, too small  to evaluate by PET for hypermetabolism but concerning for pulmonary  metastasis.  2. FDG avid lesions within the hepatic segments 3, 5 and 6 concerning  for disease progression.  3. Numerous hypermetabolic foci within the spine, pelvis, ribs and  left humerus compatible with osseous metastatic disease.   4. FDG avid focus at the T10 vertebral body effaces the spinal canal.  Recommend formal thoracic MRI with contrast to assess for  myelomalacia.   5.  FDG avid focus of the lateral margin of the spleen could represent  a splenic metastasis.  6. Nonobstructing left renal nephrolithiasis.    [facility] database reviewed:     Impression & Recommendations:  75 yo man with metastatic and progressing melanoma    Most of the visit discussing EOL planning as summarized in this note.    EOL education: answered questions about eol symptom, med use.   He understands he is likely in his final months.   Discussed stopping driving scenarios.     Discussed/educated him about hospice care, timing of enrollment, what hospice does and doesn't do. His preference is to die at home and we discussed preparing his family for eol home caregiving. I encouraged him to talk with his PCP about [facility] benefits and eol care too, and [facility] has a palliative unit.    DNR/DNI    F/u with us in a month    45 minutes spent on the date of the encounter doing chart review, history and exam, documentation and further activities as noted above.    Thank you for involving us in the patient's care.   Mg Patel MD / Palliative Medicine / Text me via [facility].      Video-Visit Details    Type of service:  Video Visit    Video Start Time:   Video End Time:    Originating Location (pt. Location): Home    Distant Location (provider location):  [facility]     Platform used for Video Visit: Now In Store

## 2021-02-12 NOTE — PROGRESS NOTES
Infusion Nursing Note:  Galileo Ballesteros Mayberry presents today for Cycle 6 Day 1 Nivolumab.    Patient seen by provider today: No   present during visit today: Not Applicable.    Note: VSS, denies pain, any new shortness of breath, fevers, chest pain, numbness or tingling. Pt reports no new changes since last seeing Dr. rTent. No changes with voiding/bowels or change in appetite. Tolerating current regimen well.     Patient did not meet criteria for an asymptomatic covid-19 PCR test in infusion today. Patient declined the covid-19 test.    Intravenous Access:  Peripheral IV placed.    Treatment Conditions:  Lab Results   Component Value Date    HGB 14.2 02/12/2021     Lab Results   Component Value Date    WBC 4.5 02/12/2021      Lab Results   Component Value Date    ANEU 2.8 02/12/2021     Lab Results   Component Value Date     02/12/2021      Lab Results   Component Value Date     02/12/2021                   Lab Results   Component Value Date    POTASSIUM 4.1 02/12/2021           Lab Results   Component Value Date    MAG 2.3 02/12/2021            Lab Results   Component Value Date    CR 0.87 02/12/2021                   Lab Results   Component Value Date    FAUSTO 9.0 02/12/2021                Lab Results   Component Value Date    BILITOTAL 0.4 02/12/2021           Lab Results   Component Value Date    ALBUMIN 3.4 02/12/2021                    Lab Results   Component Value Date    ALT 35 02/12/2021           Lab Results   Component Value Date    AST 47 02/12/2021       Results reviewed, labs MET treatment parameters, ok to proceed with treatment.      Post Infusion Assessment:  Patient tolerated infusion without incident.  Blood return noted pre and post infusion.  Access discontinued per protocol.       Discharge Plan:   Patient declined prescription refills.  AVS to patient via [employer].  Patient will return 3/1 for next Zometa infusion and 3/12 for next chemo infusion appointment.   Patient  discharged in stable condition accompanied by: self.  Departure Mode: Ambulatory.  Face to Face time: 0.    Bonnie Daniels RN

## 2021-02-12 NOTE — PROGRESS NOTES
Favian is a 76 year old who is being evaluated via a billable video visit.      How would you like to obtain your AVS? MyChart  If the video visit is dropped, the invitation should be resent by: Text to cell phone: [phone]  Will anyone else be joining your video visit? No       ALEX El      Palliative Care Outpatient Clinic    (This note was transcribed using voice recognition software. While I review and edit the transcription, I may miss errors, and the software sometimes does unexpected capitalizations and formatting that I miss. Please let me know of any serious mistranscriptions and I will addend this note.)    Patient ID:  Medical - He has widespread uveal melanoma  Late 2020-21 receiving Y90 for local control of his bulky liver mets and ipi/nivo systemically.    Jan 2021 scans showed widespread progression, plan is to continue nivo as 'maintenance' therapy    Social - Lives with wife. AF . . Has 1 dtr in [place], 1 in [facility]. He is 100% [facility].    Care Planning - full code, no HCD on our chart but he is completing one at home. 2/12/21 Code status discussion.      History:  History gathered today from: patient, medical chart    Last visit with Dr Trent--discussed what to do, he is continuing nivolumab and zometa.  Scans and Dr Trent f/u early march    He is having some side effects of the infusions--tiredness, exhaustion, sleeps most of the day for a few days afterwards.    Inbetween infusions--continues to drive school bus, mild-only pain.    Has 3-5 day episodes of more pain which spontaneously comes on/subsides.   Takes hydromorphone prn sparingly, works when he needs it.    We discuss EOL prep, planning.  His major worry is his wife's coping, well being, social isolation after he's gone. He does talk with her about these concerns. Will, etc all done. He has a strong chente and feels at peace with dying.     He feels his energy is slipping a  little and we discussed assuming this is the strongest he is and getting all those tasks done asap.    PE: There were no vitals taken for this visit.   Wt Readings from Last 3 Encounters:   01/29/21 99.2 kg (218 lb 9.6 oz)   12/28/20 96.3 kg (212 lb 6.4 oz)   12/04/20 93.5 kg (206 lb 1.6 oz)     Gen alert, comfortable appearing, NAD.   Head NCAT.  Eyes anicteric without injection  Face symmetric, eyes conjugate  Mouth pink, moist appearing  Lungs unlabored, no cough, speaking full sentences  Skin no rashes or lesions evident on face/neck  Neuro Face symmetric, eyes conjugate; speech fluent.  Neuropsych exam normal including affect, sensorium, gross memory, thought processes, and fund of knowledge.       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 s/p Y90 liver radioembolization,  systemic sunitinib and now undergoing palliative therapy with  ipilimumab and nivolumab, there is evidence of disease progression:     1. Multiple new solid pulmonary nodules as detailed above, too small  to evaluate by PET for hypermetabolism but concerning for pulmonary  metastasis.  2. FDG avid lesions within the hepatic segments 3, 5 and 6 concerning  for disease progression.  3. Numerous hypermetabolic foci within the spine, pelvis, ribs and  left humerus compatible with osseous metastatic disease.   4. FDG avid focus at the T10 vertebral body effaces the spinal canal.  Recommend formal thoracic MRI with contrast to assess for  myelomalacia.   5. FDG avid focus of the lateral margin of the spleen could represent  a splenic metastasis.  6. Nonobstructing left renal nephrolithiasis.    [facility] database reviewed:       Impression & Recommendations:  77 yo man with metastatic and progressing melanoma    Most of the visit discussing EOL planning as summarized  in this note.    EOL education: answered questions about eol symptom, med use.   He understands he is likely in his final months.   Discussed stopping driving scenarios.     Discussed/educated him about hospice care, timing of enrollment, what hospice does and doesn't do. His preference is to die at home and we discussed preparing his family for eol home caregiving. I encouraged him to talk with his PCP about [facility] benefits and eol care too, and [facility] has a palliative [facility].    DNR/DNI    F/u with us in a month    45 minutes spent on the date of the encounter doing chart review, history and exam, documentation and further activities as noted above.    Thank you for involving us in the patient's care.   Mg Patel MD / Palliative Medicine / Text me via [employer].          Video Start Time:   Video-Visit Details    Type of service:  Video Visit    Video End Time:    Originating Location (pt. Location): Home    Distant Location (provider location):  [facility]     Platform used for Video Visit: R[employer]

## 2021-02-12 NOTE — NURSING NOTE
Chief Complaint   Patient presents with     Blood Draw     Labs drawn from placed PIV by RN in lab. Vitals taken. Checked into next appointment.      Labs drawn with IV start by RN in lab.  Vital signs taken.  Pt checked in to next appointment.    Kalyani Slaughter RN

## 2021-03-01 NOTE — PROGRESS NOTES
Infusion Nursing Note:  Galileo Mayberry presents today for zometa.    Patient seen by provider today: No   present during visit today: Not Applicable.    Note: Patient states he is feeling well today with no new complaints. He just got back from a vacation in [place] and reports improvement in the rash/iching on his shin.      Intravenous Access:  Peripheral IV placed in lab    Treatment Conditions:  Lab Results   Component Value Date    HGB 14.0 03/01/2021     Lab Results   Component Value Date    WBC 3.8 03/01/2021      Lab Results   Component Value Date    ANEU 2.3 03/01/2021     Lab Results   Component Value Date     03/01/2021      Lab Results   Component Value Date     03/01/2021                   Lab Results   Component Value Date    POTASSIUM 4.0 03/01/2021           Lab Results   Component Value Date    MAG 2.2 03/01/2021            Lab Results   Component Value Date    CR 0.89 03/01/2021                   Lab Results   Component Value Date    FAUSTO 8.9 03/01/2021                Lab Results   Component Value Date    BILITOTAL 0.6 03/01/2021           Lab Results   Component Value Date    ALBUMIN 3.3 03/01/2021                    Lab Results   Component Value Date    ALT 29 03/01/2021           Lab Results   Component Value Date    AST 42 03/01/2021       Results reviewed, labs MET treatment parameters, ok to proceed with treatment.      Post Infusion Assessment:  Patient tolerated infusion without incident.  Blood return noted pre and post infusion.  Site patent and intact, free from redness, edema or discomfort.  No evidence of extravasations.  Access discontinued per protocol.       Discharge Plan:   Patient declined prescription refills.  Discharge instructions reviewed with: Patient.  Patient and/or family verbalized understanding of discharge instructions and all questions answered.  AVS to patient via [employer].  Patient will return 3/4 for next appointment with Dr Trent.    Patient discharged in stable condition accompanied by: self.  Departure Mode: Ambulatory.  Face to Face time: 0.    Unique Torres RN

## 2021-03-01 NOTE — PATIENT INSTRUCTIONS
[facility] Triage and after hours / weekends / holidays:  [phone]    Please call the triage or after hours line if you experience a temperature greater than or equal to 100.5, shaking chills, have uncontrolled nausea, vomiting and/or diarrhea, dizziness, shortness of breath, chest pain, bleeding, unexplained bruising, or if you have any other new/concerning symptoms, questions or concerns.      If you are having any concerning symptoms or wish to speak to a provider before your next infusion visit, please call your care coordinator or triage to notify them so we can adequately serve you.     If you need a refill on a narcotic prescription or other medication, please call before your infusion appointment.         March 2021 Sunday Monday Tuesday Wednesday Thursday Friday Saturday        1    LAB PERIPHERAL   9:30 AM   (15 min.)    [facility] DRAW   [facility] ONC INFUSION 30  10:00 AM   (30 min.)   [facility]   [facility] 2    PET ONCOLOGY WHOLE BODY   9:00 AM   (45 min.)   [id number]   [facility] 3     4    VIDEO VISIT RETURN   2:30 PM   (30 min.)   Jw Burden MD   [facility] 5     6       7     8     9     10     11     12    LAB PERIPHERAL  11:30 AM   (15 min.)    [facility] DRAW   [facility] ONC INFUSION 60  12:00 PM   (60 min.)   [facility] ONCOL[facility] INFUSION   [facility] 13       14     15     16     17     18     19     20       21     22     23     24     25     26     27       28     29     30     31                                April 2021 Sunday Monday Tuesday Wednesday Thursday Friday Saturday                       1     2     3       4     5     6     7     8     9     10       11     12     13     14     15     16     17       18     19     20     21     22     23     24       25     26      27     28     29     30                         Recent Results (from the past 24 hour(s))   Comprehensive metabolic panel    Collection Time: 03/01/21  9:59 AM   Result Value Ref Range    Sodium 141 133 - 144 mmol/L    Potassium 4.0 3.4 - 5.3 mmol/L    Chloride 108 94 - 109 mmol/L    Carbon Dioxide 25 20 - 32 mmol/L    Anion Gap 7 3 - 14 mmol/L    Glucose 146 (H) 70 - 99 mg/dL    Urea Nitrogen 18 7 - 30 mg/dL    Creatinine 0.89 0.66 - 1.25 mg/dL    GFR Estimate 82 >60 mL/min/[1.73_m2]    GFR Estimate If Black >90 >60 mL/min/[1.73_m2]    Calcium 8.9 8.5 - 10.1 mg/dL    Bilirubin Total 0.6 0.2 - 1.3 mg/dL    Albumin 3.3 (L) 3.4 - 5.0 g/dL    Protein Total 7.2 6.8 - 8.8 g/dL    Alkaline Phosphatase 135 40 - 150 U/L    ALT 29 0 - 70 U/L    AST 42 0 - 45 U/L   CBC with platelets differential    Collection Time: 03/01/21  9:59 AM   Result Value Ref Range    WBC 3.8 (L) 4.0 - 11.0 10e9/L    RBC Count 4.86 4.4 - 5.9 10e12/L    Hemoglobin 14.0 13.3 - 17.7 g/dL    Hematocrit 42.2 40.0 - 53.0 %    MCV 87 78 - 100 fl    MCH 28.8 26.5 - 33.0 pg    MCHC 33.2 31.5 - 36.5 g/dL    RDW 13.8 10.0 - 15.0 %    Platelet Count 167 150 - 450 10e9/L    Diff Method Automated Method     % Neutrophils 60.7 %    % Lymphocytes 21.8 %    % Monocytes 11.7 %    % Eosinophils 4.2 %    % Basophils 1.3 %    % Immature Granulocytes 0.3 %    Nucleated RBCs 0 0 /100    Absolute Neutrophil 2.3 1.6 - 8.3 10e9/L    Absolute Lymphocytes 0.8 0.8 - 5.3 10e9/L    Absolute Monocytes 0.4 0.0 - 1.3 10e9/L    Absolute Eosinophils 0.2 0.0 - 0.7 10e9/L    Absolute Basophils 0.1 0.0 - 0.2 10e9/L    Abs Immature Granulocytes 0.0 0 - 0.4 10e9/L    Absolute Nucleated RBC 0.0    Lactate Dehydrogenase    Collection Time: 03/01/21  9:59 AM   Result Value Ref Range    Lactate Dehydrogenase 811 (H) 85 - 227 U/L   Phosphorus    Collection Time: 03/01/21  9:59 AM   Result Value Ref Range    Phosphorus 3.1 2.5 - 4.5 mg/dL   TSH with free T4 reflex    Collection Time: 03/01/21   9:59 AM   Result Value Ref Range    TSH 3.63 0.40 - 4.00 mU/L   Magnesium    Collection Time: 03/01/21  9:59 AM   Result Value Ref Range    Magnesium 2.2 1.6 - 2.3 mg/dL

## 2021-03-01 NOTE — NURSING NOTE
Chief Complaint   Patient presents with     Lab Only     venipuncture, PIV placed, vitals checked     Renetta Braden RN on 3/1/2021 at 10:10 AM

## 2021-03-04 NOTE — PROGRESS NOTES
Favian is a 76 year old who is being evaluated via a billable video visit.      How would you like to obtain your AVS? MyChart  If the video visit is dropped, the invitation should be resent by: Send to e-mail at: [email or web address]  Will anyone else be joining your video visit? No       ALEX El    Video-Visit Details    Type of service:  Video Visit  Video Start Time: 2:45 PM  Video End Time:3:15 PM  Originating Location (pt. 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 11/2019, he is diagnosed with ocular melanoma, after a choroidal lesion was found in his Left eye.  Patient reports over the last several months he developed new floaters/spots in his L eye.  2. 11/25/2019, he was seen by ophthalmology who noted a left eye, elevated bilobed lesion, size 6.28mm x 18.31(T) x 17.04(L).   3. 12/14/19, CT-CAP showed no evidence of metastatic disease.  4. 1/10/2020: Patient referred to [facility]. B-scan ultrasound of left eye showed elevated bilobed lesion measuring 7.2-7.3 height with a base of 21.4 x 22.5 mm. Low to medium reflectivity. Difficult measurements due to location. Ciliary body involvement was noted. Ultrasound basal dimension measurement included subretinal fluid, and clinical measurement had to be done by transillumination due to anterior tumor location.  5. 1/15/2020, visual acuity right eye 20/20 -1, left eye 20/25 +2 nh. Visual fields counting fingers full bilaterally. Clinical fundus exam of left eye revealed choroidal/ciliary body malignant melanoma with basal measurement of 22 x 21 mm and height of 7.5 mm. Located in the Inferior left eye from 4:30-7:30, 15 mm to disc, 15 mm to fovea, +subretinal fluid, bilobed. The tumor without  fluid appears to be 1-2 mm smaller in basal dimension (20 x 19 mm).   6. 2/10/2020, Iodine-125 24 mm plaque placement delivering 85 Gy to an 8 mm height. FNA biopsy performed, and molecular testing shows Class II PRAME positive, high-risk of early metastasis.  7. 3/31/2020, he starts adjuvant sunitinib 25 mg daily for high risk disease. He completed 6 months of therapy.  8. 8/26/2020, MRI-liver shows interval development of numerous (more than 30) metastatic lesions in the liver in bilobar distribution. The largest in segment JUAN measures 1.85 cm.  9. 10/19/2020, he undergoes Y90 radioembolization with 72.2 mCi of Theraspheres to the right lobe of the liver; predominantly in hepatic segments 5 and 6, milder uptake in the region of segment 8  10. 10/23/2020, first cycle of Ipilimumab 1mg/kg and nivolumab 3mg/kg. He received 4 cycles of Ipi/Nivo.  11. 11/16/2020, he has his 2nd Y90 radioembolization with 69 mCi of TheraSpheres to hepatic segments 4A and 4B.       12. 1/15/2020, he starts nivolumab 480 mg monthly maintenance.    History of Present Illness:  Mr. 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 He returns today via video visit.    He had PET-CT for short interval follow-up. The majority of the FDG avid lesions have either grown minimally or they are stable. One lesion in the left upper arm marrow space is smaller.    He is doing well and tolerating nivolumab with no significant side effects. LDH level is stable. Fatigue is his biggest complaint. He denies any diarrhea, cough, or shortness of breath.    ECOG performance status is 1.    Review of Systems:  12-point ROS negative except as in HPI    Current Outpatient Medications   Medication Sig Dispense Refill     acetaminophen (TYLENOL) 500 MG tablet Take 1,000 mg by mouth       Ascorbic Acid (VITAMIN C) 500 MG CAPS Take 500 mg by mouth       calcium carbonate (OS-FAUSTO) 500 MG tablet Take 1 tablet (500 mg) by mouth 2 times daily 60 tablet 3      Cholecalciferol (VITAMIN D3) 25 MCG (1000 UT) CAPS Takes 3 daily       diclofenac (VOLTAREN) 1 % topical gel        doxepin (SINEQUAN) 10 MG/ML (HIGH CONC) solution Take 0.6 mLs (6 mg) by mouth At Bedtime 18 mL 1     Ferrous Gluconate 240 (27 Fe) MG TABS Take 240 mg by mouth       finasteride (PROSCAR) 5 MG tablet Take 5 mg by mouth       HYDROmorphone (DILAUDID) 2 MG tablet Take 0.5 tablets (1 mg) by mouth every 6 hours as needed for moderate to severe pain 30 tablet 0     HYDROmorphone (DILAUDID) 2 MG tablet Take 1 tablet (2 mg) by mouth every 6 hours as needed for pain 10 tablet 0     LORazepam (ATIVAN) 0.5 MG tablet Take 1 tablet (0.5 mg) by mouth every 4 hours as needed (Anxiety, Nausea/Vomiting or Sleep) 30 tablet 3     methylphenidate (RITALIN) 10 MG tablet as needed       methylPREDNISolone (MEDROL DOSEPAK) 4 MG tablet therapy pack Take 1 tablet (4 mg) by mouth 2 times daily Take as directed on package. (Patient not taking: Reported on 2/12/2021) 21 tablet 0     methylPREDNISolone (MEDROL) 32 MG tablet Please take 32 mg, 12 hours prior to procedure, take another 32 mg, 2 hours prior to procedure for contrast allergy. (Patient not taking: Reported on 2/12/2021) 2 tablet 0     ondansetron (ZOFRAN) 4 MG tablet Take 1-2 tablets (4-8 mg) by mouth every 6 hours as needed for nausea (vomiting) (Patient not taking: Reported on 2/12/2021) 40 tablet 0     polyethylene glycol (MIRALAX) 17 GM/Dose powder Take 17 g (1 capful) by mouth 2 times daily 255 g 1     prochlorperazine (COMPAZINE) 10 MG tablet Take 1 tablet (10 mg) by mouth every 6 hours as needed (Nausea/Vomiting) (Patient not taking: Reported on 3/1/2021) 30 tablet 3     senna-docusate (SENNA S) 8.6-50 MG tablet Take 1 tablet by mouth 2 times daily as needed for constipation 60 tablet 1     tamsulosin (FLOMAX) 0.4 MG capsule Take 0.4 mg by mouth       Testosterone 1.62 % GEL          Past Medical History:   Diagnosis Date     Degenerative joint disease       Metastatic melanoma (H)     L eye     Nonsenile cataract      Past Surgical History:   Procedure Laterality Date     ------------OTHER-------------  2014    back surgery L4/L5      ------------OTHER-------------      knee surgery both 1961 Right, 1971 Left knee     AS REMOVAL OF KIDNEY STONE  2015     IR SIRT (SELECTIVE INTERNAL RADIO THERAPY)  10/13/2020     IR VISCERAL ANGIOGRAM  10/13/2020     IR VISCERAL EMBOLIZATION  10/19/2020     IR VISCERAL EMBOLIZATION  11/16/2020     JOINT REPLACEMENT  2017    Both kneses replaced (2017 and 2018)     ROTATOR CUFF REPAIR RT/LT Right 2016     TURP  2000     Family History   Problem Relation Age of Onset     Glaucoma Mother      Prostate Cancer Father      Cancer Sister      Macular Degeneration No family hx of        Physical Examination:  There were no vitals taken for this visit.  Wt Readings from Last 5 Encounters:   03/01/21 92.5 kg (204 lb)   02/12/21 99.7 kg (219 lb 11.2 oz)   01/29/21 99.2 kg (218 lb 9.6 oz)   12/28/20 96.3 kg (212 lb 6.4 oz)   12/04/20 93.5 kg (206 lb 1.6 oz)     GENERAL: Healthy, alert and no distress  EYES: Eyes grossly normal to inspection.  No discharge or erythema, or obvious scleral/conjunctival abnormalities.  HENT: Normal cephalic/atraumatic.  External ears, nose and mouth without ulcers or lesions.  No nasal drainage visible.  NECK: No asymmetry, visible masses or scars  RESP: No audible wheeze, cough, or visible cyanosis.  No visible retractions or increased work of breathing.    SKIN: Visible skin clear. No significant rash, abnormal pigmentation or lesions.  NEURO: Cranial nerves grossly intact.  Mentation and speech appropriate for age.  PSYCH: Mentation appears normal, affect normal/bright, judgement and insight intact, normal speech and appearance well-groomed.    The rest of a comprehensive physical examination is deferred due to PHE (public health emergency) video visit restrictions.    Imaging:  PET-CT, 3/2/2021  FINDINGS:       HEAD/NECK:  There is no  suspicious FDG uptake in the neck.      The paranasal sinuses are clear. The mastoid air cells are clear.      The mucosal pharyngeal space, the , prevertebral and carotid  spaces are within normal limits.      No masses, mass effect or pathologically enlarged lymph nodes are  evident. The thyroid gland is unremarkable.     CHEST:     Left lower lobe nodule previously measuring 6 mm is increased in size  today measuring 11 mm, remains borderline in size for evaluation with  FDG (series 9 image 103). Otherwise, scattered sub-6 mm solid  pulmonary nodules not definitely changed in size or distribution from  prior.     Additionally there is a new groundglass opacity of the inferior left  upper lobe along the site of prior platelike atelectasis today  measuring 2 x 2.2 cm (series 9 image 103).     There is no significant pericardial or pleural effusions.     ABDOMEN AND PELVIS:  Increasingly heterogeneous density of the liver with MR angiographic  distribution of FDG uptake, predominantly of segments 3, 5 and 6,  today with a max SUV of 12.57, previously 11.75 (series 5 image 304).  Persistent increased FDG uptake of the left lateral spleen with  increased contour irregularity (series 5 image 259).     There are no suspicious adrenal mass lesions or opaque gallbladder  calculi. Unchanged bilateral renal cortical cyst without FDG uptake.  No hydronephrosis. Punctate right nonobstructing renal calculi.  Atheromatous changes of the infrarenal aorta and iliac arteries.     There is no evidence for diverticulitis, bowel obstruction or free  fluid.     LOWER EXTREMITIES:   No abnormal masses or hypermetabolic lesions.     BONES:   There is overall increased axial skeletal FDG uptake both in max SUV  and distribution compared to prior but remains most prominent along  the spine, sternum, and pelvis, for example:  1. New left sternal lesion with max SUV of 8 (series 5 image 222).  2.  Expanding right posterior sacral/iliac lesion having a max SUV of  8.44, previously 7.57 (series 5 image 381)                                                                      IMPRESSION: In this patient with known uveal melanoma, today with  evidence of increased metastatic disease:  1. Increased FDG uptake and geographic distribution of hepatic  metastases.  2. Enlarging left lower lobe pulmonary nodule, borderline in size for  FDG uptake, however, this is concerning for metastatic disease.  3. Skeletal metastases increasing in size and distribution,  predominantly of the sternum, spine, and pelvis.  4. Persistent left lateral splenic FDG uptake with increased contour  abnormality on CT.   5. Nonobstructing right renal calculi.      ASSESSMENT/PLAN:    #1 Metastatic uveal melanoma, to liver and bone  #2 Choroidal melanoma with ciliary body involvement, left eye, status-post plaque brachytherapy,  [employer] Class II and PRAME mRNA positive  It was a pleasure to meet with Mr. Mayberry today. He is a 76 year old man with metastatic uveal melanoma to liver and bone.  He underwent radioembolization to the right hepatic lobe on 10/19. Ipi/Nivo on 10/23 and 11/3, then Y90 to left hepatic lobe on 11/16. He completed all 4 planned Ipi/Nivo infusions and has been maintained on nivolumab.    PET-CT today shows mixed picture with overall stable burden of disease in bone and liver in prior sites of known disease involvement. However, lesion in the marrow space of the left humerus is smaller and less FDG avid, several bony lesions appear less focally hypermetabolic with more diffuse characteristics, and it appears at least one area is improving.    Clinically he is doing well and he appears to be benefiting from therapy. Liver values have normalized, LDH levels are stabilized at 800 for the last 3 months now, and overall burden of disease is stable with his disease pace slowing and hopefully able to be maintained.    I  have recommended we continue nivolumab and Zometa for the timebeing. We can revisit at anytime. Otherwise, he can take Alleve for pain in his shoulders and for inflammatory type aches and pains. He also has Dilaudid available, particularly at night for sleep.    Given the degree of bony disease in the spine we will obtain MRI-spine and review. There is concern for potential myelopathic changes (stable on my read), but if there are signs of impending cord compromise then would consult radiation oncology.    -Continue nivolumab maintenance  -Continue monthly Zometa  -Continue Alleve OTC q12h  -MRI C/T/L spine next few weeks  -Consider radiation oncology referral  -PET-CT in 8 weeks with return to see me          Jw Reyes M.D.   of Medicine  Hematology, Oncology and Transplantation

## 2021-03-04 NOTE — LETTER
3/4/2021         RE: Galileo Mayberry  [address]  [facility] [address]        Dear Colleague,    Thank you for referring your patient, Galileo Mayberry, to the [facility]. Please see a copy of my visit note below.    Favian is a 76 year old who is being evaluated via a billable video visit.      How would you like to obtain your AVS? MyChart  If the video visit is dropped, the invitation should be resent by: Send to e-mail at: [email or web address]  Will anyone else be joining your video visit? No       ALEX El    Video-Visit Details    Type of service:  Video Visit  Video Start Time: 2:45 PM  Video End Time:3:15 PM  Originating Location (pt. 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 11/2019, he is diagnosed with ocular melanoma, after a choroidal lesion was found in his Left eye.  Patient reports over the last several months he developed new floaters/spots in his L eye.  2. 11/25/2019, he was seen by ophthalmology who noted a left eye, elevated bilobed lesion, size 6.28mm x 18.31(T) x 17.04(L).   3. 12/14/19, CT-CAP showed no evidence of metastatic disease.  4. 1/10/2020: Patient referred to [facility]. B-scan ultrasound of left eye showed elevated bilobed lesion measuring 7.2-7.3 height with a base of 21.4 x 22.5 mm. Low to medium reflectivity. Difficult measurements due to location. Ciliary body involvement was noted. Ultrasound basal dimension measurement included subretinal fluid, and clinical measurement had to be done by transillumination due to anterior tumor location.  5. 1/15/2020, visual acuity right eye 20/20 -1, left eye 20/25 +2 nh. Visual fields counting fingers full bilaterally. Clinical  fundus exam of left eye revealed choroidal/ciliary body malignant melanoma with basal measurement of 22 x 21 mm and height of 7.5 mm. Located in the Inferior left eye from 4:30-7:30, 15 mm to disc, 15 mm to fovea, +subretinal fluid, bilobed. The tumor without fluid appears to be 1-2 mm smaller in basal dimension (20 x 19 mm).   6. 2/10/2020, Iodine-125 24 mm plaque placement delivering 85 Gy to an 8 mm height. FNA biopsy performed, and molecular testing shows Class II PRAME positive, high-risk of early metastasis.  7. 3/31/2020, he starts adjuvant sunitinib 25 mg daily for high risk disease. He completed 6 months of therapy.  8. 8/26/2020, MRI-liver shows interval development of numerous (more than 30) metastatic lesions in the liver in bilobar distribution. The largest in segment JUAN measures 1.85 cm.  9. 10/19/2020, he undergoes Y90 radioembolization with 72.2 mCi of Theraspheres to the right lobe of the liver; predominantly in hepatic segments 5 and 6, milder uptake in the region of segment 8  10. 10/23/2020, first cycle of Ipilimumab 1mg/kg and nivolumab 3mg/kg. He received 4 cycles of Ipi/Nivo.  11. 11/16/2020, he has his 2nd Y90 radioembolization with 69 mCi of TheraSpheres to hepatic segments 4A and 4B.       12. 1/15/2020, he starts nivolumab 480 mg monthly maintenance.    History of Present Illness:  Mr. 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 He returns today via video visit.    He had PET-CT for short interval follow-up. The majority of the FDG avid lesions have either grown minimally or they are stable. One lesion in the left upper arm marrow space is smaller.    He is doing well and tolerating nivolumab with no significant side effects. LDH level is stable. Fatigue is his biggest complaint. He denies any diarrhea, cough, or shortness of breath.    ECOG performance status is 1.    Review of Systems:  12-point ROS negative except as in HPI    Current Outpatient Medications   Medication  Sig Dispense Refill     acetaminophen (TYLENOL) 500 MG tablet Take 1,000 mg by mouth       Ascorbic Acid (VITAMIN C) 500 MG CAPS Take 500 mg by mouth       calcium carbonate (OS-FAUSTO) 500 MG tablet Take 1 tablet (500 mg) by mouth 2 times daily 60 tablet 3     Cholecalciferol (VITAMIN D3) 25 MCG (1000 UT) CAPS Takes 3 daily       diclofenac (VOLTAREN) 1 % topical gel        doxepin (SINEQUAN) 10 MG/ML (HIGH CONC) solution Take 0.6 mLs (6 mg) by mouth At Bedtime 18 mL 1     Ferrous Gluconate 240 (27 Fe) MG TABS Take 240 mg by mouth       finasteride (PROSCAR) 5 MG tablet Take 5 mg by mouth       HYDROmorphone (DILAUDID) 2 MG tablet Take 0.5 tablets (1 mg) by mouth every 6 hours as needed for moderate to severe pain 30 tablet 0     HYDROmorphone (DILAUDID) 2 MG tablet Take 1 tablet (2 mg) by mouth every 6 hours as needed for pain 10 tablet 0     LORazepam (ATIVAN) 0.5 MG tablet Take 1 tablet (0.5 mg) by mouth every 4 hours as needed (Anxiety, Nausea/Vomiting or Sleep) 30 tablet 3     methylphenidate (RITALIN) 10 MG tablet as needed       methylPREDNISolone (MEDROL DOSEPAK) 4 MG tablet therapy pack Take 1 tablet (4 mg) by mouth 2 times daily Take as directed on package. (Patient not taking: Reported on 2/12/2021) 21 tablet 0     methylPREDNISolone (MEDROL) 32 MG tablet Please take 32 mg, 12 hours prior to procedure, take another 32 mg, 2 hours prior to procedure for contrast allergy. (Patient not taking: Reported on 2/12/2021) 2 tablet 0     ondansetron (ZOFRAN) 4 MG tablet Take 1-2 tablets (4-8 mg) by mouth every 6 hours as needed for nausea (vomiting) (Patient not taking: Reported on 2/12/2021) 40 tablet 0     polyethylene glycol (MIRALAX) 17 GM/Dose powder Take 17 g (1 capful) by mouth 2 times daily 255 g 1     prochlorperazine (COMPAZINE) 10 MG tablet Take 1 tablet (10 mg) by mouth every 6 hours as needed (Nausea/Vomiting) (Patient not taking: Reported on 3/1/2021) 30 tablet 3     senna-docusate (SENNA S) 8.6-50 MG  tablet Take 1 tablet by mouth 2 times daily as needed for constipation 60 tablet 1     tamsulosin (FLOMAX) 0.4 MG capsule Take 0.4 mg by mouth       Testosterone 1.62 % GEL          Past Medical History:   Diagnosis Date     Degenerative joint disease      Metastatic melanoma (H)     L eye     Nonsenile cataract      Past Surgical History:   Procedure Laterality Date     ------------OTHER-------------  2014    back surgery L4/L5      ------------OTHER-------------      knee surgery both 1961 Right, 1971 Left knee     AS REMOVAL OF KIDNEY STONE  2015     IR SIRT (SELECTIVE INTERNAL RADIO THERAPY)  10/13/2020     IR VISCERAL ANGIOGRAM  10/13/2020     IR VISCERAL EMBOLIZATION  10/19/2020     IR VISCERAL EMBOLIZATION  11/16/2020     JOINT REPLACEMENT  2017    Both kneses replaced (2017 and 2018)     ROTATOR CUFF REPAIR RT/LT Right 2016     TURP  2000     Family History   Problem Relation Age of Onset     Glaucoma Mother      Prostate Cancer Father      Cancer Sister      Macular Degeneration No family hx of        Physical Examination:  There were no vitals taken for this visit.  Wt Readings from Last 5 Encounters:   03/01/21 92.5 kg (204 lb)   02/12/21 99.7 kg (219 lb 11.2 oz)   01/29/21 99.2 kg (218 lb 9.6 oz)   12/28/20 96.3 kg (212 lb 6.4 oz)   12/04/20 93.5 kg (206 lb 1.6 oz)     GENERAL: Healthy, alert and no distress  EYES: Eyes grossly normal to inspection.  No discharge or erythema, or obvious scleral/conjunctival abnormalities.  HENT: Normal cephalic/atraumatic.  External ears, nose and mouth without ulcers or lesions.  No nasal drainage visible.  NECK: No asymmetry, visible masses or scars  RESP: No audible wheeze, cough, or visible cyanosis.  No visible retractions or increased work of breathing.    SKIN: Visible skin clear. No significant rash, abnormal pigmentation or lesions.  NEURO: Cranial nerves grossly intact.  Mentation and speech appropriate for age.  PSYCH: Mentation appears normal, affect  normal/bright, judgement and insight intact, normal speech and appearance well-groomed.    The rest of a comprehensive physical examination is deferred due to PHE (public health emergency) video visit restrictions.    Imaging:  PET-CT, 3/2/2021  FINDINGS:      HEAD/NECK:  There is no  suspicious FDG uptake in the neck.      The paranasal sinuses are clear. The mastoid air cells are clear.      The mucosal pharyngeal space, the , prevertebral and carotid  spaces are within normal limits.      No masses, mass effect or pathologically enlarged lymph nodes are  evident. The thyroid gland is unremarkable.     CHEST:     Left lower lobe nodule previously measuring 6 mm is increased in size  today measuring 11 mm, remains borderline in size for evaluation with  FDG (series 9 image 103). Otherwise, scattered sub-6 mm solid  pulmonary nodules not definitely changed in size or distribution from  prior.     Additionally there is a new groundglass opacity of the inferior left  upper lobe along the site of prior platelike atelectasis today  measuring 2 x 2.2 cm (series 9 image 103).     There is no significant pericardial or pleural effusions.     ABDOMEN AND PELVIS:  Increasingly heterogeneous density of the liver with MR angiographic  distribution of FDG uptake, predominantly of segments 3, 5 and 6,  today with a max SUV of 12.57, previously 11.75 (series 5 image 304).  Persistent increased FDG uptake of the left lateral spleen with  increased contour irregularity (series 5 image 259).     There are no suspicious adrenal mass lesions or opaque gallbladder  calculi. Unchanged bilateral renal cortical cyst without FDG uptake.  No hydronephrosis. Punctate right nonobstructing renal calculi.  Atheromatous changes of the infrarenal aorta and iliac arteries.     There is no evidence for diverticulitis, bowel obstruction or free  fluid.     LOWER EXTREMITIES:   No abnormal masses or hypermetabolic lesions.     BONES:    There is overall increased axial skeletal FDG uptake both in max SUV  and distribution compared to prior but remains most prominent along  the spine, sternum, and pelvis, for example:  1. New left sternal lesion with max SUV of 8 (series 5 image 222).  2. Expanding right posterior sacral/iliac lesion having a max SUV of  8.44, previously 7.57 (series 5 image 381)                                                                      IMPRESSION: In this patient with known uveal melanoma, today with  evidence of increased metastatic disease:  1. Increased FDG uptake and geographic distribution of hepatic  metastases.  2. Enlarging left lower lobe pulmonary nodule, borderline in size for  FDG uptake, however, this is concerning for metastatic disease.  3. Skeletal metastases increasing in size and distribution,  predominantly of the sternum, spine, and pelvis.  4. Persistent left lateral splenic FDG uptake with increased contour  abnormality on CT.   5. Nonobstructing right renal calculi.      ASSESSMENT/PLAN:    #1 Metastatic uveal melanoma, to liver and bone  #2 Choroidal melanoma with ciliary body involvement, left eye, status-post plaque brachytherapy,  [employer] Class II and PRAME mRNA positive  It was a pleasure to meet with Mr. Mayberry today. He is a 76 year old man with metastatic uveal melanoma to liver and bone.  He underwent radioembolization to the right hepatic lobe on 10/19. Ipi/Nivo on 10/23 and 11/3, then Y90 to left hepatic lobe on 11/16. He completed all 4 planned Ipi/Nivo infusions and has been maintained on nivolumab.    PET-CT today shows mixed picture with overall stable burden of disease in bone and liver in prior sites of known disease involvement. However, lesion in the marrow space of the left humerus is smaller and less FDG avid, several bony lesions appear less focally hypermetabolic with more diffuse characteristics, and it appears at least one area is improving.    Clinically he is  doing well and he appears to be benefiting from therapy. Liver values have normalized, LDH levels are stabilized at 800 for the last 3 months now, and overall burden of disease is stable with his disease pace slowing and hopefully able to be maintained.    I have recommended we continue nivolumab and Zometa for the timebeing. We can revisit at anytime. Otherwise, he can take Alleve for pain in his shoulders and for inflammatory type aches and pains. He also has Dilaudid available, particularly at night for sleep.    Given the degree of bony disease in the spine we will obtain MRI-spine and review. There is concern for potential myelopathic changes (stable on my read), but if there are signs of impending cord compromise then would consult radiation oncology.    -Continue nivolumab maintenance  -Continue monthly Zometa  -Continue Alleve OTC q12h  -MRI C/T/L spine next few weeks  -Consider radiation oncology referral  -PET-CT in 8 weeks with return to see me          Jw Reyes M.D.   of Medicine  Hematology, Oncology and Transplantation

## 2021-03-12 NOTE — PROGRESS NOTES
Infusion Nursing Note:  Galileo Ballesteros Mayberry presents for C7 Nivolumab    Note: pt feeling well today, no new issues or concerns to report.    Pain: denies    Treatment Conditions:  Lab Results   Component Value Date    HGB 13.5 03/12/2021     Lab Results   Component Value Date    WBC 4.0 03/12/2021      Lab Results   Component Value Date    ANEU 2.5 03/12/2021     Lab Results   Component Value Date     03/12/2021      Lab Results   Component Value Date     03/12/2021                   Lab Results   Component Value Date    POTASSIUM 4.3 03/12/2021           Lab Results   Component Value Date    MAG 2.2 03/01/2021            Lab Results   Component Value Date    CR 0.86 03/12/2021                   Lab Results   Component Value Date    FAUSTO 8.8 03/12/2021                Lab Results   Component Value Date    BILITOTAL 0.5 03/12/2021           Lab Results   Component Value Date    ALBUMIN 3.4 03/12/2021                    Lab Results   Component Value Date    ALT 35 03/12/2021           Lab Results   Component Value Date    AST 52 03/12/2021       Results reviewed, labs MET treatment parameters, ok to proceed with treatment.    Intravenous Access:  Peripheral IV placed.    Post Infusion Assessment:  Patient tolerated infusion without incident.  Blood return noted pre and post infusion.  No evidence of extravasations.  Access discontinued per protocol.    Discharge Plan:   Patient declined prescription refills.  Discharge instructions reviewed with: Patient.  Patient and/or family verbalized understanding of discharge instructions and all questions answered.  AVS to patient via [employer].  Patient will return 4/9 for next appointment.   Patient discharged in stable condition accompanied by: self.    Berenice Santoyo, RN, RN

## 2021-03-19 NOTE — LETTER
"3/19/2021       RE: Galileo Mayberry  [address]  [facility] [address]     Dear Colleague,    Thank you for [facility] your patient, [facility]berry[facility]. Please see a copy of my visit note below.    Favian is a 76 year old who is being evaluated via a billable video visit.      How would you like to obtain your AVS? MyChart  If the video visit is dropped, the invitation should be resent by: Send to e-mail at: [email or web address]  Will anyone else be joining your video visit? No    Vitals - Patient Reported  Weight (Patient Reported): 93.4 kg (206 lb)  Height (Patient Reported): 180.3 cm (5' 11\")  BMI (Based on Pt Reported Ht/Wt): 28.73  Pain Score: Moderate Pain (4)  Pain Loc: Other - see comment(BOTH SHOULDERS AND HIP)       Palliative Care Outpatient Clinic    (This note was transcribed using voice recognition software. While I review and edit the transcription, I may miss errors, and the software sometimes does unexpected capitalizations and formatting that I miss. Please let me know of any serious mistranscriptions and I will addend this note.)    Patient ID:  Medical - He has widespread uveal melanoma  Late 2020-21 receiving Y90 for local control of his bulky liver mets and ipi/nivo systemically.     Jan 2021 scans showed widespread progression, plan is to continue nivo as maintenance therapy  3/2021 scans with progressive lesions, plan is to continue nivo    Social - Lives with wife. AF . . Has 1 dtr in [place], 1 in [facility]. . [facility] is 100% [facility].     Care Planning - no HCD on our chart but he is completing one at home. 2/12/21 Code status discussion, hospice education; he wants a DNR/DNI order, POLST sent at that visit.       History:  History gathered today from: patient, medical chart    He heard from Dr Trent the scan was good and " he talks about feeling hopeful he has a few more months than he was originally thinking.  He is getting his spine MRI next week; no pain there though, but given new spine mets on PET Dr Trent is getting a closer eval of the spine with that MRI.    He has shoulder and hip pain long term he relates to DJD  Has mild upper abd discomfort but not real pain there.    Appetite 'reasonably good' but sometimes he has low appetite. 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   Wt Readings from Last 3 Encounters:   03/12/21 91.9 kg (202 lb 8 oz)   03/01/21 92.5 kg (204 lb)   02/12/21 99.7 kg (219 lb 11.2 oz)     Gen alert, comfortable appearing, NAD.   Head NCAT.  Eyes anicteric without injection  Face symmetric, eyes conjugate  Mouth pink, moist appearing  Lungs unlabored, no cough, speaking full sentences  Skin no rashes or lesions evident on face/neck  Neuro Face symmetric, eyes conjugate; speech fluent.  Neuropsych exam normal including affect, sensorium, gross memory, thought processes, and fund of knowledge.       Data reviewed:  I reviewed recent labs and imaging, my comments:  PET as above     database reviewed:       Impression & Recommendations:  75 yo man with metastatic melanoma on palliative nivolumab  Clinically stable/doing well given his situation.  Discussed general supportive care matters as above.   Reviewed ACP/POLST/code status discussion as above. Will send the POLST form today via email for him to review    Follow-up 6 week    42 minutes spent on the date of the encounter doing chart review, history and exam, patient education & counseling, documentation and other activities as noted above.    Thank you for involving us in the patient's care.   Mg PATEL  Amanda GOVEA / Palliative Medicine / Regina velasco via [facility].    Video-Visit Details    Type of service:  Video Visit    Video Start Time:   Video End Time:    Originating Location (pt. Location): Home    Distant Location (provider location):  [facility]     Platform used for Video Visit: Natasha Smith MA

## 2021-03-19 NOTE — PROGRESS NOTES
"Favian is a 76 year old who is being evaluated via a billable video visit.      How would you like to obtain your AVS? MyChart  If the video visit is dropped, the invitation should be resent by: Send to e-mail at: [email or web address]  Will anyone else be joining your video visit? No    Vitals - Patient Reported  Weight (Patient Reported): 93.4 kg (206 lb)  Height (Patient Reported): 180.3 cm (5' 11\")  BMI (Based on Pt Reported Ht/Wt): 28.73  Pain Score: Moderate Pain (4)  Pain Loc: Other - see comment(BOTH SHOULDERS AND HIP)       Palliative Care Outpatient Clinic    (This note was transcribed using voice recognition software. While I review and edit the transcription, I may miss errors, and the software sometimes does unexpected capitalizations and formatting that I miss. Please let me know of any serious mistranscriptions and I will addend this note.)    Patient ID:  Medical - He has widespread uveal melanoma  Late 2020-21 receiving Y90 for local control of his bulky liver mets and ipi/nivo systemically.     Jan 2021 scans showed widespread progression, plan is to continue nivo as maintenance therapy  3/2021 scans with progressive lesions, plan is to continue nivo    Social - Lives with wife. AF . . Has 1 dtr in [place], 1 in [facility]. . He is 100% service [facility].     Care Planning - no HCD on our chart but he is completing one at home. 2/12/21 Code status discussion, hospice education; he wants a DNR/DNI order, POLST sent at that visit.       History:  History gathered today from: patient, medical chart    He heard from Dr Trent the scan was good and he talks about feeling hopeful he has a few more months than he was originally thinking.  He is getting his spine MRI next week; no pain there though, but given new spine mets on PET Dr Trent is getting a closer eval of the spine with that MRI.    He has shoulder and hip pain long term he relates to DJD  Has " mild upper abd discomfort but not real pain there.    Appetite 'reasonably good' but sometimes he has low appetite. 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   Wt Readings from Last 3 Encounters:   03/12/21 91.9 kg (202 lb 8 oz)   03/01/21 92.5 kg (204 lb)   02/12/21 99.7 kg (219 lb 11.2 oz)     Gen alert, comfortable appearing, NAD.   Head NCAT.  Eyes anicteric without injection  Face symmetric, eyes conjugate  Mouth pink, moist appearing  Lungs unlabored, no cough, speaking full sentences  Skin no rashes or lesions evident on face/neck  Neuro Face symmetric, eyes conjugate; speech fluent.  Neuropsych exam normal including affect, sensorium, gross memory, thought processes, and fund of knowledge.       Data reviewed:  I reviewed recent labs and imaging, my comments:  PET as above     database reviewed:       Impression & Recommendations:  75 yo man with metastatic melanoma on palliative nivolumab  Clinically stable/doing well given his situation.  Discussed general supportive care matters as above.   Reviewed ACP/POLST/code status discussion as above. Will send the POLST form today via email for him to review    Follow-up 6 week    42 minutes spent on the date of the encounter doing chart review, history and exam, patient education & counseling, documentation and other activities as noted above.    Thank you for involving us in the patient's care.   Mg Patel MD / Palliative Medicine / Text me via [employer].        Video Start Time:   Video-Visit Details    Type of service:  Video Visit    Video End Time:    Originating Location (pt. Location): Home    Distant Location (provider location):  [facility]ER [facility]     Platform used for Video  Visit: Natasha Smith MA

## 2021-03-26 NOTE — NURSING NOTE
"Oncology Rooming Note    March 26, 2021 10:30 AM   Galileo Mayberry is a 76 year old male who presents for:    Chief Complaint   Patient presents with     Oncology Clinic Visit     [facility] RETURN - MELANOMA OF UVEA METASTATIC TO LIVER     Initial Vitals: /69 (BP Location: Left arm, Patient Position: Chair, Cuff Size: Adult Large)   Pulse 68   Temp 97.9  F (36.6  C)   Resp 16   Ht 1.803 m (5' 11\")   Wt 97.6 kg (215 lb 1.6 oz)   SpO2 98%   BMI 30.00 kg/m   Estimated body mass index is 30 kg/m  as calculated from the following:    Height as of this encounter: 1.803 m (5' 11\").    Weight as of this encounter: 97.6 kg (215 lb 1.6 oz). Body surface area is 2.21 meters squared.  No Pain (0) Comment: Data Unavailable   No LMP for male patient.  Allergies reviewed: Yes  Medications reviewed: Yes    Medications: Medication refills not needed today.  Pharmacy name entered into EPIC:    [facility] - [place], [place] - [id number] - [facility] [id number] [address]    Clinical concerns: No new concerns. Miley was notified.      Braxton Byrd LPN            "

## 2021-03-26 NOTE — LETTER
3/26/2021         RE: Galileo Mayberry  [address] [place] [address]        Dear Colleague,    Thank you for referring your patient, Galileo Mayberry, to the [facility]. Please see a copy of my visit note below.      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 11/2019, he is diagnosed with ocular melanoma, after a choroidal lesion was found in his Left eye.  Patient reports over the last several months he developed new floaters/spots in his L eye.  2. 11/25/2019, he was seen by ophthalmology who noted a left eye, elevated bilobed lesion, size 6.28mm x 18.31(T) x 17.04(L).   3. 12/14/19, CT-CAP showed no evidence of metastatic disease.  4. 1/10/2020: Patient referred to [facility]. B-scan ultrasound of left eye showed elevated bilobed lesion measuring 7.2-7.3 height with a base of 21.4 x 22.5 mm. Low to medium reflectivity. Difficult measurements due to location. Ciliary body involvement was noted. Ultrasound basal dimension measurement included subretinal fluid, and clinical measurement had to be done by transillumination due to anterior tumor location.  5. 1/15/2020, visual acuity right eye 20/20 -1, left eye 20/25 +2 nh. Visual fields counting fingers full bilaterally. Clinical fundus exam of left eye revealed choroidal/ciliary body malignant melanoma with basal measurement of 22 x 21 mm and height of 7.5 mm. Located in the Inferior left eye from 4:30-7:30, 15 mm to disc, 15 mm to fovea, +subretinal fluid, bilobed. The tumor without fluid appears to be 1-2 mm smaller in basal dimension (20 x 19 mm).   6. 2/10/2020, Iodine-125 24 mm plaque placement delivering 85 Gy to an 8 mm height. FNA biopsy performed, and molecular testing shows Class II PRAME positive, high-risk of early metastasis.  7. 3/31/2020, he starts adjuvant sunitinib 25 mg daily  "for high risk disease. He completed 6 months of therapy.  8. 8/26/2020, MRI-liver shows interval development of numerous (more than 30) metastatic lesions in the liver in bilobar distribution. The largest in segment JUAN measures 1.85 cm.  9. 10/19/2020, he undergoes Y90 radioembolization with 72.2 mCi of Theraspheres to the right lobe of the liver; predominantly in hepatic segments 5 and 6, milder uptake in the region of segment 8  10. 10/23/2020, first cycle of Ipilimumab 1mg/kg and nivolumab 3mg/kg. He received 4 cycles of Ipi/Nivo.  11. 11/16/2020, he has his 2nd Y90 radioembolization with 69 mCi of TheraSpheres to hepatic segments 4A and 4B.       12. 1/15/2020, he starts nivolumab 480 mg IV monthly maintenance.    History of Present Illness:  Mr. 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 He returns in follow-up with his wife and daughter today.    He is doing well and tolerating nivolumab with no significant side effects. He does note a sensation of firmness in the right abdomen, and at times notes something like a \"rolled up tube\" that moves/contracts in the right upper abdomen. He feels like his liver is likely enlarging, but he denies any significant leg swelling. Fatigue remains, but he denies any diarrhea, cough, or shortness of breath. He notes back pain today, but denies any saddle anesthesia, lower extremity weakness, or radicular symptoms. Most of his pain is his chronic lower back pain.    He is here to review his MRI-scans. The show kyphosis of the spine, multiple vertebral metastases and degenerative multilevel changes throughout the cervical, thoracic, and lumbar spine. The area of concern at T10 shows cortical breakthrough at the inferior endplate of T10, with extension into the T10-11 neural foramen and right lateral recess. He has spinal stenosis, but no cord compression.    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 His creatinine is stable at 0.86 and " TSH is 3.3. LFTs show mild elevation in alkaline phosphatase, likely from bone metastases and liver metastasis. ALT is normal. AST is mildly elevated at 52.    ECOG performance status is 1.    Review of Systems:  12-point ROS negative except as in HPI    Current Outpatient Medications   Medication Sig Dispense Refill     acetaminophen (TYLENOL) 500 MG tablet Take 1,000 mg by mouth       Ascorbic Acid (VITAMIN C) 500 MG CAPS Take 500 mg by mouth       calcium carbonate (OS-FAUSTO) 500 MG tablet Take 1 tablet (500 mg) by mouth 2 times daily 60 tablet 3     Cholecalciferol (VITAMIN D3) 25 MCG (1000 UT) CAPS Takes 3 daily       diclofenac (VOLTAREN) 1 % topical gel        doxepin (SINEQUAN) 10 MG/ML (HIGH CONC) solution Take 0.6 mLs (6 mg) by mouth At Bedtime 18 mL 1     Ferrous Gluconate 240 (27 Fe) MG TABS Take 240 mg by mouth       finasteride (PROSCAR) 5 MG tablet Take 5 mg by mouth       gabapentin (NEURONTIN) 100 MG capsule TAKE ONE CAPSULE BY MOUTH AT BEDTIME AS NEEDED FOR HAND NUMBNESS       HYDROmorphone (DILAUDID) 2 MG tablet Take 0.5 tablets (1 mg) by mouth every 6 hours as needed for moderate to severe pain 30 tablet 0     HYDROmorphone (DILAUDID) 2 MG tablet Take 1 tablet (2 mg) by mouth every 6 hours as needed for pain 10 tablet 0     LORazepam (ATIVAN) 0.5 MG tablet Take 1 tablet (0.5 mg) by mouth every 4 hours as needed (Anxiety, Nausea/Vomiting or Sleep) 30 tablet 3     methylphenidate (RITALIN) 10 MG tablet as needed       methylPREDNISolone (MEDROL DOSEPAK) 4 MG tablet therapy pack Take 1 tablet (4 mg) by mouth 2 times daily Take as directed on package. (Patient not taking: Reported on 3/19/2021) 21 tablet 0     methylPREDNISolone (MEDROL) 32 MG tablet Please take 32 mg, 12 hours prior to procedure, take another 32 mg, 2 hours prior to procedure for contrast allergy. (Patient not taking: Reported on 3/19/2021) 2 tablet 0     ondansetron (ZOFRAN) 4 MG tablet Take 1-2 tablets (4-8 mg) by mouth every 6  hours as needed for nausea (vomiting) 40 tablet 0     polyethylene glycol (MIRALAX) 17 GM/Dose powder Take 17 g (1 capful) by mouth 2 times daily 255 g 1     prochlorperazine (COMPAZINE) 10 MG tablet Take 1 tablet (10 mg) by mouth every 6 hours as needed (Nausea/Vomiting) 30 tablet 3     senna-docusate (SENNA S) 8.6-50 MG tablet Take 1 tablet by mouth 2 times daily as needed for constipation 60 tablet 1     tamsulosin (FLOMAX) 0.4 MG capsule Take 0.4 mg by mouth       Testosterone 1.62 % GEL          Past Medical History:   Diagnosis Date     Degenerative joint disease      Metastatic melanoma (H)     L eye     Nonsenile cataract      Past Surgical History:   Procedure Laterality Date     ------------OTHER-------------  2014    back surgery L4/L5      ------------OTHER-------------      knee surgery both 1961 Right, 1971 Left knee     AS REMOVAL OF KIDNEY STONE  2015     IR SIRT (SELECTIVE INTERNAL RADIO THERAPY)  10/13/2020     IR VISCERAL ANGIOGRAM  10/13/2020     IR VISCERAL EMBOLIZATION  10/19/2020     IR VISCERAL EMBOLIZATION  11/16/2020     JOINT REPLACEMENT  2017    Both kneses replaced (2017 and 2018)     ROTATOR CUFF REPAIR RT/LT Right 2016     TURP  2000     Family History   Problem Relation Age of Onset     Glaucoma Mother      Prostate Cancer Father      Cancer Sister      Macular Degeneration No family hx of        Physical Examination:  There were no vitals taken for this visit.  Wt Readings from Last 5 Encounters:   03/12/21 91.9 kg (202 lb 8 oz)   03/01/21 92.5 kg (204 lb)   02/12/21 99.7 kg (219 lb 11.2 oz)   01/29/21 99.2 kg (218 lb 9.6 oz)   12/28/20 96.3 kg (212 lb 6.4 oz)   GENERAL: Healthy, alert and no distress  EYES: Eyes grossly normal to inspection.  No discharge or erythema, or obvious scleral/conjunctival abnormalities.  HENT: Normal cephalic/atraumatic.  External ears, nose and mouth without ulcers or lesions.  No nasal drainage visible.  NECK: No asymmetry, visible masses or  scars  RESP: No audible wheeze, cough, or visible cyanosis.  No visible retractions or increased work of breathing.    ABDOMEN: Soft, non-tender, there is right sided hepatomegaly, and the liver tip is firm extends about 4-5 finger breadths from the costal margin.  SKIN: Visible skin clear. No significant rash, abnormal pigmentation or lesions.  NEURO: Cranial nerves grossly intact.  Mentation and speech appropriate for age.  PSYCH: Cranial nerves are intact, normal strength. Affect normal/bright, judgement and insight intact, normal speech and appearance well-groomed.    The rest of a comprehensive physical examination is deferred due to PHE (public health emergency) video visit restrictions.    Labs:  No visits with results within 1 Week(s) from this visit.   Latest known visit with results is:   Infusion Therapy Visit on 03/12/2021   Component Date Value Ref Range Status     Sodium 03/12/2021 140  133 - 144 mmol/L Final     Potassium 03/12/2021 4.3  3.4 - 5.3 mmol/L Final     Chloride 03/12/2021 109  94 - 109 mmol/L Final     Carbon Dioxide 03/12/2021 25  20 - 32 mmol/L Final     Anion Gap 03/12/2021 6  3 - 14 mmol/L Final     Glucose 03/12/2021 111* 70 - 99 mg/dL Final     Urea Nitrogen 03/12/2021 20  7 - 30 mg/dL Final     Creatinine 03/12/2021 0.86  0.66 - 1.25 mg/dL Final     GFR Estimate 03/12/2021 83  >60 mL/min/[1.73_m2] Final     GFR Estimate If Black 03/12/2021 >90  >60 mL/min/[1.73_m2] Final     Calcium 03/12/2021 8.8  8.5 - 10.1 mg/dL Final     Bilirubin Total 03/12/2021 0.5  0.2 - 1.3 mg/dL Final     Albumin 03/12/2021 3.4  3.4 - 5.0 g/dL Final     Protein Total 03/12/2021 7.2  6.8 - 8.8 g/dL Final     Alkaline Phosphatase 03/12/2021 152* 40 - 150 U/L Final     ALT 03/12/2021 35  0 - 70 U/L Final     AST 03/12/2021 52* 0 - 45 U/L Final     TSH 03/12/2021 3.30  0.40 - 4.00 mU/L Final     WBC 03/12/2021 4.0  4.0 - 11.0 10e9/L Final     RBC Count 03/12/2021 4.65  4.4 - 5.9 10e12/L Final     Hemoglobin  03/12/2021 13.5  13.3 - 17.7 g/dL Final     Hematocrit 03/12/2021 41.2  40.0 - 53.0 % Final     MCV 03/12/2021 89  78 - 100 fl Final     MCH 03/12/2021 29.0  26.5 - 33.0 pg Final     MCHC 03/12/2021 32.8  31.5 - 36.5 g/dL Final     RDW 03/12/2021 14.3  10.0 - 15.0 % Final     Platelet Count 03/12/2021 142* 150 - 450 10e9/L Final     Diff Method 03/12/2021 Automated Method   Final     % Neutrophils 03/12/2021 62.1  % Final     % Lymphocytes 03/12/2021 22.3  % Final     % Monocytes 03/12/2021 9.7  % Final     % Eosinophils 03/12/2021 4.5  % Final     % Basophils 03/12/2021 1.2  % Final     % Immature Granulocytes 03/12/2021 0.2  % Final     Nucleated RBCs 03/12/2021 0  0 /100 Final     Absolute Neutrophil 03/12/2021 2.5  1.6 - 8.3 10e9/L Final     Absolute Lymphocytes 03/12/2021 0.9  0.8 - 5.3 10e9/L Final     Absolute Monocytes 03/12/2021 0.4  0.0 - 1.3 10e9/L Final     Absolute Eosinophils 03/12/2021 0.2  0.0 - 0.7 10e9/L Final     Absolute Basophils 03/12/2021 0.1  0.0 - 0.2 10e9/L Final     Abs Immature Granulocytes 03/12/2021 0.0  0 - 0.4 10e9/L Final     Absolute Nucleated RBC 03/12/2021 0.0   Final       Imaging:  MRI-C/T/L spine, 3/23/2021  Findings:   Cervical spine:    There are multiple vertebral body infiltrative lesions demonstrating  T1 signal hypointensity, with enhancement and reduced diffusion. There  is no cortical breakthrough or evidence for epidural spread within the  cervical spine. Moderate multilevel disc desiccation and narrowing  throughout the cervical spine. Trace anterolisthesis of C4 on C5 and  C7 on T1. Degenerative retrolisthesis of C6.     There is no abnormal cord signal. No abnormal intrathecal enhancement.     The findings on a level by level basis are as follows:     C2-3:  No spinal canal stenosis. Moderate right and mild left neural  foraminal stenosis secondary to uncovertebral joint and facet  hypertrophy.     C3-4:  Small posterior disc osteophyte complex with mild spinal  canal  stenosis. Severe right and moderate left neural foraminal stenosis  secondary to uncovertebral joint and facet hypertrophy.     C4-5:  Mild spinal canal stenosis secondary to disc osteophyte  complex. Moderate right and severe left neural foraminal stenosis  secondary to uncovertebral joint and facet hypertrophy.     C5-6:  Mild spinal canal stenosis. Moderate right and severe left  neural foraminal stenosis and due to uncovertebral joint and facet  hypertrophy.     C6-7: Moderate spinal canal stenosis secondary to disc osteophyte  complex. Severe left and moderate right neuroforaminal stenosis  secondary to uncovertebral joint and facet hypertrophy.     C7-T1: No spinal canal or significant neural foraminal stenosis.     Thoracic spine:    Multiple marrow infiltrative lesions with T1 hypointensity, contrast  enhancement and reduced effusion throughout the thoracic spine.  Similar signal changes in several posterior bilateral ribs. Multilevel  anterior vertebral body wedging. No evidence of acute fracture.  Exaggerated kyphosis of the thoracic spine.     There is cortical breakthrough of the lesion at the inferior endplate  of T10, which extends into the right T10-11 neural foramen and right  lateral recess of the spinal canal. This results in severe right  neural foraminal stenosis with likely impingement of the exiting nerve  root. Moderate right lateral recess stenosis. There is overall  moderate spinal canal stenosis at this level.     Small disc protrusions at T2-3, T6-7, T7-8 and T11-12, without  high-grade spinal canal stenosis.     Lumbar spine:    There are 5 lumbar type vertebral segments. There are diffuse  infiltrative lesions throughout the osseous structures, demonstrating  T1 hypointensity, enhancement and reduced diffusion, consistent with  metastatic disease. There is grade 1/2 anterolisthesis of L4 on L5.  Severe disc height loss at L4-5. Laminectomy changes at L3-5.     The conus medullaris  terminates at L1, normal. No abnormal cord  signal. The cauda equina appears unremarkable.     On a level by level basis:     T12-L1: No spinal canal stenosis. Mild bilateral neural foraminal  narrowing. Bilateral facet arthropathy.     L1-2: Mild spinal canal stenosis due to disc bulge and facet  arthropathy. Mild bilateral neural foraminal stenosis.     L2-3: Mild to moderate spinal canal stenosis secondary to disc bulge  and facet arthropathy. Moderate bilateral neural foraminal stenosis.     L3-4: Disc bulge, with superimposed right subarticular foraminal disc  protrusion. Mild spinal canal narrowing. Moderate right and severe  left neural foraminal stenosis secondary to disc bulge and facet  arthropathy.     L4-5: Grade 1/2 anterolisthesis. Mild to moderate spinal canal  stenosis secondary to anterolisthesis, uncovering of the disc, and  facet arthropathy. Moderate to severe bilateral neural foraminal  stenosis secondary to anterolisthesis and disc bulge. Mild spinal  canal narrowing.     L5-S1: Bilateral facet hypertrophy. Mild bilateral neural foraminal  narrowing. No spinal canal stenosis.     There is a right para-aortic lymph node at lower L1 measuring up to  1.4 cm. Right renal cyst noted. Previously detailed hepatic metastases  are not fully imaged on this study.     Heterogeneous abnormal marrow of the sacrum and bilateral tom with  corresponding enhancement.     Impression:    1. Diffuse osseous vertebral metastases, most notably at T10-11, where  there is cortical breakthrough and epidural spread resulting in severe  right neural foraminal stenosis and impingement of the exiting nerve  root. Additional associated moderate spinal canal stenosis.  2. Multiple additional rib and pelvic osseous metastases.  3. No cord metastases.  4. Multilevel cervical, thoracic, and lumbar spondylosis, most  pronounced at L4-5.      ASSESSMENT/PLAN    #1 Metastatic uveal melanoma, to liver and bone  #2 Choroidal  melanoma with ciliary body involvement, left eye, status-post plaque brachytherapy, Class II and PRAME mRNA positive  #3 Should and hip pain, degenerative  It was a pleasure to see Mr. Mayberry today. He is a 76 year old man with metastatic uveal melanoma to liver and bone.  He underwent radioembolization and Ipi/Nivo and he is currently maintained on nivolumab.    He is clinically benefiting from therapy, and his last PET-CT showed encouraging signs of stability and a regressed humeral bone lesion. However, the burden of metastatic disease in the vertebral spine appears to have worsened in several areas, particularly at T10 where there is now epidural tumor extension. We discussed that it is common for tumors of vertebral body metastases to extend into this space, but as the tumor grows it may encroach on the thecal sac and compress the spinal cord or may compress the vertebral venous plexus. For this reason I am recommending radiation therapy. Surgery would not be a good option since this is starting in the vertebral body and unlikely he would be a surgical candidate given widespread disease.    -Radiation oncology referral  -Continue nivolumab and Zometa on 4/9/21  -Return to clinic 4/29 as scheduled  -Alleve for inflammatory type aches and pains, dilaudid for breakthrough pains          Jw Reyes M.D.   of Medicine  Hematology, Oncology and Transplantation      Again, thank you for allowing me to participate in the care of your patient.      Sincerely,    Jw Trent MD

## 2021-03-26 NOTE — PROGRESS NOTES
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 11/2019, he is diagnosed with ocular melanoma, after a choroidal lesion was found in his Left eye.  Patient reports over the last several months he developed new floaters/spots in his L eye.  2. 11/25/2019, he was seen by ophthalmology who noted a left eye, elevated bilobed lesion, size 6.28mm x 18.31(T) x 17.04(L).   3. 12/14/19, CT-CAP showed no evidence of metastatic disease.  4. 1/10/2020: Patient referred to [facility]. B-scan ultrasound of left eye showed elevated bilobed lesion measuring 7.2-7.3 height with a base of 21.4 x 22.5 mm. Low to medium reflectivity. Difficult measurements due to location. Ciliary body involvement was noted. Ultrasound basal dimension measurement included subretinal fluid, and clinical measurement had to be done by transillumination due to anterior tumor location.  5. 1/15/2020, visual acuity right eye 20/20 -1, left eye 20/25 +2 nh. Visual fields counting fingers full bilaterally. Clinical fundus exam of left eye revealed choroidal/ciliary body malignant melanoma with basal measurement of 22 x 21 mm and height of 7.5 mm. Located in the Inferior left eye from 4:30-7:30, 15 mm to disc, 15 mm to fovea, +subretinal fluid, bilobed. The tumor without fluid appears to be 1-2 mm smaller in basal dimension (20 x 19 mm).   6. 2/10/2020, Iodine-125 24 mm plaque placement delivering 85 Gy to an 8 mm height. FNA biopsy performed, and molecular testing shows Class II PRAME positive, high-risk of early metastasis.  7. 3/31/2020, he starts adjuvant sunitinib 25 mg daily for high risk disease. He completed 6 months of therapy.  8. 8/26/2020, MRI-liver shows interval development of numerous (more than 30) metastatic lesions in the liver in bilobar distribution. The largest in segment JUAN measures 1.85 cm.  9. 10/19/2020, he  "undergoes Y90 radioembolization with 72.2 mCi of Theraspheres to the right lobe of the liver; predominantly in hepatic segments 5 and 6, milder uptake in the region of segment 8  10. 10/23/2020, first cycle of Ipilimumab 1mg/kg and nivolumab 3mg/kg. He received 4 cycles of Ipi/Nivo.  11. 11/16/2020, he has his 2nd Y90 radioembolization with 69 mCi of TheraSpheres to hepatic segments 4A and 4B.       12. 1/15/2020, he starts nivolumab 480 mg IV monthly maintenance.    History of Present Illness:  Mr. 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 He returns in follow-up with his wife and daughter today.    He is doing well and tolerating nivolumab with no significant side effects. He does note a sensation of firmness in the right abdomen, and at times notes something like a \"rolled up tube\" that moves/contracts in the right upper abdomen. He feels like his liver is likely enlarging, but he denies any significant leg swelling. Fatigue remains, but he denies any diarrhea, cough, or shortness of breath. He notes back pain today, but denies any saddle anesthesia, lower extremity weakness, or radicular symptoms. Most of his pain is his chronic lower back pain.    He is here to review his MRI-scans. The show kyphosis of the spine, multiple vertebral metastases and degenerative multilevel changes throughout the cervical, thoracic, and lumbar spine. The area of concern at T10 shows cortical breakthrough at the inferior endplate of T10, with extension into the T10-11 neural foramen and right lateral recess. He has spinal stenosis, but no cord compression.    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 His creatinine is stable at 0.86 and TSH is 3.3. LFTs show mild elevation in alkaline phosphatase, likely from bone metastases and liver metastasis. ALT is normal. AST is mildly elevated at 52.    ECOG performance status is 1.    Review of Systems:  12-point ROS negative except as in " HPI    Current Outpatient Medications   Medication Sig Dispense Refill     acetaminophen (TYLENOL) 500 MG tablet Take 1,000 mg by mouth       Ascorbic Acid (VITAMIN C) 500 MG CAPS Take 500 mg by mouth       calcium carbonate (OS-FAUSTO) 500 MG tablet Take 1 tablet (500 mg) by mouth 2 times daily 60 tablet 3     Cholecalciferol (VITAMIN D3) 25 MCG (1000 UT) CAPS Takes 3 daily       diclofenac (VOLTAREN) 1 % topical gel        doxepin (SINEQUAN) 10 MG/ML (HIGH CONC) solution Take 0.6 mLs (6 mg) by mouth At Bedtime 18 mL 1     Ferrous Gluconate 240 (27 Fe) MG TABS Take 240 mg by mouth       finasteride (PROSCAR) 5 MG tablet Take 5 mg by mouth       gabapentin (NEURONTIN) 100 MG capsule TAKE ONE CAPSULE BY MOUTH AT BEDTIME AS NEEDED FOR HAND NUMBNESS       HYDROmorphone (DILAUDID) 2 MG tablet Take 0.5 tablets (1 mg) by mouth every 6 hours as needed for moderate to severe pain 30 tablet 0     HYDROmorphone (DILAUDID) 2 MG tablet Take 1 tablet (2 mg) by mouth every 6 hours as needed for pain 10 tablet 0     LORazepam (ATIVAN) 0.5 MG tablet Take 1 tablet (0.5 mg) by mouth every 4 hours as needed (Anxiety, Nausea/Vomiting or Sleep) 30 tablet 3     methylphenidate (RITALIN) 10 MG tablet as needed       methylPREDNISolone (MEDROL DOSEPAK) 4 MG tablet therapy pack Take 1 tablet (4 mg) by mouth 2 times daily Take as directed on package. (Patient not taking: Reported on 3/19/2021) 21 tablet 0     methylPREDNISolone (MEDROL) 32 MG tablet Please take 32 mg, 12 hours prior to procedure, take another 32 mg, 2 hours prior to procedure for contrast allergy. (Patient not taking: Reported on 3/19/2021) 2 tablet 0     ondansetron (ZOFRAN) 4 MG tablet Take 1-2 tablets (4-8 mg) by mouth every 6 hours as needed for nausea (vomiting) 40 tablet 0     polyethylene glycol (MIRALAX) 17 GM/Dose powder Take 17 g (1 capful) by mouth 2 times daily 255 g 1     prochlorperazine (COMPAZINE) 10 MG tablet Take 1 tablet (10 mg) by mouth every 6 hours as  needed (Nausea/Vomiting) 30 tablet 3     senna-docusate (SENNA S) 8.6-50 MG tablet Take 1 tablet by mouth 2 times daily as needed for constipation 60 tablet 1     tamsulosin (FLOMAX) 0.4 MG capsule Take 0.4 mg by mouth       Testosterone 1.62 % GEL          Past Medical History:   Diagnosis Date     Degenerative joint disease      Metastatic melanoma (H)     L eye     Nonsenile cataract      Past Surgical History:   Procedure Laterality Date     ------------OTHER-------------  2014    back surgery L4/L5      ------------OTHER-------------      knee surgery both 1961 Right, 1971 Left knee     AS REMOVAL OF KIDNEY STONE  2015     IR SIRT (SELECTIVE INTERNAL RADIO THERAPY)  10/13/2020     IR VISCERAL ANGIOGRAM  10/13/2020     IR VISCERAL EMBOLIZATION  10/19/2020     IR VISCERAL EMBOLIZATION  11/16/2020     JOINT REPLACEMENT  2017    Both kneses replaced (2017 and 2018)     ROTATOR CUFF REPAIR RT/LT Right 2016     TURP  2000     Family History   Problem Relation Age of Onset     Glaucoma Mother      Prostate Cancer Father      Cancer Sister      Macular Degeneration No family hx of        Physical Examination:  There were no vitals taken for this visit.  Wt Readings from Last 5 Encounters:   03/12/21 91.9 kg (202 lb 8 oz)   03/01/21 92.5 kg (204 lb)   02/12/21 99.7 kg (219 lb 11.2 oz)   01/29/21 99.2 kg (218 lb 9.6 oz)   12/28/20 96.3 kg (212 lb 6.4 oz)   GENERAL: Healthy, alert and no distress  EYES: Eyes grossly normal to inspection.  No discharge or erythema, or obvious scleral/conjunctival abnormalities.  HENT: Normal cephalic/atraumatic.  External ears, nose and mouth without ulcers or lesions.  No nasal drainage visible.  NECK: No asymmetry, visible masses or scars  RESP: No audible wheeze, cough, or visible cyanosis.  No visible retractions or increased work of breathing.    ABDOMEN: Soft, non-tender, there is right sided hepatomegaly, and the liver tip is firm extends about 4-5 finger breadths from the costal  margin.  SKIN: Visible skin clear. No significant rash, abnormal pigmentation or lesions.  NEURO: Cranial nerves grossly intact.  Mentation and speech appropriate for age.  PSYCH: Cranial nerves are intact, normal strength. Affect normal/bright, judgement and insight intact, normal speech and appearance well-groomed.    The rest of a comprehensive physical examination is deferred due to PHE (public health emergency) video visit restrictions.    Labs:  No visits with results within 1 Week(s) from this visit.   Latest known visit with results is:   Infusion Therapy Visit on 03/12/2021   Component Date Value Ref Range Status     Sodium 03/12/2021 140  133 - 144 mmol/L Final     Potassium 03/12/2021 4.3  3.4 - 5.3 mmol/L Final     Chloride 03/12/2021 109  94 - 109 mmol/L Final     Carbon Dioxide 03/12/2021 25  20 - 32 mmol/L Final     Anion Gap 03/12/2021 6  3 - 14 mmol/L Final     Glucose 03/12/2021 111* 70 - 99 mg/dL Final     Urea Nitrogen 03/12/2021 20  7 - 30 mg/dL Final     Creatinine 03/12/2021 0.86  0.66 - 1.25 mg/dL Final     GFR Estimate 03/12/2021 83  >60 mL/min/[1.73_m2] Final     GFR Estimate If Black 03/12/2021 >90  >60 mL/min/[1.73_m2] Final     Calcium 03/12/2021 8.8  8.5 - 10.1 mg/dL Final     Bilirubin Total 03/12/2021 0.5  0.2 - 1.3 mg/dL Final     Albumin 03/12/2021 3.4  3.4 - 5.0 g/dL Final     Protein Total 03/12/2021 7.2  6.8 - 8.8 g/dL Final     Alkaline Phosphatase 03/12/2021 152* 40 - 150 U/L Final     ALT 03/12/2021 35  0 - 70 U/L Final     AST 03/12/2021 52* 0 - 45 U/L Final     TSH 03/12/2021 3.30  0.40 - 4.00 mU/L Final     WBC 03/12/2021 4.0  4.0 - 11.0 10e9/L Final     RBC Count 03/12/2021 4.65  4.4 - 5.9 10e12/L Final     Hemoglobin 03/12/2021 13.5  13.3 - 17.7 g/dL Final     Hematocrit 03/12/2021 41.2  40.0 - 53.0 % Final     MCV 03/12/2021 89  78 - 100 fl Final     MCH 03/12/2021 29.0  26.5 - 33.0 pg Final     MCHC 03/12/2021 32.8  31.5 - 36.5 g/dL Final     RDW 03/12/2021 14.3  10.0 -  15.0 % Final     Platelet Count 03/12/2021 142* 150 - 450 10e9/L Final     Diff Method 03/12/2021 Automated Method   Final     % Neutrophils 03/12/2021 62.1  % Final     % Lymphocytes 03/12/2021 22.3  % Final     % Monocytes 03/12/2021 9.7  % Final     % Eosinophils 03/12/2021 4.5  % Final     % Basophils 03/12/2021 1.2  % Final     % Immature Granulocytes 03/12/2021 0.2  % Final     Nucleated RBCs 03/12/2021 0  0 /100 Final     Absolute Neutrophil 03/12/2021 2.5  1.6 - 8.3 10e9/L Final     Absolute Lymphocytes 03/12/2021 0.9  0.8 - 5.3 10e9/L Final     Absolute Monocytes 03/12/2021 0.4  0.0 - 1.3 10e9/L Final     Absolute Eosinophils 03/12/2021 0.2  0.0 - 0.7 10e9/L Final     Absolute Basophils 03/12/2021 0.1  0.0 - 0.2 10e9/L Final     Abs Immature Granulocytes 03/12/2021 0.0  0 - 0.4 10e9/L Final     Absolute Nucleated RBC 03/12/2021 0.0   Final       Imaging:  MRI-C/T/L spine, 3/23/2021  Findings:   Cervical spine:    There are multiple vertebral body infiltrative lesions demonstrating  T1 signal hypointensity, with enhancement and reduced diffusion. There  is no cortical breakthrough or evidence for epidural spread within the  cervical spine. Moderate multilevel disc desiccation and narrowing  throughout the cervical spine. Trace anterolisthesis of C4 on C5 and  C7 on T1. Degenerative retrolisthesis of C6.     There is no abnormal cord signal. No abnormal intrathecal enhancement.     The findings on a level by level basis are as follows:     C2-3:  No spinal canal stenosis. Moderate right and mild left neural  foraminal stenosis secondary to uncovertebral joint and facet  hypertrophy.     C3-4:  Small posterior disc osteophyte complex with mild spinal canal  stenosis. Severe right and moderate left neural foraminal stenosis  secondary to uncovertebral joint and facet hypertrophy.     C4-5:  Mild spinal canal stenosis secondary to disc osteophyte  complex. Moderate right and severe left neural foraminal  stenosis  secondary to uncovertebral joint and facet hypertrophy.     C5-6:  Mild spinal canal stenosis. Moderate right and severe left  neural foraminal stenosis and due to uncovertebral joint and facet  hypertrophy.     C6-7: Moderate spinal canal stenosis secondary to disc osteophyte  complex. Severe left and moderate right neuroforaminal stenosis  secondary to uncovertebral joint and facet hypertrophy.     C7-T1: No spinal canal or significant neural foraminal stenosis.     Thoracic spine:    Multiple marrow infiltrative lesions with T1 hypointensity, contrast  enhancement and reduced effusion throughout the thoracic spine.  Similar signal changes in several posterior bilateral ribs. Multilevel  anterior vertebral body wedging. No evidence of acute fracture.  Exaggerated kyphosis of the thoracic spine.     There is cortical breakthrough of the lesion at the inferior endplate  of T10, which extends into the right T10-11 neural foramen and right  lateral recess of the spinal canal. This results in severe right  neural foraminal stenosis with likely impingement of the exiting nerve  root. Moderate right lateral recess stenosis. There is overall  moderate spinal canal stenosis at this level.     Small disc protrusions at T2-3, T6-7, T7-8 and T11-12, without  high-grade spinal canal stenosis.     Lumbar spine:    There are 5 lumbar type vertebral segments. There are diffuse  infiltrative lesions throughout the osseous structures, demonstrating  T1 hypointensity, enhancement and reduced diffusion, consistent with  metastatic disease. There is grade 1/2 anterolisthesis of L4 on L5.  Severe disc height loss at L4-5. Laminectomy changes at L3-5.     The conus medullaris terminates at L1, normal. No abnormal cord  signal. The cauda equina appears unremarkable.     On a level by level basis:     T12-L1: No spinal canal stenosis. Mild bilateral neural foraminal  narrowing. Bilateral facet arthropathy.     L1-2: Mild spinal  canal stenosis due to disc bulge and facet  arthropathy. Mild bilateral neural foraminal stenosis.     L2-3: Mild to moderate spinal canal stenosis secondary to disc bulge  and facet arthropathy. Moderate bilateral neural foraminal stenosis.     L3-4: Disc bulge, with superimposed right subarticular foraminal disc  protrusion. Mild spinal canal narrowing. Moderate right and severe  left neural foraminal stenosis secondary to disc bulge and facet  arthropathy.     L4-5: Grade 1/2 anterolisthesis. Mild to moderate spinal canal  stenosis secondary to anterolisthesis, uncovering of the disc, and  facet arthropathy. Moderate to severe bilateral neural foraminal  stenosis secondary to anterolisthesis and disc bulge. Mild spinal  canal narrowing.     L5-S1: Bilateral facet hypertrophy. Mild bilateral neural foraminal  narrowing. No spinal canal stenosis.     There is a right para-aortic lymph node at lower L1 measuring up to  1.4 cm. Right renal cyst noted. Previously detailed hepatic metastases  are not fully imaged on this study.     Heterogeneous abnormal marrow of the sacrum and bilateral tom with  corresponding enhancement.     Impression:    1. Diffuse osseous vertebral metastases, most notably at T10-11, where  there is cortical breakthrough and epidural spread resulting in severe  right neural foraminal stenosis and impingement of the exiting nerve  root. Additional associated moderate spinal canal stenosis.  2. Multiple additional rib and pelvic osseous metastases.  3. No cord metastases.  4. Multilevel cervical, thoracic, and lumbar spondylosis, most  pronounced at L4-5.      ASSESSMENT/PLAN    #1 Metastatic uveal melanoma, to liver and bone  #2 Choroidal melanoma with ciliary body involvement, left eye, status-post plaque brachytherapy, Class II and PRAME mRNA positive  #3 Should and hip pain, degenerative  It was a pleasure to see Mr. Mayberry today. He is a 76 year old man with metastatic uveal melanoma to liver  and bone.  He underwent radioembolization and Ipi/Nivo and he is currently maintained on nivolumab.    He is clinically benefiting from therapy, and his last PET-CT showed encouraging signs of stability and a regressed humeral bone lesion. However, the burden of metastatic disease in the vertebral spine appears to have worsened in several areas, particularly at T10 where there is now epidural tumor extension. We discussed that it is common for tumors of vertebral body metastases to extend into this space, but as the tumor grows it may encroach on the thecal sac and compress the spinal cord or may compress the vertebral venous plexus. For this reason I am recommending radiation therapy. Surgery would not be a good option since this is starting in the vertebral body and unlikely he would be a surgical candidate given widespread disease.    -Radiation oncology referral  -Continue nivolumab and Zometa on 4/9/21  -Return to clinic 4/29 as scheduled  -Alleve for inflammatory type aches and pains, dilaudid for breakthrough pains          Jw Reyes M.D.   of Medicine  Hematology, Oncology and Transplantation

## 2021-03-29 NOTE — NURSING NOTE
Radiation Therapy Patient Education    Person involved with teaching: none    Patient educational needs for self management of treatment-related side effects assessment completed.  [facility] Patient Ed tab contains Patient Learning Assessment    Education Materials Given  Skin Care During Radiation Treatment    Educational Topics Discussed  Side effects expected, Pain management, Skin care, Activity and When to call MD/RN    Response To Teaching  Verbalizes understanding    GYN Only  Vaginal Dilator-given and educated: N/A    Referrals sent: None    Chemotherapy?  No

## 2021-03-29 NOTE — PROGRESS NOTES
INITIAL PATIENT ASSESSMENT    Diagnosis: Metastatic choroidal melanoma, mets to liver and bone    Prior radiation therapy:   Site Treated: Eye plaque  Facility: [facility]  Dates: 2/10/2020  Dose: 85 Gy    Site Treated: Liver right lobe   Facility: [facility]  Dates: 10/19/20  Dose: 72.2 mCI    Site Treated: Liver 4a and 4b  Facility: [facility]  Dates: 11/16/20  Dose: 69 mCi    Prior chemotherapy:   Protocol: Ipi/nivo  Facility: [facility]  Dates: 4 cycles starting with y90    Protocol: Nivo consolidation  Facility: [facility]  Dates: ongoing    Prior hormonal therapy:No    Pain Eval:  Denies    Psychosocial  Living arrangements: with wife in single family home  Fall Risk: independent   referral needs: Not needed    Advanced Directive: Yes - Location: EMR   Implantable Cardiac Device? No    Nurse face-to-face time: Level 5:  over 15 min face to face time      Review of Systems   Constitutional: Negative.    HENT: Positive for hearing loss (wears hearing aids, +8 years) and tinnitus.    Eyes: Positive for blurred vision (left eye) and double vision (left eye).   Respiratory: Negative.    Cardiovascular: Negative.    Gastrointestinal: Positive for heartburn.   Genitourinary: Positive for frequency and urgency.        Enlarged prostate   Musculoskeletal: Negative.    Skin: Negative.    Psychiatric/Behavioral: Positive for memory loss. The patient has insomnia.

## 2021-03-29 NOTE — PROGRESS NOTES
"RADIATION ONCOLOGY CONSULT NOTE  Date of Visit: Mar 29, 2021    Galileo Mayberry  MRN: [id number]  : 1945      Diagnosis: Metastatic choroidal melanoma, with liver and bone mets  Stage: IV    HISTORY OF PRESENT ILLNESS:  Mr. Mayberry is a 76 year old with diagnosis of metastatic uveal melanoma.     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 19 CT CAP showed no signs of metastatic disease. Patient was seen at [place] where b-scan ultrasound showed disease approximately 7x22mm in the inferior anterior left eye with ciliary body involvement. Patient had 24mm plaque placement delivering 85 Gy on 2/10/2020, FNA biopsy performed showing Class II PRAME positive, high-risk of early metastasis. Patient started adjuvant Sunitinib 3/31/2020 for high risk disease completed 6 months. 2020 MRI liver showed numerous (30+) metastatic liver lesions.  Patient underwent y90 RA x2 to right lobe of liver on 10/19/2020, and again on 2020. He started ipilimumab and Nivolumab on 10/23/2020 and has since completed 4 cycles. Is now on maintenance Nivolumab since 1/15/2021.     Patient recently had MRI spine done on 3/23 showing an area of concern at T10 with cortical breakthrough at the inferior endplate, extension into the T10-11 neural foramen and right lateral recess.  Patient is here today to discuss treatment of his metastases to his thoracic spine.     Patient is overall doing well and has minimal symptoms related to his metastatic disease. Patient does endorse mild and intermittent right sided abdominal pain that feels like \"tightening and swelling of liver\" however states this pain is manageable. Patient states his vision is unchanged in his left eye, still has mild blurriness, floaters and double vision due to esotropia. Patient denies having any new or worsening lower back pain, no lower extremity weakness, radicular symptoms or paresthesias. Patient continues to work as a school "  and is still very active restoring cars with grandson.     REVIEW OF SYSTEMS: A 12 point review of systems was obtained. Pertinent findings are noted in the HPI and are otherwise unremarkable.     CHEMOTHERAPY HISTORY: Sunitab 6 months completed, s/p 4 cycles Ipi/Nivo, now on maintenance Nivolumab 480 monthly.   PACEMAKER: none  PAST RADIATION THERAPY HISTORY: Y90 x2 right hepatic lobe, Brachytherapy left eye 85Gy    PAST MEDICAL HISTORY:  Past Medical History:   Diagnosis Date     BPH (benign prostatic hyperplasia)      Degenerative joint disease      Metastatic melanoma (H)     L eye     Nonsenile cataract      Torn rotator cuff 03/25/2021    right       PAST SURGICAL HISTORY:  Past Surgical History:   Procedure Laterality Date     ------------OTHER-------------  2014    back surgery L4/L5      ------------OTHER-------------      knee surgery both 1961 Right, 1971 Left knee     AS REMOVAL OF KIDNEY STONE  2015     IR SIRT (SELECTIVE INTERNAL RADIO THERAPY)  10/13/2020     IR VISCERAL ANGIOGRAM  10/13/2020     IR VISCERAL EMBOLIZATION  10/19/2020     IR VISCERAL EMBOLIZATION  11/16/2020     JOINT REPLACEMENT  2017    Both kneses replaced (2017 and 2018)     ROTATOR CUFF REPAIR RT/LT Right 2016     TURP  2000       ALLERGIES:  Allergies as of 03/29/2021 - Reviewed 03/29/2021   Allergen Reaction Noted     Vardenafil Other (See Comments) and Nausea 06/23/2009     Contrast dye Rash 12/04/2008       MEDICATIONS:  Current Outpatient Medications   Medication Sig Dispense Refill     acetaminophen (TYLENOL) 500 MG tablet Take 1,000 mg by mouth       Ascorbic Acid (VITAMIN C) 500 MG CAPS Take 500 mg by mouth       calcium carbonate (OS-FAUSTO) 500 MG tablet Take 1 tablet (500 mg) by mouth 2 times daily 60 tablet 3     Cholecalciferol (VITAMIN D3) 25 MCG (1000 UT) CAPS Takes 3 daily       diclofenac (VOLTAREN) 1 % topical gel        doxepin (SINEQUAN) 10 MG/ML (HIGH CONC) solution Take 0.6 mLs (6 mg) by mouth At  Bedtime 18 mL 1     Ferrous Gluconate 240 (27 Fe) MG TABS Take 240 mg by mouth       finasteride (PROSCAR) 5 MG tablet Take 5 mg by mouth       gabapentin (NEURONTIN) 100 MG capsule TAKE ONE CAPSULE BY MOUTH AT BEDTIME AS NEEDED FOR HAND NUMBNESS       HYDROmorphone (DILAUDID) 2 MG tablet Take 0.5 tablets (1 mg) by mouth every 6 hours as needed for moderate to severe pain 30 tablet 0     HYDROmorphone (DILAUDID) 2 MG tablet Take 1 tablet (2 mg) by mouth every 6 hours as needed for pain 10 tablet 0     methylphenidate (RITALIN) 10 MG tablet as needed       ondansetron (ZOFRAN) 4 MG tablet Take 1-2 tablets (4-8 mg) by mouth every 6 hours as needed for nausea (vomiting) 40 tablet 0     polyethylene glycol (MIRALAX) 17 GM/Dose powder Take 17 g (1 capful) by mouth 2 times daily 255 g 1     prochlorperazine (COMPAZINE) 10 MG tablet Take 1 tablet (10 mg) by mouth every 6 hours as needed (Nausea/Vomiting) 30 tablet 3     senna-docusate (SENNA S) 8.6-50 MG tablet Take 1 tablet by mouth 2 times daily as needed for constipation 60 tablet 1     tamsulosin (FLOMAX) 0.4 MG capsule Take 0.4 mg by mouth       Testosterone 1.62 % GEL        LORazepam (ATIVAN) 0.5 MG tablet Take 1 tablet (0.5 mg) by mouth every 4 hours as needed (Anxiety, Nausea/Vomiting or Sleep) (Patient not taking: Reported on 3/29/2021) 30 tablet 3        FAMILY HISTORY:  Family History   Problem Relation Age of Onset     Glaucoma Mother      Prostate Cancer Father      Cancer Sister      Macular Degeneration No family hx of        SOCIAL HISTORY:  Social History     Socioeconomic History     Marital status:      Spouse name: Not on file     Number of children: Not on file     Years of education: Not on file     Highest education level: Not on file   Occupational History     Not on file   Social Needs     Financial resource strain: Not on file     Food insecurity     Worry: Not on file     Inability: Not on file     Transportation needs     Medical: Not on  "file     Non-medical: Not on file   Tobacco Use     Smoking status: Never Smoker     Smokeless tobacco: Never Used   Substance and Sexual Activity     Alcohol use: Not Currently     Alcohol/week: 1.0 standard drinks     Types: 1 Glasses of wine per week     Binge frequency: Never     Drug use: Never     Sexual activity: Not on file   Lifestyle     Physical activity     Days per week: Not on file     Minutes per session: Not on file     Stress: Not on file   Relationships     Social connections     Talks on phone: Not on file     Gets together: Not on file     Attends Buddhist service: Not on file     Active member of club or organization: Not on file     Attends meetings of clubs or organizations: Not on file     Relationship status: Not on file     Intimate partner violence     Fear of current or ex partner: Not on file     Emotionally abused: Not on file     Physically abused: Not on file     Forced sexual activity: Not on file   Other Topics Concern     Parent/sibling w/ CABG, MI or angioplasty before 65F 55M? Not Asked   Social History Narrative     Not on file       PHYSICAL EXAM:  VITALS: BP (!) 144/68 (BP Location: Left arm, Patient Position: Sitting, Cuff Size: Adult Regular)   Pulse 67   Resp 16   Ht 1.803 m (5' 11\")   Wt 96.5 kg (212 lb 11.2 oz)   SpO2 96%   BMI 29.67 kg/m    GEN: Appears well, alert, oriented, and in NAD  NECK: No asymmetry, visible masses or scars  HEENT: Eyes grossly normal to inspection. Strabismus of left eye. No discharge or erythema, or obvious scleral/conjunctival abnormalities.  CV:  Warm and well-perfused, no cyanosis  RESP: CTAB, breathing comfortably on room air  SKIN: Normal color and turgor  NEURO: No gross focal deficits, normal gait, mentation and speech appropriate for age  PSYCH: Appropriate mood and affect    ECOG PERFORMANCE STATUS: 0  MRI:  reviewed     IMPRESSION: Mr. Mayberry is a 76 year old with metastatic uveal melanoma to liver and bone, status-post COMS " plaque brachytherapy, and status post y90 RA x2 to right liver lobe.     He presents today to discuss palliative RT to the thoracic spine.   He is not having pain, but with epidural extension, is at risk for neurologic compromise.     RECOMMENDATION:  Recent MRI spine showed neural foramen extension of tumor at T10 with concern for potential future nerve root/thecal sac/spinal cord compression. Patient is currently asymptomatic from this (no pain, no persistent paresthesia, no weakness) and maintains good quality of life.     Recommended he undergo palliative RT to T10. We explained our rationale, as well as the logistics, risks, benefits, and alternatives to Radiation Therapy. Potential toxicities of treatment were outlined, including but not limited to, fatigue, GERD, and nausea. We encouraged Mr. Mayberry to ask questions, which we answered to the best of our ability.    He elected to proceed with radiotherapy. CT simulation will be completed later this morning. Plan to start treatment next Monday, 4/5, 20 Gy in 5 fractions.     The patient was seen and discussed with staff, Dr. Conteh. Thank you for involving us in the care of this patient.  Please feel free to contact us with questions or concerns at any time.    Zak Romo MS4    Dipti Ying MD PGY-2  Radiation Oncology, [facility]    I was personally present with the resident during the history and exam.  I   discussed the case with the resident and agree with the findings and plan   of care as documented in the resident's note.    Naomi Conteh   [phone]

## 2021-03-29 NOTE — LETTER
"    3/29/2021         RE: Galileo Mayberry  [address]  [facility] [address]        Dear Colleague,    Thank you for referring your patient, Galileo Mayberry, to the [facility], [facility] RADIATION ONCOLOGY. Please see a copy of my visit note below.    RADIATION ONCOLOGY CONSULT NOTE  Date of Visit: Mar 29, 2021    Galileo Mayberry  MRN: [id number]  : 1945      Diagnosis: Metastatic choroidal melanoma, with liver and bone mets  Stage: IV    HISTORY OF PRESENT ILLNESS:  Mr. Mayberry is a 76 year old with diagnosis of metastatic uveal melanoma.     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 19 CT CAP showed no signs of metastatic disease. Patient was seen at [place] where b-scan ultrasound showed disease approximately 7x22mm in the inferior anterior left eye with ciliary body involvement. Patient had 24mm plaque placement delivering 85 Gy on 2/10/2020, FNA biopsy performed showing Class II PRAME positive, high-risk of early metastasis. Patient started adjuvant Sunitinib 3/31/2020 for high risk disease completed 6 months. 2020 MRI liver showed numerous (30+) metastatic liver lesions.  Patient underwent y90 RA x2 to right lobe of liver on 10/19/2020, and again on 2020. He started ipilimumab and Nivolumab on 10/23/2020 and has since completed 4 cycles. Is now on maintenance Nivolumab since 1/15/2021.     Patient recently had MRI spine done on 3/23 showing an area of concern at T10 with cortical breakthrough at the inferior endplate, extension into the T10-11 neural foramen and right lateral recess.  Patient is here today to discuss treatment of his metastases to his thoracic spine.     Patient is overall doing well and has minimal symptoms related to his metastatic disease. Patient does endorse mild and intermittent right sided abdominal pain that feels like \"tightening and swelling of liver\" however states this pain is manageable. Patient states his vision " is unchanged in his left eye, still has mild blurriness, floaters and double vision due to esotropia. Patient denies having any new or worsening lower back pain, no lower extremity weakness, radicular symptoms or paresthesias. Patient continues to work as a  and is still very active restoring cars with grandson.     REVIEW OF SYSTEMS: A 12 point review of systems was obtained. Pertinent findings are noted in the HPI and are otherwise unremarkable.     CHEMOTHERAPY HISTORY: Sunitab 6 months completed, s/p 4 cycles Ipi/Nivo, now on maintenance Nivolumab 480 monthly.   PACEMAKER: none  PAST RADIATION THERAPY HISTORY: Y90 x2 right hepatic lobe, Brachytherapy left eye 85Gy    PAST MEDICAL HISTORY:  Past Medical History:   Diagnosis Date     BPH (benign prostatic hyperplasia)      Degenerative joint disease      Metastatic melanoma (H)     L eye     Nonsenile cataract      Torn rotator cuff 03/25/2021    right       PAST SURGICAL HISTORY:  Past Surgical History:   Procedure Laterality Date     ------------OTHER-------------  2014    back surgery L4/L5      ------------OTHER-------------      knee surgery both 1961 Right, 1971 Left knee     AS REMOVAL OF KIDNEY STONE  2015     IR SIRT (SELECTIVE INTERNAL RADIO THERAPY)  10/13/2020     IR VISCERAL ANGIOGRAM  10/13/2020     IR VISCERAL EMBOLIZATION  10/19/2020     IR VISCERAL EMBOLIZATION  11/16/2020     JOINT REPLACEMENT  2017    Both kneses replaced (2017 and 2018)     ROTATOR CUFF REPAIR RT/LT Right 2016     TURP  2000       ALLERGIES:  Allergies as of 03/29/2021 - Reviewed 03/29/2021   Allergen Reaction Noted     Vardenafil Other (See Comments) and Nausea 06/23/2009     Contrast dye Rash 12/04/2008       MEDICATIONS:  Current Outpatient Medications   Medication Sig Dispense Refill     acetaminophen (TYLENOL) 500 MG tablet Take 1,000 mg by mouth       Ascorbic Acid (VITAMIN C) 500 MG CAPS Take 500 mg by mouth       calcium carbonate (OS-FAUSTO) 500 MG tablet  Take 1 tablet (500 mg) by mouth 2 times daily 60 tablet 3     Cholecalciferol (VITAMIN D3) 25 MCG (1000 UT) CAPS Takes 3 daily       diclofenac (VOLTAREN) 1 % topical gel        doxepin (SINEQUAN) 10 MG/ML (HIGH CONC) solution Take 0.6 mLs (6 mg) by mouth At Bedtime 18 mL 1     Ferrous Gluconate 240 (27 Fe) MG TABS Take 240 mg by mouth       finasteride (PROSCAR) 5 MG tablet Take 5 mg by mouth       gabapentin (NEURONTIN) 100 MG capsule TAKE ONE CAPSULE BY MOUTH AT BEDTIME AS NEEDED FOR HAND NUMBNESS       HYDROmorphone (DILAUDID) 2 MG tablet Take 0.5 tablets (1 mg) by mouth every 6 hours as needed for moderate to severe pain 30 tablet 0     HYDROmorphone (DILAUDID) 2 MG tablet Take 1 tablet (2 mg) by mouth every 6 hours as needed for pain 10 tablet 0     methylphenidate (RITALIN) 10 MG tablet as needed       ondansetron (ZOFRAN) 4 MG tablet Take 1-2 tablets (4-8 mg) by mouth every 6 hours as needed for nausea (vomiting) 40 tablet 0     polyethylene glycol (MIRALAX) 17 GM/Dose powder Take 17 g (1 capful) by mouth 2 times daily 255 g 1     prochlorperazine (COMPAZINE) 10 MG tablet Take 1 tablet (10 mg) by mouth every 6 hours as needed (Nausea/Vomiting) 30 tablet 3     senna-docusate (SENNA S) 8.6-50 MG tablet Take 1 tablet by mouth 2 times daily as needed for constipation 60 tablet 1     tamsulosin (FLOMAX) 0.4 MG capsule Take 0.4 mg by mouth       Testosterone 1.62 % GEL        LORazepam (ATIVAN) 0.5 MG tablet Take 1 tablet (0.5 mg) by mouth every 4 hours as needed (Anxiety, Nausea/Vomiting or Sleep) (Patient not taking: Reported on 3/29/2021) 30 tablet 3        FAMILY HISTORY:  Family History   Problem Relation Age of Onset     Glaucoma Mother      Prostate Cancer Father      Cancer Sister      Macular Degeneration No family hx of        SOCIAL HISTORY:  Social History     Socioeconomic History     Marital status:      Spouse name: Not on file     Number of children: Not on file     Years of education: Not  "on file     Highest education level: Not on file   Occupational History     Not on file   Social Needs     Financial resource strain: Not on file     Food insecurity     Worry: Not on file     Inability: Not on file     Transportation needs     Medical: Not on file     Non-medical: Not on file   Tobacco Use     Smoking status: Never Smoker     Smokeless tobacco: Never Used   Substance and Sexual Activity     Alcohol use: Not Currently     Alcohol/week: 1.0 standard drinks     Types: 1 Glasses of wine per week     Binge frequency: Never     Drug use: Never     Sexual activity: Not on file   Lifestyle     Physical activity     Days per week: Not on file     Minutes per session: Not on file     Stress: Not on file   Relationships     Social connections     Talks on phone: Not on file     Gets together: Not on file     Attends Advent service: Not on file     Active member of club or organization: Not on file     Attends meetings of clubs or organizations: Not on file     Relationship status: Not on file     Intimate partner violence     Fear of current or ex partner: Not on file     Emotionally abused: Not on file     Physically abused: Not on file     Forced sexual activity: Not on file   Other Topics Concern     Parent/sibling w/ CABG, MI or angioplasty before 65F 55M? Not Asked   Social History Narrative     Not on file       PHYSICAL EXAM:  VITALS: BP (!) 144/68 (BP Location: Left arm, Patient Position: Sitting, Cuff Size: Adult Regular)   Pulse 67   Resp 16   Ht 1.803 m (5' 11\")   Wt 96.5 kg (212 lb 11.2 oz)   SpO2 96%   BMI 29.67 kg/m    GEN: Appears well, alert, oriented, and in NAD  NECK: No asymmetry, visible masses or scars  HEENT: Eyes grossly normal to inspection. Strabismus of left eye. No discharge or erythema, or obvious scleral/conjunctival abnormalities.  CV:  Warm and well-perfused, no cyanosis  RESP: CTAB, breathing comfortably on room air  SKIN: Normal color and turgor  NEURO: No gross focal " deficits, normal gait, mentation and speech appropriate for age  PSYCH: Appropriate mood and affect    ECOG PERFORMANCE STATUS: 0  MRI:  reviewed     IMPRESSION: Mr. Mayberry is a 76 year old with metastatic uveal melanoma to liver and bone, status-post COMS plaque brachytherapy, and status post y90 RA x2 to right liver lobe.     He presents today to discuss palliative RT to the thoracic spine.   He is not having pain, but with epidural extension, is at risk for neurologic compromise.     RECOMMENDATION:  Recent MRI spine showed neural foramen extension of tumor at T10 with concern for potential future nerve root/thecal sac/spinal cord compression. Patient is currently asymptomatic from this (no pain, no persistent paresthesia, no weakness) and maintains good quality of life.     Recommended he undergo palliative RT to T10. We explained our rationale, as well as the logistics, risks, benefits, and alternatives to Radiation Therapy. Potential toxicities of treatment were outlined, including but not limited to, fatigue, GERD, and nausea. We encouraged Mr. Mayberry to ask questions, which we answered to the best of our ability.    He elected to proceed with radiotherapy. CT simulation will be completed later this morning. Plan to start treatment next Monday, 4/5, 20 Gy in 5 fractions.     The patient was seen and discussed with staff, Dr. Conteh. Thank you for involving us in the care of this patient.  Please feel free to contact us with questions or concerns at any time.    Zak Romo MS4    Dipti Ying MD PGY-2  Radiation Oncology, [facility]    I was personally present with the resident during the history and exam.  I   discussed the case with the resident and agree with the findings and plan   of care as documented in the resident's note.    Naomi Conteh   [phone]           INITIAL PATIENT ASSESSMENT    Diagnosis: Metastatic choroidal melanoma, mets to liver and bone    Prior radiation  therapy:   Site Treated: Eye plaque  Facility: [facility]  Dates: 2/10/2020  Dose: 85 Gy    Site Treated: Liver right lobe   Facility: [facility]  Dates: 10/19/20  Dose: 72.2 mCI    Site Treated: Liver 4a and 4b  Facility: [facility]  Dates: 11/16/20  Dose: 69 mCi    Prior chemotherapy:   Protocol: Ipi/nivo  Facility: [facility]  Dates: 4 cycles starting with y90    Protocol: Nivo consolidation  Facility: [facility]  Dates: ongoing    Prior hormonal therapy:No    Pain Eval:  Denies    Psychosocial  Living arrangements: with wife in single family home  Fall Risk: independent   referral needs: Not needed    Advanced Directive: Yes - Location: EMR   Implantable Cardiac Device? No    Nurse face-to-face time: Level 5:  over 15 min face to face time      Review of Systems   Constitutional: Negative.    HENT: Positive for hearing loss (wears hearing aids, +8 years) and tinnitus.    Eyes: Positive for blurred vision (left eye) and double vision (left eye).   Respiratory: Negative.    Cardiovascular: Negative.    Gastrointestinal: Positive for heartburn.   Genitourinary: Positive for frequency and urgency.        Enlarged prostate   Musculoskeletal: Negative.    Skin: Negative.    Psychiatric/Behavioral: Positive for memory loss. The patient has insomnia.        Sincerely,        Naomi Conteh MD

## 2021-04-07 NOTE — LETTER
2021         RE: Galileo Mayberry  [address]  [facility] [address]        Dear Colleague,    Thank you for referring your patient, Galileo Mayberry, to the [facility] RADIATION ONCOLOGY. Please see a copy of my visit note below.    RADIATION ONCOLOGY WEEKLY ON TREATMENT VISIT   Encounter Date: 2021    Patient Name: Galileo Mayberry  MRN: [id number]  : 1945     Disease and Stage: ocular melanoma metastatic to liver and bone  Treatment Site: Thoracic spine  T 9-11  Current Dose/Planned Total Dose: [1200 of planned 2000 cGy  Daily Fraction Size: [400] cGy/day, [5] times/week  Concurrent Chemotherapy: Yes  Drug and Frequency: optivo  Subjective: Mr. Mayberry presents to clinic today for his weekly on-treatment visit. He has mild nausea  and no diarrhea.      ROS:   Nutrition Alteration  Diet Type: Patient's Preference  Anorexia NCI: 0 - None  Skin  Skin Reaction: 0 - No changes  CNS Alteration  CNS note: No complaints     Cardiovascular  Respiratory effort: 1 - Normal - without distress  Gastrointestinal  Nausea: 0 - None  Vomitin - No vomiting (ons)  Diarrhea: 0 - None  Genitourinary  Urinary Status: 0 - Normal  Dysuria: 0 - None  Psychosocial  Mood - Anxiety: 0 - Normal  Mood - Depression: 0 - Normal  Pain Assessment  0-10 Pain Scale: 0    Objective:   There were no vitals taken for this visit.  No erythema.    Treatment-related toxicities (CTCAE v4.0):  none    Assessment:    Mr. Mayberry is a 76 year old male with metastatic melanoma    Plan:   1. Continue radiotherapy  2. Done this week  3. Follow up prn.      Mosaiq chart and setup information reviewed  IGRT images reviewed         Naomi Conteh  [phone] pager   Department of Radiation Oncology  [facility]

## 2021-04-07 NOTE — PROGRESS NOTES
RADIATION ONCOLOGY WEEKLY ON TREATMENT VISIT   Encounter Date: 2021    Patient Name: Galileo Mayberry  MRN: [id number]  : 1945     Disease and Stage: ocular melanoma metastatic to liver and bone  Treatment Site: Thoracic spine  T 9-11  Current Dose/Planned Total Dose: [1200 of planned 2000 cGy  Daily Fraction Size: [400] cGy/day, [5] times/week  Concurrent Chemotherapy: Yes  Drug and Frequency: optivo  Subjective: Mr. Mayberry presents to clinic today for his weekly on-treatment visit. He has mild nausea  and no diarrhea.      ROS:   Nutrition Alteration  Diet Type: Patient's Preference  Anorexia NCI: 0 - None  Skin  Skin Reaction: 0 - No changes  CNS Alteration  CNS note: No complaints     Cardiovascular  Respiratory effort: 1 - Normal - without distress  Gastrointestinal  Nausea: 0 - None  Vomitin - No vomiting (ons)  Diarrhea: 0 - None  Genitourinary  Urinary Status: 0 - Normal  Dysuria: 0 - None  Psychosocial  Mood - Anxiety: 0 - Normal  Mood - Depression: 0 - Normal  Pain Assessment  0-10 Pain Scale: 0    Objective:   There were no vitals taken for this visit.  No erythema.    Treatment-related toxicities (CTCAE v4.0):  none    Assessment:    Mr. Mayberry is a 76 year old male with metastatic melanoma    Plan:   1. Continue radiotherapy  2. Done this week  3. Follow up prn.      Mosaiq chart and setup information reviewed  IGRT images reviewed         Naomi Conteh  [phone] pager   Department of Radiation Oncology  [facility]

## 2021-04-09 NOTE — PROGRESS NOTES
Infusion Nursing Note:  Galileo Mayberry presents today for C8D1 Nivolumab; C4D1 Zometa    Patient seen by provider today: No   present during visit today: Not Applicable.    Note: pt assessed upon arrival to infusion clinic.  Denies fever, chills, SOB, or cough.  Overall feels well.  Not eating as well because he states nothing tastes good. Having pain related to shoulder/rotator cuff issues and degenerative joint issues.  Reviewed treatment plan with pt.  Pt states he feels well to receive treatment today.    Pt confirmed that he is taking his calcium and vitamin D supplements; denies jaw pain or any upcoming dental procedures/issues.    Intravenous Access:  Peripheral IV placed.    Treatment Conditions:  Lab Results   Component Value Date    HGB 12.4 04/09/2021     Lab Results   Component Value Date    WBC 3.5 04/09/2021      Lab Results   Component Value Date    ANEU 2.3 04/09/2021     Lab Results   Component Value Date     04/09/2021      Lab Results   Component Value Date     04/09/2021                   Lab Results   Component Value Date    POTASSIUM 4.3 04/09/2021           Lab Results   Component Value Date    MAG 2.2 03/01/2021            Lab Results   Component Value Date    CR 0.90 04/09/2021                   Lab Results   Component Value Date    FAUSTO 8.9 04/09/2021                Lab Results   Component Value Date    BILITOTAL 0.5 04/09/2021           Lab Results   Component Value Date    ALBUMIN 3.3 04/09/2021                    Lab Results   Component Value Date    ALT 30 04/09/2021           Lab Results   Component Value Date    AST 43 04/09/2021       Results reviewed, labs MET treatment parameters, ok to proceed with treatment.      Post Infusion Assessment:  Patient tolerated infusion without incident.  Blood return noted pre and post infusion.  Site patent and intact, free from redness, edema or discomfort.  No evidence of extravasations.  Access discontinued per  protocol.       Discharge Plan:   Patient declined prescription refills.  AVS to patient via [facility].  Patient will return 5/7/21 for next appointment.   Patient discharged in stable condition accompanied by: self.  Departure Mode: Ambulatory.    Cinthya Flores RN

## 2021-04-09 NOTE — PATIENT INSTRUCTIONS
[facility] Triage and after hours / weekends / holidays:  [phone]    Please call the triage or after hours line if you experience a temperature greater than or equal to 100.5, shaking chills, have uncontrolled nausea, vomiting and/or diarrhea, dizziness, shortness of breath, chest pain, bleeding, unexplained bruising, or if you have any other new/concerning symptoms, questions or concerns.      If you are having any concerning symptoms or wish to speak to a provider before your next infusion visit, please call your care coordinator or triage to notify them so we can adequately serve you.     If you need a refill on a narcotic prescription or other medication, please call before your infusion appointment.                 April 2021 Sunday Monday Tuesday Wednesday Thursday Friday Saturday                       1    [place] PLANNING BILLING ONLY   7:00 AM   (30 min.)   Naomi Conteh MD   [facility]iation On[facility] 2     3       4     5    TREATMENT  10:30 AM   (30 min.)   [facility]LEK[facility]   [facility] 6    TREATMENT  11:00 AM   (30 min.)   [facility]LEK[facility]   [facility] 7    TREATMENT  11:00 AM   (30 min.)   P RAD ONC ELEKTA   [facility]    OTV  11:30 AM   (15 min.)   Naomi Conteh MD   [facility] 8    TREATMENT  11:00 AM   (30 min.)   [facility]LEKTA   [facility] 9    TREATMENT  11:00 AM   (30 min.)   P RAD ONC ELEKTA   [facility]    LAB PERIPHERAL  11:30 AM   (15 min.)   [facility]   [facility]    [facility] ONC INFUSION 60  12:00 PM   (60 min.)   [facility] ON[facility] INFUSION   [facility] 10       11     12     13     14     15     16     17       18     19     20     21     22     23     24       25     26     27    PET ONCOLOGY WHOLE  BODY   9:45 AM   (45 min.)   [id number]   [facility] 28     29    VIDEO VISIT RETURN   1:30 PM   (30 min.)   Jw Burden MD   [facility] 30                      May 2021      [place] Monday Tuesday Wednesday Thursday Friday Saturday                                 1       2     3     4     5     6     7    VIDEO VISIT RETURN  10:25 AM   (40 min.)   Mg Patel MD   [facility]    LAB PERIPHERAL  12:30 PM   (15 min.)   [facility]   [facility]    [facility] ONC INFUSION 60   1:00 PM   (60 min.)   [facility] ONCOLOGY INFUSION   [facility] 8       9     10     11     12     13     14     15       16     17     18     19     20     21     22       23     24     25     26     27     28     29       30     31                                             Recent Results (from the past 24 hour(s))   Comprehensive metabolic panel    Collection Time: 04/09/21 12:41 PM   Result Value Ref Range    Sodium 139 133 - 144 mmol/L    Potassium 4.3 3.4 - 5.3 mmol/L    Chloride 108 94 - 109 mmol/L    Carbon Dioxide 24 20 - 32 mmol/L    Anion Gap 7 3 - 14 mmol/L    Glucose 102 (H) 70 - 99 mg/dL    Urea Nitrogen 19 7 - 30 mg/dL    Creatinine 0.90 0.66 - 1.25 mg/dL    GFR Estimate 82 >60 mL/min/[1.73_m2]    GFR Estimate If Black >90 >60 mL/min/[1.73_m2]    Calcium 8.9 8.5 - 10.1 mg/dL    Bilirubin Total 0.5 0.2 - 1.3 mg/dL    Albumin 3.3 (L) 3.4 - 5.0 g/dL    Protein Total 7.0 6.8 - 8.8 g/dL    Alkaline Phosphatase 128 40 - 150 U/L    ALT 30 0 - 70 U/L    AST 43 0 - 45 U/L   CBC with platelets differential    Collection Time: 04/09/21 12:41 PM   Result Value Ref Range    WBC 3.5 (L) 4.0 - 11.0 10e9/L    RBC Count 4.25 (L) 4.4 - 5.9 10e12/L    Hemoglobin 12.4 (L) 13.3 - 17.7 g/dL    Hematocrit 37.6 (L) 40.0 - 53.0 %    MCV 89 78 - 100 fl    MCH 29.2 26.5 - 33.0 pg    MCHC 33.0 31.5 - 36.5 g/dL     RDW 14.6 10.0 - 15.0 %    Platelet Count 140 (L) 150 - 450 10e9/L    Diff Method Automated Method     % Neutrophils 67.7 %    % Lymphocytes 14.7 %    % Monocytes 11.8 %    % Eosinophils 4.0 %    % Basophils 1.2 %    % Immature Granulocytes 0.6 %    Nucleated RBCs 0 0 /100    Absolute Neutrophil 2.3 1.6 - 8.3 10e9/L    Absolute Lymphocytes 0.5 (L) 0.8 - 5.3 10e9/L    Absolute Monocytes 0.4 0.0 - 1.3 10e9/L    Absolute Eosinophils 0.1 0.0 - 0.7 10e9/L    Absolute Basophils 0.0 0.0 - 0.2 10e9/L    Abs Immature Granulocytes 0.0 0 - 0.4 10e9/L    Absolute Nucleated RBC 0.0    Lactate Dehydrogenase    Collection Time: 04/09/21 12:41 PM   Result Value Ref Range    Lactate Dehydrogenase 928 (H) 85 - 227 U/L   TSH with free T4 reflex    Collection Time: 04/09/21 12:41 PM   Result Value Ref Range    TSH 3.56 0.40 - 4.00 mU/L

## 2021-04-29 NOTE — PROGRESS NOTES
Favian is a 76 year old who is being evaluated via a billable video visit.      How would you like to obtain your AVS? MyChart  If the video visit is dropped, the invitation should be resent by: Send to e-mail at: [email or web address]  Will anyone else be joining your video visit? No       ALEX El    Video-Visit Details  Type of service:  Video Visit  Video Start Time: 1:45 PM  Video End Time:2:15 PM  Originating Location (pt. Location): Home  Distant Location (provider location):  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 11/2019, he is diagnosed with ocular melanoma, after a choroidal lesion was found in his Left eye.  Patient reports over the last several months he developed new floaters/spots in his L eye.  2. 11/25/2019, he was seen by ophthalmology who noted a left eye, elevated bilobed lesion, size 6.28mm x 18.31(T) x 17.04(L).   3. 12/14/19, CT-CAP showed no evidence of metastatic disease.  4. 1/10/2020: Patient referred to [facility] ER. B-scan ultrasound of left eye showed elevated bilobed lesion measuring 7.2-7.3 height with a base of 21.4 x 22.5 mm. Low to medium reflectivity. Difficult measurements due to location. Ciliary body involvement was noted. Ultrasound basal dimension measurement included subretinal fluid, and clinical measurement had to be done by transillumination due to anterior tumor location.  5. 1/15/2020, visual acuity right eye 20/20 -1, left eye 20/25 +2 nh. Visual fields counting fingers full bilaterally. Clinical fundus exam of left eye revealed choroidal/ciliary body malignant melanoma with basal measurement of 22 x 21 mm and height of 7.5 mm. Located in the Inferior left eye from 4:30-7:30, 15 mm to disc, 15 mm to fovea, +subretinal fluid, bilobed. The tumor without  fluid appears to be 1-2 mm smaller in basal dimension (20 x 19 mm).   6. 2/10/2020, Iodine-125 24 mm plaque placement delivering 85 Gy to an 8 mm height. FNA biopsy performed, and molecular testing shows Class II PRAME positive, high-risk of early metastasis.  7. 3/31/2020, he starts adjuvant sunitinib 25 mg daily for high risk disease. He completed 6 months of therapy.  8. 8/26/2020, MRI-liver shows interval development of numerous (more than 30) metastatic lesions in the liver in bilobar distribution. The largest in segment JUAN measures 1.85 cm.  9. 10/19/2020, he undergoes Y90 radioembolization with 72.2 mCi of Theraspheres to the right lobe of the liver; predominantly in hepatic segments 5 and 6, milder uptake in the region of segment 8  10. 10/23/2020, first cycle of Ipilimumab 1mg/kg and nivolumab 3mg/kg. He received 4 cycles of Ipi/Nivo.  11. 11/16/2020, he has his 2nd Y90 radioembolization with 69 mCi of TheraSpheres to hepatic segments 4A and 4B.       12. 1/15/2020, he starts nivolumab 480 mg IV monthly maintenance.    History of Present Illness:  Mr. 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 He returns in follow-up with his wife and daughter today.    He is doing well and tolerating nivolumab with no significant side effects. He denies any diarrhea, cough, or shortness of breath. He is dealing with supraspinatus tendon rotator cuff tear. [facility] He also is using Voltaren gel on the shoulder and this helps with the pain. Pain is worst in shoulders and hips.    He endorses a fullness sensation in the right upper quadrant, however, he is still eating. He is maintaining his weight. 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     Otherwise, he had PET-CT scan, which showed mostly increase in FDG uptake in the numerous metastatic tumors. Overall, sizes remained  minimally changed, but overall volume of disease continues to grow and new  small lesions are visible. Labs from 4/27/21 show an elevated LDH to 1189 with AST of 64. The remainder of the CMP is normal. The CBC shows a normal WBC and hemoglobin of 13.5. However, absolute lymphocytes are low at 0.3.     ECOG performance status is 1.    Review of Systems:  12-point ROS negative except as in HPI    Current Outpatient Medications   Medication Sig Dispense Refill     acetaminophen (TYLENOL) 500 MG tablet Take 1,000 mg by mouth       Ascorbic Acid (VITAMIN C) 500 MG CAPS Take 500 mg by mouth       calcium carbonate (OS-FAUSTO) 500 MG tablet Take 1 tablet (500 mg) by mouth 2 times daily 60 tablet 3     Cholecalciferol (VITAMIN D3) 25 MCG (1000 UT) CAPS Takes 3 daily       diclofenac (VOLTAREN) 1 % topical gel        Ferrous Gluconate 240 (27 Fe) MG TABS Take 240 mg by mouth       finasteride (PROSCAR) 5 MG tablet Take 5 mg by mouth       gabapentin (NEURONTIN) 100 MG capsule TAKE ONE CAPSULE BY MOUTH AT BEDTIME AS NEEDED FOR HAND NUMBNESS       HYDROmorphone (DILAUDID) 2 MG tablet Take 0.5 tablets (1 mg) by mouth every 6 hours as needed for moderate to severe pain 30 tablet 0     HYDROmorphone (DILAUDID) 2 MG tablet Take 1 tablet (2 mg) by mouth every 6 hours as needed for pain 10 tablet 0     LORazepam (ATIVAN) 0.5 MG tablet Take 1 tablet (0.5 mg) by mouth every 4 hours as needed (Anxiety, Nausea/Vomiting or Sleep) 30 tablet 3     methylphenidate (RITALIN) 10 MG tablet as needed       polyethylene glycol (MIRALAX) 17 GM/Dose powder Take 17 g (1 capful) by mouth 2 times daily 255 g 1     prochlorperazine (COMPAZINE) 10 MG tablet Take 1 tablet (10 mg) by mouth every 6 hours as needed (Nausea/Vomiting) 30 tablet 3     tamsulosin (FLOMAX) 0.4 MG capsule Take 0.4 mg by mouth       Testosterone 1.62 % GEL        doxepin (SINEQUAN) 10 MG/ML (HIGH CONC) solution Take 0.6 mLs (6 mg) by mouth At Bedtime (Patient not taking: Reported on 4/9/2021) 18 mL 1     ondansetron (ZOFRAN) 4 MG tablet Take 1-2 tablets  (4-8 mg) by mouth every 6 hours as needed for nausea (vomiting) (Patient not taking: Reported on 4/9/2021) 40 tablet 0     senna-docusate (SENNA S) 8.6-50 MG tablet Take 1 tablet by mouth 2 times daily as needed for constipation (Patient not taking: Reported on 4/9/2021) 60 tablet 1       Past Medical History:   Diagnosis Date     BPH (benign prostatic hyperplasia)      Degenerative joint disease      Metastatic melanoma (H)     L eye     Nonsenile cataract      Torn rotator cuff 03/25/2021    right     Past Surgical History:   Procedure Laterality Date     ------------OTHER-------------  2014    back surgery L4/L5      ------------OTHER-------------      knee surgery both 1961 Right, 1971 Left knee     AS REMOVAL OF KIDNEY STONE  2015     IR SIRT (SELECTIVE INTERNAL RADIO THERAPY)  10/13/2020     IR VISCERAL ANGIOGRAM  10/13/2020     IR VISCERAL EMBOLIZATION  10/19/2020     IR VISCERAL EMBOLIZATION  11/16/2020     JOINT REPLACEMENT  2017    Both kneses replaced (2017 and 2018)     ROTATOR CUFF REPAIR RT/LT Right 2016     TURP  2000     Family History   Problem Relation Age of Onset     Glaucoma Mother      Prostate Cancer Father      Cancer Sister      Macular Degeneration No family hx of        Physical Examination:  There were no vitals taken for this visit.  Wt Readings from Last 5 Encounters:   04/09/21 98.9 kg (218 lb)   03/29/21 96.5 kg (212 lb 11.2 oz)   03/26/21 97.6 kg (215 lb 1.6 oz)   03/12/21 91.9 kg (202 lb 8 oz)   03/01/21 92.5 kg (204 lb)   GENERAL: Healthy, alert and no distress  EYES: Eyes grossly normal to inspection.  No discharge or erythema, or obvious scleral/conjunctival abnormalities.  HENT: Normal cephalic/atraumatic.  External ears, nose and mouth without ulcers or lesions.  No nasal drainage visible.  NECK: No asymmetry, visible masses or scars  RESP: No audible wheeze, cough, or visible cyanosis.  No visible retractions or increased work of breathing.    ABDOMEN: Soft, non-tender, there  is right sided hepatomegaly, and the liver tip is firm extends about 4-5 finger breadths from the costal margin.  SKIN: Visible skin clear. No significant rash, abnormal pigmentation or lesions.  NEURO: Cranial nerves grossly intact.  Mentation and speech appropriate for age.  PSYCH: Cranial nerves are intact, normal strength. Affect normal/bright, judgement and insight intact, normal speech and appearance well-groomed.    The rest of a comprehensive physical examination is deferred due to PHE (public health emergency) video visit restrictions.    Labs:  Hospital Outpatient Visit on 04/27/2021   Component Date Value Ref Range Status     Sodium 04/27/2021 138  133 - 144 mmol/L Final     Potassium 04/27/2021 4.2  3.4 - 5.3 mmol/L Final     Chloride 04/27/2021 107  94 - 109 mmol/L Final     Carbon Dioxide 04/27/2021 24  20 - 32 mmol/L Final     Anion Gap 04/27/2021 6  3 - 14 mmol/L Final     Glucose 04/27/2021 120* 70 - 99 mg/dL Final     Urea Nitrogen 04/27/2021 20  7 - 30 mg/dL Final     Creatinine 04/27/2021 0.84  0.66 - 1.25 mg/dL Final     GFR Estimate 04/27/2021 84  >60 mL/min/[1.73_m2] Final     GFR Estimate If Black 04/27/2021 >90  >60 mL/min/[1.73_m2] Final     Calcium 04/27/2021 9.3  8.5 - 10.1 mg/dL Final     Bilirubin Total 04/27/2021 0.7  0.2 - 1.3 mg/dL Final     Albumin 04/27/2021 3.4  3.4 - 5.0 g/dL Final     Protein Total 04/27/2021 7.1  6.8 - 8.8 g/dL Final     Alkaline Phosphatase 04/27/2021 115  40 - 150 U/L Final     ALT 04/27/2021 25  0 - 70 U/L Final     AST 04/27/2021 64* 0 - 45 U/L Final     WBC 04/27/2021 4.1  4.0 - 11.0 10e9/L Final     RBC Count 04/27/2021 4.51  4.4 - 5.9 10e12/L Final     Hemoglobin 04/27/2021 13.5  13.3 - 17.7 g/dL Final     Hematocrit 04/27/2021 40.0  40.0 - 53.0 % Final     MCV 04/27/2021 89  78 - 100 fl Final     MCH 04/27/2021 29.9  26.5 - 33.0 pg Final     MCHC 04/27/2021 33.8  31.5 - 36.5 g/dL Final     RDW 04/27/2021 14.9  10.0 - 15.0 % Final     Platelet Count  04/27/2021 146* 150 - 450 10e9/L Final     Diff Method 04/27/2021 Automated Method   Final     % Neutrophils 04/27/2021 87.8  % Final     % Lymphocytes 04/27/2021 7.1  % Final     % Monocytes 04/27/2021 3.9  % Final     % Eosinophils 04/27/2021 0.2  % Final     % Basophils 04/27/2021 0.5  % Final     % Immature Granulocytes 04/27/2021 0.5  % Final     Nucleated RBCs 04/27/2021 0  0 /100 Final     Absolute Neutrophil 04/27/2021 3.6  1.6 - 8.3 10e9/L Final     Absolute Lymphocytes 04/27/2021 0.3* 0.8 - 5.3 10e9/L Final     Absolute Monocytes 04/27/2021 0.2  0.0 - 1.3 10e9/L Final     Absolute Eosinophils 04/27/2021 0.0  0.0 - 0.7 10e9/L Final     Absolute Basophils 04/27/2021 0.0  0.0 - 0.2 10e9/L Final     Abs Immature Granulocytes 04/27/2021 0.0  0 - 0.4 10e9/L Final     Absolute Nucleated RBC 04/27/2021 0.0   Final     Lactate Dehydrogenase 04/27/2021 1,189* 85 - 227 U/L Final           Imaging:  PET Oncology Whole Body  CT Chest/Abdomen/Pelvis w Contrast  Narrative: Combined Report of:    PET and CT on  4/27/2021 12:23 PM :    1. PET of the neck, chest, abdomen, and pelvis.  2. PET CT Fusion for Attenuation Correction and Anatomical  Localization:    3. Diagnostic CT scan of the chest, abdomen, and pelvis with  intravenous contrast for interpretation.  3. CT of the chest, abdomen and pelvis obtained for diagnostic  interpretation.  4. 3D MIP and PET-CT fused images were processed on an independent  workstation and archived to PACS and reviewed by a radiologist.    Technique:    1. PET: The patient received 12.38 mCi of F-18-FDG; the serum glucose  was 121 prior to administration, body weight was 99.1 kg. Images were  evaluated in the axial, sagittal, and coronal planes as well as the  rotational whole body MIP. Images were acquired from the Vertex to the  Feet.    UPTAKE WAS MEASURED AT 71 MINUTES.     BACKGROUND:  Liver SUV max= 4.1,   Aorta Blood SUV Max: 2.7.     2. CT: Volumetric acquisition for clinical  interpretation of the  chest, abdomen, and pelvis acquired at 3 mm sections after the  uneventful administration of intravenous contrast. The chest, abdomen,  and pelvis were evaluated at 5 mm sections in bone, soft tissue, and  lung windows.      The patient received 127 cc of Isovue 370 intravenously for the  examination.     3. 3D MIP and PET-CT fused images were processed on an independent  workstation and archived to PACS and reviewed by a radiologist.    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  Underwent multiple chemotherapies. History of Y-90 radioembolization  therapy to liver metastases. History of spinal external beam  radiotherapy.  Currently on ipilimumab and nivolumab.    COMPARISON: MRI C/T/L-spine 3/23/2021, PET-CT 3/2/2021, PET CT  1/11/2021, MRI abdomen 12/2/2020    FINDINGS:     HEAD/NECK:  There is no suspicious FDG uptake in the neck.     The paranasal sinuses are clear. The mastoid air cells are clear. The  mucosal pharyngeal space, the , prevertebral and carotid  spaces are within normal limits. No masses, mass effect or  pathologically enlarged lymph nodes are evident. The thyroid gland is  unremarkable.    CHEST:  Innumerable pulmonary nodules are again demonstrated, not  significantly changed in size since PET-CT 3/2/2021, with uptake  similar to slightly increased. For example (series 9):  - Image 66: Two nodules in the medial left lower lobe, 10 mm and 7 mm  with SUV max 1.9 and 2.1 respectively, previously 1.6 and 1.9.  - Image 72: Left lower lobe perivascular nodule, 9 mm, SUV max 2.6,  previously 2.5.  - Image 55: Right upper lobe nodule anteriorly, 5 mm, SUV max 2.5,  previously 1.6.    No acute consolidation. Mild dependent atelectasis. No  pleural  effusion or pneumothorax. The central tracheobronchial tree is patent.    Heart size is enlarged. No pericardial effusion. Normal caliber of the  thoracic aorta and main pulmonary artery. Mild coronary artery  calcifications. Small hiatal hernia. Mildly avid left hilar lymph node  has increased uptake, measuring 1.3 x 1.3 cm with SUV max 5.8,  previously 3.9 (series 5, image 182).    ABDOMEN AND PELVIS:  Numerous intrahepatic hypermetabolic masses are again demonstrated,  which demonstrate similar to slightly increased uptake. These lesions  do not appear significantly changed in size when comparing PET images.  Comparison on CT images is limited by lack of intravenous contrast on  previous study. Simple lesions includes multiple subcapsular lesions  in the medial left lobe with SUV max 9.3, previously 7.5 (series 5,  image 265), and very large lesion nearly entirely replacing segment 6  with SUV max 11.5, previously 11.8 (series 5, image 286).    Multiple hypermetabolic intrasplenic masses are demonstrated,  including larger and more hypermetabolic mass in the lateral spleen  with SUV max 8.3, previously 5.6 (series 5, image 251), and newly  hypermetabolic mass seen just medial to the the first described lesion  with SUV max 5.8 (series 5, image 248). These lesions are  hypoenhancing relative to the remainder of the spleen.    Increased size and uptake to a right para-aortic retroperitoneal lymph  node measuring 1.2 cm with SUV max 7.8, previously 0.9 cm and SUV max  4.3 (series 5, image 301).    The gallbladder, pancreas, adrenal glands are unremarkable. Symmetric  nephrographic enhancement. Multiple renal cortical hypodensities  suggesting small cysts. No hydronephrosis. Prostatomegaly. No  abnormally distended loops of small or large bowel. Trace free fluid  in the pelvis. No fluid collection. No free air. Normal caliber of the  abdominal aorta.    LOWER EXTREMITIES:   No abnormal masses or  hypermetabolic soft tissue lesions. Postsurgical  changes of bilateral total knee arthroplasties.    BONES:   Innumerable hypermetabolic intraosseous lesions are again  demonstrated. These are seen within the axial and appendicular  skeleton, including in the spine, ribs, sternum, clavicles, right  scapula, and bilateral humeri and femora. There is increased uptake to  multiple lesions, for example in the mid left femur with SUV 5.4,  previously 3.9 (series 5, image 496), L5 vertebral body with SUV max  7.4, previously 4.7 (series 5, image 365), and posterior left  acetabulum with SUV max 8.3, previously 6.2 (series 5, image 424).  There is persistent hypermetabolism to the lower thoracic spine, for  example to T10 with SUV max 6.7, previously 5.0.  Impression: IMPRESSION: In this patient with a history of metastatic uveal  melanoma on therapy with nivolumab and ipilimumab, there is concern  for disease progression since PET-CT 3/2/2021:    1. Increased uptake to the innumerable intraosseous metastases.  2. Increased uptake and number of intrasplenic metastases.  3. Stable to slightly increased uptake to the intrahepatic metastases.  4. Stable to slightly increased uptake to the innumerable pulmonary  nodules.  5. Increased uptake to suspicious lymph nodes in the left hilum and  retroperitoneum.    I have personally reviewed the examination and initial interpretation  and I agree with the findings.    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 Mayberry today. He is a 76 year old man with metastatic uveal melanoma.  He underwent radioembolization and Ipi/Nivo and he is currently maintained on nivolumab. On treatment he has had an improvement in the burden of  "disease in liver, but gradual worsening of widely disseminated disease. Nivolumab monotherapy has generally been insufficient at maintaining his disease, however he is clinically benefiting from therapy. He currently feels well, aside from a fullness sensation building in the right upper quadrant.    We reviewed the increasing LDH is negatively prognostic, which is worrisome. His disease is growing relatively slowly, but it is relentlessly worsening. We discussed that he has several options going forward including continuing anti-PD1 therapy (nivolumab monotherapy), changing therapy to something more aggressive, or stopping treatment and choosing instead to focus on quality of life. Patient stated he feels well and would like to do what would be possible to try to \"be around longer\".     On prognosis I would estimate that without treatment life expectancy would be around 6 months with nivolumab treatment. Stopping all treatment would likely be similar. Increasing aggressiveness of therapy is not clear, but could improve outcome or could actually worsen his survival.    -Continue nivolumab and Zometa, now  -Plan for repeat imaging in June, and potentially be ready to add carboplatin/taxol for 2 cycles if possible or if needed  -Alleve for inflammatory type aches and pains, dilaudid for breakthrough pains          Jw Reyes M.D.   of Medicine  Hematology, Oncology and Transplantation  "

## 2021-04-29 NOTE — LETTER
4/29/2021         RE: Galileo Mayberry  [address]  [facility] [address]        Dear Colleague,    Thank you for referring your patient, Galileo Mayberry, to the [facility]. Please see a copy of my visit note below.    Favian is a 76 year old who is being evaluated via a billable video visit.      How would you like to obtain your AVS? MyChart  If the video visit is dropped, the invitation should be resent by: Send to e-mail at: [email or web address]  Will anyone else be joining your video visit? No       ALEX El    Video-Visit Details  Type of service:  Video Visit  Video Start Time: 1:45 PM  Video End Time:2:15 PM  Originating Location (pt. 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 11/2019, he is diagnosed with ocular melanoma, after a choroidal lesion was found in his Left eye.  Patient reports over the last several months he developed new floaters/spots in his L eye.  2. 11/25/2019, he was seen by ophthalmology who noted a left eye, elevated bilobed lesion, size 6.28mm x 18.31(T) x 17.04(L).   3. 12/14/19, CT-CAP showed no evidence of metastatic disease.  4. 1/10/2020: Patient referred to [facility]. B-scan ultrasound of left eye showed elevated bilobed lesion measuring 7.2-7.3 height with a base of 21.4 x 22.5 mm. Low to medium reflectivity. Difficult measurements due to location. Ciliary body involvement was noted. Ultrasound basal dimension measurement included subretinal fluid, and clinical measurement had to be done by transillumination due to anterior tumor location.  5. 1/15/2020, visual acuity right eye 20/20 -1, left eye 20/25 +2 nh. Visual fields counting fingers full bilaterally.  Clinical fundus exam of left eye revealed choroidal/ciliary body malignant melanoma with basal measurement of 22 x 21 mm and height of 7.5 mm. Located in the Inferior left eye from 4:30-7:30, 15 mm to disc, 15 mm to fovea, +subretinal fluid, bilobed. The tumor without fluid appears to be 1-2 mm smaller in basal dimension (20 x 19 mm).   6. 2/10/2020, Iodine-125 24 mm plaque placement delivering 85 Gy to an 8 mm height. FNA biopsy performed, and molecular testing shows Class II PRAME positive, high-risk of early metastasis.  7. 3/31/2020, he starts adjuvant sunitinib 25 mg daily for high risk disease. He completed 6 months of therapy.  8. 8/26/2020, MRI-liver shows interval development of numerous (more than 30) metastatic lesions in the liver in bilobar distribution. The largest in segment JUAN measures 1.85 cm.  9. 10/19/2020, he undergoes Y90 radioembolization with 72.2 mCi of Theraspheres to the right lobe of the liver; predominantly in hepatic segments 5 and 6, milder uptake in the region of segment 8  10. 10/23/2020, first cycle of Ipilimumab 1mg/kg and nivolumab 3mg/kg. He received 4 cycles of Ipi/Nivo.  11. 11/16/2020, he has his 2nd Y90 radioembolization with 69 mCi of TheraSpheres to hepatic segments 4A and 4B.       12. 1/15/2020, he starts nivolumab 480 mg IV monthly maintenance.    History of Present Illness:  Mr. 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 He returns in follow-up with his wife and daughter today.    He is doing well and tolerating nivolumab with no significant side effects. He denies any diarrhea, cough, or shortness of breath. He is dealing with supraspinatus tendon rotator cuff tear. [facility] He also is using Voltaren gel on the shoulder and this helps with the pain. Pain is worst in shoulders and hips.    He endorses a fullness sensation in the right upper quadrant, however, he is still eating. He is maintaining his weight. 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     Otherwise, he had PET-CT scan, which showed mostly increase in FDG uptake in the numerous metastatic tumors. Overall, sizes remained  minimally changed, but overall volume of disease continues to grow and new small lesions are visible. Labs from 4/27/21 show an elevated LDH to 1189 with AST of 64. The remainder of the CMP is normal. The CBC shows a normal WBC and hemoglobin of 13.5. However, absolute lymphocytes are low at 0.3.     ECOG performance status is 1.    Review of Systems:  12-point ROS negative except as in HPI    Current Outpatient Medications   Medication Sig Dispense Refill     acetaminophen (TYLENOL) 500 MG tablet Take 1,000 mg by mouth       Ascorbic Acid (VITAMIN C) 500 MG CAPS Take 500 mg by mouth       calcium carbonate (OS-FAUSTO) 500 MG tablet Take 1 tablet (500 mg) by mouth 2 times daily 60 tablet 3     Cholecalciferol (VITAMIN D3) 25 MCG (1000 UT) CAPS Takes 3 daily       diclofenac (VOLTAREN) 1 % topical gel        Ferrous Gluconate 240 (27 Fe) MG TABS Take 240 mg by mouth       finasteride (PROSCAR) 5 MG tablet Take 5 mg by mouth       gabapentin (NEURONTIN) 100 MG capsule TAKE ONE CAPSULE BY MOUTH AT BEDTIME AS NEEDED FOR HAND NUMBNESS       HYDROmorphone (DILAUDID) 2 MG tablet Take 0.5 tablets (1 mg) by mouth every 6 hours as needed for moderate to severe pain 30 tablet 0     HYDROmorphone (DILAUDID) 2 MG tablet Take 1 tablet (2 mg) by mouth every 6 hours as needed for pain 10 tablet 0     LORazepam (ATIVAN) 0.5 MG tablet Take 1 tablet (0.5 mg) by mouth every 4 hours as needed (Anxiety, Nausea/Vomiting or Sleep) 30 tablet 3     methylphenidate (RITALIN) 10 MG tablet as needed       polyethylene glycol (MIRALAX) 17 GM/Dose powder Take 17 g (1 capful) by mouth 2 times daily 255 g 1     prochlorperazine (COMPAZINE) 10 MG tablet Take 1 tablet (10 mg) by mouth every 6 hours as needed (Nausea/Vomiting) 30 tablet 3     tamsulosin (FLOMAX)  0.4 MG capsule Take 0.4 mg by mouth       Testosterone 1.62 % GEL        doxepin (SINEQUAN) 10 MG/ML (HIGH CONC) solution Take 0.6 mLs (6 mg) by mouth At Bedtime (Patient not taking: Reported on 4/9/2021) 18 mL 1     ondansetron (ZOFRAN) 4 MG tablet Take 1-2 tablets (4-8 mg) by mouth every 6 hours as needed for nausea (vomiting) (Patient not taking: Reported on 4/9/2021) 40 tablet 0     senna-docusate (SENNA S) 8.6-50 MG tablet Take 1 tablet by mouth 2 times daily as needed for constipation (Patient not taking: Reported on 4/9/2021) 60 tablet 1       Past Medical History:   Diagnosis Date     BPH (benign prostatic hyperplasia)      Degenerative joint disease      Metastatic melanoma (H)     L eye     Nonsenile cataract      Torn rotator cuff 03/25/2021    right     Past Surgical History:   Procedure Laterality Date     ------------OTHER-------------  2014    back surgery L4/L5      ------------OTHER-------------      knee surgery both 1961 Right, 1971 Left knee     AS REMOVAL OF KIDNEY STONE  2015     IR SIRT (SELECTIVE INTERNAL RADIO THERAPY)  10/13/2020     IR VISCERAL ANGIOGRAM  10/13/2020     IR VISCERAL EMBOLIZATION  10/19/2020     IR VISCERAL EMBOLIZATION  11/16/2020     JOINT REPLACEMENT  2017    Both kneses replaced (2017 and 2018)     ROTATOR CUFF REPAIR RT/LT Right 2016     TURP  2000     Family History   Problem Relation Age of Onset     Glaucoma Mother      Prostate Cancer Father      Cancer Sister      Macular Degeneration No family hx of        Physical Examination:  There were no vitals taken for this visit.  Wt Readings from Last 5 Encounters:   04/09/21 98.9 kg (218 lb)   03/29/21 96.5 kg (212 lb 11.2 oz)   03/26/21 97.6 kg (215 lb 1.6 oz)   03/12/21 91.9 kg (202 lb 8 oz)   03/01/21 92.5 kg (204 lb)   GENERAL: Healthy, alert and no distress  EYES: Eyes grossly normal to inspection.  No discharge or erythema, or obvious scleral/conjunctival abnormalities.  HENT: Normal cephalic/atraumatic.  External  ears, nose and mouth without ulcers or lesions.  No nasal drainage visible.  NECK: No asymmetry, visible masses or scars  RESP: No audible wheeze, cough, or visible cyanosis.  No visible retractions or increased work of breathing.    ABDOMEN: Soft, non-tender, there is right sided hepatomegaly, and the liver tip is firm extends about 4-5 finger breadths from the costal margin.  SKIN: Visible skin clear. No significant rash, abnormal pigmentation or lesions.  NEURO: Cranial nerves grossly intact.  Mentation and speech appropriate for age.  PSYCH: Cranial nerves are intact, normal strength. Affect normal/bright, judgement and insight intact, normal speech and appearance well-groomed.    The rest of a comprehensive physical examination is deferred due to PHE (public health emergency) video visit restrictions.    Labs:  Hospital Outpatient Visit on 04/27/2021   Component Date Value Ref Range Status     Sodium 04/27/2021 138  133 - 144 mmol/L Final     Potassium 04/27/2021 4.2  3.4 - 5.3 mmol/L Final     Chloride 04/27/2021 107  94 - 109 mmol/L Final     Carbon Dioxide 04/27/2021 24  20 - 32 mmol/L Final     Anion Gap 04/27/2021 6  3 - 14 mmol/L Final     Glucose 04/27/2021 120* 70 - 99 mg/dL Final     Urea Nitrogen 04/27/2021 20  7 - 30 mg/dL Final     Creatinine 04/27/2021 0.84  0.66 - 1.25 mg/dL Final     GFR Estimate 04/27/2021 84  >60 mL/min/[1.73_m2] Final     GFR Estimate If Black 04/27/2021 >90  >60 mL/min/[1.73_m2] Final     Calcium 04/27/2021 9.3  8.5 - 10.1 mg/dL Final     Bilirubin Total 04/27/2021 0.7  0.2 - 1.3 mg/dL Final     Albumin 04/27/2021 3.4  3.4 - 5.0 g/dL Final     Protein Total 04/27/2021 7.1  6.8 - 8.8 g/dL Final     Alkaline Phosphatase 04/27/2021 115  40 - 150 U/L Final     ALT 04/27/2021 25  0 - 70 U/L Final     AST 04/27/2021 64* 0 - 45 U/L Final     WBC 04/27/2021 4.1  4.0 - 11.0 10e9/L Final     RBC Count 04/27/2021 4.51  4.4 - 5.9 10e12/L Final     Hemoglobin 04/27/2021 13.5  13.3 - 17.7  g/dL Final     Hematocrit 04/27/2021 40.0  40.0 - 53.0 % Final     MCV 04/27/2021 89  78 - 100 fl Final     MCH 04/27/2021 29.9  26.5 - 33.0 pg Final     MCHC 04/27/2021 33.8  31.5 - 36.5 g/dL Final     RDW 04/27/2021 14.9  10.0 - 15.0 % Final     Platelet Count 04/27/2021 146* 150 - 450 10e9/L Final     Diff Method 04/27/2021 Automated Method   Final     % Neutrophils 04/27/2021 87.8  % Final     % Lymphocytes 04/27/2021 7.1  % Final     % Monocytes 04/27/2021 3.9  % Final     % Eosinophils 04/27/2021 0.2  % Final     % Basophils 04/27/2021 0.5  % Final     % Immature Granulocytes 04/27/2021 0.5  % Final     Nucleated RBCs 04/27/2021 0  0 /100 Final     Absolute Neutrophil 04/27/2021 3.6  1.6 - 8.3 10e9/L Final     Absolute Lymphocytes 04/27/2021 0.3* 0.8 - 5.3 10e9/L Final     Absolute Monocytes 04/27/2021 0.2  0.0 - 1.3 10e9/L Final     Absolute Eosinophils 04/27/2021 0.0  0.0 - 0.7 10e9/L Final     Absolute Basophils 04/27/2021 0.0  0.0 - 0.2 10e9/L Final     Abs Immature Granulocytes 04/27/2021 0.0  0 - 0.4 10e9/L Final     Absolute Nucleated RBC 04/27/2021 0.0   Final     Lactate Dehydrogenase 04/27/2021 1,189* 85 - 227 U/L Final           Imaging:  PET Oncology Whole Body  CT Chest/Abdomen/Pelvis w Contrast  Narrative: Combined Report of:    PET and CT on  4/27/2021 12:23 PM :    1. PET of the neck, chest, abdomen, and pelvis.  2. PET CT Fusion for Attenuation Correction and Anatomical  Localization:    3. Diagnostic CT scan of the chest, abdomen, and pelvis with  intravenous contrast for interpretation.  3. CT of the chest, abdomen and pelvis obtained for diagnostic  interpretation.  4. 3D MIP and PET-CT fused images were processed on an independent  workstation and archived to PACS and reviewed by a radiologist.    Technique:    1. PET: The patient received 12.38 mCi of F-18-FDG; the serum glucose  was 121 prior to administration, body weight was 99.1 kg. Images were  evaluated in the axial, sagittal, and  coronal planes as well as the  rotational whole body MIP. Images were acquired from the Vertex to the  Feet.    UPTAKE WAS MEASURED AT 71 MINUTES.     BACKGROUND:  Liver SUV max= 4.1,   Aorta Blood SUV Max: 2.7.     2. CT: Volumetric acquisition for clinical interpretation of the  chest, abdomen, and pelvis acquired at 3 mm sections after the  uneventful administration of intravenous contrast. The chest, abdomen,  and pelvis were evaluated at 5 mm sections in bone, soft tissue, and  lung windows.      The patient received 127 cc of Isovue 370 intravenously for the  examination.     3. 3D MIP and PET-CT fused images were processed on an independent  workstation and archived to PACS and reviewed by a radiologist.    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  Underwent multiple chemotherapies. History of Y-90 radioembolization  therapy to liver metastases. History of spinal external beam  radiotherapy.  Currently on ipilimumab and nivolumab.    COMPARISON: MRI C/T/L-spine 3/23/2021, PET-CT 3/2/2021, PET CT  1/11/2021, MRI abdomen 12/2/2020    FINDINGS:     HEAD/NECK:  There is no suspicious FDG uptake in the neck.     The paranasal sinuses are clear. The mastoid air cells are clear. The  mucosal pharyngeal space, the , prevertebral and carotid  spaces are within normal limits. No masses, mass effect or  pathologically enlarged lymph nodes are evident. The thyroid gland is  unremarkable.    CHEST:  Innumerable pulmonary nodules are again demonstrated, not  significantly changed in size since PET-CT 3/2/2021, with uptake  similar to slightly increased. For example (series 9):  - Image 66: Two nodules in the medial left lower lobe, 10 mm and 7 mm  with SUV max 1.9 and 2.1  respectively, previously 1.6 and 1.9.  - Image 72: Left lower lobe perivascular nodule, 9 mm, SUV max 2.6,  previously 2.5.  - Image 55: Right upper lobe nodule anteriorly, 5 mm, SUV max 2.5,  previously 1.6.    No acute consolidation. Mild dependent atelectasis. No pleural  effusion or pneumothorax. The central tracheobronchial tree is patent.    Heart size is enlarged. No pericardial effusion. Normal caliber of the  thoracic aorta and main pulmonary artery. Mild coronary artery  calcifications. Small hiatal hernia. Mildly avid left hilar lymph node  has increased uptake, measuring 1.3 x 1.3 cm with SUV max 5.8,  previously 3.9 (series 5, image 182).    ABDOMEN AND PELVIS:  Numerous intrahepatic hypermetabolic masses are again demonstrated,  which demonstrate similar to slightly increased uptake. These lesions  do not appear significantly changed in size when comparing PET images.  Comparison on CT images is limited by lack of intravenous contrast on  previous study. Simple lesions includes multiple subcapsular lesions  in the medial left lobe with SUV max 9.3, previously 7.5 (series 5,  image 265), and very large lesion nearly entirely replacing segment 6  with SUV max 11.5, previously 11.8 (series 5, image 286).    Multiple hypermetabolic intrasplenic masses are demonstrated,  including larger and more hypermetabolic mass in the lateral spleen  with SUV max 8.3, previously 5.6 (series 5, image 251), and newly  hypermetabolic mass seen just medial to the the first described lesion  with SUV max 5.8 (series 5, image 248). These lesions are  hypoenhancing relative to the remainder of the spleen.    Increased size and uptake to a right para-aortic retroperitoneal lymph  node measuring 1.2 cm with SUV max 7.8, previously 0.9 cm and SUV max  4.3 (series 5, image 301).    The gallbladder, pancreas, adrenal glands are unremarkable. Symmetric  nephrographic enhancement. Multiple renal cortical hypodensities  suggesting  small cysts. No hydronephrosis. Prostatomegaly. No  abnormally distended loops of small or large bowel. Trace free fluid  in the pelvis. No fluid collection. No free air. Normal caliber of the  abdominal aorta.    LOWER EXTREMITIES:   No abnormal masses or hypermetabolic soft tissue lesions. Postsurgical  changes of bilateral total knee arthroplasties.    BONES:   Innumerable hypermetabolic intraosseous lesions are again  demonstrated. These are seen within the axial and appendicular  skeleton, including in the spine, ribs, sternum, clavicles, right  scapula, and bilateral humeri and femora. There is increased uptake to  multiple lesions, for example in the mid left femur with SUV 5.4,  previously 3.9 (series 5, image 496), L5 vertebral body with SUV max  7.4, previously 4.7 (series 5, image 365), and posterior left  acetabulum with SUV max 8.3, previously 6.2 (series 5, image 424).  There is persistent hypermetabolism to the lower thoracic spine, for  example to T10 with SUV max 6.7, previously 5.0.  Impression: IMPRESSION: In this patient with a history of metastatic uveal  melanoma on therapy with nivolumab and ipilimumab, there is concern  for disease progression since PET-CT 3/2/2021:    1. Increased uptake to the innumerable intraosseous metastases.  2. Increased uptake and number of intrasplenic metastases.  3. Stable to slightly increased uptake to the intrahepatic metastases.  4. Stable to slightly increased uptake to the innumerable pulmonary  nodules.  5. Increased uptake to suspicious lymph nodes in the left hilum and  retroperitoneum.    I have personally reviewed the examination and initial interpretation  and I agree with the findings.    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 Mayberry today. He is a 76 year old man with metastatic uveal melanoma.  He underwent radioembolization and Ipi/Nivo and he is currently maintained on nivolumab. On treatment he has had an improvement in the burden of disease in liver, but gradual worsening of widely disseminated disease. Nivolumab monotherapy has generally been insufficient at maintaining his disease, however he is clinically benefiting from therapy. He currently feels well, aside from a fullness sensation building in the right upper quadrant.    We reviewed the increasing LDH is negatively prognostic, which is worrisome. His disease is growing relatively slowly, but it is relentlessly worsening. We discussed that he has several options going forward including continuing anti-PD1 therapy (nivolumab monotherapy), changing therapy to something more aggressive, or stopping treatment and choosing instead to focus on quality of life. Patient stated he feels well and would like to do what would be possible to try to \"be around longer\".     On prognosis I would estimate that without treatment life expectancy would be around 6 months with nivolumab treatment. Stopping all treatment would likely be similar. Increasing aggressiveness of therapy is not clear, but could improve outcome or could actually worsen his survival.    -Continue nivolumab and Zometa, now  -Plan for repeat imaging in June, and potentially be ready to add carboplatin/taxol for 2 cycles if possible or if needed  -Alleve for inflammatory type aches and pains, dilaudid for breakthrough pains          Jw Reyes M.D.   of Medicine  Hematology, Oncology and Transplantation      Again, thank you for allowing me to participate in the care of your patient.        Sincerely,        Jw Trent MD    "

## 2021-05-05 NOTE — TELEPHONE ENCOUNTER
" Health Call Center    Phone Message    May a detailed message be left on voicemail: no     Reason for Call: Appointment Intake    Referring Provider Name: Deysi Biggs at [facility] to [facility]. Hi for floaters. \"Pt needs eye f/u for surgery, YAG vitreolysis left eye\"  Diagnosis and/or Symptoms: Other specified retinal disorders     Action Taken: Message routed to:  [facility] [[id number]]    Travel Screening: Not Applicable                         "

## 2021-05-06 NOTE — TELEPHONE ENCOUNTER
Called pt and scheduled his appointment for 930 am on Thursday May 20th. Pt has a particular schedule we had to work with due to pt being a . Pt was informed that appointments can take about 2-4 hours and that he will be dilated.     Jillian Tamez, COMT 8:52 AM May 6, 2021      8

## 2021-05-07 NOTE — PROGRESS NOTES
"Favian is a 76 year old who is being evaluated via a billable video visit.      How would you like to obtain your AVS? MyChart  If the video visit is dropped, the invitation should be resent by: Text to cell phone: [phone]  Will anyone else be joining your video visit? No   Vitals - Patient Reported  Weight (Patient Reported): 93 kg (205 lb)  Height (Patient Reported): 180.3 cm (5' 11\")  BMI (Based on Pt Reported Ht/Wt): 28.59  Pain Score: No Pain (0)      Nemo Cruz CMA on 5/7/2021 at 10:25 AM        [facility]    (This note was transcribed using voice recognition software. While I review and edit the transcription, I may miss errors, and the software sometimes does unexpected capitalizations and formatting that I miss. Please let me know of any serious mistranscriptions and I will addend this note.)    Patient ID:  Medical - He has widespread uveal melanoma  Late 2020-21 receiving Y90 for local control of his bulky liver mets and ipi/nivo systemically.     Jan 2021 scans showed widespread progression, plan is to continue nivo as maintenance therapy  3/2021 scans with progressive lesions, plan is to continue nivo  4/2021 PET showed widespread progression, although clinically he was doing well, plan is to add carbo/taxol for a couple cycles over summer    Social - Lives with wife. AF . . Has 1 dtr in [place], 1 in [facility]. . [facility]     Care Planning - no HCD on our chart but he is completing one at home. 2/12/21 Code status discussion, hospice education; he wants a DNR/DNI order, POLST sent at that visit.       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 He feels ok about this, is open to using chemo although notes he was " originally told chemo doesn't really work & that is perplexing to him why it is now being offered. He is asking also Dr Trent about an investigational agent for compassionate use, tabentafusp.    Insomnia: taking 6 mg doxepin liquid, finds he wakes up less on it. Worried about deep & REM sleep and we discussed research shows doxepin doesn't affect those. He does feel better in the day and I told him not to worry about it.     Minimal pain    May go RVing in June    PE: There were no vitals taken for this visit.   Wt Readings from Last 3 Encounters:   04/09/21 98.9 kg (218 lb)   03/29/21 96.5 kg (212 lb 11.2 oz)   03/26/21 97.6 kg (215 lb 1.6 oz)     Alert NAD  Healthy appearing  Full affect, clear sensorium, speaking in full sentences, memory and thought processes nl    Data reviewed:  I reviewed recent labs and imaging, my comments:  Cr 0.84  LDH 1189, rising    PET  IMPRESSION: In this patient with a history of metastatic uveal  melanoma on therapy with nivolumab and ipilimumab, there is concern  for disease progression since PET-CT 3/2/2021:     1. Increased uptake to the innumerable intraosseous metastases.  2. Increased uptake and number of intrasplenic metastases.  3. Stable to slightly increased uptake to the intrahepatic metastases.  4. Stable to slightly increased uptake to the innumerable pulmonary  nodules.  5. Increased uptake to suspicious lymph nodes in the left hilum and  retroperitoneum.    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    Insomnia--continue 6mg doxepin, it seems to be safely helping    Discussed cancer, his hopes for an investigational agent, future possibility of cytotoxic chemo      30 minutes spent on the date of the encounter doing chart review, history and exam, patient education & counseling, documentation and other activities as noted  above.    Thank you for involving us in the patient's care.   Mg Patel MD / Palliative Medicine / Regina velasco via [facility].        Video Start Time:   Video-Visit Details    Type of service:  Video Visit    Video End Time:    Originating Location (pt. Location): Home    Distant Location (provider location):  [facility]     Platform used for Video Visit: [employer]

## 2021-05-07 NOTE — PROGRESS NOTES
Infusion Nursing Note:  Galileo Mayberry presents today for D1 C9 Nivolumab, Zometa.    Patient seen by provider today: No    Intravenous Access:  Peripheral IV placed in lab.    Treatment Conditions:  Lab Results   Component Value Date    HGB 12.6 05/07/2021     Lab Results   Component Value Date    WBC 5.0 05/07/2021      Lab Results   Component Value Date    ANEU 3.5 05/07/2021     Lab Results   Component Value Date     05/07/2021      Lab Results   Component Value Date     05/07/2021                   Lab Results   Component Value Date    POTASSIUM 4.3 05/07/2021           Lab Results   Component Value Date    MAG 2.2 03/01/2021            Lab Results   Component Value Date    CR 0.98 05/07/2021                   Lab Results   Component Value Date    FAUSTO 9.0 05/07/2021                Lab Results   Component Value Date    BILITOTAL 0.5 05/07/2021           Lab Results   Component Value Date    ALBUMIN 3.5 05/07/2021                    Lab Results   Component Value Date    ALT 33 05/07/2021           Lab Results   Component Value Date    AST 68 05/07/2021       Results reviewed, labs MET treatment parameters, ok to proceed with treatment.  Corrected Ca+ 9.4.    Note: Patient presents to infusion for nivolumab and zometa. Confirmed patient is taking calcium and vitamin D. Was evaluated by Dr. Trent on 4/29/21. Denied s/s of infection and any new issues or concerns since that appt.    Post Infusion Assessment:  Patient tolerated infusion without incident.  Blood return noted pre and post infusion.  Site patent and intact, free from redness, edema or discomfort.  No evidence of extravasations.  Access discontinued per protocol.    Discharge Plan:   Patient declined prescription refills.  Discharge instructions reviewed with: Patient.  Patient and/or family verbalized understanding of discharge instructions and all questions answered.  AVS to patient via ApprionT.  Patient will return for next  appointment on approx 6/4/21. IB sent to scheduling.   Patient discharged in stable condition accompanied by: self.  Departure Mode: Ambulatory.    Vera Turcios RN

## 2021-05-07 NOTE — PATIENT INSTRUCTIONS
Contact Numbers  [facility] Nurse Triage: [phone]    Please call the [facility] Triage line if you experience a temperature greater than or equal to 100.5, shaking chills, have uncontrolled nausea, vomiting and/or diarrhea, dizziness, shortness of breath, chest pain, bleeding, unexplained bruising, or if you have any other new/concerning symptoms, questions or concerns.     If you are having any concerning symptoms or wish to speak to a provider before your next infusion visit, please call your care coordinator or triage to notify them so we can adequately serve you.     If you need a refill on a prescription or other medication, please call triage before your infusion appointment.       May 2021      Ferdinand Monday Tuesday Wednesday Thursday Friday Saturday                                 1       2     3     4     5     6     7    VIDEO VISIT RETURN  10:25 AM   (40 min.)   Mg Patel MD   [facility]    LAB PERIPHERAL  12:30 PM   (15 min.)   [facility] LAB DRAW   [facility] ONC INFUSION 60   1:00 PM   (60 min.)    ONC INFUSION NURSE   [facility] 8       9     10     11     12     13     14     15       16     17     18     19     20    [facility] NEW GENERAL   9:30 AM   (15 min.)   Randy Hi MD   [facility] 21     22       23     24     25     26     27     28     29       30     31 June 2021 Sunday Monday Tuesday Wednesday Thursday Friday Saturday             1     2     3     4     5       6     7     8     9     10     11     12       13     14     15     16     17     18     19       20     21     22     23     24     25     26       27     28     29     30                                    Lab Results:  Recent Results (from the past 12 hour(s))   Comprehensive metabolic panel    Collection Time: 05/07/21 12:55 PM   Result  Value Ref Range    Sodium 140 133 - 144 mmol/L    Potassium 4.3 3.4 - 5.3 mmol/L    Chloride 106 94 - 109 mmol/L    Carbon Dioxide 25 20 - 32 mmol/L    Anion Gap 8 3 - 14 mmol/L    Glucose 89 70 - 99 mg/dL    Urea Nitrogen 16 7 - 30 mg/dL    Creatinine 0.98 0.66 - 1.25 mg/dL    GFR Estimate 74 >60 mL/min/[1.73_m2]    GFR Estimate If Black 85 >60 mL/min/[1.73_m2]    Calcium 9.0 8.5 - 10.1 mg/dL    Bilirubin Total 0.5 0.2 - 1.3 mg/dL    Albumin 3.5 3.4 - 5.0 g/dL    Protein Total 7.2 6.8 - 8.8 g/dL    Alkaline Phosphatase 131 40 - 150 U/L    ALT 33 0 - 70 U/L    AST 68 (H) 0 - 45 U/L   TSH with free T4 reflex    Collection Time: 05/07/21 12:55 PM   Result Value Ref Range    TSH 3.65 0.40 - 4.00 mU/L   CBC with platelets differential    Collection Time: 05/07/21 12:55 PM   Result Value Ref Range    WBC 5.0 4.0 - 11.0 10e9/L    RBC Count 4.32 (L) 4.4 - 5.9 10e12/L    Hemoglobin 12.6 (L) 13.3 - 17.7 g/dL    Hematocrit 37.9 (L) 40.0 - 53.0 %    MCV 88 78 - 100 fl    MCH 29.2 26.5 - 33.0 pg    MCHC 33.2 31.5 - 36.5 g/dL    RDW 14.7 10.0 - 15.0 %    Platelet Count 146 (L) 150 - 450 10e9/L    Diff Method Automated Method     % Neutrophils 70.0 %    % Lymphocytes 13.4 %    % Monocytes 12.8 %    % Eosinophils 2.4 %    % Basophils 1.0 %    % Immature Granulocytes 0.4 %    Nucleated RBCs 0 0 /100    Absolute Neutrophil 3.5 1.6 - 8.3 10e9/L    Absolute Lymphocytes 0.7 (L) 0.8 - 5.3 10e9/L    Absolute Monocytes 0.6 0.0 - 1.3 10e9/L    Absolute Eosinophils 0.1 0.0 - 0.7 10e9/L    Absolute Basophils 0.1 0.0 - 0.2 10e9/L    Abs Immature Granulocytes 0.0 0 - 0.4 10e9/L    Absolute Nucleated RBC 0.0    Lactate Dehydrogenase    Collection Time: 05/07/21 12:55 PM   Result Value Ref Range    Lactate Dehydrogenase 1,339 (H) 85 - 227 U/L

## 2021-05-07 NOTE — LETTER
"5/7/2021       RE: Galileo Mayberry  [address]  [place] [address]     Dear Colleague,    Thank you [facility] referring your patient, Galileo Mayberry[facility]. Please see a copy of my visit note below.    Favian is a 76 year old who is being evaluated via a billable video visit.      How would you like to obtain your AVS? MyChart  If the video visit is dropped, the invitation should be resent by: Text to cell phone: [phone]  Will anyone else be joining your video visit? No   Vitals - Patient Reported  Weight (Patient Reported): 93 kg (205 lb)  Height (Patient Reported): 180.3 cm (5' 11\")  BMI (Based on Pt Reported Ht/Wt): 28.59  Pain Score: No Pain (0)    Nemo Cruz CMA on 5/7/2021 at 10:25 AM      Palliative Care Outpatient Clinic    (This note was transcribed using voice recognition software. While I review and edit the transcription, I may miss errors, and the software sometimes does unexpected capitalizations and formatting that I miss. Please let me know of any serious mistranscriptions and I will addend this note.)    Patient ID:  Medical - He has widespread uveal melanoma  Late 2020-21 receiving Y90 for local control of his bulky liver mets and ipi/nivo systemically.     Jan 2021 scans showed widespread progression, plan is to continue nivo as maintenance therapy  3/2021 scans with progressive lesions, plan is to continue nivo  4/2021 PET showed widespread progression, although clinically he was doing well, plan is to add carbo/taxol for a couple cycles over summer    Social - Lives with wife. AF . . Has 1 dtr in [place], 1 in [facility]. . [facility] is [facility]% [facility].     Care Planning - no HCD on our chart but he is completing one at home. 2/12/21 Code status discussion, hospice education; he wants a DNR/DNI order, POLST sent at that " visit.       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 He feels ok about this, is open to using chemo although notes he was originally told chemo doesn't really work & that is perplexing to him why it is now being offered. He is asking also Dr Trent about an investigational agent for compassionate use, tabentafusp.    Insomnia: taking 6 mg doxepin liquid, finds he wakes up less on it. Worried about deep & REM sleep and we discussed research shows doxepin doesn't affect those. He does feel better in the day and I told him not to worry about it.     Minimal pain    May go RVing in June    PE: There were no vitals taken for this visit.   Wt Readings from Last 3 Encounters:   04/09/21 98.9 kg (218 lb)   03/29/21 96.5 kg (212 lb 11.2 oz)   03/26/21 97.6 kg (215 lb 1.6 oz)     Alert NAD  Healthy appearing  Full affect, clear sensorium, speaking in full sentences, memory and thought processes nl    Data reviewed:  I reviewed recent labs and imaging, my comments:  Cr 0.84  LDH 1189, rising    PET  IMPRESSION: In this patient with a history of metastatic uveal  melanoma on therapy with nivolumab and ipilimumab, there is concern  for disease progression since PET-CT 3/2/2021:     1. Increased uptake to the innumerable intraosseous metastases.  2. Increased uptake and number of intrasplenic metastases.  3. Stable to slightly increased uptake to the intrahepatic metastases.  4. Stable to slightly increased uptake to the innumerable pulmonary  nodules.  5. Increased uptake to suspicious lymph nodes in the left hilum and  retroperitoneum.     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    Insomnia--continue 6mg doxepin,  it seems to be safely helping    Discussed cancer, his hopes for an investigational agent, future possibility of cytotoxic chemo    30 minutes spent on the date of the encounter doing chart review, history and exam, patient education & counseling, documentation and other activities as noted above.    Thank you for involving us in the patient's care.   Mg Patel MD / Palliative Medicine / Text me via [facility].

## 2021-05-07 NOTE — PROCEDURES
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     Treatment Summary:  Radiation Oncology - Course: 2 Protocol:   Treatment Site   Current Dose Modality    From To Elapsed Days Fx.  2 T spine              2,000 cGy 18 X      4/05/2021  2021   4  5          Dose per Fraction:  400 cGy     Total Dose: 2000 cGy             COMMENTS:                      Mr. Lynch is a 76 year old with diagnosis of metastatic uveal melanoma. He recently had MRI spine done on 3/23/21   showing an area of concern at T10 with cortical breakthrough at the inferior endplate, extension into the T10-11 neural   foramen and right lateral recess. He was asymptomatic, but with epidural extension, was at risk for neurologic   compromise. He thus underwent palliative radiotherapy to the thoracic spine as outlined above. He tolerated treatment   well.     ED visits/hospitalizations: None     Acute Toxicity Profile by CTC v5.0: None     PAIN MANAGEMENT:  None required.                            FOLLOW UP PLAN: Follow-up here PRN. Follow-up with medical oncology as scheduled.                             Resident Physician: Dipti Ying M.D.   Staff Physician: Naomi Conteh M.D.  Physicist: Marcel Crawford     CC: Jw Trent MD                                     Radiation Oncology:  [facility] [address] [place] [address]

## 2021-05-07 NOTE — PROGRESS NOTES
Chief Complaint   Patient presents with     Blood Draw     PIV blood draw, vitals taken and checked into next appointment     Blood drawn from left arm.    -Amaya LADD CMA

## 2021-05-11 PROBLEM — F41.9 ANXIETY: Status: ACTIVE | Noted: 2021-01-01

## 2021-05-11 PROBLEM — F43.10 POSTTRAUMATIC STRESS DISORDER: Status: ACTIVE | Noted: 2020-01-20

## 2021-05-11 PROBLEM — F43.25 ADJUSTMENT DISORDER WITH MIXED DISTURBANCE OF EMOTIONS AND CONDUCT: Status: ACTIVE | Noted: 2021-01-01

## 2021-05-11 PROBLEM — F34.1 DYSTHYMIA: Status: ACTIVE | Noted: 2021-01-01

## 2021-05-11 PROBLEM — N40.1 BENIGN PROSTATIC HYPERPLASIA WITH URINARY OBSTRUCTION: Status: ACTIVE | Noted: 2020-01-20

## 2021-05-11 PROBLEM — H26.9 CATARACTS, BILATERAL: Status: ACTIVE | Noted: 2021-01-01

## 2021-05-11 PROBLEM — N13.8 BENIGN PROSTATIC HYPERPLASIA WITH URINARY OBSTRUCTION: Status: ACTIVE | Noted: 2020-01-20

## 2021-05-11 PROBLEM — H26.9 CATARACT: Status: ACTIVE | Noted: 2021-01-01

## 2021-05-11 PROBLEM — N18.30 CHRONIC KIDNEY DISEASE, STAGE 3 (H): Status: ACTIVE | Noted: 2021-01-01

## 2021-05-20 NOTE — PROGRESS NOTES
Chief Complaint(s) and History of Present Illness(es)     Consult For     Laterality: left eye    Onset: 18 months ago    Course: stable    Associated symptoms: flashes, floaters and dryness (left eye).  Negative   for eye pain    Treatments tried: no treatments    Pain scale: 0/10    Comments: Vitreolysis left eye              Comments     [facility]  For the past 18 months he has   been seeing a cloud in his left eye.  He also has another separate floater   that moves about in his left eye.  Occasionally he sees a light travel   across the top of his visual field (left eye).  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      SHANIKA Aden 10:13 AM  May 20, 2021               Review of systems for the eyes was negative other than the pertinent positives/negatives listed in the HPI.      Assessment & Plan      Galileo Mayberry is a 76 year old male with the following diagnoses:   1. Vitreous strands of left eye    2. 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)    4. Age-related nuclear cataract of both eyes         Referral from Dr. Gonzales at [facility] for persistent symptomatic floater in the left eye   Previously treated for ocular melanoma at [facility] (Patricio)  Was told that laser vitreolysis could be an option and would be safe to consider at this time    RBA to left YAG vitreolysis discussed, consent obtained, will proceed today   Return precautions reviewed   ATs as needed     Patient disposition:   Return in about 4 weeks (around 6/17/2021) for DFE left eye.           Attending Physician Attestation:  Complete documentation of historical and exam elements from today's encounter can be found in the full encounter summary report (not reduplicated in this progress note).  I personally obtained the chief complaint(s) and history of present illness.  I confirmed and edited as necessary  the review of systems, past medical/surgical history, family history, social history, and examination findings as documented by others; and I examined the patient myself.  I personally reviewed the relevant tests, images, and reports as documented above.  I formulated and edited as necessary the assessment and plan and discussed the findings and management plan with the patient and family. . - Randy Hi MD

## 2021-05-20 NOTE — NURSING NOTE
Chief Complaints and History of Present Illnesses   Patient presents with     Consult For     Vitreolysis left eye     Chief Complaint(s) and History of Present Illness(es)     Consult For     Laterality: left eye    Onset: 18 months ago    Course: stable    Associated symptoms: flashes, floaters and dryness (left eye).  Negative for eye pain    Treatments tried: no treatments    Pain scale: 0/10    Comments: Vitreolysis left eye              Comments     [facility]  For the past 18 months he has been seeing a cloud in his left eye.  He also has another separate floater that moves about in his left eye.  Occasionally he sees a light travel across the top of his visual field (left eye).  In November of 2019 he was diagnosed with an ocular melanoma in his left eye (treated at [place]).      SHANIKA Aden 10:13 AM  May 20, 2021

## 2021-05-20 NOTE — LETTER
5/20/2021       RE: Galileo Mayberry  [address]  [facility] [address]     Dear Dr Gonzales,    Thank you for referring your patient, Galileo Mayberry, to the [facility] at [facility]. Please see a copy of my visit note below.    Chief Complaint(s) and History of Present Illness(es)     Consult For     Laterality: left eye    Onset: 18 months ago    Course: stable    Associated symptoms: flashes, floaters and dryness (left eye).  Negative   for eye pain    Treatments tried: no treatments    Pain scale: 0/10    Comments: Vitreolysis left eye              Comments     [facility]  For the past 18 months he has   been seeing a cloud in his left eye.  He also has another separate floater   that moves about in his left eye.  Occasionally he sees a light travel   across the top of his visual field (left eye).  In November of 2019 he was   diagnosed with an ocular melanoma in his left eye (treated at [place]).      SHANIKA Aden 10:13 AM  May 20, 2021               Review of systems for the eyes was negative other than the pertinent positives/negatives listed in the HPI.      Assessment & Plan      Galileo Mayberry is a 76 year old male with the following diagnoses:   1. Vitreous strands of left eye    2. 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)    4. Age-related nuclear cataract of both eyes         Referral from Dr. Gonzales at [facility] for persistent symptomatic floater in the left eye   Previously treated for ocular melanoma at [place] (Patricio)  Was told that laser vitreolysis could be an option and would be safe to consider at this time    RBA to left YAG vitreolysis discussed, consent obtained, will proceed today   Return precautions reviewed   ATs as needed     Patient disposition:   Return in about 4 weeks (around 6/17/2021) for DFE left eye.      Attending Physician  Attestation:  Complete documentation of historical and exam elements from today's encounter can be found in the full encounter summary report (not reduplicated in this progress note).  I personally obtained the chief complaint(s) and history of present illness.  I confirmed and edited as necessary the review of systems, past medical/surgical history, family history, social history, and examination findings as documented by others; and I examined the patient myself.  I personally reviewed the relevant tests, images, and reports as documented above.  I formulated and edited as necessary the assessment and plan and discussed the findings and management plan with the patient and family. . - Randy Hi MD         Again, thank you for allowing me to participate in the care of your patient.      Sincerely,    Randy Hi MD

## 2021-06-04 NOTE — LETTER
6/4/2021         RE: Galileo Mayberry  [address] [place] [address]        Dear Colleague,    Thank you for referring your patient, Galileo Mayberry, to the [facility]. Please see a copy of my visit note below.    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 11/2019, he is diagnosed with ocular melanoma, after a choroidal lesion was found in his Left eye.  Patient reports over the last several months he developed new floaters/spots in his L eye.  2. 11/25/2019, he was seen by ophthalmology who noted a left eye, elevated bilobed lesion, size 6.28mm x 18.31(T) x 17.04(L).   3. 12/14/19, CT-CAP showed no evidence of metastatic disease.  4. 1/10/2020: Patient referred to [facility]. B-scan ultrasound of left eye showed elevated bilobed lesion measuring 7.2-7.3 height with a base of 21.4 x 22.5 mm. Low to medium reflectivity. Difficult measurements due to location. Ciliary body involvement was noted. Ultrasound basal dimension measurement included subretinal fluid, and clinical measurement had to be done by transillumination due to anterior tumor location.  5. 1/15/2020, visual acuity right eye 20/20 -1, left eye 20/25 +2 nh. Visual fields counting fingers full bilaterally. Clinical fundus exam of left eye revealed choroidal/ciliary body malignant melanoma with basal measurement of 22 x 21 mm and height of 7.5 mm. Located in the Inferior left eye from 4:30-7:30, 15 mm to disc, 15 mm to fovea, +subretinal fluid, bilobed. The tumor without fluid appears to be 1-2 mm smaller in basal dimension (20 x 19 mm).   6. 2/10/2020, Iodine-125 24 mm plaque placement delivering 85 Gy to an 8 mm height. FNA biopsy performed, and molecular testing shows Class II PRAME positive, high-risk of early metastasis.  7. 3/31/2020, he starts adjuvant sunitinib 25 mg daily for  high risk disease. He completed 6 months of therapy.  8. 8/26/2020, MRI-liver shows interval development of numerous (more than 30) metastatic lesions in the liver in bilobar distribution. The largest in segment JUAN measures 1.85 cm.  9. 10/19/2020, he undergoes Y90 radioembolization with 72.2 mCi of Theraspheres to the right lobe of the liver; predominantly in hepatic segments 5 and 6, milder uptake in the region of segment 8  10. 10/23/2020, first cycle of Ipilimumab 1mg/kg and nivolumab 3mg/kg. He received 4 cycles of Ipi/Nivo.  11. 11/16/2020, he has his 2nd Y90 radioembolization with 69 mCi of TheraSpheres to hepatic segments 4A and 4B.       12. 1/15/2020, he starts nivolumab 480 mg IV monthly maintenance.  13. 4/7/2021, receives 2000 cGy in 5 fractions to T9-T11.    History of Present Illness:  Mr. 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 He returns in follow-up alone today.    He is doing well and tolerating nivolumab with no significant side effects. He denies any diarrhea, cough, or shortness of breath.     He had PET-CT scan for restaging, which shows mostly increase in size and FDG uptake in the numerous metastatic tumors. The T10 lesion appears to be growing into the right side of the spinal canal, at risk for spinal chord impingement. He has already received radiation therapy to this location.    He is still working and feeling well. Denies any major increases in pain.    Serum LDH level is also rising, with most recent value 1339 on 5/7.    ECOG performance status is 1.    Review of Systems:  12-point ROS negative except as in HPI    Current Outpatient Medications   Medication Sig Dispense Refill     acetaminophen (TYLENOL) 500 MG tablet Take 1,000 mg by mouth       Ascorbic Acid (VITAMIN C) 500 MG CAPS Take 500 mg by mouth       calcium carbonate (OS-FAUSTO) 500 MG tablet Take 1 tablet (500 mg) by mouth 2 times daily 60 tablet 3     Cholecalciferol (VITAMIN D3) 25 MCG (1000 UT) CAPS  Takes 3 daily       diclofenac (VOLTAREN) 1 % topical gel        doxepin (SINEQUAN) 10 MG/ML (HIGH CONC) solution Take 0.6 mLs (6 mg) by mouth At Bedtime 18 mL 1     Emollient (AQUAPHOR ADVANCED THERAPY) OINT APPLY SMALL AMOUNT TOPICALLY EVERY DAY TO MOISTURIZE RASH       Ferrous Gluconate 240 (27 Fe) MG TABS Take 240 mg by mouth       finasteride (PROSCAR) 5 MG tablet Take 5 mg by mouth       gabapentin (NEURONTIN) 100 MG capsule TAKE ONE CAPSULE BY MOUTH AT BEDTIME AS NEEDED FOR HAND NUMBNESS       HYDROmorphone (DILAUDID) 2 MG tablet Take 0.5 tablets (1 mg) by mouth every 6 hours as needed for moderate to severe pain 30 tablet 0     HYDROmorphone (DILAUDID) 2 MG tablet Take 1 tablet (2 mg) by mouth every 6 hours as needed for pain 10 tablet 0     LORazepam (ATIVAN) 0.5 MG tablet Take 1 tablet (0.5 mg) by mouth every 4 hours as needed (Anxiety, Nausea/Vomiting or Sleep) 30 tablet 3     meloxicam (MOBIC) 15 MG tablet TAKE ONE TABLET BY MOUTH DAILY AS NEEDED FOR PAIN. TAKE WITH FOOD       methylphenidate (RITALIN) 10 MG tablet as needed       ondansetron (ZOFRAN) 4 MG tablet Take 1-2 tablets (4-8 mg) by mouth every 6 hours as needed for nausea (vomiting) 40 tablet 0     polyethylene glycol (MIRALAX) 17 GM/Dose powder Take 17 g (1 capful) by mouth 2 times daily 255 g 1     polyvinyl alcohol (LIQUIFILM TEARS) 1.4 % ophthalmic solution INSTILL ONE DROP IN BOTH EYES FOUR TIMES A DAY AS NEEDED FOR DRY EYES       prochlorperazine (COMPAZINE) 10 MG tablet Take 1 tablet (10 mg) by mouth every 6 hours as needed (Nausea/Vomiting) 30 tablet 3     senna-docusate (SENNA S) 8.6-50 MG tablet Take 1 tablet by mouth 2 times daily as needed for constipation 60 tablet 1     tamsulosin (FLOMAX) 0.4 MG capsule Take 0.4 mg by mouth       Testosterone 1.62 % GEL          Past Medical History:   Diagnosis Date     BPH (benign prostatic hyperplasia)      Degenerative joint disease      Metastatic melanoma (H)     L eye     Nonsenile  cataract      Torn rotator cuff 03/25/2021    right     Past Surgical History:   Procedure Laterality Date     ------------OTHER-------------  2014    back surgery L4/L5      ------------OTHER-------------      knee surgery both 1961 Right, 1971 Left knee     AS REMOVAL OF KIDNEY STONE  2015     IR SIRT (SELECTIVE INTERNAL RADIO THERAPY)  10/13/2020     IR VISCERAL ANGIOGRAM  10/13/2020     IR VISCERAL EMBOLIZATION  10/19/2020     IR VISCERAL EMBOLIZATION  11/16/2020     JOINT REPLACEMENT  2017    Both kneses replaced (2017 and 2018)     ROTATOR CUFF REPAIR RT/LT Right 2016     SURGICAL HISTORY OF -   05/2021    ablation of right shoulder     TURP  2000     Family History   Problem Relation Age of Onset     Glaucoma Mother      Prostate Cancer Father      Cancer Sister      Macular Degeneration No family hx of        Physical Examination:  /79 (BP Location: Right arm, Patient Position: Sitting, Cuff Size: Adult Regular)   Pulse 64   Temp 98.7  F (37.1  C) (Oral)   Resp 16   Wt 96.8 kg (213 lb 8 oz)   SpO2 97%   BMI 29.78 kg/m    Wt Readings from Last 5 Encounters:   06/04/21 96.8 kg (213 lb 8 oz)   05/07/21 97.5 kg (215 lb)   04/09/21 98.9 kg (218 lb)   03/29/21 96.5 kg (212 lb 11.2 oz)   03/26/21 97.6 kg (215 lb 1.6 oz)   GENERAL: Healthy, alert and no distress  EYES: Eyes grossly normal to inspection.  No discharge or erythema, or obvious scleral/conjunctival abnormalities.  HENT: Normal cephalic/atraumatic.  External ears, nose and mouth without ulcers or lesions.  No nasal drainage visible.  NECK: No asymmetry, visible masses or scars  RESP: No audible wheeze, cough, or visible cyanosis.  No visible retractions or increased work of breathing.    ABDOMEN: Soft, non-tender, there is right sided hepatomegaly, and the liver tip is firm extends about 4-5 finger breadths from the costal margin.  SKIN: Visible skin clear. No significant rash, abnormal pigmentation or lesions.  NEURO: Cranial nerves grossly  intact.  Mentation and speech appropriate for age.  PSYCH: Cranial nerves are intact, normal strength. Affect normal/bright, judgement and insight intact, normal speech and appearance well-groomed.      Labs:  Orders Only on 06/03/2021   Component Date Value Ref Range Status     Sodium 06/03/2021 138  133 - 144 mmol/L Final     Potassium 06/03/2021 4.6  3.4 - 5.3 mmol/L Final     Chloride 06/03/2021 107  94 - 109 mmol/L Final     Carbon Dioxide 06/03/2021 29  20 - 32 mmol/L Final     Anion Gap 06/03/2021 3  3 - 14 mmol/L Final     Glucose 06/03/2021 115* 70 - 99 mg/dL Final     Urea Nitrogen 06/03/2021 20  7 - 30 mg/dL Final     Creatinine 06/03/2021 0.89  0.66 - 1.25 mg/dL Final     GFR Estimate 06/03/2021 82  >60 mL/min/[1.73_m2] Final     GFR Estimate If Black 06/03/2021 >90  >60 mL/min/[1.73_m2] Final     Calcium 06/03/2021 9.3  8.5 - 10.1 mg/dL Final     Bilirubin Total 06/03/2021 0.6  0.2 - 1.3 mg/dL Final     Albumin 06/03/2021 3.4  3.4 - 5.0 g/dL Final     Protein Total 06/03/2021 7.1  6.8 - 8.8 g/dL Final     Alkaline Phosphatase 06/03/2021 125  40 - 150 U/L Final     ALT 06/03/2021 28  0 - 70 U/L Final     AST 06/03/2021 66* 0 - 45 U/L Final       Imaging:  PET Oncology Whole Body  Narrative: Combined Report of:    PET and CT on  6/3/2021 11:33 AM :    1. PET of the neck, chest, abdomen, and pelvis.  2. PET CT Fusion for Attenuation Correction and Anatomical  Localization:    3. 3D MIP and PET-CT fused images were processed on an independent  workstation and archived to PACS and reviewed by a radiologist.    Technique:    1. PET: The patient received 12.06 mCi of F-18-FDG; the serum glucose  was 116 prior to administration, body weight was 91.6 kg. Images were  evaluated in the axial, sagittal, and coronal planes as well as the  rotational whole body MIP. Images were acquired from the Vertex to the  Feet.    UPTAKE WAS MEASURED AT 60 MINUTES.     BACKGROUND:  Liver SUV max= 3.9,   Aorta Blood SUV Max: 2.4.      2. CT: CT only obtained for attenuation correction and not diagnostic  purposes.    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  Underwent multiple chemotherapies. History of Y-90 radioembolization  therapy to liver metastases. History of spinal external beam  radiotherapy. Currently on treatment with ipilimumab and nivolumab.    COMPARISON: PET-CT 4/27/2021, PET-CT 3/20/2021, PET-CT 1/11/2021    FINDINGS:     HEAD/NECK:  There is no suspicious FDG uptake in the neck.     CHEST:  Innumerable hypermetabolic pulmonary nodules are again demonstrated  and are similar to prior study 4/27/2021. For example left upper lobe  nodule measuring 0.8 cm SUV max 2.0, previously 0.7 cm and SUV max 2.0  (series 5, image 178) and right upper lobe nodule measuring 0.6 cm  with SUV max 2.1, previously 0.5 cm and SUV max 2.5 (series 5, image  203).    Increased uptake to intramuscular nodular deposit in the right rotator  cuff with SUV max 6.3, previously 4.0 (series 5, image 192).    Heart size is enlarged. No pericardial effusion. Mild-to-moderate  coronary artery calcifications. Normal caliber of the thoracic aorta  and main pulmonary artery. Small hiatal hernia. Decreased uptake to  the left hilar lymph nodes, for example in the superior left hilum  with SUV max 3.5, previously 5.8 (series 5, image 192).    ABDOMEN AND PELVIS:  Innumerable intrahepatic mass is again demonstrated with unchanged  nodular contour of the liver. Overall these appear similar in extent  to previous study. For example large mass in the inferior right  hepatic lobe with SUV max 10.5, previously 11.4, however increased TLG  of 3822 SUV-bw*cm3 from 2700  SUV-bw*cm3 (series 5, image 288). In the  left hepatic lobe, multifocal irregular lesions with SUV max 8.3 and  TLG 1023 SUV-bw*cm3, decreased from SUV max 9.3 and TLG 1100  SUV-bw*cm3.     Numerous hypermetabolic intrasplenic masses are again demonstrated  which do not appear significantly changed in size and demonstrate  uptake similar to prior, for example in the posterior spleen a massive  ischemic 7.4, previously 7.8 (series 5, image 250).    Larger aortocaval lymph node measuring 1.3 x 1.3 cm with SUV max 12.2  (series 5, image 308), previously 1.0 x 1.0 cm and SUV max 7.8. Mildly  increased innumerable tiny nodules within the peritoneum suggesting  combination of deposits in the mesenteric lymph nodes. For example in  the right lower quadrant a larger nodule measuring 0.9 x 0.8 cm with  SUV max 1.6, previously 0.7 x 0.6 cm and SUV max 1.1 (series 5, image  375). Notably, these small nodular deposits are diffusely below liver  background levels in terms of uptake.    Prostatomegaly. Bladder is unremarkable. No hydronephrosis. Hypodense  renal cortical cysts similar to prior. Small volume free fluid in the  pelvis. No abnormally distended loops of small or large bowel.    LOWER EXTREMITIES:   Increased uptake to an intramuscular deposit in the posterior  compartment of the left thigh with SUV max 12.1, previously 7.1  (series 5, 518). Postsurgical changes of bilateral total knee  arthroplasties.    BONES:   Extensive hypermetabolic intraosseous metastases are again  demonstrated including in the spine, skull base, clavicles, sternum,  scapulae, ribs, pelvis, and proximal extremities. These demonstrate  similar uptake the previous study. For example in the proximal right  humerus with SUV max 7.1, previously 7.5 (series 5, image 125), in the  T1 vertebral body with SUV max 6.4, previously 6.1 (series 5, image  157), L3 vertebral body with SUV max 7.9, previously 7.6 (series 5,  image 338), and right sacrum with  SUV max 7.8, previously 7.1 (series  5, image 388).  Impression: IMPRESSION: In this patient with a history of metastatic uveal  melanoma currently on ipilimumab and nivolumab, there is slight  progression since 4/27/2021:  1. Overall slightly increased uptake to the extensive intraosseous  metastases.  1a. T10 lesion growing into the right side of the spinal canal, at  risk for spinal chord impingement, consider MRI.  2. Increased uptake to multiple intramuscular metastatic deposits.  3. Increased size and uptake of metastatic retroperitoneal lymph node.  Decreased uptake to left hilar nodes.  4. Overall increased intrahepatic metastatic burden.  5. Stable intrasplenic metastases.  6. Slightly larger pulmonary metastatic nodules.  7. Slightly larger peritoneal deposits.    I have personally reviewed the examination and initial interpretation  and I agree with the findings.    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    Technique: Multiplanar T1-weighted, axial FLAIR, and susceptibility  images were obtained without intravenous contrast. Following  intravenous gadolinium-based contrast administration, axial  T2-weighted, diffusion, and T1-weighted images (in multiple planes)  were obtained.    Contrast dose: 10 mls Gadavist    Findings: In the inferior aspect of left globe vitreous chamber,  within the choroid, there is a 10 mm wide 3.5 mm thick enhancing  lesion, consistent with melanoma. No evidence of  extraocular  extension. Right orbit is unremarkable.    There is no mass effect, midline shift, or evidence of intracranial  hemorrhage. The ventricles are proportionate to the cerebral sulci.  Mild leukoaraiosis.    Postcontrast images demonstrate no abnormal intracranial enhancing  lesions.    No definite abnormality of the skull marrow signal is noted. The major  vascular intracranial flow-voids are present. The visualized portions  of paranasal sinuses, and mastoid air cells are relatively clear.   Impression: Impression:  1. Enhancing iuveal melanoma in the inferior aspect of the left globe  posterior chamber without extraocular extension.  2. No evidence of metastatic disease in the brain and calvarium.    YUSEF LAY MD          ASSESSMENT/PLAN    #1 Metastatic uveal melanoma, bone, liver, lung, and soft tissue  #2 Choroidal melanoma with ciliary body involvement, left eye, status-post plaque brachytherapy, Class II and PRAME mRNA positive  #3 Shoulder and hip pain, moderate  It was a pleasure to see Mr. Mayberry today. He is a 76 year old man with metastatic uveal melanoma.  He underwent radioembolization and Ipi/Nivo and is currently maintained on nivolumab.     On maintenance nivolumab there has been a gradual worsening of widely disseminated disease. Nivolumab monotherapy has been insufficient at maintaining his disease, however he is clinically benefiting from the therapy. He currently feels well, aside from a fullness sensation building in the right upper quadrant.    We discussed next steps. We discussed the potential to induce some shrinkage with chemotherapy, though this would have to be combined with immunotherapy to maximize any benefit. The goal is to reduce the number of immunosuppressive immune cells with chemotherapy while de-inhibiting effector T cells and NK cells through the use of immune checkpoint blockade.    We reviewed the use of carboplatin and paclitaxel for 2 cycles. This will be  combined with ipilimumab and nivolumab.    This is an aggressive strategy, but the goal of this kind of approach is to buy him some time to be able to receive additional therapies on clinical trial, or possibly Tebentefusp if approved in the next few months.    -Plan repeat Ipi/Nivo with carboplatin/taxol for the first 2 cycles to start in early July.  -Alleve for inflammatory type aches and pains, dilaudid for breakthrough pains          Jw Reyes M.D.   of Medicine  Hematology, Oncology and Transplantation      Again, thank you for allowing me to participate in the care of your patient.        Sincerely,        Jw Trent MD

## 2021-06-04 NOTE — PATIENT INSTRUCTIONS
[facility] Triage and after hours / weekends / holidays:  [phone]    Please call the triage or after hours line if you experience a temperature greater than or equal to 100.5, shaking chills, have uncontrolled nausea, vomiting and/or diarrhea, dizziness, shortness of breath, chest pain, bleeding, unexplained bruising, or if you have any other new/concerning symptoms, questions or concerns.      If you are having any concerning symptoms or wish to speak to a provider before your next infusion visit, please call your care coordinator or triage to notify them so we can adequately serve you.     If you need a refill on a narcotic prescription or other medication, please call before your infusion appointment.                 June 2021 Sunday Monday Tuesday Wednesday Thursday Friday Saturday             1     2     3    PET ONCOLOGY WHOLE BODY   9:15 AM   (45 min.)   [id number]   [facility]    LAB  11:30 AM   (15 min.)   UU LAB GOLD WAITING   [facility]    MR BRAIN WWO   1:00 PM   (60 min.)   [email or web address]   [facility] 4    RETURN   1:15 PM   (30 min.)   Jw Burden MD   [facility]    ONC INFUSION 1 HR (60 MIN)   2:00 PM   (60 min.)    ONC INFUSION NURSE   [facility] 5       6     7     8     9     10    VIDEO VISIT RETURN   9:05 AM   (40 min.)   Mg Patel MD   [facility] 11     12       13     14     15     16     17     18     19       20     21     22    [facility] RETURN GENERAL   9:30 AM   (15 min.)   Randy Hi MD   [facility] 23     24     25     26       27     28     29     30                                July 2021 Sunday Monday Tuesday Wednesday Thursday Friday Saturday                       1     2     3       4     5     6     7     8     9     10       11     12     13     14     15     16      17       18     19     20     21     22     23     24       25     26     27     28     29     30     31                     Lab Results:  No results found for this or any previous visit (from the past 12 hour(s)).

## 2021-06-04 NOTE — PROGRESS NOTES
Infusion Nursing Note:  Galileo Mayberry presents today for Day 1 Cycle 10 Nivolumab. New Xgeva  Patient seen by provider today: Yes: Dr. Trent   present during visit today: Not Applicable.    Note: Patient presents to infusion feeling well. Patient denies pain and states no acute complaints or concerns not addressed with Dr. Trent today. Patient confirms no tooth or jaw issues and states he takes Calcium/Vitamin D supplements daily. New education on Xgeva given with patient verbalized understanding (previously on IV Zometa, last dose 5/7/2021). Plan B Labs handout given.    Intravenous Access:  Peripheral IV placed.    Treatment Conditions:  Lab Results   Component Value Date     06/03/2021                   Lab Results   Component Value Date    POTASSIUM 4.6 06/03/2021           Lab Results   Component Value Date    MAG 2.2 03/01/2021            Lab Results   Component Value Date    CR 0.89 06/03/2021                   Lab Results   Component Value Date    FAUSTO 9.3 06/03/2021                Lab Results   Component Value Date    BILITOTAL 0.6 06/03/2021           Lab Results   Component Value Date    ALBUMIN 3.4 06/03/2021                    Lab Results   Component Value Date    ALT 28 06/03/2021           Lab Results   Component Value Date    AST 66 06/03/2021     Corrected Calcium Score: 9.8  Results reviewed, labs MET treatment parameters, ok to proceed with treatment.      Post Infusion Assessment:  Patient tolerated infusion without incident.  Blood return noted pre and post infusion.  Site patent and intact, free from redness, edema or discomfort.  No evidence of extravasations.  Access discontinued per protocol.       Discharge Plan:   Patient declined prescription refills.  Discharge instructions reviewed with: Patient.  Patient and/or family verbalized understanding of discharge instructions and all questions answered.  AVS to patient via [employer].  Patient will return in 3 weeks  for next appointment. Check out orders in place by Dr. Trent.   Patient discharged in stable condition accompanied by: self.  Departure Mode: Ambulatory.  Face to Face time: 0 minutes.      Tad Laird RN

## 2021-06-04 NOTE — PROGRESS NOTES
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 11/2019, he is diagnosed with ocular melanoma, after a choroidal lesion was found in his Left eye.  Patient reports over the last several months he developed new floaters/spots in his L eye.  2. 11/25/2019, he was seen by ophthalmology who noted a left eye, elevated bilobed lesion, size 6.28mm x 18.31(T) x 17.04(L).   3. 12/14/19, CT-CAP showed no evidence of metastatic disease.  4. 1/10/2020: Patient referred to [facility]. B-scan ultrasound of left eye showed elevated bilobed lesion measuring 7.2-7.3 height with a base of 21.4 x 22.5 mm. Low to medium reflectivity. Difficult measurements due to location. Ciliary body involvement was noted. Ultrasound basal dimension measurement included subretinal fluid, and clinical measurement had to be done by transillumination due to anterior tumor location.  5. 1/15/2020, visual acuity right eye 20/20 -1, left eye 20/25 +2 nh. Visual fields counting fingers full bilaterally. Clinical fundus exam of left eye revealed choroidal/ciliary body malignant melanoma with basal measurement of 22 x 21 mm and height of 7.5 mm. Located in the Inferior left eye from 4:30-7:30, 15 mm to disc, 15 mm to fovea, +subretinal fluid, bilobed. The tumor without fluid appears to be 1-2 mm smaller in basal dimension (20 x 19 mm).   6. 2/10/2020, Iodine-125 24 mm plaque placement delivering 85 Gy to an 8 mm height. FNA biopsy performed, and molecular testing shows Class II PRAME positive, high-risk of early metastasis.  7. 3/31/2020, he starts adjuvant sunitinib 25 mg daily for high risk disease. He completed 6 months of therapy.  8. 8/26/2020, MRI-liver shows interval development of numerous (more than 30) metastatic lesions in the liver in bilobar distribution. The largest in segment JUAN measures 1.85 cm.  9. 10/19/2020, he undergoes  Y90 radioembolization with 72.2 mCi of Theraspheres to the right lobe of the liver; predominantly in hepatic segments 5 and 6, milder uptake in the region of segment 8  10. 10/23/2020, first cycle of Ipilimumab 1mg/kg and nivolumab 3mg/kg. He received 4 cycles of Ipi/Nivo.  11. 11/16/2020, he has his 2nd Y90 radioembolization with 69 mCi of TheraSpheres to hepatic segments 4A and 4B.       12. 1/15/2020, he starts nivolumab 480 mg IV monthly maintenance.  13. 4/7/2021, receives 2000 cGy in 5 fractions to T9-T11.    History of Present Illness:  Mr. 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 He returns in follow-up alone today.    He is doing well and tolerating nivolumab with no significant side effects. He denies any diarrhea, cough, or shortness of breath.     He had PET-CT scan for restaging, which shows mostly increase in size and FDG uptake in the numerous metastatic tumors. The T10 lesion appears to be growing into the right side of the spinal canal, at risk for spinal chord impingement. He has already received radiation therapy to this location.    He is still working and feeling well. Denies any major increases in pain.    Serum LDH level is also rising, with most recent value 1339 on 5/7.    ECOG performance status is 1.    Review of Systems:  12-point ROS negative except as in HPI    Current Outpatient Medications   Medication Sig Dispense Refill     acetaminophen (TYLENOL) 500 MG tablet Take 1,000 mg by mouth       Ascorbic Acid (VITAMIN C) 500 MG CAPS Take 500 mg by mouth       calcium carbonate (OS-FAUSTO) 500 MG tablet Take 1 tablet (500 mg) by mouth 2 times daily 60 tablet 3     Cholecalciferol (VITAMIN D3) 25 MCG (1000 UT) CAPS Takes 3 daily       diclofenac (VOLTAREN) 1 % topical gel        doxepin (SINEQUAN) 10 MG/ML (HIGH CONC) solution Take 0.6 mLs (6 mg) by mouth At Bedtime 18 mL 1     Emollient (AQUAPHOR ADVANCED THERAPY) OINT APPLY SMALL AMOUNT TOPICALLY EVERY DAY TO MOISTURIZE  RASH       Ferrous Gluconate 240 (27 Fe) MG TABS Take 240 mg by mouth       finasteride (PROSCAR) 5 MG tablet Take 5 mg by mouth       gabapentin (NEURONTIN) 100 MG capsule TAKE ONE CAPSULE BY MOUTH AT BEDTIME AS NEEDED FOR HAND NUMBNESS       HYDROmorphone (DILAUDID) 2 MG tablet Take 0.5 tablets (1 mg) by mouth every 6 hours as needed for moderate to severe pain 30 tablet 0     HYDROmorphone (DILAUDID) 2 MG tablet Take 1 tablet (2 mg) by mouth every 6 hours as needed for pain 10 tablet 0     LORazepam (ATIVAN) 0.5 MG tablet Take 1 tablet (0.5 mg) by mouth every 4 hours as needed (Anxiety, Nausea/Vomiting or Sleep) 30 tablet 3     meloxicam (MOBIC) 15 MG tablet TAKE ONE TABLET BY MOUTH DAILY AS NEEDED FOR PAIN. TAKE WITH FOOD       methylphenidate (RITALIN) 10 MG tablet as needed       ondansetron (ZOFRAN) 4 MG tablet Take 1-2 tablets (4-8 mg) by mouth every 6 hours as needed for nausea (vomiting) 40 tablet 0     polyethylene glycol (MIRALAX) 17 GM/Dose powder Take 17 g (1 capful) by mouth 2 times daily 255 g 1     polyvinyl alcohol (LIQUIFILM TEARS) 1.4 % ophthalmic solution INSTILL ONE DROP IN BOTH EYES FOUR TIMES A DAY AS NEEDED FOR DRY EYES       prochlorperazine (COMPAZINE) 10 MG tablet Take 1 tablet (10 mg) by mouth every 6 hours as needed (Nausea/Vomiting) 30 tablet 3     senna-docusate (SENNA S) 8.6-50 MG tablet Take 1 tablet by mouth 2 times daily as needed for constipation 60 tablet 1     tamsulosin (FLOMAX) 0.4 MG capsule Take 0.4 mg by mouth       Testosterone 1.62 % GEL          Past Medical History:   Diagnosis Date     BPH (benign prostatic hyperplasia)      Degenerative joint disease      Metastatic melanoma (H)     L eye     Nonsenile cataract      Torn rotator cuff 03/25/2021    right     Past Surgical History:   Procedure Laterality Date     ------------OTHER-------------  2014    back surgery L4/L5      ------------OTHER-------------      knee surgery both 1961 Right, 1971 Left knee     AS  REMOVAL OF KIDNEY STONE  2015     IR SIRT (SELECTIVE INTERNAL RADIO THERAPY)  10/13/2020     IR VISCERAL ANGIOGRAM  10/13/2020     IR VISCERAL EMBOLIZATION  10/19/2020     IR VISCERAL EMBOLIZATION  11/16/2020     JOINT REPLACEMENT  2017    Both kneses replaced (2017 and 2018)     ROTATOR CUFF REPAIR RT/LT Right 2016     SURGICAL HISTORY OF -   05/2021    ablation of right shoulder     TURP  2000     Family History   Problem Relation Age of Onset     Glaucoma Mother      Prostate Cancer Father      Cancer Sister      Macular Degeneration No family hx of        Physical Examination:  /79 (BP Location: Right arm, Patient Position: Sitting, Cuff Size: Adult Regular)   Pulse 64   Temp 98.7  F (37.1  C) (Oral)   Resp 16   Wt 96.8 kg (213 lb 8 oz)   SpO2 97%   BMI 29.78 kg/m    Wt Readings from Last 5 Encounters:   06/04/21 96.8 kg (213 lb 8 oz)   05/07/21 97.5 kg (215 lb)   04/09/21 98.9 kg (218 lb)   03/29/21 96.5 kg (212 lb 11.2 oz)   03/26/21 97.6 kg (215 lb 1.6 oz)   GENERAL: Healthy, alert and no distress  EYES: Eyes grossly normal to inspection.  No discharge or erythema, or obvious scleral/conjunctival abnormalities.  HENT: Normal cephalic/atraumatic.  External ears, nose and mouth without ulcers or lesions.  No nasal drainage visible.  NECK: No asymmetry, visible masses or scars  RESP: No audible wheeze, cough, or visible cyanosis.  No visible retractions or increased work of breathing.    ABDOMEN: Soft, non-tender, there is right sided hepatomegaly, and the liver tip is firm extends about 4-5 finger breadths from the costal margin.  SKIN: Visible skin clear. No significant rash, abnormal pigmentation or lesions.  NEURO: Cranial nerves grossly intact.  Mentation and speech appropriate for age.  PSYCH: Cranial nerves are intact, normal strength. Affect normal/bright, judgement and insight intact, normal speech and appearance well-groomed.      Labs:  Orders Only on 06/03/2021   Component Date Value Ref  Range Status     Sodium 06/03/2021 138  133 - 144 mmol/L Final     Potassium 06/03/2021 4.6  3.4 - 5.3 mmol/L Final     Chloride 06/03/2021 107  94 - 109 mmol/L Final     Carbon Dioxide 06/03/2021 29  20 - 32 mmol/L Final     Anion Gap 06/03/2021 3  3 - 14 mmol/L Final     Glucose 06/03/2021 115* 70 - 99 mg/dL Final     Urea Nitrogen 06/03/2021 20  7 - 30 mg/dL Final     Creatinine 06/03/2021 0.89  0.66 - 1.25 mg/dL Final     GFR Estimate 06/03/2021 82  >60 mL/min/[1.73_m2] Final     GFR Estimate If Black 06/03/2021 >90  >60 mL/min/[1.73_m2] Final     Calcium 06/03/2021 9.3  8.5 - 10.1 mg/dL Final     Bilirubin Total 06/03/2021 0.6  0.2 - 1.3 mg/dL Final     Albumin 06/03/2021 3.4  3.4 - 5.0 g/dL Final     Protein Total 06/03/2021 7.1  6.8 - 8.8 g/dL Final     Alkaline Phosphatase 06/03/2021 125  40 - 150 U/L Final     ALT 06/03/2021 28  0 - 70 U/L Final     AST 06/03/2021 66* 0 - 45 U/L Final       Imaging:  PET Oncology Whole Body  Narrative: Combined Report of:    PET and CT on  6/3/2021 11:33 AM :    1. PET of the neck, chest, abdomen, and pelvis.  2. PET CT Fusion for Attenuation Correction and Anatomical  Localization:    3. 3D MIP and PET-CT fused images were processed on an independent  workstation and archived to PACS and reviewed by a radiologist.    Technique:    1. PET: The patient received 12.06 mCi of F-18-FDG; the serum glucose  was 116 prior to administration, body weight was 91.6 kg. Images were  evaluated in the axial, sagittal, and coronal planes as well as the  rotational whole body MIP. Images were acquired from the Vertex to the  Feet.    UPTAKE WAS MEASURED AT 60 MINUTES.     BACKGROUND:  Liver SUV max= 3.9,   Aorta Blood SUV Max: 2.4.     2. CT: CT only obtained for attenuation correction and not diagnostic  purposes.    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  Underwent multiple chemotherapies. History of Y-90 radioembolization  therapy to liver metastases. History of spinal external beam  radiotherapy. Currently on treatment with ipilimumab and nivolumab.    COMPARISON: PET-CT 4/27/2021, PET-CT 3/20/2021, PET-CT 1/11/2021    FINDINGS:     HEAD/NECK:  There is no suspicious FDG uptake in the neck.     CHEST:  Innumerable hypermetabolic pulmonary nodules are again demonstrated  and are similar to prior study 4/27/2021. For example left upper lobe  nodule measuring 0.8 cm SUV max 2.0, previously 0.7 cm and SUV max 2.0  (series 5, image 178) and right upper lobe nodule measuring 0.6 cm  with SUV max 2.1, previously 0.5 cm and SUV max 2.5 (series 5, image  203).    Increased uptake to intramuscular nodular deposit in the right rotator  cuff with SUV max 6.3, previously 4.0 (series 5, image 192).    Heart size is enlarged. No pericardial effusion. Mild-to-moderate  coronary artery calcifications. Normal caliber of the thoracic aorta  and main pulmonary artery. Small hiatal hernia. Decreased uptake to  the left hilar lymph nodes, for example in the superior left hilum  with SUV max 3.5, previously 5.8 (series 5, image 192).    ABDOMEN AND PELVIS:  Innumerable intrahepatic mass is again demonstrated with unchanged  nodular contour of the liver. Overall these appear similar in extent  to previous study. For example large mass in the inferior right  hepatic lobe with SUV max 10.5, previously 11.4, however increased TLG  of 3822 SUV-bw*cm3 from 2700 SUV-bw*cm3 (series 5, image 288). In the  left hepatic lobe, multifocal irregular lesions with SUV max 8.3 and  TLG 1023 SUV-bw*cm3, decreased from SUV max 9.3 and TLG 1100  SUV-bw*cm3.     Numerous hypermetabolic intrasplenic masses are again demonstrated  which  do not appear significantly changed in size and demonstrate  uptake similar to prior, for example in the posterior spleen a massive  ischemic 7.4, previously 7.8 (series 5, image 250).    Larger aortocaval lymph node measuring 1.3 x 1.3 cm with SUV max 12.2  (series 5, image 308), previously 1.0 x 1.0 cm and SUV max 7.8. Mildly  increased innumerable tiny nodules within the peritoneum suggesting  combination of deposits in the mesenteric lymph nodes. For example in  the right lower quadrant a larger nodule measuring 0.9 x 0.8 cm with  SUV max 1.6, previously 0.7 x 0.6 cm and SUV max 1.1 (series 5, image  375). Notably, these small nodular deposits are diffusely below liver  background levels in terms of uptake.    Prostatomegaly. Bladder is unremarkable. No hydronephrosis. Hypodense  renal cortical cysts similar to prior. Small volume free fluid in the  pelvis. No abnormally distended loops of small or large bowel.    LOWER EXTREMITIES:   Increased uptake to an intramuscular deposit in the posterior  compartment of the left thigh with SUV max 12.1, previously 7.1  (series 5, 518). Postsurgical changes of bilateral total knee  arthroplasties.    BONES:   Extensive hypermetabolic intraosseous metastases are again  demonstrated including in the spine, skull base, clavicles, sternum,  scapulae, ribs, pelvis, and proximal extremities. These demonstrate  similar uptake the previous study. For example in the proximal right  humerus with SUV max 7.1, previously 7.5 (series 5, image 125), in the  T1 vertebral body with SUV max 6.4, previously 6.1 (series 5, image  157), L3 vertebral body with SUV max 7.9, previously 7.6 (series 5,  image 338), and right sacrum with SUV max 7.8, previously 7.1 (series  5, image 388).  Impression: IMPRESSION: In this patient with a history of metastatic uveal  melanoma currently on ipilimumab and nivolumab, there is slight  progression since 4/27/2021:  1. Overall slightly increased uptake to  the extensive intraosseous  metastases.  1a. T10 lesion growing into the right side of the spinal canal, at  risk for spinal chord impingement, consider MRI.  2. Increased uptake to multiple intramuscular metastatic deposits.  3. Increased size and uptake of metastatic retroperitoneal lymph node.  Decreased uptake to left hilar nodes.  4. Overall increased intrahepatic metastatic burden.  5. Stable intrasplenic metastases.  6. Slightly larger pulmonary metastatic nodules.  7. Slightly larger peritoneal deposits.    I have personally reviewed the examination and initial interpretation  and I agree with the findings.    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    Technique: Multiplanar T1-weighted, axial FLAIR, and susceptibility  images were obtained without intravenous contrast. Following  intravenous gadolinium-based contrast administration, axial  T2-weighted, diffusion, and T1-weighted images (in multiple planes)  were obtained.    Contrast dose: 10 mls Gadavist    Findings: In the inferior aspect of left globe vitreous chamber,  within the choroid, there is a 10 mm wide 3.5 mm thick enhancing  lesion, consistent with melanoma. No evidence of extraocular  extension. Right orbit is unremarkable.    There is no mass effect, midline shift, or evidence of intracranial  hemorrhage. The ventricles are proportionate to the cerebral sulci.  Mild leukoaraiosis.    Postcontrast images demonstrate no abnormal intracranial  enhancing  lesions.    No definite abnormality of the skull marrow signal is noted. The major  vascular intracranial flow-voids are present. The visualized portions  of paranasal sinuses, and mastoid air cells are relatively clear.   Impression: Impression:  1. Enhancing iuveal melanoma in the inferior aspect of the left globe  posterior chamber without extraocular extension.  2. No evidence of metastatic disease in the brain and calvarium.    YUSEF LAY MD          ASSESSMENT/PLAN    #1 Metastatic uveal melanoma, bone, liver, lung, and soft tissue  #2 Choroidal melanoma with ciliary body involvement, left eye, status-post plaque brachytherapy, Class II and PRAME mRNA positive  #3 Shoulder and hip pain, moderate  It was a pleasure to see Mr. Mayberry today. He is a 76 year old man with metastatic uveal melanoma.  He underwent radioembolization and Ipi/Nivo and is currently maintained on nivolumab.     On maintenance nivolumab there has been a gradual worsening of widely disseminated disease. Nivolumab monotherapy has been insufficient at maintaining his disease, however he is clinically benefiting from the therapy. He currently feels well, aside from a fullness sensation building in the right upper quadrant.    We discussed next steps. We discussed the potential to induce some shrinkage with chemotherapy, though this would have to be combined with immunotherapy to maximize any benefit. The goal is to reduce the number of immunosuppressive immune cells with chemotherapy while de-inhibiting effector T cells and NK cells through the use of immune checkpoint blockade.    We reviewed the use of carboplatin and paclitaxel for 2 cycles. This will be combined with ipilimumab and nivolumab.    This is an aggressive strategy, but the goal of this kind of approach is to buy him some time to be able to receive additional therapies on clinical trial, or possibly Tebentefusp if approved in the next few months.    -Plan repeat  Ipi/Nivo with carboplatin/taxol for the first 2 cycles to start in early July.  -Alleve for inflammatory type aches and pains, dilaudid for breakthrough pains          Jw Reyes M.D.   of Medicine  Hematology, Oncology and Transplantation

## 2021-06-04 NOTE — NURSING NOTE
"Oncology Rooming Note    June 4, 2021 1:36 PM   Galileo Mayberry is a 76 year old male who presents for:    Chief Complaint   Patient presents with     Oncology Clinic Visit     MELANOMA OF UVEA METASTATIC TO LIVER     Initial Vitals: /79 (BP Location: Right arm, Patient Position: Sitting, Cuff Size: Adult Regular)   Pulse 64   Temp 98.7  F (37.1  C) (Oral)   Resp 16   Wt 96.8 kg (213 lb 8 oz)   SpO2 97%   BMI 29.78 kg/m   Estimated body mass index is 29.78 kg/m  as calculated from the following:    Height as of 3/29/21: 1.803 m (5' 11\").    Weight as of this encounter: 96.8 kg (213 lb 8 oz). Body surface area is 2.2 meters squared.  Mild Pain (2) Comment: Data Unavailable   No LMP for male patient.  Allergies reviewed: Yes  Medications reviewed: Yes    Medications: Medication refills not needed today.  Pharmacy name entered into EPIC:    [facility] - [place], [place] - [id number] - [facility] [id number] [id number]    Clinical concerns: No new concerns.        Nicole Reyez CMA              "

## 2021-06-10 NOTE — LETTER
6/10/2021       RE: Galileo Mayberry  [address]  [facility] [address]     Dear Colleague,    Thank you for referring your patient, Galileo Mayberry, [facility]. Please see a copy of my visit note below.    Favian is a 76 year old who is being evaluated via a billable video visit.      How would you like to obtain your AVS? MyChart  If the video visit is dropped, the invitation should be resent by: Send to e-mail at: [email or web address]  Will anyone else be joining your video visit? No       ALEX El    Palliative Care Outpatient Clinic    (This note was transcribed using voice recognition software. While I review and edit the transcription, I may miss errors, and the software sometimes does unexpected capitalizations and formatting that I miss. Please let me know of any serious mistranscriptions and I will addend this note.)    Patient ID:  Medical - He has widespread uveal melanoma  Late 2020-21 receiving Y90 for local control of his bulky liver mets and ipi/nivo systemically.     Jan 2021 scans showed widespread progression, plan is to continue nivo as maintenance therapy  3/2021 scans with progressive lesions, plan is to continue nivo  4/2021 PET showed widespread progression, although clinically he was doing well, plan is to add carbo/taxol for a couple cycles over summer  4/2021 palliative RT to T9-11  6/2021 continued progression; Dr Trent discussed prognosis ?6-12 mo with ongoing treatment; patient wants to continue active treatment with hopes of longer life; plan is to repeat ipi/nivo+carbo/taxol in July     We follow mostly for fatigue, insomnia, & care planning. [facility].    Social - Lives with wife. AF . . Has 1 dtr in [place], 1 in [facility]. . [facility] is 100[facility].     Care Planning - no HCD on our chart but he is  "completing one at home. 2/12/21 Code status discussion, hospice education; he wants a DNR/DNI order, POLST sent at that visit.    History:  History gathered today from: patient, medical chart    \"One of my goals was to finish up the school year\" and he did it this week and feels very good about it. Also it was intolerably hot. Many of the kids gave him hugs and said goodbye, very meaningful for him. There was a water balloon fight at one point.    Plan moving forward with him doing ipi/nivo+carbo/taxol this summer.     Sleep & Pain:  Up until now has had what he thinks is DJD pain eg R shoulder & sciatica. He has    Insomnia: asks if he can take doxepin daily or not, or should he take it daily    Due to sciatica he takes a 2 mg dilaudid at bedtime to help him get to sleep and manage the pain. He then wakes up several hours later in pain and will take another 2 mg dilaudid to get back to sleep. Tylenol is not enough often to sleep. [facility] that he has not tried yet. Sometimes 400 mg ibuprofen at bedtime. He has not had kidney, heart problems, or PUD; or brain mets. He thinks he's had a Dieulafoy lesion perhaps in the past, something in his small intestine that was bleeding on NSAIDs years ago and so he stopped NSAIDs for years but has recently restarted intermittent use.     Appetite; generally poor. Forces self to eat during the day. Not losing weight. Has ongoing mild to moderate nausea and takes ondansetron sometimes which is effective. No emesis. But continues to have 'uneasy' stomach regularly.     Takes senna-s bid + PEG daily and constipation is managed ok    Yesterday was his 56 anniversary of his graduation from the [employer]. Reflects on his gratitude that he has remained functional and has had a good qol despite living with metastatic cancer.     PE: There were no vitals taken for this visit.   Wt Readings from Last 3 Encounters:   06/04/21 96.8 kg (213 lb 8 oz)   05/07/21 97.5 kg " (215 lb)   04/09/21 98.9 kg (218 lb)     Alert NAD healthy appearing  Full affect, speaking full sentences  Clear sensorium    Data reviewed:  I reviewed recent labs and imaging, my comments:  LDH 1300 & rising. Cr 0.89  PET last week-widespread progression in bones, lung, liver, peritoneum, muscle  Brain MRI neg for intraparenchymal brain mets     database reviewed: MSER 15 mg #56, dilaudid 2mg #56 [facility]      Impression & Recommendations:  75 yo with metastatic uveal melanoma    Had a long discussion about pain which is inching up probably from his progressive cancer. He has bid ms contin which he's never used. Major issue is overnight pain/sleep. Should not use daily NSAIDs given probable Dieulafoy but occasional use eg couple times a week is fine. Discussed mobic is an NSAID which he's also been prescribed. I recommended he take the MSContin at bedtime regularly, continue prn tylenol, NSAID, gabap; call me though if pain continues to worsen!    Anorexia: discussed this at length. At the end of the day he's keeping his weight up and I d/w him I don't typically recommend orexigens if people are keeping their weight ok given all the orexigens we have have significant side effects. He should continue ondansetron up to tid as it remains effective. He chuckles that he has given himself permission to eat ice cream for breakfast given his situation and that is a pleasure.     We discussed some side effects to expect possibly with upcoming chemo, he's never had cytotoxic chemotherapy.    Discussed prognosis, planning for death in the next year; he as always is being plan-ful, careful, and allison with all this. Mood good.    45 minutes spent on the date of the encounter doing chart review, history and exam, patient education & counseling, documentation and other activities as noted above.    Thank you for involving us in the patient's care.   Mg Patel MD / Palliative Medicine / Text me via [facility].        Video  Start Time:   Video-Visit Details    Type of service:  Video Visit    Video End Time:    Originating Location (pt. Location): Home    Distant Location (provider location):  [facility]CER [facility]     Platform used for Video Visit: [employer]      Again, thank you for allowing me to participate in the care of your patient.      Sincerely,    Mg Patel MD

## 2021-06-10 NOTE — PROGRESS NOTES
"Favian is a 76 year old who is being evaluated via a billable video visit.      How would you like to obtain your AVS? MyChart  If the video visit is dropped, the invitation should be resent by: Send to e-mail at: [email or web address]  Will anyone else be joining your video visit? No       ALEX El    Palliative Care Outpatient Clinic    (This note was transcribed using voice recognition software. While I review and edit the transcription, I may miss errors, and the software sometimes does unexpected capitalizations and formatting that I miss. Please let me know of any serious mistranscriptions and I will addend this note.)    Patient ID:  Medical - He has widespread uveal melanoma  Late 2020-21 receiving Y90 for local control of his bulky liver mets and ipi/nivo systemically.     Jan 2021 scans showed widespread progression, plan is to continue nivo as maintenance therapy  3/2021 scans with progressive lesions, plan is to continue nivo  4/2021 PET showed widespread progression, although clinically he was doing well, plan is to add carbo/taxol for a couple cycles over summer  4/2021 palliative RT to T9-11  6/2021 continued progression; Dr Trent discussed prognosis ?6-12 mo with ongoing treatment; patient wants to continue active treatment with hopes of longer life; plan is to repeat ipi/nivo+carbo/taxol in July     We follow mostly for fatigue, insomnia, & care planning. [facility].    Social - Lives with wife. AF . . Has 1 dtr in [place], 1 in [facility]. . He is 100% service [facility] the [facility].     Care Planning - no HCD on our chart but he is completing one at home. 2/12/21 Code status discussion, hospice education; he wants a DNR/DNI order, POLST sent at that visit.    History:  History gathered today from: patient, medical chart    \"One of my goals was to finish up the school year\" and he did it this week and feels very good about it. Also it was " intolerably hot. Many of the kids gave him hugs and said goodbye, very meaningful for him. There was a water balloon fight at one point.    Plan moving forward with him doing ipi/nivo+carbo/taxol this summer.     Sleep & Pain:  Up until now has had what he thinks is DJD pain eg R shoulder & sciatica. He has    Insomnia: asks if he can take doxepin daily or not, or should he take it daily    Due to sciatica he takes a 2 mg dilaudid at bedtime to help him get to sleep and manage the pain. He then wakes up several hours later in pain and will take another 2 mg dilaudid to get back to sleep. Tylenol is not enough often to sleep. [facility] just [facility] MSContin that he has not tried yet. Sometimes 400 mg ibuprofen at bedtime. He has not had kidney, heart problems, or PUD; or brain mets. He thinks he's had a Dieulafoy lesion perhaps in the past, something in his small intestine that was bleeding on NSAIDs years ago and so he stopped NSAIDs for years but has recently restarted intermittent use.     Appetite; generally poor. Forces self to eat during the day. Not losing weight. Has ongoing mild to moderate nausea and takes ondansetron sometimes which is effective. No emesis. But continues to have 'uneasy' stomach regularly.     Takes senna-s bid + PEG daily and constipation is managed ok    Yesterday was his 56 anniversary of his graduation from the [employer]. Reflects on his gratitude that he has remained functional and has had a good qol despite living with metastatic cancer.     PE: There were no vitals taken for this visit.   Wt Readings from Last 3 Encounters:   06/04/21 96.8 kg (213 lb 8 oz)   05/07/21 97.5 kg (215 lb)   04/09/21 98.9 kg (218 lb)     Alert NAD healthy appearing  Full affect, speaking full sentences  Clear sensorium    Data reviewed:  I reviewed recent labs and imaging, my comments:  LDH 1300 & rising. Cr 0.89  PET last week-widespread progression in bones, lung, liver, peritoneum, muscle  Brain MRI  neg for intraparenchymal brain mets     database reviewed: MSER 15 mg #56, dilaudid 2mg #56 [facility]      Impression & Recommendations:  77 yo with metastatic uveal melanoma    Had a long discussion about pain which is inching up probably from his progressive cancer. He has bid ms contin which he's never used. Major issue is overnight pain/sleep. Should not use daily NSAIDs given probable Dieulafoy but occasional use eg couple times a week is fine. Discussed mobic is an NSAID which he's also been prescribed. I recommended he take the MSContin at bedtime regularly, continue prn tylenol, NSAID, gabap; call me though if pain continues to worsen!    Anorexia: discussed this at length. At the end of the day he's keeping his weight up and I d/w him I don't typically recommend orexigens if people are keeping their weight ok given all the orexigens we have have significant side effects. He should continue ondansetron up to tid as it remains effective. He chuckles that he has given himself permission to eat ice cream for breakfast given his situation and that is a pleasure.     We discussed some side effects to expect possibly with upcoming chemo, he's never had cytotoxic chemotherapy.    Discussed prognosis, planning for death in the next year; he as always is being plan-ful, careful, and allison with all this. Mood good.    45 minutes spent on the date of the encounter doing chart review, history and exam, patient education & counseling, documentation and other activities as noted above.    Thank you for involving us in the patient's care.   Mg Patel MD / Palliative Medicine / Text me via [facility].        Video Start Time:   Video-Visit Details    Type of service:  Video Visit    Video End Time:    Originating Location (pt. Location): Home    Distant Location (provider location):  [facility] CANCER [facility]     Platform used for Video Visit: [employer]

## 2021-06-15 NOTE — TELEPHONE ENCOUNTER
[facility] Triage Telephone Call    Called patient in follow up to my chart message sent regarding pain in his left shoulder feels like it is coming from under left shoulder blade, bottom part by the spine.  When he lies down it radiates up to top of shoulder and down arm, improves when up.  He has never had this pain prior to having MRI done.     Previously has had trouble with shoulders rotator cuff on both sides.  But pain there is more on top of shoulder so this new pain does not seem to be related to the current pain.  Last night he took Dilaudid 2 mg tab without relief, took second dose about 3 hours later. Later he did get relief and when he woke up he had no pain laying down but as soon as he moved pain returned.      During the day today 4-5/10 pain     He has Dilaudid at home and he is using for pain in shoulders. He is also seen by Palliative care.     Denies dizziness headache fever chills.    Feels like he is maybe a little more SOB recently but no more than last visit with Dr. Trent.     He would like to know if he should take Dilaudid as ordered.  He has reviewed pain management with palliative care previously and was told he can take MS Contin or Dilaudid as needed.      Instructed to take either Dilaudid or take MS Contin as ordered but not take both.  Will route to care team for review and further instruction.     Tonya Smith RN   BSN, [facility], STAR-T  [facility] Triage

## 2021-06-16 NOTE — TELEPHONE ENCOUNTER
"Rad Onc    Called the patient about his NEW pain in his neck going towards the LEFT shoulder.   Rates it at 7 +  taking dilaudid and morphine for it    I reviewed the PEt scan and the mri of the thor and lumbar spine.    I  am concerned about the epidural disease at T10; but his pain seems to be more consistent with cervical disease and certainly the PET scan did demonstrate disease in the cervical spine.    I will obtain an MRI of the cervical spine      I reviewed the MRI with Dr Escalante.  He would be willing to consider \"separation ' surgery for the lesion at T10.  I will have Dr Hamlin look at the MRI  of the thoaracic area and weigh in on whether repeat irradiation  SBRT ( with or without separation surgery might be indicated.    I will discuss possible steroids with DR Trent.  He is currently on optivo.    Naomi Conteh  [phone] pager      CC  Patient Care Team:  Van Vranken, Benjamin E, MD as PCP - General  Sil Gonzales MD as Referring Physician  Mayra Gil MD as Referring Physician (Ophthalmology)  Jw Burden MD as MD (Hematology & Oncology)  Jw Burden MD as Assigned Cancer Care Provider  Stacey Xiao MD as MD (Hospice And Palliative Care)  Jody Thompson, RN as Specialty Care Coordinator (Hospice And Palliative Care)  Mg Patel MD as MD (Hospice And Palliative Care)  Mg Patel MD as Assigned Palliative Care Provider  Randy Hi MD as Assigned Surgical Provider    "

## 2021-06-16 NOTE — TELEPHONE ENCOUNTER
"[facility] Triage Telephone Call    Called patient in follow up to yesterdays discussion regarding pain medication.  Per Dr. Patel:     I spoke with him last week before this happened. His pcp had just given his MSContin 15 mg bid and he wasn't taking it at all and I encouraged him to take it at night at least.     Now that his pain is worsening and his major concern is at night I\"d continue to encourage him to take the MSContin 15 mg at least at night.   He can mix the dilaudid and MSContin, and still continue to take the dilaudid during the day even if he takes morphine at night.     Informed Favian of above instructions and he voiced understanding.     Tonya Smith RN   BSN, [facility], STAR-T  [facility] Triage      "

## 2021-06-22 NOTE — NURSING NOTE
Chief Complaints and History of Present Illnesses   Patient presents with     Follow Up     Vitreous strands of left eye (post vitreolysis on 05/20/2021)     Chief Complaint(s) and History of Present Illness(es)     Follow Up     Laterality: left eye    Course: gradually improving    Associated symptoms: floaters, flashes (occasional (couple times a week)) and dryness (mild).  Negative for eye pain    Treatments tried: no treatments    Pain scale: 0/10    Comments: Vitreous strands of left eye (post vitreolysis on 05/20/2021)              Comments     He states that his vision has seemed better in his left eye, since his last eye exam.  The laser seems to have dispersed the cloud floater into a great area of the left vision but the cloud is less dense.    SHANIKA Adne 9:37 AM  June 22, 2021

## 2021-06-22 NOTE — PROGRESS NOTES
"Chief Complaint(s) and History of Present Illness(es)     Follow Up     Laterality: left eye    Course: gradually improving    Associated symptoms: floaters, flashes (occasional (couple times a week))   and dryness (mild).  Negative for eye pain    Treatments tried: no treatments    Pain scale: 0/10    Comments: Vitreous strands of left eye (post vitreolysis on 05/20/2021)              Comments     He states that his vision has seemed better in his left eye, since his   last eye exam.  The laser seems to have dispersed the cloud floater into a   great area of the left vision but the cloud is less dense.    Dipti Toure, COT 9:37 AM  June 22, 2021               Review of systems for the eyes was negative other than the pertinent positives/negatives listed in the HPI.      Assessment & Plan      Galileo Mayberry is a 76 year old male with the following diagnoses:   1. Vitreous strands of left eye    2. 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)          Referral from Dr. Gonzales at [facility] for persistent symptomatic floater in the left eye   Previously treated for ocular melanoma at [facility] ([facility])  S/P YAG vitreolysis left eye  Looks good today.  Noting more of a \"fixed\" film now in the left eye.    Stable dilated fundus exam  Cataract appears to be cause for current symptoms  Visually significant left eye     Risks, benefits and alternatives to cataract extraction/IOL implantation discussed; consent obtained.  Will schedule surgery today     Special equipment/needs:    Anesthesia:Topical  Dilation:Moderate  Iris expansion:IFIS  Pseudoexfoliation: No pseudoexfoliation  Trypan Blue: No   Goal emmetropia  Dex   Return precautions reviewed   ATs as needed     Today with Galileo Mayberry, I reviewed the indications, risks, benefits, and alternatives of the proposed surgical procedure including, but not limited to, failure " obtain the desired result  and need for additional surgery, bleeding, infection, loss of vision, loss of the eye, and the remote possibility of permanent damage to any organ system or death with the use of anesthesia.  I provided multiple opportunities for the questions, answered all questions to the best of my ability, and confirmed that my answers and my discussion were understood.           Patient disposition:   Return in about 4 weeks (around 7/20/2021) for Follow Up, DFE left.    Love Love MD  PGY-2 Resident Physician  Department of Ophthalmology    Attending Physician Attestation:  Complete documentation of historical and exam elements from today's encounter can be found in the full encounter summary report (not reduplicated in this progress note).  I personally obtained the chief complaint(s) and history of present illness.  I confirmed and edited as necessary the review of systems, past medical/surgical history, family history, social history, and examination findings as documented by others; and I examined the patient myself.  I personally reviewed the relevant tests, images, and reports as documented above.  I formulated and edited as necessary the assessment and plan and discussed the findings and management plan with the patient and family. .Attending Physician Image/Tesing Attestation: I personally reviewed the ophthalmic test(s) associated with this encounter, agree with the interpretation(s) as documented by the resident/fellow, and have edited the corresponding report(s) as necessary.   - Randy Hi MD

## 2021-06-22 NOTE — LETTER
"6/22/2021       RE: Galileo Mayberry  [address]  [facility] [address]     Dear Colleague,    Thank you for referring your patient, Galileo Mayberry, to the [facility] at [facility]. Please see a copy of my visit note below.    Chief Complaint(s) and History of Present Illness(es)     Follow Up     Laterality: left eye    Course: gradually improving    Associated symptoms: floaters, flashes (occasional (couple times a week))   and dryness (mild).  Negative for eye pain    Treatments tried: no treatments    Pain scale: 0/10    Comments: Vitreous strands of left eye (post vitreolysis on 05/20/2021)              Comments     He states that his vision has seemed better in his left eye, since his   last eye exam.  The laser seems to have dispersed the cloud floater into a   great area of the left vision but the cloud is less dense.    SHANIKA Aden 9:37 AM  June 22, 2021               Review of systems for the eyes was negative other than the pertinent positives/negatives listed in the HPI.      Assessment & Plan      Galileo Mayberry is a 76 year old male with the following diagnoses:   1. Vitreous strands of left eye    2. 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)          Referral from Dr. Gonzales at [facility] for persistent symptomatic floater in the left eye   Previously treated for ocular melanoma at [facility] ([facility])  S/P YAG vitreolysis left eye  Looks good today.  Noting more of a \"fixed\" film now in the left eye.    Stable dilated fundus exam  Cataract appears to be cause for current symptoms  Visually significant left eye     Risks, benefits and alternatives to cataract extraction/IOL implantation discussed; consent obtained.  Will schedule surgery today     Special equipment/needs:    Anesthesia:Topical  Dilation:Moderate  Iris " expansion:IFIS  Pseudoexfoliation: No pseudoexfoliation  Trypan Blue: No   Goal emmetropia  Dex   Return precautions reviewed   ATs as needed     Today with Galileo Mayberry, I reviewed the indications, risks, benefits, and alternatives of the proposed surgical procedure including, but not limited to, failure obtain the desired result  and need for additional surgery, bleeding, infection, loss of vision, loss of the eye, and the remote possibility of permanent damage to any organ system or death with the use of anesthesia.  I provided multiple opportunities for the questions, answered all questions to the best of my ability, and confirmed that my answers and my discussion were understood.           Patient disposition:   Return in about 4 weeks (around 7/20/2021) for Follow Up, DFE left.    Love Love MD  PGY-2 Resident Physician  Department of Ophthalmology    Attending Physician Attestation:  Complete documentation of historical and exam elements from today's encounter can be found in the full encounter summary report (not reduplicated in this progress note).  I personally obtained the chief complaint(s) and history of present illness.  I confirmed and edited as necessary the review of systems, past medical/surgical history, family history, social history, and examination findings as documented by others; and I examined the patient myself.  I personally reviewed the relevant tests, images, and reports as documented above.  I formulated and edited as necessary the assessment and plan and discussed the findings and management plan with the patient and family. .Attending Physician Image/Tesing Attestation: I personally reviewed the ophthalmic test(s) associated with this encounter, agree with the interpretation(s) as documented by the resident/fellow, and have edited the corresponding report(s) as necessary.   - Randy Hi MD         Again, thank you for allowing me to participate in the care of your  patient.      Sincerely,    Randy Hi MD

## 2021-06-25 PROBLEM — H25.13 AGE-RELATED NUCLEAR CATARACT OF BOTH EYES: Status: ACTIVE | Noted: 2021-01-01

## 2021-06-25 NOTE — TELEPHONE ENCOUNTER
Spoke with patient to schedule left eye surgery with Dr. Hi    Surgery was scheduled on 7/26 at [facility]  Patient will have H&P at [facility]     Patient is aware a COVID-19 test is needed before their procedure. The test should be with-in 4 days of their procedure.   Test Details: Date 7/22 Location [facility]    Post-Op visit was scheduled on 8/10  Patient was advised a / is needed day of surgery. As well as, for 24 hours after their surgery procedure.  Surgery packet was mailed 6/25, patient has my direct contact information for any further questions [phone].

## 2021-07-02 NOTE — PATIENT INSTRUCTIONS
Neulasta injection will start tomorrow at 5PM, approximately 27 hours after application today.  When the dose delivery starts, it will take about 45 minutes to complete.Neulasta Onpro On-Body should have green flashing light.  Call triage or on-call MD if injector flashes red or appears to be leaking.  Keep Onpro On-Body Neulasta 4 inches away from electrical equipment and avoid showering 4 hours prior to injection      [facility] Triage and after hours / weekends / holidays:  [phone]    Please call the triage or after hours line if you experience a temperature greater than or equal to 100.5, shaking chills, have uncontrolled nausea, vomiting and/or diarrhea, dizziness, shortness of breath, chest pain, bleeding, unexplained bruising, or if you have any other new/concerning symptoms, questions or concerns.      If you are having any concerning symptoms or wish to speak to a provider before your next infusion visit, please call your care coordinator or triage to notify them so we can adequately serve you.     If you need a refill on a narcotic prescription or other medication, please call before your infusion appointment.             July 2021 Sunday Monday Tuesday Wednesday Thursday Friday Saturday                       1     2    LAB PERIPHERAL   8:30 AM   (15 min.)   [facility]   [facility]    ONC INFUSION 6 HR (360 MIN)   9:00 AM   (360 min.)    ONC INFUSION NURSE   [facility] 3       4     5     6     7     8     9    LAB PERIPHERAL  10:30 AM   (15 min.)    [facility]   [facility]    ONC INFUSION 2 HR (120 MIN)  11:00 AM   (120 min.)    ONC INFUSION NURSE   [facility] 10       11     12     13    VIDEO VISIT RETURN   3:25 PM   (40 min.)   Mg Patel MD   [facility] 14     15     16     17       18     19     20     21     22      23     24       25     26    PHACOEMULSIFICATION, CATARACT, WITH INTRAOCULAR LENS IMPLANT  11:25 AM   Randy Hi MD   [facility] OR    Outpatient Visit  11:25 AM   [facility] OR [place] 27     28     29     30     31 August 2021 Sunday Monday Tuesday Wednesday Thursday Friday Saturday   1     2     3     4     5     6     7       8     9     10    [facility] POST-OP   7:30 AM   (15 min.)   Randy Hi MD   [facility] 11     12     13     14       15     16     17     18     19     20     21       22     23     24     25     26     27     28       29     30     31                                        Recent Results (from the past 24 hour(s))   Comprehensive metabolic panel    Collection Time: 07/02/21  8:11 AM   Result Value Ref Range    Sodium 140 133 - 144 mmol/L    Potassium 4.2 3.4 - 5.3 mmol/L    Chloride 108 94 - 109 mmol/L    Carbon Dioxide 25 20 - 32 mmol/L    Anion Gap 6 3 - 14 mmol/L    Glucose 111 (H) 70 - 99 mg/dL    Urea Nitrogen 17 7 - 30 mg/dL    Creatinine 0.98 0.66 - 1.25 mg/dL    GFR Estimate 73 >60 mL/min/[1.73_m2]    GFR Estimate If Black 85 >60 mL/min/[1.73_m2]    Calcium 8.7 8.5 - 10.1 mg/dL    Bilirubin Total 0.6 0.2 - 1.3 mg/dL    Albumin 3.2 (L) 3.4 - 5.0 g/dL    Protein Total 6.9 6.8 - 8.8 g/dL    Alkaline Phosphatase 151 (H) 40 - 150 U/L    ALT 40 0 - 70 U/L     (H) 0 - 45 U/L   CBC with platelets differential    Collection Time: 07/02/21  8:11 AM   Result Value Ref Range    WBC 3.2 (L) 4.0 - 11.0 10e9/L    RBC Count 4.46 4.4 - 5.9 10e12/L    Hemoglobin 12.9 (L) 13.3 - 17.7 g/dL    Hematocrit 38.9 (L) 40.0 - 53.0 %    MCV 87 78 - 100 fl    MCH 28.9 26.5 - 33.0 pg    MCHC 33.2 31.5 - 36.5 g/dL    RDW 14.4 10.0 - 15.0 %    Platelet Count 132 (L) 150 - 450 10e9/L    Diff Method Automated Method     % Neutrophils 59.4 %    % Lymphocytes 21.4 %    % Monocytes 13.5 %    % Eosinophils 4.1 %    % Basophils 1.3 %    % Immature  Granulocytes 0.3 %    Nucleated RBCs 0 0 /100    Absolute Neutrophil 1.9 1.6 - 8.3 10e9/L    Absolute Lymphocytes 0.7 (L) 0.8 - 5.3 10e9/L    Absolute Monocytes 0.4 0.0 - 1.3 10e9/L    Absolute Eosinophils 0.1 0.0 - 0.7 10e9/L    Absolute Basophils 0.0 0.0 - 0.2 10e9/L    Abs Immature Granulocytes 0.0 0 - 0.4 10e9/L    Absolute Nucleated RBC 0.0    Lactate Dehydrogenase    Collection Time: 07/02/21  8:11 AM   Result Value Ref Range    Lactate Dehydrogenase 2,034 (H) 85 - 227 U/L   Magnesium    Collection Time: 07/02/21  8:11 AM   Result Value Ref Range    Magnesium 2.4 (H) 1.6 - 2.3 mg/dL

## 2021-07-02 NOTE — PROGRESS NOTES
Infusion Nursing Note:  Galileo Mayberry presents today for cycle 1, day 1 taxol, carboplatin, xgeva  Patient seen by provider today: No   present during visit today: Not Applicable.    Note:   Taxol and carboplatin are new for patient today.  He has previously been receiving immunotherapy and is adding chemotherapy to his treatment plan.  RN printed handouts from [employer] and did teaching with the patient regarding the new chemotherapy drugs. Patient does not have a thermometer at home, but states he will get one.  Instructed to call with any fevers >100.4, shaking chills, other infectious symptoms, uncontrolled nausea, vomiting, diarrhea, constipation or any other new or concerning symptoms.  Provided with the phone number for triage.      Intravenous Access:  Peripheral IV placed in lab    Treatment Conditions:  Lab Results   Component Value Date    HGB 12.9 07/02/2021     Lab Results   Component Value Date    WBC 3.2 07/02/2021      Lab Results   Component Value Date    ANEU 1.9 07/02/2021     Lab Results   Component Value Date     07/02/2021      Lab Results   Component Value Date     07/02/2021                   Lab Results   Component Value Date    POTASSIUM 4.2 07/02/2021           Lab Results   Component Value Date    MAG 2.4 07/02/2021            Lab Results   Component Value Date    CR 0.98 07/02/2021                   Lab Results   Component Value Date    FAUSTO 8.7 07/02/2021                Lab Results   Component Value Date    BILITOTAL 0.6 07/02/2021           Lab Results   Component Value Date    ALBUMIN 3.2 07/02/2021                    Lab Results   Component Value Date    ALT 40 07/02/2021           Lab Results   Component Value Date     07/02/2021       Results reviewed, labs MET treatment parameters, ok to proceed with treatment.      Post Infusion Assessment:  Patient tolerated infusion without incident.  Patient was very sleepy for most of the infusion  visit. Dr Trent inbasketed to see if benadryl dose can be decreased to 25mg for the next cycle  Patient tolerated injection without incident.  Xgeva given subcutaneously into right arm  Blood return noted pre and post infusion.  Site patent and intact, free from redness, edema or discomfort.  No evidence of extravasations.  Access discontinued per protocol.     Neulasta Onpro On-Body injector applied to right abdomen at 1400 with light facing up.  Writer discussed Neulasta injection would start tomorrow at 1700, approximately 27 hours after application applied today.  Written and Verbal instruction reviewed with patient.  Pt instructed when the dose delivery starts, it will take about 45 minutes to complete.  Pt aware Neulasta Onpro On-Body should have green flashing light and to call triage or on-call MD if injector flashes red or appears to be leaking. Pt aware to keep Onpro On-Body Neulasta 4 inches away from electrical equipment and to avoid showering 4 hours prior to injection.   Patient encouraged to take claritin to help prevent bone pain        Discharge Plan:   [facility].  He already has zofran, compazine, and ativan at home for nausea to use as needed  Discharge instructions reviewed with: Patient.  Patient and/or family verbalized understanding of discharge instructions and all questions answered.  AVS to patient via [employer].  Patient will return 7/9 for next appointment.   Patient discharged in stable condition accompanied by: self.  Departure Mode: Ambulatory.  Face to Face time: 0.      Unique Torres RN

## 2021-07-09 NOTE — PROGRESS NOTES
Infusion Nursing Note:  Galileo Mayberry presents today for cycle 1 day 8 Mirna Franco.    Patient seen by provider today: No   present during visit today: Not Applicable.    Note: Pt arrives feeling ok. He has felt some worsening in fatigue over the last week after receiving his first infusion. He also has been feeling more full and has had a slight increase in his nausea.      Intravenous Access:  Peripheral IV placed.    Treatment Conditions:  Lab Results   Component Value Date    HGB 12.3 07/09/2021     Lab Results   Component Value Date    WBC 7.4 07/09/2021      Lab Results   Component Value Date    ANEU 5.0 07/09/2021     Lab Results   Component Value Date     07/09/2021      Lab Results   Component Value Date     07/09/2021                   Lab Results   Component Value Date    POTASSIUM 4.4 07/09/2021           Lab Results   Component Value Date    MAG 2.4 07/02/2021            Lab Results   Component Value Date    CR 1.05 07/09/2021                   Lab Results   Component Value Date    FAUSTO 8.8 07/09/2021                Lab Results   Component Value Date    BILITOTAL 0.8 07/09/2021           Lab Results   Component Value Date    ALBUMIN 3.2 07/09/2021                    Lab Results   Component Value Date    ALT 28 07/09/2021           Lab Results   Component Value Date    AST 84 07/09/2021       Results reviewed, labs MET treatment parameters, ok to proceed with treatment.      Post Infusion Assessment:  Patient tolerated infusion without incident.  Blood return noted pre and post infusion.  Site patent and intact, free from redness, edema or discomfort.  No evidence of extravasations.  Access discontinued per protocol.       Discharge Plan:   Patient declined prescription refills.  Discharge instructions reviewed with: Patient.  Patient and/or family verbalized understanding of discharge instructions and all questions answered.  Copy of AVS reviewed with patient and/or  family.  Patient will return (IB sent to DR Trent and scheduling as patient didn't agree with the next planned chemo date) for next appointment.  Patient discharged in stable condition accompanied by: self.  Departure Mode: Ambulatory.      Sari Madera RN

## 2021-07-09 NOTE — NURSING NOTE
Chief Complaint   Patient presents with     Blood Draw     Labs drawn via PIV placed by RN in lab. VS taken.      Angelo Hines RN

## 2021-07-09 NOTE — PATIENT INSTRUCTIONS
On Day 1 you receive Taxol and Carboplatin  On Day 8 you receive Yervoy and Opdivo    Contact Numbers:   [facility] Main Line: [phone]    Call triage to speak with triage if you are experiencing chills and/or temperature greater than or equal to 100.5, uncontrolled nausea/vomiting, diarrhea, constipation, dizziness, shortness of breath, chest pain, bleeding, unexplained bruising, or any new/concerning symptoms, questions/concerns.     If you are having any concerning symptoms or wish to speak to a provider before your next infusion visit, please call your care coordinator or triage to notify them so we can adequately serve you.     If you need a refill on a medication or narcotic prescription, please call triage or your care coordinator before your infusion appointment.                 July 2021 Sunday Monday Tuesday Wednesday Thursday Friday Saturday                       1     2    LAB PERIPHERAL   8:30 AM   (15 min.)    [facility]   [facility]    ONC INFUSION 6 HR (360 MIN)   9:00 AM   (360 min.)    ONC INFUSION NURSE   [facility] 3       4     5     6     7     8     9    LAB PERIPHERAL  10:30 AM   (15 min.)    [facility]   [facility]    ONC INFUSION 2 HR (120 MIN)  11:00 AM   (120 min.)    ONC INFUSION NURSE   [facility] 10       11     12     13    VIDEO VISIT RETURN   3:25 PM   (40 min.)   Mg Patel MD   [facility] 14     15     16     17       18     19     20     21     22     23     24       25     26    PHACOEMULSIFICATION, CATARACT, WITH INTRAOCULAR LENS IMPLANT  11:25 AM   Randy Hi MD   [facility] OR    Outpatient Visit  11:25 AM   [facility] 27     28     29     30     31 August 2021 Sunday Monday Tuesday Wednesday Thursday Friday Saturday   1     2     3     4     5      6     7       8     9     10    [facility] POST-OP   7:30 AM   (15 min.)   Randy Hi MD   [facility] 11     12     13     14       15     16     17     18     19     20     21       22     23     24     25     26     27     28       29     30     31                                         Lab Results:  Recent Results (from the past 12 hour(s))   Comprehensive metabolic panel    Collection Time: 07/09/21 11:10 AM   Result Value Ref Range    Sodium 140 133 - 144 mmol/L    Potassium 4.4 3.4 - 5.3 mmol/L    Chloride 109 94 - 109 mmol/L    Carbon Dioxide 23 20 - 32 mmol/L    Anion Gap 8 3 - 14 mmol/L    Glucose 125 (H) 70 - 99 mg/dL    Urea Nitrogen 17 7 - 30 mg/dL    Creatinine 1.05 0.66 - 1.25 mg/dL    GFR Estimate 67 >60 mL/min/[1.73_m2]    GFR Estimate If Black 78 >60 mL/min/[1.73_m2]    Calcium 8.8 8.5 - 10.1 mg/dL    Bilirubin Total 0.8 0.2 - 1.3 mg/dL    Albumin 3.2 (L) 3.4 - 5.0 g/dL    Protein Total 7.0 6.8 - 8.8 g/dL    Alkaline Phosphatase 155 (H) 40 - 150 U/L    ALT 28 0 - 70 U/L    AST 84 (H) 0 - 45 U/L   Lactate Dehydrogenase    Collection Time: 07/09/21 11:10 AM   Result Value Ref Range    Lactate Dehydrogenase 1,742 (H) 85 - 227 U/L   CBC with platelets differential    Collection Time: 07/09/21 11:55 AM   Result Value Ref Range    WBC 7.4 4.0 - 11.0 10e9/L    RBC Count 4.20 (L) 4.4 - 5.9 10e12/L    Hemoglobin 12.3 (L) 13.3 - 17.7 g/dL    Hematocrit 36.6 (L) 40.0 - 53.0 %    MCV 87 78 - 100 fl    MCH 29.3 26.5 - 33.0 pg    MCHC 33.6 31.5 - 36.5 g/dL    RDW 14.2 10.0 - 15.0 %    Platelet Count 105 (L) 150 - 450 10e9/L    Diff Method Manual Differential     % Neutrophils 66.9 %    % Lymphocytes 12.2 %    % Monocytes 11.3 %    % Eosinophils 0.9 %    % Basophils 0.0 %    % Metamyelocytes 3.5 %    % Myelocytes 1.7 %    % Promyelocytes 3.5 %    Nucleated RBCs 4 (H) 0 /100    Absolute Neutrophil 5.0 1.6 - 8.3 10e9/L    Absolute Lymphocytes 0.9 0.8 - 5.3 10e9/L    Absolute Monocytes 0.8 0.0  - 1.3 10e9/L    Absolute Eosinophils 0.1 0.0 - 0.7 10e9/L    Absolute Basophils 0.0 0.0 - 0.2 10e9/L    Absolute Metamyelocytes 0.3 (H) 0 10e9/L    Absolute Myelocytes 0.1 (H) 0 10e9/L    Absolute Promyeloctyes 0.3 (H) 0 10e9/L    Absolute Nucleated RBC 0.3     Anisocytosis Slight     Platelet Estimate Confirming automated cell count

## 2021-07-09 NOTE — TELEPHONE ENCOUNTER
Reached out to Favian to answer his questions regarding the upcoming surgery 07/26 with Dr. Randy Hi. Scheduled a COVID test at [facility] 07/24    As for the time of surgery, I explained we do not give out the time of surgery until 1-2 business days prior to the procedure. Favian should receive a phone call 1-2 business days before surgery with the arrival time/surgery time and instructions. Please call [phone] if you haven t heard from the nurses the day prior to surgery.

## 2021-07-13 NOTE — LETTER
7/13/2021       RE: Galileo Mayberry  [address]  [facility] [address]     Dear Colleague,    Thank you for referring your patient, Galileo Mayberry, [facility]. Please see a copy of my visit note below.    Favian is a 76 year old who is being evaluated via a billable video visit.      How would you like to obtain your AVS? MyChart  If the video visit is dropped, the invitation should be resent by: Send to e-mail at: [email or web address]  Will anyone else be joining your video visit? No       ALEX El      Palliative [facility] Outpatient [facility]    (This note was transcribed using voice recognition software. While I review and edit the transcription, I may miss errors, and the software sometimes does unexpected capitalizations and formatting that I miss. Please let me know of any serious mistranscriptions and I will addend this note.)    Patient ID:  Medical - He has widespread uveal melanoma  Late 2020-21 receiving Y90 for local control of his bulky liver mets and ipi/nivo systemically.     Jan 2021 scans showed widespread progression, plan is to continue nivo as maintenance therapy  3/2021 scans with progressive lesions, plan is to continue nivo  4/2021 PET showed widespread progression, although clinically he was doing well, plan is to add carbo/taxol for a couple cycles over summer  4/2021 palliative RT to T9-11  6/2021 continued progression; Dr Trent discussed prognosis ?6-12 mo with ongoing treatment; patient wants to continue active treatment with hopes of longer life; plan is to repeat ipi/nivo+carbo/taxol in July     We follow mostly for fatigue, insomnia, & care planning. [facility] doc manages his pain.     Social - Lives with wife. AF . . Has 1 dtr in [place], 1 in [facility]. . He is 100% service [facility].     Care Planning - no HCD on our chart but he  "is completing one at home. 2/12/21 Code status discussion, hospice education; he wants a DNR/DNI order, POLST sent at that visit. Strong Mandaen chente.    History:  History gathered today from: patient, family/loved ones    \"Well it's a great day for me, I didn't sleep well last night, but he has had a great day, very active, did errands.\"    Overall having some nausea, anorexia, weakness, fatigue after the chemo.  He asks if these are typical side effects and we discuss that they are.    Insomnia: takes 1h to get to sleep sometimes but then sleeps soundly overall.     Having joint pains, at times severe. Dilaudid no help. Took morphine last night and woke up this am feeling great. Doesn't often take morphine (MSContin).     Appetite remains poor; continues to make great efforts to keep weight up.    Doing PT with a PT    PE: There were no vitals taken for this visit.   Wt Readings from Last 3 Encounters:   07/09/21 94.3 kg (208 lb)   07/02/21 93.6 kg (206 lb 4.8 oz)   06/04/21 96.8 kg (213 lb 8 oz)     Alert NAD  In good spirits  Clear sensorium      Data reviewed:  I reviewed recent labs and imaging, my comments:  LDH 1700, was 2000 on 7/2. Cr 1.  MRIs a month ago--diffuse metastatic disease, no clear progression or concerning findigs     database reviewed: y      Impression & Recommendations:  77 yo with metastatic melanoma     Cancer pain: I recommend he try he morphine more often, eg nightly. O/w no change.It's not clear to me why today is so much better than most but ti may be because he took the morphine and woke up with less pain.    Insomnia: no great solutions, continue doxepin    Anorexia: nutrition referral.     He is well informed about his cancer, coping well, philosophical about his terminal illness, we discussed this today      42 minutes spent on the date of the encounter doing chart review, history and exam, patient education & counseling, documentation and other activities as noted " above.    Thank you for involving us in the patient's care.   Mg Patel MD / Palliative Medicine / Text me via [facility].      Originating Location (pt. Location): Home    Distant Location (provider location):  [facility]     Platform used for Video Visit: Natasha      Again, thank you for allowing me to participate in the care of your patient.      Sincerely,    Mg Patel MD

## 2021-07-13 NOTE — PROGRESS NOTES
"Favian is a 76 year old who is being evaluated via a billable video visit.      How would you like to obtain your AVS? MyChart  If the video visit is dropped, the invitation should be resent by: Send to e-mail at: [email or web address]  Will anyone else be joining your video visit? No       ALEX El      [facility]    (This note was transcribed using voice recognition software. While I review and edit the transcription, I may miss errors, and the software sometimes does unexpected capitalizations and formatting that I miss. Please let me know of any serious mistranscriptions and I will addend this note.)    Patient ID:  Medical - He has widespread uveal melanoma  Late 2020-21 receiving Y90 for local control of his bulky liver mets and ipi/nivo systemically.     Jan 2021 scans showed widespread progression, plan is to continue nivo as maintenance therapy  3/2021 scans with progressive lesions, plan is to continue nivo  4/2021 PET showed widespread progression, although clinically he was doing well, plan is to add carbo/taxol for a couple cycles over summer  4/2021 palliative RT to T9-11  6/2021 continued progression; Dr Trent discussed prognosis ?6-12 mo with ongoing treatment; patient wants to continue active treatment with hopes of longer life; plan is to repeat ipi/nivo+carbo/taxol in July     We follow mostly for fatigue, insomnia, & care planning. [facility] doc manages [facility].     Social - Lives with wife. AF . . Has 1 dtr in [place], 1 in [facility]. . He is 100% service [facility].     Care Planning - no HCD on our chart but he is completing one at home. 2/12/21 Code status discussion, hospice education; he wants a DNR/DNI order, POLST sent at that visit. Strong Islam chente.    History:  History gathered today from: patient, family/loved ones    \"Well it's a great day for me, I didn't sleep well last night, but he has had a great " "day, very active, did errands.\"    Overall having some nausea, anorexia, weakness, fatigue after the chemo.  He asks if these are typical side effects and we discuss that they are.    Insomnia: takes 1h to get to sleep sometimes but then sleeps soundly overall.     Having joint pains, at times severe. Dilaudid no help. Took morphine last night and woke up this am feeling great. Doesn't often take morphine (MSContin).     Appetite remains poor; continues to make great efforts to keep weight up.    Doing PT with a PT    PE: There were no vitals taken for this visit.   Wt Readings from Last 3 Encounters:   07/09/21 94.3 kg (208 lb)   07/02/21 93.6 kg (206 lb 4.8 oz)   06/04/21 96.8 kg (213 lb 8 oz)     Alert NAD  In good spirits  Clear sensorium      Data reviewed:  I reviewed recent labs and imaging, my comments:  LDH 1700, was 2000 on 7/2. Cr 1.  MRIs a month ago--diffuse metastatic disease, no clear progression or concerning findigs     database reviewed: y      Impression & Recommendations:  77 yo with metastatic melanoma     Cancer pain: I recommend he try he morphine more often, eg nightly. O/w no change.It's not clear to me why today is so much better than most but ti may be because he took the morphine and woke up with less pain.    Insomnia: no great solutions, continue doxepin    Anorexia: nutrition referral.     He is well informed about his cancer, coping well, philosophical about his terminal illness, we discussed this today      42 minutes spent on the date of the encounter doing chart review, history and exam, patient education & counseling, documentation and other activities as noted above.    Thank you for involving us in the patient's care.   Mg Patel MD / Palliative Medicine / Text me via [facility].      Originating Location (pt. Location): Home    Distant Location (provider location):  [facility]     Platform used for Video Visit: Natasha"

## 2021-07-14 NOTE — TELEPHONE ENCOUNTER
Nutrition Education Scheduling Outreach #1:    Call to patient to schedule. Left message with phone number to call to schedule.    Plan for 2nd outreach attempt within 1 week.    [facility]  Diabetes and Nutrition Scheduling

## 2021-07-20 NOTE — TELEPHONE ENCOUNTER
Calls in today overall exhausted.   Having a hard time eating, taking in protein shakes, Organ organic protein shakes.   Yesterday ate a normal breakfast, went out and did some errands and then at 1 pm came home and took a 2 hour nap.   Took ondansetron and dilaudid and then felt better.   Able to be up around the house and do some errands.   His big question is how to eat healthy so he can keep up his strength. Dr Patel placed a referral for Nutrition consult. Gave information on where to call for his nutrition referral.   Went over fatigue protocol with Favian. That fatigue is a cumulative side effect of chemotherapy.  Wanted Dr Trent to know that he is having increased fatigue.

## 2021-07-21 NOTE — TELEPHONE ENCOUNTER
Nutrition Education Scheduling Outreach #2:    Call to patient to schedule. Patient unavailable.    Maryellen Ellis  [facility]  Diabetes and Nutrition Scheduling

## 2021-07-23 NOTE — PROGRESS NOTES
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 11/2019, he is diagnosed with ocular melanoma, after a choroidal lesion was found in his Left eye.  Patient reports over the last several months he developed new floaters/spots in his L eye.  2. 11/25/2019, he was seen by ophthalmology who noted a left eye, elevated bilobed lesion, size 6.28mm x 18.31(T) x 17.04(L).   3. 12/14/19, CT-CAP showed no evidence of metastatic disease.  4. 1/10/2020: Patient referred to [facility]. B-scan ultrasound of left eye showed elevated bilobed lesion measuring 7.2-7.3 height with a base of 21.4 x 22.5 mm. Low to medium reflectivity. Difficult measurements due to location. Ciliary body involvement was noted. Ultrasound basal dimension measurement included subretinal fluid, and clinical measurement had to be done by transillumination due to anterior tumor location.  5. 1/15/2020, visual acuity right eye 20/20 -1, left eye 20/25 +2 nh. Visual fields counting fingers full bilaterally. Clinical fundus exam of left eye revealed choroidal/ciliary body malignant melanoma with basal measurement of 22 x 21 mm and height of 7.5 mm. Located in the Inferior left eye from 4:30-7:30, 15 mm to disc, 15 mm to fovea, +subretinal fluid, bilobed. The tumor without fluid appears to be 1-2 mm smaller in basal dimension (20 x 19 mm).   6. 2/10/2020, Iodine-125 24 mm plaque placement delivering 85 Gy to an 8 mm height. FNA biopsy performed, and molecular testing shows Class II PRAME positive, high-risk of early metastasis.  7. 3/31/2020, he starts adjuvant sunitinib 25 mg daily for high risk disease. He completed 6 months of therapy.  8. 8/26/2020, MRI-liver shows interval development of numerous (more than 30) metastatic lesions in the liver in bilobar distribution. The largest in segment JUAN measures 1.85 cm.  9. 10/19/2020, he undergoes  Y90 radioembolization with 72.2 mCi of Theraspheres to the right lobe of the liver; predominantly in hepatic segments 5 and 6, milder uptake in the region of segment 8  10. 10/23/2020, first cycle of Ipilimumab 1mg/kg and nivolumab 3mg/kg. He received 4 cycles of Ipi/Nivo.  11. 11/16/2020, he has his 2nd Y90 radioembolization with 69 mCi of TheraSpheres to hepatic segments 4A and 4B.       12. 1/15/2020, he starts nivolumab 480 mg IV monthly maintenance.  13. 4/7/2021, receives 2000 cGy in 5 fractions to T9-T11.    History of Present Illness:  Mr. 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 He returns in follow-up today.    He had chemotherapy on 7/2 and then ipi/nivo on 7/9. He tells me it took about 12 days for him to finally come around. The first 2-3 days were not so bad, but once the steroids wore off he felt the weight of his treatment. He was overcome with fatigue and had significant nausea. Oral intake diminished and it became more difficult to take in adequate nutrition. He also did not keep up with his pain medication.    However, the last few days he has started to feel better. His hair started falling out about 3-4 days ago. His fatigue is lifting. He notes constipation, but feels this could be related to poor po intake. He is taking senna and Miralax.    ECOG performance status is 1.    Review of Systems:  12-point ROS negative except as in HPI    Current Outpatient Medications   Medication Sig Dispense Refill     acetaminophen (TYLENOL) 500 MG tablet Take 1,000 mg by mouth       Ascorbic Acid (VITAMIN C) 500 MG CAPS Take 500 mg by mouth       calcium carbonate (OS-FAUSTO) 500 MG tablet Take 1 tablet (500 mg) by mouth 2 times daily 60 tablet 3     Cholecalciferol (VITAMIN D3) 25 MCG (1000 UT) CAPS Takes 3 daily       diclofenac (VOLTAREN) 1 % topical gel        doxepin (SINEQUAN) 10 MG/ML (HIGH CONC) solution Take 0.6 mLs (6 mg) by mouth At Bedtime 18 mL 1     Emollient (AQUAPHOR ADVANCED  THERAPY) OINT APPLY SMALL AMOUNT TOPICALLY EVERY DAY TO MOISTURIZE RASH       Ferrous Gluconate 240 (27 Fe) MG TABS Take 240 mg by mouth       finasteride (PROSCAR) 5 MG tablet Take 5 mg by mouth       gabapentin (NEURONTIN) 100 MG capsule TAKE ONE CAPSULE BY MOUTH AT BEDTIME AS NEEDED FOR HAND NUMBNESS       HYDROmorphone (DILAUDID) 2 MG tablet Take 0.5 tablets (1 mg) by mouth every 6 hours as needed for moderate to severe pain 30 tablet 0     HYDROmorphone (DILAUDID) 2 MG tablet Take 1 tablet (2 mg) by mouth every 6 hours as needed for pain 10 tablet 0     LORazepam (ATIVAN) 0.5 MG tablet Take 1 tablet (0.5 mg) by mouth every 4 hours as needed (Anxiety, Nausea/Vomiting or Sleep) 30 tablet 3     meloxicam (MOBIC) 15 MG tablet TAKE ONE TABLET BY MOUTH DAILY AS NEEDED FOR PAIN. TAKE WITH FOOD       methylphenidate (RITALIN) 10 MG tablet as needed       ondansetron (ZOFRAN) 4 MG tablet Take 1-2 tablets (4-8 mg) by mouth every 6 hours as needed for nausea (vomiting) 40 tablet 0     polyethylene glycol (MIRALAX) 17 GM/Dose powder Take 17 g (1 capful) by mouth 2 times daily 255 g 1     polyvinyl alcohol (LIQUIFILM TEARS) 1.4 % ophthalmic solution INSTILL ONE DROP IN BOTH EYES FOUR TIMES A DAY AS NEEDED FOR DRY EYES       prochlorperazine (COMPAZINE) 10 MG tablet Take 1 tablet (10 mg) by mouth every 6 hours as needed (nausea/vomiting) (Patient not taking: Reported on 7/2/2021) 30 tablet 5     prochlorperazine (COMPAZINE) 10 MG tablet Take 1 tablet (10 mg) by mouth every 6 hours as needed (Nausea/Vomiting) (Patient not taking: Reported on 7/2/2021) 30 tablet 3     senna-docusate (SENNA S) 8.6-50 MG tablet Take 1 tablet by mouth 2 times daily as needed for constipation 60 tablet 1     tamsulosin (FLOMAX) 0.4 MG capsule Take 0.4 mg by mouth       Testosterone 1.62 % GEL          Past Medical History:   Diagnosis Date     BPH (benign prostatic hyperplasia)      Degenerative joint disease      Metastatic melanoma (H)     L eye      Nonsenile cataract      Torn rotator cuff 03/25/2021    right     Past Surgical History:   Procedure Laterality Date     ------------OTHER-------------  2014    back surgery L4/L5      ------------OTHER-------------      knee surgery both 1961 Right, 1971 Left knee     AS REMOVAL OF KIDNEY STONE  2015     IR SIRT (SELECTIVE INTERNAL RADIO THERAPY)  10/13/2020     IR VISCERAL ANGIOGRAM  10/13/2020     IR VISCERAL EMBOLIZATION  10/19/2020     IR VISCERAL EMBOLIZATION  11/16/2020     JOINT REPLACEMENT  2017    Both kneses replaced (2017 and 2018)     ROTATOR CUFF REPAIR RT/LT Right 2016     SURGICAL HISTORY OF -   05/2021    ablation of right shoulder     TURP  2000     Family History   Problem Relation Age of Onset     Glaucoma Mother      Prostate Cancer Father      Cancer Sister      Macular Degeneration No family hx of        Physical Examination:  Wt 93.3 kg (205 lb 9.6 oz)   BMI 28.68 kg/m    Wt Readings from Last 5 Encounters:   07/23/21 93.3 kg (205 lb 9.6 oz)   07/09/21 94.3 kg (208 lb)   07/02/21 93.6 kg (206 lb 4.8 oz)   06/04/21 96.8 kg (213 lb 8 oz)   05/07/21 97.5 kg (215 lb)   GENERAL: Healthy, alert and no distress  EYES: Eyes grossly normal to inspection.  No discharge or erythema, or obvious scleral/conjunctival abnormalities.  HENT: Normal cephalic/atraumatic.  External ears, nose and mouth without ulcers or lesions.  No nasal drainage visible.  NECK: No asymmetry, visible masses or scars  RESP: No audible wheeze, cough, or visible cyanosis.  No visible retractions or increased work of breathing.    SKIN: Visible skin clear. No significant rash, abnormal pigmentation or lesions.  NEURO: Cranial nerves grossly intact.  Mentation and speech appropriate for age.  PSYCH: Cranial nerves are intact, normal strength. Affect normal/bright, judgement and insight intact, normal speech and appearance well-groomed.    Labs:  No visits with results within 1 Week(s) from this visit.   Latest known visit with  results is:   Infusion Therapy Visit on 07/09/2021   Component Date Value Ref Range Status     Sodium 07/09/2021 140  133 - 144 mmol/L Final     Potassium 07/09/2021 4.4  3.4 - 5.3 mmol/L Final     Chloride 07/09/2021 109  94 - 109 mmol/L Final     Carbon Dioxide 07/09/2021 23  20 - 32 mmol/L Final     Anion Gap 07/09/2021 8  3 - 14 mmol/L Final     Glucose 07/09/2021 125* 70 - 99 mg/dL Final     Urea Nitrogen 07/09/2021 17  7 - 30 mg/dL Final     Creatinine 07/09/2021 1.05  0.66 - 1.25 mg/dL Final     GFR Estimate 07/09/2021 67  >60 mL/min/[1.73_m2] Final     GFR Estimate If Black 07/09/2021 78  >60 mL/min/[1.73_m2] Final     Calcium 07/09/2021 8.8  8.5 - 10.1 mg/dL Final     Bilirubin Total 07/09/2021 0.8  0.2 - 1.3 mg/dL Final     Albumin 07/09/2021 3.2* 3.4 - 5.0 g/dL Final     Protein Total 07/09/2021 7.0  6.8 - 8.8 g/dL Final     Alkaline Phosphatase 07/09/2021 155* 40 - 150 U/L Final     ALT 07/09/2021 28  0 - 70 U/L Final     AST 07/09/2021 84* 0 - 45 U/L Final     Lactate Dehydrogenase 07/09/2021 1,742* 85 - 227 U/L Final     WBC 07/09/2021 7.4  4.0 - 11.0 10e9/L Final     RBC Count 07/09/2021 4.20* 4.4 - 5.9 10e12/L Final     Hemoglobin 07/09/2021 12.3* 13.3 - 17.7 g/dL Final     Hematocrit 07/09/2021 36.6* 40.0 - 53.0 % Final     MCV 07/09/2021 87  78 - 100 fl Final     MCH 07/09/2021 29.3  26.5 - 33.0 pg Final     MCHC 07/09/2021 33.6  31.5 - 36.5 g/dL Final     RDW 07/09/2021 14.2  10.0 - 15.0 % Final     Platelet Count 07/09/2021 105* 150 - 450 10e9/L Final     Diff Method 07/09/2021 Manual Differential   Final     % Neutrophils 07/09/2021 66.9  % Final     % Lymphocytes 07/09/2021 12.2  % Final     % Monocytes 07/09/2021 11.3  % Final     % Eosinophils 07/09/2021 0.9  % Final     % Basophils 07/09/2021 0.0  % Final     % Metamyelocytes 07/09/2021 3.5  % Final     % Myelocytes 07/09/2021 1.7  % Final     % Promyelocytes 07/09/2021 3.5  % Final     Nucleated RBCs 07/09/2021 4* 0 /100 Final     Absolute  Neutrophil 07/09/2021 5.0  1.6 - 8.3 10e9/L Final     Absolute Lymphocytes 07/09/2021 0.9  0.8 - 5.3 10e9/L Final     Absolute Monocytes 07/09/2021 0.8  0.0 - 1.3 10e9/L Final     Absolute Eosinophils 07/09/2021 0.1  0.0 - 0.7 10e9/L Final     Absolute Basophils 07/09/2021 0.0  0.0 - 0.2 10e9/L Final     Absolute Metamyelocytes 07/09/2021 0.3* 0 10e9/L Final     Absolute Myelocytes 07/09/2021 0.1* 0 10e9/L Final     Absolute Promyeloctyes 07/09/2021 0.3* 0 10e9/L Final     Absolute Nucleated RBC 07/09/2021 0.3   Final     Anisocytosis 07/09/2021 Slight   Final     Platelet Estimate 07/09/2021 Confirming automated cell count   Final       ASSESSMENT/PLAN    #1 Metastatic uveal melanoma, bone, liver, lung, and soft tissue  #2 Choroidal melanoma with ciliary body involvement, left eye, status-post plaque brachytherapy, Class II and PRAME mRNA positive  #3 Shoulder and hip pain, moderate  It was a pleasure to see Mr. Mayberry today. He is a 76 year old man with metastatic uveal melanoma.  He underwent radioembolization and Ipi/Nivo and is currently maintained on nivolumab.     He has had one cycle of combined chemoimmunotherapy. LDH has decreased from over 2034 to 1742. He has had the desired response thus far. The ultimate goal is to reduce the number of immunosuppressive immune cells with chemotherapy while de-inhibiting effector T cells and NK cells through the use of immune checkpoint blockade.    Given tolerability concerns I will limit him to just one cycle of chemotherapy. We will plan to proceed with immunotherapy alone going forward for an additional 3 cycles of ipilimumab and nivolumab, as tolerated.    -Plan to proceed with Ipi/Nivo alone  -We will add Lactulose for constipation          Jw Reyes M.D.   of Medicine  Hematology, Oncology and Transplantation

## 2021-07-23 NOTE — H&P (VIEW-ONLY)
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 11/2019, he is diagnosed with ocular melanoma, after a choroidal lesion was found in his Left eye.  Patient reports over the last several months he developed new floaters/spots in his L eye.  2. 11/25/2019, he was seen by ophthalmology who noted a left eye, elevated bilobed lesion, size 6.28mm x 18.31(T) x 17.04(L).   3. 12/14/19, CT-CAP showed no evidence of metastatic disease.  4. 1/10/2020: Patient referred to [facility]. B-scan ultrasound of left eye showed elevated bilobed lesion measuring 7.2-7.3 height with a base of 21.4 x 22.5 mm. Low to medium reflectivity. Difficult measurements due to location. Ciliary body involvement was noted. Ultrasound basal dimension measurement included subretinal fluid, and clinical measurement had to be done by transillumination due to anterior tumor location.  5. 1/15/2020, visual acuity right eye 20/20 -1, left eye 20/25 +2 nh. Visual fields counting fingers full bilaterally. Clinical fundus exam of left eye revealed choroidal/ciliary body malignant melanoma with basal measurement of 22 x 21 mm and height of 7.5 mm. Located in the Inferior left eye from 4:30-7:30, 15 mm to disc, 15 mm to fovea, +subretinal fluid, bilobed. The tumor without fluid appears to be 1-2 mm smaller in basal dimension (20 x 19 mm).   6. 2/10/2020, Iodine-125 24 mm plaque placement delivering 85 Gy to an 8 mm height. FNA biopsy performed, and molecular testing shows Class II PRAME positive, high-risk of early metastasis.  7. 3/31/2020, he starts adjuvant sunitinib 25 mg daily for high risk disease. He completed 6 months of therapy.  8. 8/26/2020, MRI-liver shows interval development of numerous (more than 30) metastatic lesions in the liver in bilobar distribution. The largest in segment JUAN measures 1.85 cm.  9. 10/19/2020, he undergoes  Y90 radioembolization with 72.2 mCi of Theraspheres to the right lobe of the liver; predominantly in hepatic segments 5 and 6, milder uptake in the region of segment 8  10. 10/23/2020, first cycle of Ipilimumab 1mg/kg and nivolumab 3mg/kg. He received 4 cycles of Ipi/Nivo.  11. 11/16/2020, he has his 2nd Y90 radioembolization with 69 mCi of TheraSpheres to hepatic segments 4A and 4B.       12. 1/15/2020, he starts nivolumab 480 mg IV monthly maintenance.  13. 4/7/2021, receives 2000 cGy in 5 fractions to T9-T11.    History of Present Illness:  Mr. 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 He returns in follow-up today.    He had chemotherapy on 7/2 and then ipi/nivo on 7/9. He tells me it took about 12 days for him to finally come around. The first 2-3 days were not so bad, but once the steroids wore off he felt the weight of his treatment. He was overcome with fatigue and had significant nausea. Oral intake diminished and it became more difficult to take in adequate nutrition. He also did not keep up with his pain medication.    However, the last few days he has started to feel better. His hair started falling out about 3-4 days ago. His fatigue is lifting. He notes constipation, but feels this could be related to poor po intake. He is taking senna and Miralax.    ECOG performance status is 1.    Review of Systems:  12-point ROS negative except as in HPI    Current Outpatient Medications   Medication Sig Dispense Refill     acetaminophen (TYLENOL) 500 MG tablet Take 1,000 mg by mouth       Ascorbic Acid (VITAMIN C) 500 MG CAPS Take 500 mg by mouth       calcium carbonate (OS-FAUSTO) 500 MG tablet Take 1 tablet (500 mg) by mouth 2 times daily 60 tablet 3     Cholecalciferol (VITAMIN D3) 25 MCG (1000 UT) CAPS Takes 3 daily       diclofenac (VOLTAREN) 1 % topical gel        doxepin (SINEQUAN) 10 MG/ML (HIGH CONC) solution Take 0.6 mLs (6 mg) by mouth At Bedtime 18 mL 1     Emollient (AQUAPHOR ADVANCED  THERAPY) OINT APPLY SMALL AMOUNT TOPICALLY EVERY DAY TO MOISTURIZE RASH       Ferrous Gluconate 240 (27 Fe) MG TABS Take 240 mg by mouth       finasteride (PROSCAR) 5 MG tablet Take 5 mg by mouth       gabapentin (NEURONTIN) 100 MG capsule TAKE ONE CAPSULE BY MOUTH AT BEDTIME AS NEEDED FOR HAND NUMBNESS       HYDROmorphone (DILAUDID) 2 MG tablet Take 0.5 tablets (1 mg) by mouth every 6 hours as needed for moderate to severe pain 30 tablet 0     HYDROmorphone (DILAUDID) 2 MG tablet Take 1 tablet (2 mg) by mouth every 6 hours as needed for pain 10 tablet 0     LORazepam (ATIVAN) 0.5 MG tablet Take 1 tablet (0.5 mg) by mouth every 4 hours as needed (Anxiety, Nausea/Vomiting or Sleep) 30 tablet 3     meloxicam (MOBIC) 15 MG tablet TAKE ONE TABLET BY MOUTH DAILY AS NEEDED FOR PAIN. TAKE WITH FOOD       methylphenidate (RITALIN) 10 MG tablet as needed       ondansetron (ZOFRAN) 4 MG tablet Take 1-2 tablets (4-8 mg) by mouth every 6 hours as needed for nausea (vomiting) 40 tablet 0     polyethylene glycol (MIRALAX) 17 GM/Dose powder Take 17 g (1 capful) by mouth 2 times daily 255 g 1     polyvinyl alcohol (LIQUIFILM TEARS) 1.4 % ophthalmic solution INSTILL ONE DROP IN BOTH EYES FOUR TIMES A DAY AS NEEDED FOR DRY EYES       prochlorperazine (COMPAZINE) 10 MG tablet Take 1 tablet (10 mg) by mouth every 6 hours as needed (nausea/vomiting) (Patient not taking: Reported on 7/2/2021) 30 tablet 5     prochlorperazine (COMPAZINE) 10 MG tablet Take 1 tablet (10 mg) by mouth every 6 hours as needed (Nausea/Vomiting) (Patient not taking: Reported on 7/2/2021) 30 tablet 3     senna-docusate (SENNA S) 8.6-50 MG tablet Take 1 tablet by mouth 2 times daily as needed for constipation 60 tablet 1     tamsulosin (FLOMAX) 0.4 MG capsule Take 0.4 mg by mouth       Testosterone 1.62 % GEL          Past Medical History:   Diagnosis Date     BPH (benign prostatic hyperplasia)      Degenerative joint disease      Metastatic melanoma (H)     L eye      Nonsenile cataract      Torn rotator cuff 03/25/2021    right     Past Surgical History:   Procedure Laterality Date     ------------OTHER-------------  2014    back surgery L4/L5      ------------OTHER-------------      knee surgery both 1961 Right, 1971 Left knee     AS REMOVAL OF KIDNEY STONE  2015     IR SIRT (SELECTIVE INTERNAL RADIO THERAPY)  10/13/2020     IR VISCERAL ANGIOGRAM  10/13/2020     IR VISCERAL EMBOLIZATION  10/19/2020     IR VISCERAL EMBOLIZATION  11/16/2020     JOINT REPLACEMENT  2017    Both kneses replaced (2017 and 2018)     ROTATOR CUFF REPAIR RT/LT Right 2016     SURGICAL HISTORY OF -   05/2021    ablation of right shoulder     TURP  2000     Family History   Problem Relation Age of Onset     Glaucoma Mother      Prostate Cancer Father      Cancer Sister      Macular Degeneration No family hx of        Physical Examination:  Wt 93.3 kg (205 lb 9.6 oz)   BMI 28.68 kg/m    Wt Readings from Last 5 Encounters:   07/23/21 93.3 kg (205 lb 9.6 oz)   07/09/21 94.3 kg (208 lb)   07/02/21 93.6 kg (206 lb 4.8 oz)   06/04/21 96.8 kg (213 lb 8 oz)   05/07/21 97.5 kg (215 lb)   GENERAL: Healthy, alert and no distress  EYES: Eyes grossly normal to inspection.  No discharge or erythema, or obvious scleral/conjunctival abnormalities.  HENT: Normal cephalic/atraumatic.  External ears, nose and mouth without ulcers or lesions.  No nasal drainage visible.  NECK: No asymmetry, visible masses or scars  RESP: No audible wheeze, cough, or visible cyanosis.  No visible retractions or increased work of breathing.    SKIN: Visible skin clear. No significant rash, abnormal pigmentation or lesions.  NEURO: Cranial nerves grossly intact.  Mentation and speech appropriate for age.  PSYCH: Cranial nerves are intact, normal strength. Affect normal/bright, judgement and insight intact, normal speech and appearance well-groomed.    Labs:  No visits with results within 1 Week(s) from this visit.   Latest known visit with  results is:   Infusion Therapy Visit on 07/09/2021   Component Date Value Ref Range Status     Sodium 07/09/2021 140  133 - 144 mmol/L Final     Potassium 07/09/2021 4.4  3.4 - 5.3 mmol/L Final     Chloride 07/09/2021 109  94 - 109 mmol/L Final     Carbon Dioxide 07/09/2021 23  20 - 32 mmol/L Final     Anion Gap 07/09/2021 8  3 - 14 mmol/L Final     Glucose 07/09/2021 125* 70 - 99 mg/dL Final     Urea Nitrogen 07/09/2021 17  7 - 30 mg/dL Final     Creatinine 07/09/2021 1.05  0.66 - 1.25 mg/dL Final     GFR Estimate 07/09/2021 67  >60 mL/min/[1.73_m2] Final     GFR Estimate If Black 07/09/2021 78  >60 mL/min/[1.73_m2] Final     Calcium 07/09/2021 8.8  8.5 - 10.1 mg/dL Final     Bilirubin Total 07/09/2021 0.8  0.2 - 1.3 mg/dL Final     Albumin 07/09/2021 3.2* 3.4 - 5.0 g/dL Final     Protein Total 07/09/2021 7.0  6.8 - 8.8 g/dL Final     Alkaline Phosphatase 07/09/2021 155* 40 - 150 U/L Final     ALT 07/09/2021 28  0 - 70 U/L Final     AST 07/09/2021 84* 0 - 45 U/L Final     Lactate Dehydrogenase 07/09/2021 1,742* 85 - 227 U/L Final     WBC 07/09/2021 7.4  4.0 - 11.0 10e9/L Final     RBC Count 07/09/2021 4.20* 4.4 - 5.9 10e12/L Final     Hemoglobin 07/09/2021 12.3* 13.3 - 17.7 g/dL Final     Hematocrit 07/09/2021 36.6* 40.0 - 53.0 % Final     MCV 07/09/2021 87  78 - 100 fl Final     MCH 07/09/2021 29.3  26.5 - 33.0 pg Final     MCHC 07/09/2021 33.6  31.5 - 36.5 g/dL Final     RDW 07/09/2021 14.2  10.0 - 15.0 % Final     Platelet Count 07/09/2021 105* 150 - 450 10e9/L Final     Diff Method 07/09/2021 Manual Differential   Final     % Neutrophils 07/09/2021 66.9  % Final     % Lymphocytes 07/09/2021 12.2  % Final     % Monocytes 07/09/2021 11.3  % Final     % Eosinophils 07/09/2021 0.9  % Final     % Basophils 07/09/2021 0.0  % Final     % Metamyelocytes 07/09/2021 3.5  % Final     % Myelocytes 07/09/2021 1.7  % Final     % Promyelocytes 07/09/2021 3.5  % Final     Nucleated RBCs 07/09/2021 4* 0 /100 Final     Absolute  Neutrophil 07/09/2021 5.0  1.6 - 8.3 10e9/L Final     Absolute Lymphocytes 07/09/2021 0.9  0.8 - 5.3 10e9/L Final     Absolute Monocytes 07/09/2021 0.8  0.0 - 1.3 10e9/L Final     Absolute Eosinophils 07/09/2021 0.1  0.0 - 0.7 10e9/L Final     Absolute Basophils 07/09/2021 0.0  0.0 - 0.2 10e9/L Final     Absolute Metamyelocytes 07/09/2021 0.3* 0 10e9/L Final     Absolute Myelocytes 07/09/2021 0.1* 0 10e9/L Final     Absolute Promyeloctyes 07/09/2021 0.3* 0 10e9/L Final     Absolute Nucleated RBC 07/09/2021 0.3   Final     Anisocytosis 07/09/2021 Slight   Final     Platelet Estimate 07/09/2021 Confirming automated cell count   Final       ASSESSMENT/PLAN    #1 Metastatic uveal melanoma, bone, liver, lung, and soft tissue  #2 Choroidal melanoma with ciliary body involvement, left eye, status-post plaque brachytherapy, Class II and PRAME mRNA positive  #3 Shoulder and hip pain, moderate  It was a pleasure to see Mr. Mayberry today. He is a 76 year old man with metastatic uveal melanoma.  He underwent radioembolization and Ipi/Nivo and is currently maintained on nivolumab.     He has had one cycle of combined chemoimmunotherapy. LDH has decreased from over 2034 to 1742. He has had the desired response thus far. The ultimate goal is to reduce the number of immunosuppressive immune cells with chemotherapy while de-inhibiting effector T cells and NK cells through the use of immune checkpoint blockade.    Given tolerability concerns I will limit him to just one cycle of chemotherapy. We will plan to proceed with immunotherapy alone going forward for an additional 3 cycles of ipilimumab and nivolumab, as tolerated.    -Plan to proceed with Ipi/Nivo alone  -We will add Lactulose for constipation          Jw Reyes M.D.   of Medicine  Hematology, Oncology and Transplantation

## 2021-07-23 NOTE — NURSING NOTE
"Oncology Rooming Note    July 23, 2021 11:10 AM   Galileo Mayberry is a 76 year old male who presents for:    Chief Complaint   Patient presents with     Oncology Clinic Visit     bone metastasis     Initial Vitals: /63   Pulse 60   Temp 97.9  F (36.6  C) (Oral)   Wt 93.3 kg (205 lb 9.6 oz)   SpO2 95%   BMI 28.68 kg/m   Estimated body mass index is 28.68 kg/m  as calculated from the following:    Height as of 3/29/21: 1.803 m (5' 11\").    Weight as of this encounter: 93.3 kg (205 lb 9.6 oz). Body surface area is 2.16 meters squared.  Mild Pain (3) Comment: Data Unavailable   No LMP for male patient.  Allergies reviewed: Yes  Medications reviewed: Yes    Medications: Medication refills not needed today.  Pharmacy name entered into EPIC:    [facility] - [place], [place] - [id number] - [facility] [address] [id number]    Clinical concerns: none       Bree Lundberg CMA            "

## 2021-07-23 NOTE — LETTER
7/23/2021         RE: Galileo Mayberry  [address] [place] [address]        Dear Colleague,    Thank you for referring your patient, Galileo Mayberry, to the [facility]. Please see a copy of my visit note below.    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 11/2019, he is diagnosed with ocular melanoma, after a choroidal lesion was found in his Left eye.  Patient reports over the last several months he developed new floaters/spots in his L eye.  2. 11/25/2019, he was seen by ophthalmology who noted a left eye, elevated bilobed lesion, size 6.28mm x 18.31(T) x 17.04(L).   3. 12/14/19, CT-CAP showed no evidence of metastatic disease.  4. 1/10/2020: Patient referred to [facility]. B-scan ultrasound of left eye showed elevated bilobed lesion measuring 7.2-7.3 height with a base of 21.4 x 22.5 mm. Low to medium reflectivity. Difficult measurements due to location. Ciliary body involvement was noted. Ultrasound basal dimension measurement included subretinal fluid, and clinical measurement had to be done by transillumination due to anterior tumor location.  5. 1/15/2020, visual acuity right eye 20/20 -1, left eye 20/25 +2 nh. Visual fields counting fingers full bilaterally. Clinical fundus exam of left eye revealed choroidal/ciliary body malignant melanoma with basal measurement of 22 x 21 mm and height of 7.5 mm. Located in the Inferior left eye from 4:30-7:30, 15 mm to disc, 15 mm to fovea, +subretinal fluid, bilobed. The tumor without fluid appears to be 1-2 mm smaller in basal dimension (20 x 19 mm).   6. 2/10/2020, Iodine-125 24 mm plaque placement delivering 85 Gy to an 8 mm height. FNA biopsy performed, and molecular testing shows Class II PRAME positive, high-risk of early metastasis.  7. 3/31/2020, he starts adjuvant sunitinib 25 mg daily  for high risk disease. He completed 6 months of therapy.  8. 8/26/2020, MRI-liver shows interval development of numerous (more than 30) metastatic lesions in the liver in bilobar distribution. The largest in segment JUAN measures 1.85 cm.  9. 10/19/2020, he undergoes Y90 radioembolization with 72.2 mCi of Theraspheres to the right lobe of the liver; predominantly in hepatic segments 5 and 6, milder uptake in the region of segment 8  10. 10/23/2020, first cycle of Ipilimumab 1mg/kg and nivolumab 3mg/kg. He received 4 cycles of Ipi/Nivo.  11. 11/16/2020, he has his 2nd Y90 radioembolization with 69 mCi of TheraSpheres to hepatic segments 4A and 4B.       12. 1/15/2020, he starts nivolumab 480 mg IV monthly maintenance.  13. 4/7/2021, receives 2000 cGy in 5 fractions to T9-T11.    History of Present Illness:  Mr. 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 He returns in follow-up today.    He had chemotherapy on 7/2 and then ipi/nivo on 7/9. He tells me it took about 12 days for him to finally come around. The first 2-3 days were not so bad, but once the steroids wore off he felt the weight of his treatment. He was overcome with fatigue and had significant nausea. Oral intake diminished and it became more difficult to take in adequate nutrition. He also did not keep up with his pain medication.    However, the last few days he has started to feel better. His hair started falling out about 3-4 days ago. His fatigue is lifting. He notes constipation, but feels this could be related to poor po intake. He is taking senna and Miralax.    ECOG performance status is 1.    Review of Systems:  12-point ROS negative except as in HPI    Current Outpatient Medications   Medication Sig Dispense Refill     acetaminophen (TYLENOL) 500 MG tablet Take 1,000 mg by mouth       Ascorbic Acid (VITAMIN C) 500 MG CAPS Take 500 mg by mouth       calcium carbonate (OS-FAUSTO) 500 MG tablet Take 1 tablet (500 mg) by mouth 2 times  daily 60 tablet 3     Cholecalciferol (VITAMIN D3) 25 MCG (1000 UT) CAPS Takes 3 daily       diclofenac (VOLTAREN) 1 % topical gel        doxepin (SINEQUAN) 10 MG/ML (HIGH CONC) solution Take 0.6 mLs (6 mg) by mouth At Bedtime 18 mL 1     Emollient (AQUAPHOR ADVANCED THERAPY) OINT APPLY SMALL AMOUNT TOPICALLY EVERY DAY TO MOISTURIZE RASH       Ferrous Gluconate 240 (27 Fe) MG TABS Take 240 mg by mouth       finasteride (PROSCAR) 5 MG tablet Take 5 mg by mouth       gabapentin (NEURONTIN) 100 MG capsule TAKE ONE CAPSULE BY MOUTH AT BEDTIME AS NEEDED FOR HAND NUMBNESS       HYDROmorphone (DILAUDID) 2 MG tablet Take 0.5 tablets (1 mg) by mouth every 6 hours as needed for moderate to severe pain 30 tablet 0     HYDROmorphone (DILAUDID) 2 MG tablet Take 1 tablet (2 mg) by mouth every 6 hours as needed for pain 10 tablet 0     LORazepam (ATIVAN) 0.5 MG tablet Take 1 tablet (0.5 mg) by mouth every 4 hours as needed (Anxiety, Nausea/Vomiting or Sleep) 30 tablet 3     meloxicam (MOBIC) 15 MG tablet TAKE ONE TABLET BY MOUTH DAILY AS NEEDED FOR PAIN. TAKE WITH FOOD       methylphenidate (RITALIN) 10 MG tablet as needed       ondansetron (ZOFRAN) 4 MG tablet Take 1-2 tablets (4-8 mg) by mouth every 6 hours as needed for nausea (vomiting) 40 tablet 0     polyethylene glycol (MIRALAX) 17 GM/Dose powder Take 17 g (1 capful) by mouth 2 times daily 255 g 1     polyvinyl alcohol (LIQUIFILM TEARS) 1.4 % ophthalmic solution INSTILL ONE DROP IN BOTH EYES FOUR TIMES A DAY AS NEEDED FOR DRY EYES       prochlorperazine (COMPAZINE) 10 MG tablet Take 1 tablet (10 mg) by mouth every 6 hours as needed (nausea/vomiting) (Patient not taking: Reported on 7/2/2021) 30 tablet 5     prochlorperazine (COMPAZINE) 10 MG tablet Take 1 tablet (10 mg) by mouth every 6 hours as needed (Nausea/Vomiting) (Patient not taking: Reported on 7/2/2021) 30 tablet 3     senna-docusate (SENNA S) 8.6-50 MG tablet Take 1 tablet by mouth 2 times daily as needed for  constipation 60 tablet 1     tamsulosin (FLOMAX) 0.4 MG capsule Take 0.4 mg by mouth       Testosterone 1.62 % GEL          Past Medical History:   Diagnosis Date     BPH (benign prostatic hyperplasia)      Degenerative joint disease      Metastatic melanoma (H)     L eye     Nonsenile cataract      Torn rotator cuff 03/25/2021    right     Past Surgical History:   Procedure Laterality Date     ------------OTHER-------------  2014    back surgery L4/L5      ------------OTHER-------------      knee surgery both 1961 Right, 1971 Left knee     AS REMOVAL OF KIDNEY STONE  2015     IR SIRT (SELECTIVE INTERNAL RADIO THERAPY)  10/13/2020     IR VISCERAL ANGIOGRAM  10/13/2020     IR VISCERAL EMBOLIZATION  10/19/2020     IR VISCERAL EMBOLIZATION  11/16/2020     JOINT REPLACEMENT  2017    Both kneses replaced (2017 and 2018)     ROTATOR CUFF REPAIR RT/LT Right 2016     SURGICAL HISTORY OF -   05/2021    ablation of right shoulder     TURP  2000     Family History   Problem Relation Age of Onset     Glaucoma Mother      Prostate Cancer Father      Cancer Sister      Macular Degeneration No family hx of        Physical Examination:  Wt 93.3 kg (205 lb 9.6 oz)   BMI 28.68 kg/m    Wt Readings from Last 5 Encounters:   07/23/21 93.3 kg (205 lb 9.6 oz)   07/09/21 94.3 kg (208 lb)   07/02/21 93.6 kg (206 lb 4.8 oz)   06/04/21 96.8 kg (213 lb 8 oz)   05/07/21 97.5 kg (215 lb)   GENERAL: Healthy, alert and no distress  EYES: Eyes grossly normal to inspection.  No discharge or erythema, or obvious scleral/conjunctival abnormalities.  HENT: Normal cephalic/atraumatic.  External ears, nose and mouth without ulcers or lesions.  No nasal drainage visible.  NECK: No asymmetry, visible masses or scars  RESP: No audible wheeze, cough, or visible cyanosis.  No visible retractions or increased work of breathing.    SKIN: Visible skin clear. No significant rash, abnormal pigmentation or lesions.  NEURO: Cranial nerves grossly intact.  Mentation  and speech appropriate for age.  PSYCH: Cranial nerves are intact, normal strength. Affect normal/bright, judgement and insight intact, normal speech and appearance well-groomed.    Labs:  No visits with results within 1 Week(s) from this visit.   Latest known visit with results is:   Infusion Therapy Visit on 07/09/2021   Component Date Value Ref Range Status     Sodium 07/09/2021 140  133 - 144 mmol/L Final     Potassium 07/09/2021 4.4  3.4 - 5.3 mmol/L Final     Chloride 07/09/2021 109  94 - 109 mmol/L Final     Carbon Dioxide 07/09/2021 23  20 - 32 mmol/L Final     Anion Gap 07/09/2021 8  3 - 14 mmol/L Final     Glucose 07/09/2021 125* 70 - 99 mg/dL Final     Urea Nitrogen 07/09/2021 17  7 - 30 mg/dL Final     Creatinine 07/09/2021 1.05  0.66 - 1.25 mg/dL Final     GFR Estimate 07/09/2021 67  >60 mL/min/[1.73_m2] Final     GFR Estimate If Black 07/09/2021 78  >60 mL/min/[1.73_m2] Final     Calcium 07/09/2021 8.8  8.5 - 10.1 mg/dL Final     Bilirubin Total 07/09/2021 0.8  0.2 - 1.3 mg/dL Final     Albumin 07/09/2021 3.2* 3.4 - 5.0 g/dL Final     Protein Total 07/09/2021 7.0  6.8 - 8.8 g/dL Final     Alkaline Phosphatase 07/09/2021 155* 40 - 150 U/L Final     ALT 07/09/2021 28  0 - 70 U/L Final     AST 07/09/2021 84* 0 - 45 U/L Final     Lactate Dehydrogenase 07/09/2021 1,742* 85 - 227 U/L Final     WBC 07/09/2021 7.4  4.0 - 11.0 10e9/L Final     RBC Count 07/09/2021 4.20* 4.4 - 5.9 10e12/L Final     Hemoglobin 07/09/2021 12.3* 13.3 - 17.7 g/dL Final     Hematocrit 07/09/2021 36.6* 40.0 - 53.0 % Final     MCV 07/09/2021 87  78 - 100 fl Final     MCH 07/09/2021 29.3  26.5 - 33.0 pg Final     MCHC 07/09/2021 33.6  31.5 - 36.5 g/dL Final     RDW 07/09/2021 14.2  10.0 - 15.0 % Final     Platelet Count 07/09/2021 105* 150 - 450 10e9/L Final     Diff Method 07/09/2021 Manual Differential   Final     % Neutrophils 07/09/2021 66.9  % Final     % Lymphocytes 07/09/2021 12.2  % Final     % Monocytes 07/09/2021 11.3  % Final      % Eosinophils 07/09/2021 0.9  % Final     % Basophils 07/09/2021 0.0  % Final     % Metamyelocytes 07/09/2021 3.5  % Final     % Myelocytes 07/09/2021 1.7  % Final     % Promyelocytes 07/09/2021 3.5  % Final     Nucleated RBCs 07/09/2021 4* 0 /100 Final     Absolute Neutrophil 07/09/2021 5.0  1.6 - 8.3 10e9/L Final     Absolute Lymphocytes 07/09/2021 0.9  0.8 - 5.3 10e9/L Final     Absolute Monocytes 07/09/2021 0.8  0.0 - 1.3 10e9/L Final     Absolute Eosinophils 07/09/2021 0.1  0.0 - 0.7 10e9/L Final     Absolute Basophils 07/09/2021 0.0  0.0 - 0.2 10e9/L Final     Absolute Metamyelocytes 07/09/2021 0.3* 0 10e9/L Final     Absolute Myelocytes 07/09/2021 0.1* 0 10e9/L Final     Absolute Promyeloctyes 07/09/2021 0.3* 0 10e9/L Final     Absolute Nucleated RBC 07/09/2021 0.3   Final     Anisocytosis 07/09/2021 Slight   Final     Platelet Estimate 07/09/2021 Confirming automated cell count   Final       ASSESSMENT/PLAN    #1 Metastatic uveal melanoma, bone, liver, lung, and soft tissue  #2 Choroidal melanoma with ciliary body involvement, left eye, status-post plaque brachytherapy, Class II and PRAME mRNA positive  #3 Shoulder and hip pain, moderate  It was a pleasure to see Mr. Mayberry today. He is a 76 year old man with metastatic uveal melanoma.  He underwent radioembolization and Ipi/Nivo and is currently maintained on nivolumab.     He has had one cycle of combined chemoimmunotherapy. LDH has decreased from over 2034 to 1742. He has had the desired response thus far. The ultimate goal is to reduce the number of immunosuppressive immune cells with chemotherapy while de-inhibiting effector T cells and NK cells through the use of immune checkpoint blockade.    Given tolerability concerns I will limit him to just one cycle of chemotherapy. We will plan to proceed with immunotherapy alone going forward for an additional 3 cycles of ipilimumab and nivolumab, as tolerated.    -Plan to proceed with Ipi/Nivo alone  -We  will add Lactulose for constipation          Jw Reyes M.D.   of Medicine  Hematology, Oncology and Transplantation      Again, thank you for allowing me to participate in the care of your patient.        Sincerely,        Jw Trent MD

## 2021-07-26 NOTE — ANESTHESIA CARE TRANSFER NOTE
Patient: Galileo Mayberry    Procedure(s):  LEFT EYE PHACOEMULSIFICATION, COMPLEX CATARACT, WITH INTRAOCULAR LENS IMPLANT    Diagnosis: Age-related nuclear cataract of both eyes [H25.13]  Diagnosis Additional Information: No value filed.    Anesthesia Type:   MAC     Note:    Oropharynx: spontaneously breathing  Level of Consciousness: awake  Oxygen Supplementation: room air      Dentition: dentition unchanged  Vital Signs Stable: post-procedure vital signs reviewed and stable  Report to RN Given: handoff report given  Patient transferred to: Phase II    Handoff Report: Identifed the Patient, Identified the Reponsible Provider, Reviewed the pertinent medical history, Discussed the surgical course, Reviewed Intra-OP anesthesia mangement and issues during anesthesia, Set expectations for post-procedure period and Allowed opportunity for questions and acknowledgement of understanding      Vitals:  Vitals Value Taken Time   BP     Temp 36.9  C (98.4  F) 07/26/21 1044   Pulse 59 07/26/21 1044   Resp 16 07/26/21 1044   SpO2         Electronically Signed By: KENDELL Lima CRNA  July 26, 2021  10:45 AM

## 2021-07-26 NOTE — ANESTHESIA PREPROCEDURE EVALUATION
Anesthesia Pre-Procedure Evaluation    Patient: Galileo Mayberry   MRN: [id number] : 1945        Preoperative Diagnosis: Age-related nuclear cataract of both eyes [H25.13]   Procedure : Procedure(s):  LEFT EYE PHACOEMULSIFICATION, CATARACT, WITH INTRAOCULAR LENS IMPLANT     Past Medical History:   Diagnosis Date     BPH (benign prostatic hyperplasia)      Degenerative joint disease      Metastatic melanoma (H)     L eye     Nonsenile cataract      Torn rotator cuff 2021    right      Past Surgical History:   Procedure Laterality Date     ------------OTHER-------------      back surgery L4/L5      ------------OTHER-------------      knee surgery both  Right,  Left knee     AS REMOVAL OF KIDNEY STONE       IR SIRT (SELECTIVE INTERNAL RADIO THERAPY)  10/13/2020     IR VISCERAL ANGIOGRAM  10/13/2020     IR VISCERAL EMBOLIZATION  10/19/2020     IR VISCERAL EMBOLIZATION  2020     JOINT REPLACEMENT      Both kneses replaced ( and )     ROTATOR CUFF REPAIR RT/LT Right 2016     SURGICAL HISTORY OF 2021    ablation of right shoulder     TURP        Allergies   Allergen Reactions     Vardenafil Other (See Comments) and Nausea     Contrast Dye Rash     20: Per patient, CT dye allergy      Social History     Tobacco Use     Smoking status: Never Smoker     Smokeless tobacco: Never Used   Substance Use Topics     Alcohol use: Not Currently     Alcohol/week: 1.0 standard drinks     Types: 1 Glasses of wine per week      Wt Readings from Last 1 Encounters:   21 90.3 kg (199 lb)        Anesthesia Evaluation            ROS/MED HX  ENT/Pulmonary:       Neurologic:       Cardiovascular:     (+) -----Previous cardiac testing   Echo: Date: Results:    Stress Test: Date: Results:    ECG Reviewed: Date: 20 Results:  SB  Cath: Date: Results:      METS/Exercise Tolerance:     Hematologic:       Musculoskeletal:       GI/Hepatic:       Renal/Genitourinary:     (+)  renal disease, type: CRI, BPH,     Endo:       Psychiatric/Substance Use: Comment: PTSD    (+) psychiatric history anxiety     Infectious Disease:       Malignancy: Comment: Ocular melanoma with bone and liver mets  (+) Malignancy, Skin CA Active status post Chemo.        Other:            Physical Exam    Airway        Mallampati: II   TM distance: > 3 FB   Neck ROM: full   Mouth opening: > 3 cm    Respiratory Devices and Support         Dental     Comment: Poor dentition        Cardiovascular   cardiovascular exam normal          Pulmonary   pulmonary exam normal                OUTSIDE LABS:  CBC:   Lab Results   Component Value Date    WBC 7.4 07/09/2021    WBC 3.2 (L) 07/02/2021    HGB 12.3 (L) 07/09/2021    HGB 12.9 (L) 07/02/2021    HCT 36.6 (L) 07/09/2021    HCT 38.9 (L) 07/02/2021     (L) 07/09/2021     (L) 07/02/2021     BMP:   Lab Results   Component Value Date     07/09/2021     07/02/2021    POTASSIUM 4.4 07/09/2021    POTASSIUM 4.2 07/02/2021    CHLORIDE 109 07/09/2021    CHLORIDE 108 07/02/2021    CO2 23 07/09/2021    CO2 25 07/02/2021    BUN 17 07/09/2021    BUN 17 07/02/2021    CR 1.05 07/09/2021    CR 0.98 07/02/2021     (H) 07/09/2021     (H) 07/02/2021     COAGS:   Lab Results   Component Value Date    PTT 33 11/16/2020    INR 1.04 11/16/2020     POC: No results found for: BGM, HCG, HCGS  HEPATIC:   Lab Results   Component Value Date    ALBUMIN 3.2 (L) 07/09/2021    PROTTOTAL 7.0 07/09/2021    ALT 28 07/09/2021    AST 84 (H) 07/09/2021     (H) 12/04/2020    ALKPHOS 155 (H) 07/09/2021    BILITOTAL 0.8 07/09/2021     OTHER:   Lab Results   Component Value Date    FAUSTO 8.8 07/09/2021    PHOS 3.1 03/01/2021    MAG 2.4 (H) 07/02/2021    TSH 3.65 05/07/2021    CRP 3.0 12/04/2020    SED 20 12/04/2020       Anesthesia Plan    ASA Status:  3   NPO Status:  NPO Appropriate    Anesthesia Type: MAC.     - Reason for MAC: straight local not clinically adequate    Induction: Intravenous.   Maintenance: TIVA.        Consents    Anesthesia Plan(s) and associated risks, benefits, and realistic alternatives discussed. Questions answered and patient/representative(s) expressed understanding.     - Discussed with:  Patient         Postoperative Care    Pain management: Multi-modal analgesia.   PONV prophylaxis: Ondansetron (or other 5HT-3)     Comments:                ERON CRUZ MD

## 2021-07-26 NOTE — ANESTHESIA POSTPROCEDURE EVALUATION
Patient: Galileo Mayberry    Procedure(s):  LEFT EYE PHACOEMULSIFICATION, COMPLEX CATARACT, WITH INTRAOCULAR LENS IMPLANT    Diagnosis:Age-related nuclear cataract of both eyes [H25.13]  Diagnosis Additional Information: No value filed.    Anesthesia Type:  MAC    Note:  Disposition: Outpatient   Postop Pain Control: Uneventful            Sign Out: Well controlled pain   PONV: No   Neuro/Psych: Uneventful            Sign Out: Acceptable/Baseline neuro status   Airway/Respiratory: Uneventful            Sign Out: Acceptable/Baseline resp. status   CV/Hemodynamics: Uneventful            Sign Out: Acceptable CV status; No obvious hypovolemia; No obvious fluid overload   Other NRE: NONE   DID A NON-ROUTINE EVENT OCCUR? No           Last vitals:  Vitals Value Taken Time   BP 99/52 07/26/21 1100   Temp 36.9  C (98.4  F) 07/26/21 1044   Pulse 55 07/26/21 1100   Resp 16 07/26/21 1100   SpO2 94 % 07/26/21 1100       Electronically Signed By: ERON CRUZ MD  July 26, 2021  11:56 AM

## 2021-07-26 NOTE — DISCHARGE INSTRUCTIONS
"[facility]  Home Care Following Anesthesia  For 24 hours after surgery:  1. Get plenty of rest.  A responsible adult must stay with you for at least 24 hours after you leave the surgery center.  2. Do not drive or use heavy equipment.  If you have weakness or tingling, don't drive or use heavy equipment until this feeling goes away.   3. Do not drink alcohol.   4. Avoid strenuous or risky activities.  Ask for help when climbing stairs.  5. You may feel lightheaded.  IF so, sit for a few minutes before standing.  Have someone help you get up.   6. If you have nausea (feel sick to your stomach): Drink only clear liquids such as apple juice, ginger ale, broth or 7-Up.  Rest may also help.  Be sure to drink enough fluids.  Move to a regular diet as you feel able.   7. You may have a slight fever.  Call the doctor if your fever is over 100 F (37.7 C) (taken under the tongue) or lasts longer than 24 hours.  8. You may have a dry mouth, a sore throat, muscle aches or trouble sleeping. These should go away after 24 hours.  9. Do not make important or legal decisions.   10. It is recommended to avoid smoking.        Today you received a Marcaine or bupivacaine block to numb the nerves near your surgery site.  This is a block using local anesthetic or \"numbing\" medication injected around the nerves to anesthetize or \"numb\" the area supplied by those nerves.  This block is injected into the muscle layer near your surgical site.  The medication may numb the location where you had surgery for 6-18 hours, but may last up to 24 hours.  If your surgical site is an arm or leg you should be careful with your affected limb, since it is possible to injure your limb without being aware of it due to the numbing.  Until full feeling returns, you should guard against bumping or hitting your limb, and avoid extreme hot or cold temperatures on the skin.  As the block wears off, the feeling will return as " a tingling or prickly sensation near your surgical site.  You will experience more discomfort from your incision as the feeling returns.  You may want to take a pain pill (a narcotic or Tylenol if this was prescribed by your surgeon) when you start to experience mild pain before the pain beccomes more severe.  If your pain medications do not control your pain you should notifiy your surgeon.    Tips for taking pain medications  To get the best pain relief possible, remember these points:    Take pain medications as directed, before pain becomes severe.    Pain medication can upset your stomach: taking it with food may help.    Constipation is a common side effect of pain medication. Drink plenty of  fluids.    Eat foods high in fiber. Take a stool softener if recommended by your doctor or pharmacist.    Do not drink alcohol, drive or operate machinery while taking pain medications.    Ask about other ways to control pain, such as with heat, ice or relaxation.    Tylenol/Acetaminophen Consumption  To help encourage the safe use of acetaminophen, the makers of TYLENOL  have lowered the maximum daily dose for single-ingredient Extra Strength TYLENOL  (acetaminophen) products sold in the [place] from 8 pills per day (4,000 mg) to 6 pills per day (3,000 mg). The dosing interval has also changed from 2 pills every 4-6 hours to 2 pills every 6 hours.    If you feel your pain relief is insufficient, you may take Tylenol/Acetaminophen in addition to your narcotic pain medication.     Be careful not to exceed 3,000 mg of Tylenol/Acetaminophen in a 24 hour period from all sources.    If you are taking extra strength Tylenol/acetaminophen (500 mg), the maximum dose is 6 tablets in 24 hours.    If you are taking regular strength acetaminophen (325 mg), the maximum dose is 9 tablets in 24 hours.    Call a doctor for any of the followin. Signs of infection (fever, growing tenderness at the surgery site, a large amount of  drainage or bleeding, severe pain, foul-smelling drainage, redness, swelling).  2. It has been over 8 to 10 hours since surgery and you are still not able to urinate (pass water).  3. Headache for over 24 hours.  4. Numbness, tingling or weakness the day after surgery (if you had spinal anesthesia).  5. Signs of Covid-19 infection (temperature over 100 degrees, shortness of breath, cough, loss of taste/smell, generalized body aches, persistent headache, chills, sore throat, nausea/vomiting/diarrhea)  Your doctor is:       Dr. Randy Hi, Ophthalmology: [phone]               Or dial [phone] and ask for the resident on call for:  Ophthalmology  For emergency care, call the:  [facility]:  [phone] (TTY for hearing impaired: [phone])

## 2021-07-26 NOTE — OP NOTE
PREOPERATIVE DIAGNOSIS:   1. Age-related nuclear cataract of both eyes           POSTOPERATIVE DIAGNOSIS: Same   PROCEDURES:   1. Complex cataract extraction with intraocular lens implant Left eye.  SURGEON: Randy Hi M.D.  Assistant: Daria Ordoñez MD             INDICATIONS: The patient Galileo Mayberry presented to the eye clinic with decreased vision secondary to cataract in the Left eye. The risks, benefits and alternatives to cataract extraction were discussed. The patient elected to proceed. All questions were answered to the patient's satisfaction.   DESCRIPTION OF PROCEDURE:   Prior to the procedure, appropriate cardiac and respiratory monitors were applied to the patient.  In the pre-operative holding area, a drop of topical tetracaine followed by lidocaine gel followed by povidone iodine.  The patient was brought to the operating room where a surgical pause was carried out to identify with all members of the surgical team the correct surgical site.  With adequate anesthesia, the Left eye was prepped and draped in the usual sterile fashion. A lid speculum was placed, and the operating microscope was rotated into position. A paracentesis was created.  Through this limbal paracentesis, the anterior chamber was filled with preservative-free lidocaine followed by viscoelastic.  Due to poor mydriasis, a Malyugin (6.25 mm) capsular ring was inserted in to the eye to provide for pupillary expansion. A temporal wound was created at the limbus using a 2.6 mm blade. A capsulorrhexis was initiated using a bent 25-gauge needle and was completed in continuous and circular fashion using the capsulorrhexis forceps. The lens nucleus was hydrodissected using balanced salt solution.  The lens nucleus was rotated and removed using phacoemulsification in a stop and chop technique.  Residual cortical material was removed using irrigation-aspiration.  The capsular bag was reinflated to its maximal extent with cohesive  viscoelastic.  A 21.5 diopter ZCBOO inserted into the capsular bag.  The lens power selected was reviewed using the intraocular lens power measurements that were obtained preoperatively to confirm that the correct lens was selected for the desired post-operative refractive state. The ring was removed from the anterior chamber. The residual viscoelastic was removed in its entirety, the wound were hydrated and found to be self-sealing.  Intracameral moxifloxacin was administered. Tactile pressure was confirmed to be in a normal range.  Subconjunctival Dexamethasone (2 mg) was injected.. The lid speculum was removed and a patch and shield were applied.  The patient tolerated the procedure well, and there were no complications.    PLAN: The patient will be discharged to home and will follow up tomorrow morning in the eye clinic.  EBL:  None  Complications:  None  Implant Name Type Inv. Item Serial No.  Lot No. LRB No. Used Action   EYE IMP IOL RADHA PCL TECNIS ZCB00 21.5 - [id number] Lens/Eye Implant EYE IMP IOL RADHA PCL TECNIS ZCB00 21.5 [id number] [employer]  Left 1 Implanted         Attending Physician Procedure Attestation: I was present for the entire procedure       Randy Hi MD  , Comprehensive Ophthalmology  Department of Ophthalmology and Visual Neurosciences  [facility]

## 2021-07-26 NOTE — PROGRESS NOTES
POD#0, status post cataract surgery, left eye    No complaints.  Denies eye pain.        Impression/Plan:  Pseudophakia, OS: POD0, good post-operative appearance. IOP reasonable.    - Moxifloxacin TID for a week, then stop  - Prednisolone QID for a week, then 3/2/1/0 weekly taper    Eye protection at all times and eye shield at night for 1 week.    Limited activities with no exercise or heavy lifting for 1 week.    Instructed patient to contact us for decreasing vision, eye pain, new floaters or flashes of light or other concerning symptoms.    Written instructions given    Return to clinic as scheduled.    Daria Ordoñez MD  Ophthalmology PGY-4      Not seen by staff during this visit, available should need have arisen.  Plan appropriate as above.    Randy Hi MD  , Comprehensive Ophthalmology  Department of Ophthalmology and Visual Neurosciences  [facility]

## 2021-07-27 NOTE — TELEPHONE ENCOUNTER
[facility] Call Center    Phone Message    May a detailed message be left on voicemail: yes     Reason for Call: Other: Pt calling because he missed a call. Pt would like more clarification on how to take the medications Moxiflixacin and Prednisolone. Please follow up with the pt asap.    Action Taken: Message routed to:  [facility] Eye    Travel Screening: Not Applicable

## 2021-07-27 NOTE — TELEPHONE ENCOUNTER
I returned the patient's phone call and reviewed the eye drop instructions.  The patient expressed understanding.

## 2021-07-27 NOTE — TELEPHONE ENCOUNTER
[facility] Call Center    Phone Message    May a detailed message be left on voicemail: yes     Reason for Call: Medication Question or concern regarding medication   Prescription Clarification  Name of Medication:Moxifloxacin & Prednisolone   Prescribing Provider: Randy Hi MD   Pharmacy:[place], [place] - [address] [place] [address] [phone]   What on the order needs clarification? Pt is calling in because he would like direction on which medication he should taper off. Please contact the Pt with instructions.           Action Taken: Message routed to:  [facility] Eye    Travel Screening: Not Applicable

## 2021-07-30 NOTE — TELEPHONE ENCOUNTER
[facility] Telephone Triage Note    Assessment: Favian called in to triage reporting the following symptoms:   One week history of sudden onset of pain just above inside of LEFT elbow that shoots up into his shoulder and under LEFT scapula, and shoots down to hand. Pain is 7-9/10, and the only position of comfort is lying flat on his back. Pt needs to take pain medication--dilaudid and morphine to reduce the pain. The only time pain is 0/10 is when pt lies flat on his back and takes his dilaudid and morphine, dilaudid doesn't help enough when taken alone. Pain is described as stabbing or cramping and is debilitating since he cannot be up any more than 30 minutes per day before he has to lie down again due to the pain.    Associated symptoms are some sweating, some nausea (zofran helps), and some dizziness (takes a while to steady himself) when he stands after being on his back.     States he has been on his back 20 hours/day since onset of pain on Sunday, 7/25. He is concerned about deconditioning with being in bed so much. He considers the pain debilitating.    Pt reports that any movement or use of his LEFT arm causes shooting pain up and down his arm. Being in any position, except flat on his back, for > 30 minutes causes pain that requires him to lie flat on his back.      Pt denies chest pain, SOB, fever, illness, diarrhea  Denies family history of heart disease    [facility] states [facility].   [facility] metastatic ocular [facility]  Has not taken meds this a.m.     Paged Dr. Trent  Pt called back; Paged Jalyn Greenberg at 1146  Per Jalyn, pt should be evaluated in ED today as there is no open apt to offer him today.  Call made to Favian, advised him to go to ED for evaluation. Pt will be taking an Uber because his wife is ill today.  Call made to [facility] ED, spoke with ERIKA Schultz and gave report.    Recommendations: Discussed with Jalyn Greenberg who advised pt be seen in ED  due to unavailability of space in clinic.      Follow-Up: Pt advised to go to ED now. [place] ED closest. Report called to ERIKA Schultz. [facility] ED.

## 2021-07-30 NOTE — ED PROVIDER NOTES
"  History   Chief Complaint:  Arm Pain    HPI   Galileo Mayberry is a 76 year old male with history of metastatic uveal melanoma, chronic kidney disease, and hyperlipidemia who presents with bilateral upper extremity pain for the past 7-10 days. The patient states he had an onset of left shoulder blade cramping 3 weeks ago. Onset was during a PET scan. However, a week ago, the pain has increased to his bilateral arms and notes that the pain radiates up and down his arm from his medial elbow. He states the pain feels like an \"ice pick\" is hitting him and notes that the pain goes down to his fingers. States he sometimes has loss of sensation in parts of his arms (described as going to sleep, pins and needles) and notes no loss in strength but concern his hand may not work as well. He is currently taking both morphine and Dilaudid for pain management, but notes that this partially relieves symptoms. Denies any new chest pain or shortness of breath with the above symptoms. He endorses loss of appetite and increased abdominal bloating. Takes Gabapentin at night only. Of note, he has been working with a physical therapist for his arm pain and was recommended to stretch.    Review of Systems   Constitutional: Positive for appetite change.   Respiratory: Negative for shortness of breath.    Cardiovascular: Negative for chest pain.   Gastrointestinal: Positive for abdominal distention.   Musculoskeletal: Positive for myalgias.   Neurological: Positive for weakness and numbness.   All other systems reviewed and are negative.    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    Physical Exam     Patient Vitals for the past 24 hrs:   BP Temp Temp src Pulse Resp SpO2 Height Weight   07/30/21 1939 -- -- -- -- -- 95 % -- --   07/30/21 1937 113/73 -- -- 62 -- -- -- --   07/30/21 1434 120/65 97.8  F (36.6  C) Temporal 62 16 95 % 1.803 m (5' 11\") 90.3 kg (199 lb)       Physical Exam  Eyes:               Sclera white; Pupils are equal and round  ENT:                External ears and nares normal  CV:                  Rate as above with regular rhythm   Resp:               Breath sounds clear and equal bilaterally                          Non-labored, no retractions or accessory muscle use  MS:                  Moves all extremities  Neck:               No midline or paraspinal tenderness, full ROM  Back:               Tenderness along medial inferior edge of scapula  LUE:                Tenderness focally posterior shoulder musculature where arm and back connect.  Full ROM.  Rotating hand into back of neck increases paresthesias at elbow.  Strength 5/5 for \"ok\", , IO.  Strong radial pulse  Skin:                Warm and dry, no rash or ulceration  Neuro:             Speech is normal and fluent. No apparent deficit.    Emergency Department Course     Imaging:  XR Shoulder Left G/E 3 Views  1.  Left shoulder negative for fracture. No definite lytic lesion  visualized. Normal glenohumeral and " acromioclavicular joint alignment.  Mild glenohumeral osteoarthrosis. Moderate acromioclavicular  osteoarthrosis.  2.  1.3 cm nodular opacity adjacent to the aortic knob, may represent  1 of the patient's known pulmonary nodules.  Reading per radiology.    XR Cervical Spine 2-3 Views  Unchanged mild degenerative anterolisthesis of C7 on T1  and straightening of the normal cervical lordosis. There is also mild  anterolisthesis of C2 on C3 that is slightly more pronounced on the  swimmer's view as compared to the neutral lateral view, suggesting  possible mild dynamic component of C2-C3 spondylolisthesis. This is  likely degenerative. No gross vertebral body height loss. Mild to  moderate multilevel degenerative disc space narrowing, more pronounced  in the lower cervical spine. Marginal endplate osteophytes and  uncovertebral osteophytes. Moderate to advanced multilevel  degenerative facet arthropathy. The prevertebral soft tissues appear  normal. Soft tissue calcifications in the mid neck bilaterally which  likely represent carotid atherosclerotic calcifications.  Reading per radiology.    Laboratory:   CBC: WBC 5.5, HGB 12.1 (L),  (L)   CMP: Anion Gap 2 (L), Glucose 109 (H), Alkaline Phosphatase 152 (H), AST 82 (H), Albumin 3.1 (L), o/w WNL (Creatinine 0.95)   Magnesium: 2.4 (H)  CK total: 29 (L)    Emergency Department Course:    Reviewed:  I reviewed vitals, past medical history and care everywhere    Assessments:  1822 I obtained history and examined the patient as noted above.   2001 I rechecked the patient and explained findings.  2022 I rechecked the patient.    Interventions:  1931 Zanaflex 2 mg PO  1931 Ibuprofen 400 mg PO    Disposition:  The patient was discharged to home.     Impression & Plan   CMS Diagnoses: None    Medical Decision Making:  Galileo Mayberry is a 76 year old male who presents for evaluation of arm pain with symptoms concerning for nerve related pathology. He does not have any  pain radiating from the neck or tenderness or findings in this area and herniated disc is not suspected clinically. I suspected peripheral etiology and medial epicondylitis is high in the differential. He also has some areas of muscular tenderness around the scapula and shoulder that are likely contributing. Xrays of the scapula and the cervical spine do not show any acute pathology. Specifically there does not appear to be any pathologic fractures secondary to metasteses from his cancers. In addition to the pain medications he already has, he will start taking gabapentin during the day in addition to night time. He will be started on a low dose of muscle relaxer and was cautioned on the potential for drowsiness and dizziness. He states he is allowed to take NSAIDs and 400 mg of ibuprofen three times a day as needed. Was recommend he will need close follow up with his clinic and they recommended follow up with neurology as well.  Given there is no numbness and no strength deficits stroke was not suspected and intracranial imaging is not indicated.      Diagnosis:    ICD-10-CM    1. Rhomboid muscle pain  M79.18    2. Pain in joint of left shoulder  M25.512    3. Medial epicondylitis of elbow, left  M77.02    4. Paresthesias  R20.2      Discharge Medications:  Current Discharge Medication List      START taking these medications    Details   !! gabapentin (NEURONTIN) 100 MG capsule 100mg (1 capsule) in the morning and in the middle of the day  Qty: 20 capsule, Refills: 0      tiZANidine (ZANAFLEX) 2 MG tablet Take 1 tablet (2 mg) by mouth 3 times daily as needed (muscle relaxant)  Qty: 20 tablet, Refills: 0       !! - Potential duplicate medications found. Please discuss with provider.        Scribe Disclosure:  I, Fiona Alejandro, am serving as a scribe at 6:18 PM on 7/30/2021 to document services personally performed by Hawa Causey MD based on my observations and the provider's statements to me.     Marium  Hawa Mcdonough MD  07/30/21 2617

## 2021-07-30 NOTE — ED TRIAGE NOTES
"Pt reports history of Stage 4 cancer and reports increased pain in bilateral arms L>R that has become more constant for the past week. Pt rates pain 9/10 at its worst and feels like an ice pick. Pt has home medications (dilaudid and morphine) and has taken sparingly but without relief. Pt also reports poor appetite \"and feeling bloated all the time.\"  "

## 2021-08-06 NOTE — NURSING NOTE
Chief Complaint   Patient presents with     Blood Draw     Labs drawn from PIV placed in lab by RN. VS taken.      Labs drawn from PIV placed by RN. Line flushed with saline. Vitals taken. Pt checked in for appointment(s).  BP 86/52; patient states he has had diarrhea all week, but is feeling stable. Denies dizziness.     Jose White RN

## 2021-08-06 NOTE — PROGRESS NOTES
Infusion Nursing Note:  Galileo Mayberry presents today for Cycle 2, Day 8 Ipilumumab, Nivolumab.    Patient seen by provider today: No   present during visit today: Not Applicable.    Note: Pt assessed upon arrival to infusion suite. Denies fever, chills, cough, SOB or other signs/symptoms of infection. Pt did have a couple days earlier this week with diarrhea and saw his primary care MD. Since then, diarrhea has resolved. No further issues. Pt's BP in lab this morning 86/52. Pt denies feeling dizzy or lightheaded. BP checked in infusion improved to 96/58. Pt encouraged to drink more fluids. Rating pain 2/10 today in his shoulder. Declines any pain intervention while here today.     Intravenous Access:  Peripheral IV placed.    Treatment Conditions:  Lab Results   Component Value Date    HGB 12.1 08/06/2021    HGB 12.3 07/09/2021     Lab Results   Component Value Date    WBC 3.9 08/06/2021    WBC 7.4 07/09/2021      Lab Results   Component Value Date    ANEU 5.0 07/09/2021     Lab Results   Component Value Date     08/06/2021     07/09/2021      Lab Results   Component Value Date     08/06/2021     07/09/2021                   Lab Results   Component Value Date    POTASSIUM 4.4 08/06/2021    POTASSIUM 4.4 07/09/2021           Lab Results   Component Value Date    MAG 2.4 07/30/2021    MAG 2.4 07/02/2021            Lab Results   Component Value Date    CR 1.10 08/06/2021    CR 1.05 07/09/2021                   Lab Results   Component Value Date    FAUSTO 8.4 08/06/2021    FAUSTO 8.8 07/09/2021                Lab Results   Component Value Date    BILITOTAL 0.8 08/06/2021    BILITOTAL 0.8 07/09/2021           Lab Results   Component Value Date    ALBUMIN 3.1 08/06/2021    ALBUMIN 3.2 07/09/2021                    Lab Results   Component Value Date    ALT 22 08/06/2021    ALT 28 07/09/2021           Lab Results   Component Value Date    AST 86 08/06/2021    AST 84 07/09/2021      Results reviewed, labs MET treatment parameters, ok to proceed with treatment.    Post Infusion Assessment:  Patient tolerated infusion without incident.  Blood return noted pre and post infusion.  Site patent and intact, free from redness, edema or discomfort.  No evidence of extravasations.  Access discontinued per protocol.     Discharge Plan:   Patient declined prescription refills.  AVS to patient via [employer].  Patient will return 8/27/21 for next appointment. Message sent to schedulers to change 8/20 appt to 8/27.  Patient discharged in stable condition accompanied by: self.  Departure Mode: Ambulatory.      Perla Liang RN

## 2021-08-10 NOTE — NURSING NOTE
"Chief Complaints and History of Present Illnesses   Patient presents with     Post Op (Ophthalmology) Left Eye     S/p CE/IOL left eye 7/26/21     Chief Complaint(s) and History of Present Illness(es)     Post Op (Ophthalmology) Left Eye     Associated symptoms: dryness.  Negative for flashes, floaters and eye pain    Pain scale: 0/10    Comments: S/p CE/IOL left eye 7/26/21              Comments     Pt reports the vision has been good - states he hasn't really tried without his glasses  Does still note \"the cloud\" that floats around the vision - was previously treated with vitreolysis    Ocular meds:  Prednisolone BID (on taper schedule), left eye  AT's PRN    ERROL Morrissey 7:45 AM August 10, 2021                 "

## 2021-08-10 NOTE — PROGRESS NOTES
"Chief Complaint(s) and History of Present Illness(es)     Post Op (Ophthalmology) Left Eye     Associated symptoms: dryness.  Negative for flashes, floaters and eye   pain    Pain scale: 0/10    Comments: S/p CE/IOL left eye 7/26/21              Comments     Pt reports the vision has been good - states he hasn't really tried   without his glasses  Does still note \"the cloud\" that floats around the vision - was previously   treated with vitreolysis    Ocular meds:  Prednisolone BID (on taper schedule), left eye  AT's PRN    ERROL Morrissey 7:45 AM August 10, 2021             Review of systems for the eyes was negative other than the pertinent positives/negatives listed in the HPI.      Assessment & Plan      Galileo Mayberry is a 76 year old male with the following diagnoses:   1. Postsurgical state, eye - Left Eye    2. Pseudophakia - Left Eye    3. 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    Daria Ordoñez MD  Ophthalmology PGY-4         Attending Physician Attestation:  Complete documentation of historical and exam elements from today's encounter can be found in the full encounter summary report (not reduplicated in this progress note).  I personally obtained the chief complaint(s) and history of present illness.  I confirmed and edited as necessary the review of systems, past medical/surgical history, family history, social history, and examination findings as documented by others; and I examined the patient myself.  I personally reviewed the relevant tests, images, and reports as documented above.  I formulated and edited as necessary the assessment and plan and discussed the findings and management plan with the patient and family. . - Randy Hi MD  "

## 2021-08-12 NOTE — TELEPHONE ENCOUNTER
Glasses Rx updated.    Copied/pasted in separate [facility] message.    Left message complete and viewable in [employer] and provided direct number for any further assistance.    Shawn Zhang RN 8:40 AM 08/13/21    --    Spoke to pt at [phone]    Pt would like Rx for computer distance on top with reading Rx below--- Rx number 3    Will ask Dr. Hi to assist in amending 8- with final Rx #3 per request with computer on top/reading bottom     Will notify pt once complete    Shawn Zhang RN 2:45 PM 08/12/21            [facility] Call Center    Phone Message    May a detailed message be left on voicemail: yes     Reason for Call: Other: Favian calling to request a call back. He would like to speak to his care team in regards to his eye glasses Rx. Please call him back to discuss.      Action Taken: Message routed to:  [facility]  eye    Travel Screening: Not Applicable

## 2021-08-25 NOTE — PROGRESS NOTES
"Favian is a 76 year old who is being evaluated via a billable video visit.      How would you like to obtain your AVS? MyChart  If the video visit is dropped, the invitation should be resent by: Send to e-mail at: [email or web address]  Will anyone else be joining your video visit? No     I have reviewed and updated the patient's allergies and medication list.    Concerns: none  Refills: none     Vitals - Patient Reported  Weight (Patient Reported): 89.8 kg (198 lb)  Height (Patient Reported): 180.3 cm (5' 11\")  BMI (Based on Pt Reported Ht/Wt): 27.62  Pain Score: Mild Pain (2)  Pain Loc: Other - see comment (widespread)    Bree Lundberg CMA    Video-Visit Details    Type of service:  Video Visit    Video Start Time: 1:27 PM    Video End Time:1:47 PM    Originating Location (pt. 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 11/2019, he is diagnosed with ocular melanoma, after a choroidal lesion was found in his Left eye.  Patient reports over the last several months he developed new floaters/spots in his L eye.  2. 11/25/2019, he was seen by ophthalmology who noted a left eye, elevated bilobed lesion, size 6.28mm x 18.31(T) x 17.04(L).   3. 12/14/19, CT-CAP showed no evidence of metastatic disease.  4. 1/10/2020: Patient referred to [facility]. B-scan ultrasound of left eye showed elevated bilobed lesion measuring 7.2-7.3 height with a base of 21.4 x 22.5 mm. Low to medium reflectivity. Difficult measurements due to location. Ciliary body involvement was noted. Ultrasound basal dimension measurement included " subretinal fluid, and clinical measurement had to be done by transillumination due to anterior tumor location.  5. 1/15/2020, visual acuity right eye 20/20 -1, left eye 20/25 +2 nh. Visual fields counting fingers full bilaterally. Clinical fundus exam of left eye revealed choroidal/ciliary body malignant melanoma with basal measurement of 22 x 21 mm and height of 7.5 mm. Located in the Inferior left eye from 4:30-7:30, 15 mm to disc, 15 mm to fovea, +subretinal fluid, bilobed. The tumor without fluid appears to be 1-2 mm smaller in basal dimension (20 x 19 mm).   6. 2/10/2020, Iodine-125 24 mm plaque placement delivering 85 Gy to an 8 mm height. FNA biopsy performed, and molecular testing shows Class II PRAME positive, high-risk of early metastasis.  7. 3/31/2020, he starts adjuvant sunitinib 25 mg daily for high risk disease. He completed 6 months of therapy.  8. 8/26/2020, MRI-liver shows interval development of numerous (more than 30) metastatic lesions in the liver in bilobar distribution. The largest in segment JUAN measures 1.85 cm.  9. 10/19/2020, he undergoes Y90 radioembolization with 72.2 mCi of Theraspheres to the right lobe of the liver; predominantly in hepatic segments 5 and 6, milder uptake in the region of segment 8  10. 10/23/2020, first cycle of Ipilimumab 1mg/kg and nivolumab 3mg/kg. He received 4 cycles of Ipi/Nivo.  11. 11/16/2020, he has his 2nd Y90 radioembolization with 69 mCi of TheraSpheres to hepatic segments 4A and 4B.       12. 1/15/2020, he starts nivolumab 480 mg IV monthly maintenance.  13. 4/7/2021, receives 2000 cGy in 5 fractions to T9-T11.    History of Present Illness:  Mr. 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 He returns in follow-up today.    -No new symptoms.   -Had cataract surgery on the left eye with improvement in vision.  -Has dyspnea on exertion that has been getting progressively worse. Has a cough with green phlegm for a few weeks.   -Has had some  nausea in the last week, but no vomiting. Gets relief from Zofran.   -Has had constipation. Taking Senna-S bid and MiraLax bid.   -Fluid intake is fair. Urine is dark color.   -Eating is fair.   -[facility] with [facility].   -Has mild neuropathy in feet and in right hand that is unchanged that started after chemotherapy. Less bothersome recently.   -Denies any new lumps or bumps.   -[facility]   -Remains on pain medication, as [facility] by [facility].     Review of Systems:  Patient denies any of the following except if noted above: fevers, chills, difficulty with energy, vision or hearing changes, chest pain, dyspnea at rest, abdominal pain, vomiting, diarrhea, urinary concerns, headaches, issues with sleep or mood.    Current Outpatient Medications   Medication Sig Dispense Refill     acetaminophen (TYLENOL) 500 MG tablet Take 1,000 mg by mouth       ALPRAZolam (XANAX) 0.25 MG tablet TAKE ONE TABLET BY MOUTH EVERY DAY AS NEEDED FOR SEVERE ANXIETY; DON&apos;T TAKE WITHIN 4 HRS OF MORPHINE OR HYDROMORPHONE (Patient not taking: Reported on 7/23/2021)       Ascorbic Acid (VITAMIN C) 500 MG CAPS Take 500 mg by mouth       calcium carbonate (OS-FAUSTO) 500 MG tablet Take 1 tablet (500 mg) by mouth 2 times daily 60 tablet 3     Cholecalciferol (VITAMIN D3) 25 MCG (1000 UT) CAPS Takes 3 daily       diclofenac (VOLTAREN) 1 % topical gel        doxepin (SINEQUAN) 10 MG/ML (HIGH CONC) solution Take 0.6 mLs (6 mg) by mouth At Bedtime 18 mL 1     Ferrous Gluconate 240 (27 Fe) MG TABS Take 240 mg by mouth       finasteride (PROSCAR) 5 MG tablet Take 5 mg by mouth       gabapentin (NEURONTIN) 100 MG capsule 100mg (1 capsule) in the morning and in the middle of the day 20 capsule 0     HYDROmorphone (DILAUDID) 2 MG tablet Take 0.5 tablets (1 mg) by mouth every 6 hours as needed for moderate to severe pain 30 tablet 0     lactulose (CEPHULAC) 20 GM  packet Take 1 packet (20 g) by mouth 3 times daily as needed for constipation (Patient not taking: Reported on 8/6/2021) 60 packet 3     LORazepam (ATIVAN) 0.5 MG tablet Take 1 tablet (0.5 mg) by mouth every 4 hours as needed (Anxiety, Nausea/Vomiting or Sleep) 30 tablet 3     meloxicam (MOBIC) 15 MG tablet TAKE ONE TABLET BY MOUTH DAILY AS NEEDED FOR PAIN. TAKE WITH FOOD       methylphenidate (RITALIN) 10 MG tablet as needed       morphine (MS CONTIN) 15 MG CR tablet TAKE ONE TABLET BY MOUTH EVERY 12 HOURS AS NEEDED FOR BONE PAIN       ondansetron (ZOFRAN) 4 MG tablet Take 1-2 tablets (4-8 mg) by mouth every 6 hours as needed for nausea (vomiting) 40 tablet 0     polyethylene glycol (MIRALAX) 17 GM/Dose powder Take 17 g (1 capful) by mouth 2 times daily (Patient not taking: Reported on 8/6/2021) 255 g 1     polyvinyl alcohol (LIQUIFILM TEARS) 1.4 % ophthalmic solution INSTILL ONE DROP IN BOTH EYES FOUR TIMES A DAY AS NEEDED FOR DRY EYES       prednisoLONE acetate (PRED FORTE) 1 % ophthalmic suspension Apply 1 drop to eye 4 times daily To operative eye 10 mL 1     prochlorperazine (COMPAZINE) 10 MG tablet Take 1 tablet (10 mg) by mouth every 6 hours as needed (nausea/vomiting) (Patient not taking: Reported on 7/2/2021) 30 tablet 5     senna-docusate (SENNA S) 8.6-50 MG tablet Take 1 tablet by mouth 2 times daily as needed for constipation (Patient not taking: Reported on 8/6/2021) 60 tablet 1     sertraline (ZOLOFT) 50 MG tablet TAKE ONE-HALF TABLET BY MOUTH EVERY DAY       tamsulosin (FLOMAX) 0.4 MG capsule Take 0.4 mg by mouth       Testosterone 1.62 % GEL        tiZANidine (ZANAFLEX) 2 MG tablet Take 1 tablet (2 mg) by mouth 3 times daily as needed (muscle relaxant) 20 tablet 0     Objective:  General: patient appears well in no acute distress, alert and oriented, speech clear and fluid  Skin: no visualized rash or lesions on visualized skin  Resp: Appears to be breathing comfortably without accessory muscle  usage, speaking in full sentences, no audible wheezes or cough.  Psych: Coherent speech, normal rate and volume, able to articulate logical thoughts, able to abstract reason, no tangential thoughts, no hallucinations or delusions  Patient's affect is appropriate.    Labs:  Most Recent 3 CBC's:Recent Labs   Lab Test 08/06/21  0648 07/30/21  1443 07/09/21  1155   WBC 3.9* 5.5 7.4   HGB 12.1* 12.1* 12.3*   MCV 90 90 87   * 141* 105*    Most Recent 3 BMP's:  Recent Labs   Lab Test 08/06/21  0648 07/30/21  1443 07/09/21  1110    136 140   POTASSIUM 4.4 4.3 4.4   CHLORIDE 105 107 109   CO2 24 27 23   BUN 15 16 17   CR 1.10 0.95 1.05   ANIONGAP 8 2* 8   FAUSTO 8.4* 8.7 8.8   * 109* 125*    Most Recent 2 LFT's:  Recent Labs   Lab Test 08/06/21  0648 07/30/21  1443   AST 86* 82*   ALT 22 20   ALKPHOS 148 152*   BILITOTAL 0.8 0.9    Most Recent TSH and T4:  Recent Labs   Lab Test 05/07/21  1255   TSH 3.65     I reviewed the above labs today.    ASSESSMENT/PLAN    #1 Metastatic uveal melanoma, bone, liver, lung, and soft tissue  #2 Choroidal melanoma with ciliary body involvement, left eye, status-post plaque brachytherapy, Class II and PRAME mRNA positive  #3 Shoulder and hip pain, moderate  It was a pleasure to see Mr. Mayberry today. He is a 76 year old man with metastatic uveal melanoma.  He underwent radioembolization and Ipi/Nivo followed by maintainence nivolumab.  He has had one cycle of combined chemoimmunotherapy. LDH has decreased from over 2034 to 1742. He has had the desired response thus far. The ultimate goal is to reduce the number of immunosuppressive immune cells with chemotherapy while de-inhibiting effector T cells and NK cells through the use of immune checkpoint blockade.  Given tolerability concerns, we limited him to just one cycle of chemotherapy. He then proceeded with immunotherapy alone with plans for an additional 3 cycles of ipilimumab and nivolumab. He is doing well today and will  continue with cycle 3 ipi/nivo later this week, assuming his chest CT, as below, looks okay. Will tentatively plan for a PET/CT after 4 cycles of ipi/nivo.     #4 Cough and dyspnea on exertion.   Will obtain a chest CT to further assess.     Jalyn Greenberg PA-C  [facility]  [address] [place] [address]  [phone]    Addendum: Chest CT is concerning for disease progression. I reviewed this with Dr. Trent and discussed with the patient whether or not he wished to continue with treatment. Patient does prefer to continue with treatment.     8/27/21 Addendum: [facility] the [facility] C-spine imaging.   Epidural involvement at the C6-T1. Moderate to severe narrowing. No signal change. Will set him up with rad onc for consideration of palliative radiation. Patient has bilateral hand numbness. Will get the records sent to us here.

## 2021-08-27 NOTE — PROGRESS NOTES
Infusion Nursing Note:  Galileo Mayberry presents today for C3D8 Yervoy/Nivolumab.    Patient seen by provider today: No   present during visit today: Not Applicable.    Note: Patient reports feeling well. Denies fever/chills. Denies nausea/vomiting nor chest and abdominal discomfort. No new complaints made. Otherwise well.    Appointment is not made yet by the time patient left the facility. Instructed patient if unable to see next few days to call . Verbalized understanding.     Intravenous Access:  Peripheral IV placed.    Treatment Conditions:  Lab Results   Component Value Date    HGB 12.6 08/27/2021    HGB 12.3 07/09/2021     Lab Results   Component Value Date    WBC 4.5 08/27/2021    WBC 7.4 07/09/2021      Lab Results   Component Value Date    ANEU 5.0 07/09/2021     Lab Results   Component Value Date     08/27/2021     07/09/2021      Lab Results   Component Value Date     08/27/2021     07/09/2021                   Lab Results   Component Value Date    POTASSIUM 4.7 08/27/2021    POTASSIUM 4.4 07/09/2021           Lab Results   Component Value Date    MAG 2.3 08/27/2021    MAG 2.4 07/02/2021            Lab Results   Component Value Date    CR 0.97 08/27/2021    CR 1.05 07/09/2021                   Lab Results   Component Value Date    FAUSTO 8.8 08/27/2021    FAUSTO 8.8 07/09/2021                Lab Results   Component Value Date    BILITOTAL 1.4 08/27/2021    BILITOTAL 0.8 07/09/2021           Lab Results   Component Value Date    ALBUMIN 3.1 08/27/2021    ALBUMIN 3.2 07/09/2021                    Lab Results   Component Value Date    ALT 21 08/27/2021    ALT 28 07/09/2021           Lab Results   Component Value Date     08/27/2021    AST 84 07/09/2021       Results reviewed, labs did NOT meet treatment parameters: See TORB.    TORB: 8/27/21/Jalyn MATHUR/Neela Klein RN/ Ct scan result noted. Proceed with treatment as planned.     TORB:  8/27/21/Jalyn MATHUR/Neela Klein RN/  and T. Gal 1.4, to proceed with treatment as planned. For Xgeva, it should be given no sooner than every 4 weeks, usually we give every 4-6 weeks in patients who come in every 3 weeks.    Post Infusion Assessment:  Patient tolerated infusion without incident.  Blood return noted pre and post infusion.  Site patent and intact, free from redness, edema or discomfort.  No evidence of extravasations.  Access discontinued per protocol.       Discharge Plan:   Patient declined prescription refills.  Discharge instructions reviewed with: Patient.  Patient and/or family verbalized understanding of discharge instructions and all questions answered.  AVS to patient via [email or web address].  Patient will return in 4 weeks for next appointment. See notes above,   Patient discharged in stable condition accompanied by: .  Departure Mode: Wheelchair.      TREY DAVID, ERIKA

## 2021-08-27 NOTE — NURSING NOTE
Chief Complaint   Patient presents with     Blood Draw     Labs drawn via piv by rn in lab. VS taken.     Labs drawn from PIV placed by RN. Line flushed with saline. Vitals taken. Pt checked in for appointment(s).    Eleno Bartlett RN

## 2021-08-27 NOTE — PATIENT INSTRUCTIONS
Contact Numbers  [facility]: [phone]    After Hours:  [phone]  Triage: [phone]    Please call the [facility] Triage line if you experience a temperature greater than or equal to 100.5, shaking chills, have uncontrolled nausea, vomiting and/or diarrhea, dizziness, shortness of breath, chest pain, bleeding, unexplained bruising, or if you have any other new/concerning symptoms, questions or concerns.     If it is after hours, weekends, or holidays, please call the main hospital  at  [phone] and ask to speak to the Oncology doctor on call.     If you are having any concerning symptoms or wish to speak to a provider before your next infusion visit, please call your care coordinator or triage to notify them so we can adequately serve you.     If you need a refill on a narcotic prescription or other medication, please call triage before your infusion appointment.         August 2021 Sunday Monday Tuesday Wednesday Thursday Friday Saturday   1     2     3     4     5     6    LAB PERIPHERAL   6:30 AM   (15 min.)   [facility] LAB DRAW   [facility]    ONC INFUSION 2 HR (120 MIN)   7:00 AM   (120 min.)    ONC INFUSION NURSE   [facility] 7       8     9     10    [facility] POST-OP   7:30 AM   (15 min.)   Randy Hi MD   [facility] 11     12     13     14       15     16     17     18     19     20     21       22     23     24     25    VIDEO VISIT RETURN   1:00 PM   (45 min.)   Jalyn Greenberg PA-C   [facility] 26    CT CHEST WO   4:20 PM   (20 min.)   [facility]SC[facility]   [facility] [place] 27    LAB PERIPHERAL  12:30 PM   (15 min.)    [facility] DRAW   [facility]    ONC INFUSION 2 HR (120 MIN)   1:00 PM   (120 min.)    ONC INFUSION NURSE   [facility] 28       29     30     31                                      September 2021 Sunday Monday Tuesday Wednesday Thursday Friday Saturday                  1     2     3     4       5     6     7     8     9     10     11       12     13     14     15     16     17     18       19     20     21     22     23     24     25       26     27     28     29     30                               Lab Results:  Recent Results (from the past 12 hour(s))   Comprehensive metabolic panel    Collection Time: 08/27/21 12:57 PM   Result Value Ref Range    Sodium 135 133 - 144 mmol/L    Potassium 4.7 3.4 - 5.3 mmol/L    Chloride 102 94 - 109 mmol/L    Carbon Dioxide (CO2) 24 20 - 32 mmol/L    Anion Gap 9 3 - 14 mmol/L    Urea Nitrogen 15 7 - 30 mg/dL    Creatinine 0.97 0.66 - 1.25 mg/dL    Calcium 8.8 8.5 - 10.1 mg/dL    Glucose 103 (H) 70 - 99 mg/dL    Alkaline Phosphatase 208 (H) 40 - 150 U/L     (H) 0 - 45 U/L    ALT 21 0 - 70 U/L    Protein Total 7.0 6.8 - 8.8 g/dL    Albumin 3.1 (L) 3.4 - 5.0 g/dL    Bilirubin Total 1.4 (H) 0.2 - 1.3 mg/dL    GFR Estimate 74 >60 mL/min/1.73m2   Lactate Dehydrogenase    Collection Time: 08/27/21 12:57 PM   Result Value Ref Range    Lactate Dehydrogenase 2,390 (H) 85 - 227 U/L   Magnesium    Collection Time: 08/27/21 12:57 PM   Result Value Ref Range    Magnesium 2.3 1.6 - 2.3 mg/dL   CBC with platelets and differential    Collection Time: 08/27/21 12:57 PM   Result Value Ref Range    WBC Count 4.5 4.0 - 11.0 10e3/uL    RBC Count 4.29 (L) 4.40 - 5.90 10e6/uL    Hemoglobin 12.6 (L) 13.3 - 17.7 g/dL    Hematocrit 38.5 (L) 40.0 - 53.0 %    MCV 90 78 - 100 fL    MCH 29.4 26.5 - 33.0 pg    MCHC 32.7 31.5 - 36.5 g/dL    RDW 17.3 (H) 10.0 - 15.0 %    Platelet Count 167 150 - 450 10e3/uL    % Neutrophils 68 %    % Lymphocytes 14 %    % Monocytes 13 %    % Eosinophils 4 %    % Basophils 1 %    % Immature Granulocytes 0 %    NRBCs per 100 WBC 0 <1 /100    Absolute Neutrophils 3.0 1.6 - 8.3 10e3/uL    Absolute Lymphocytes 0.6 (L)  0.8 - 5.3 10e3/uL    Absolute Monocytes 0.6 0.0 - 1.3 10e3/uL    Absolute Eosinophils 0.2 0.0 - 0.7 10e3/uL    Absolute Basophils 0.0 0.0 - 0.2 10e3/uL    Absolute Immature Granulocytes 0.0 <=0.0 10e3/uL    Absolute NRBCs 0.0 10e3/uL

## 2021-08-31 NOTE — PROGRESS NOTES
Patient called c/o significant abdominal bloating and distention r/t ascites.  Discussed with Dr. Trent.  Diuretic trial of 50 mg. Spironolactone and 20 mg. Lasix BID PRN will be started.  Rx's sent to pharmacy.  Orders also placed for a paracentesis in IR pending outcome of diuretic trial.  Will connect with patient again on Friday, 9/3 and will update providers.    Sayra Montiel MBA, MSN, RN, ONC  RN Care Coordinator  [facility]

## 2021-09-09 PROBLEM — R18.8 OTHER ASCITES: Status: ACTIVE | Noted: 2021-01-01

## 2021-09-10 NOTE — PROGRESS NOTES
Paracentesis Nursing Note  Galileo Mayberry presents today to [facility] for a [facility]esis.    During today's appointment orders from Jw Trent were completed.    Progress Note:  Patient identification verified by name and date of birth.  Assessment completed.  Vitals monitored throughout appointment and recorded in Doc Flowsheets.  See proceduralist note in ultrasound.    Vascular Access: peripheral IV placed today.  Labs: were not ordered for this appointment.    Date of consent or authorization: 9/10/21.  Invasive Procedure Safety Checklist was completed and sent for scanning.     Paracentesis performed by Dr. Jason.    The following labs were communicated to provider performing paracentesis:  Lab Results   Component Value Date     08/27/2021     07/09/2021       Total amount of ascites fluid drained: 4.1 liters.  Color of ascites fluid: yellow.  Total amount of albumin given: 12.5  grams.    Patient tolerated procedure well.    Post procedure,denies pain or discomfort post paracentesis.    Asymptomatic COVID test date: 9/9/21 (If next appt is within 2 weeks, COVID test to be done in [facility])      Discharge Plan:  Discharge instructions were reviewed with patient.  Patient/Representative verbalized understanding and all questions were answered.   Discharged from [facility] in stable condition.    Jalyn Hanks RN       Administrations This Visit     albumin human 25 % injection 12.5 g     Admin Date  09/10/2021 Action  New Bag Dose  12.5 g Route  Intravenous Administered By  Jalyn Hanks RN          lidocaine (PF) (XYLOCAINE) 1 % injection 20 mL     Admin Date  09/10/2021 Action  Given Dose  10 mL Route  Subcutaneous Administered By  Jalyn Hanks RN                /71 (BP Location: Right arm)   Pulse 103   Temp 97.3  F (36.3  C) (Oral)   Resp 16   Wt 86.5 kg (190 lb 11.2 oz)   SpO2 94%   BMI 26.60 kg/m

## 2021-09-10 NOTE — TELEPHONE ENCOUNTER
Bed: B02B  Expected date: 8/16/17  Expected time: 10:18 AM  Means of arrival: [facility] (3)  Comments:  CODE STROKE  64F left side deficit, facial droop, onset 15m  AOx2, Welsh speaking  120/96 80 20    Pre-Procedure COVID Test Results Pending    Results Reviewed  The patient has a pending COVID test result.  The patient had a COVID test scheduled on 9/9/21.      No COVID pre-call needed. RN to verify test results prior to procedure.     Carol Faust, RN

## 2021-09-10 NOTE — PATIENT INSTRUCTIONS
Dear Galileo Mayberry    Thank you for choosing [facility] ([facility]) for your paracentesis.  The following information is a summary of our appointment as well as important reminders.      Patient Education     Discharge Instructions for Paracentesis  Paracentesis is a procedure to remove extra fluid from your belly (abdomen). This fluid buildup in the abdomen is called ascites. The procedure may have been done to take a sample of the fluid. Or, it may have been done to drain the extra fluid from your abdomen and help make you more comfortable.      Ascites is buildup of excess fluid in the abdomen.   Home care    If you have pain after the procedure, your healthcare provider can prescribe or recommend pain medicines. Take these exactly as directed. If you stopped taking other medicines before the procedure, ask your provider when you can start them again.    Take it easy for 24 hours after the procedure. Don't do any physical activity until your provider says it s OK.    You will have a small bandage over the puncture site. Stitches, surgical staples, adhesive tapes, adhesive strips, or surgical glue may be used to close the incision. They also help stop bleeding and speed healing. You may take the bandage off in 24 hours.    Check the puncture site for the signs of infection listed below.    Follow-up care  Make a follow-up appointment with your healthcare provider as directed. During your follow-up visit, your provider will check your healing. Let your provider know how you are feeling. You can also discuss the cause of your ascites and if you need any further treatment. If your fluid is infected, you will be sent home on antibiotics. In some cases, the paracentesis may need to be repeated if the fluid returns. Your provider may also prescribe medicines that increase urination (diuretics) to decrease the buildup of fluid.   When to call your  healthcare provider  Call your healthcare provider if you have any of the following after the procedure:    A fever of 100.4  F ( 38.0 C) or higher, or as directed by your provider    Chills    Trouble breathing    Pain that doesn't go away even after taking pain medicine    Belly pain not caused by having the skin punctured    Bleeding from the puncture site    More than a small amount of fluid leaking from the puncture site    Swollen belly    Signs of infection at the puncture site. These include increased pain, redness, or swelling, warmth, or bad-smelling drainage.    Blood in your urine    Feeling dizzy or lightheaded, or fainting  "[employer]" last reviewed this educational content on 10/1/2019    9442-3309 [employer]. All rights reserved. This information is not intended as a substitute for professional medical care. Always follow your healthcare professional's instructions.             We look forward in seeing you on your next appointment here at [facility] ([facility]).  Please don t hesitate to call us at [phone] to reschedule any of your appointments or to speak with one of the [facility] registered nurses.  It was a pleasure taking care of you today.    Sincerely,    [facility]  [facility]  [address] [place] [address]  [address]  Phone:  [phone]

## 2021-09-13 NOTE — PROGRESS NOTES
Called patient today.  He is feeling much better since his paracentesis.  Approximately 4 L were taken off.  Per Dr. Trent, directed patient to go back down on his lasix to 20 mg. BID and to monitor his blood pressure.  He will call if systolic gets below 110.  Also discussed the fact that fluid will continue to reaccumulate and to call as soon as he notices increased discomfort.  Patient indicated understanding.    Sayra Montiel MBA, MSN, RN, ONC  RN Care Coordinator  [facility]

## 2021-09-13 NOTE — PROGRESS NOTES
Department of Radiation Oncology  [facility]  [address] [place] [address]  [phone]       Consultation Note    Name: Galileo Mayberry MRN: [id number]   : 1945   Date of Service: 9/15/2021  Referring: KAREEN Clemons / Dr. Trent     Diagnosis: Metastatic uveal melanoma     History of Present Illness   Mr. Mayberry is a 76 year old man with diagnosis of metastatic uveal melanoma.      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 19 CT CAP showed no signs of metastatic disease. Patient was seen at [place] where B-scan ultrasound showed disease approximately 7x22mm in the inferior anterior left eye with ciliary body involvement. Patient had 24mm I-125 plaque placement delivering 85 Gy to an 8 mm height on 2/10/2020. FNA biopsy performed showing Class II PRAME mRNA positive, high-risk of early metastasis. MRI showed no metastasis in the liver. Patient started adjuvant Sunitinib 3/31/2020 for high risk disease. Unfortunately, 2020 MRI liver showed numerous (30+) metastatic liver lesions, largest being 1.85 cm. Liver biopsy showed PD-L1 negative disease. He underwent Y90 radioembolization x2 to right lobe of liver on 10/19/2020, and again on 2020 with a mixed response. He started Ipilimumab and Nivolumab on 10/23/2020 and completed 4 cycles. PET/CT scan and MRI of the liver on 2021 showed progressive disease in the bone and liver. Various options were discussed with him including continuation of Nivolumab, chemotherapy and best supportive care. He opted for maintenance Nivolumab which was started on 1/15/2021. He was also started on Zometa for bone metastases.     Although he is tolerating Nivolumab and doing well clinically, MRI spine 3/23/21 demonstrated an area of concern at T10 with cortical breakthrough at the inferior endplate, extension into the T10-11 neural foramen and right lateral recess. He  was seen by Dr. Conteh and underwent palliative radiotherapy to T9-11, 20 Gy in 5 fractions, completing 4/9/21. PET/CT scan again showed disease progression. He opted to continue with Nivolumab.     PET/CT 6/3/21 demonstrated overall increased uptake of intraosseous mets, increased uptake of multiple intramuscular deposits, increased size / uptake of retroperitoneal lymph node, and increased intrahepatic disease burden. MRI T and L spine 6/14/21 demonstrated diffuse osseous disease, overall similar to March scan. The lesion at T10 was unchanged.    Given overall disease progression, he started combined chemo-immunotherapy and received 1 cycle of Carboplatin / Paclitaxel on 7/2/21 and re-started Ipilimumab / Nivolumab 7/9/21, which he last received 8/27/21. The plan moving forward is to continue with Ipilimumab and Nivolumab without additional chemotherapy, depending on tolerability.     More recently, he began experiencing bilateral shoulder pain. [facility], which showed diffuse metastatic disease with notable epidural involvement of C7/T1.     Today, Mr. Mayberry is here for a discussion of radiation therapy to the C7/T1 lesion. Prior to his visit today, we also arranged a T spine MRI to evaluate the full extent of spine involvement (Lumbar spine could not be done due to scheduling issue).     On interview, he states that his neck pain started shortly after previous radiation. Pain is intermittent and radiates to his bilateral shoulders. He also began to experience weakness in the arms in the last 2 weeks. His  strength has weakened and he has trouble turning the door knobs and holding a cell phone steady. He denies any numbness. He has no new mid to lower back pain. Of note, [facility] at the same time.  He had a paracentesis by Dr. aJson on 9/10 during which 4.1 L ascitic fluid was removed. He is on Lasix 20 mg BID.      CHEMOTHERAPY HISTORY: Mostly immunotherapy (see HPI).  Most recently, he received one cycle of Carbo/Taxol, but will continue with Ipilumumab and Nivolumab. His medical oncologist is Dr. Trent.    PACEMAKER: None    PREGNANCY STATUS: N/A    PAST RADIATION THERAPY HISTORY:   1. 2/10/2020: I-125 24 mm plaque placement dlievering 85 Gy to 8 mm hieght    2. 10/19/2020: Y90 radioembolization with 72.2 mCi of ThereSpheres to the right lobe of the liver; predominantly in hepatic segment 5 and 6, milder uptake in the region of segment 8    3. 11/16/2020: Y90 radioembolization wit 69 mCi of ThereSpheres to hepatic segments 4A and 4B    4. 4/5/2021-4/9/2021: T9-T11            PAST MEDICAL HISTORY:  Past Medical History:   Diagnosis Date     BPH (benign prostatic hyperplasia)      Degenerative joint disease      Metastatic melanoma (H)     L eye     Nonsenile cataract      Torn rotator cuff 03/25/2021    right       PAST SURGICAL HISTORY:  Past Surgical History:   Procedure Laterality Date     ------------OTHER-------------  2014    back surgery L4/L5      ------------OTHER-------------      knee surgery both 1961 Right, 1971 Left knee     AS REMOVAL OF KIDNEY STONE  2015     CATARACT IOL, RT/LT Left 07/26/2021     IR SIRT (SELECTIVE INTERNAL RADIO THERAPY)  10/13/2020     IR VISCERAL ANGIOGRAM  10/13/2020     IR VISCERAL EMBOLIZATION  10/19/2020     IR VISCERAL EMBOLIZATION  11/16/2020     JOINT REPLACEMENT  2017    Both kneses replaced (2017 and 2018)     PHACOEMULSIFICATION CLEAR CORNEA WITH STANDARD INTRAOCULAR LENS IMPLANT Left 7/26/2021    Procedure: LEFT EYE PHACOEMULSIFICATION, COMPLEX CATARACT, WITH INTRAOCULAR LENS IMPLANT;  Surgeon: Randy Hi MD;  Location: [facility] OR     ROTATOR CUFF REPAIR RT/LT Right 2016     SURGICAL HISTORY OF -   05/2021    ablation of right shoulder     TURP  2000       ALLERGIES:  Allergies as of 09/15/2021 - Reviewed 09/10/2021   Allergen Reaction Noted     Vardenafil Other  (See Comments) and Nausea 06/23/2009     Contrast dye Rash 12/04/2008       MEDICATIONS:  Current Outpatient Medications   Medication Sig Dispense Refill     acetaminophen (TYLENOL) 500 MG tablet Take 1,000 mg by mouth       ALPRAZolam (XANAX) 0.25 MG tablet TAKE ONE TABLET BY MOUTH EVERY DAY AS NEEDED FOR SEVERE ANXIETY; DON&apos;T TAKE WITHIN 4 HRS OF MORPHINE OR HYDROMORPHONE (Patient not taking: Reported on 7/23/2021)       Ascorbic Acid (VITAMIN C) 500 MG CAPS Take 500 mg by mouth       calcium carbonate (OS-FAUSTO) 500 MG tablet Take 1 tablet (500 mg) by mouth 2 times daily 60 tablet 3     Cholecalciferol (VITAMIN D3) 25 MCG (1000 UT) CAPS Takes 3 daily       diclofenac (VOLTAREN) 1 % topical gel        doxepin (SINEQUAN) 10 MG/ML (HIGH CONC) solution Take 0.6 mLs (6 mg) by mouth At Bedtime 18 mL 1     Ferrous Gluconate 240 (27 Fe) MG TABS Take 240 mg by mouth       finasteride (PROSCAR) 5 MG tablet Take 5 mg by mouth       furosemide (LASIX) 20 MG tablet Take 1 tablet (20 mg) by mouth 2 times daily as needed (Take for distention and bloating) 60 tablet 1     gabapentin (NEURONTIN) 100 MG capsule 100mg (1 capsule) in the morning and in the middle of the day 20 capsule 0     HYDROmorphone (DILAUDID) 2 MG tablet Take 0.5 tablets (1 mg) by mouth every 6 hours as needed for moderate to severe pain 30 tablet 0     lactulose (CEPHULAC) 20 GM packet Take 1 packet (20 g) by mouth 3 times daily as needed for constipation (Patient not taking: Reported on 8/6/2021) 60 packet 3     LORazepam (ATIVAN) 0.5 MG tablet Take 1 tablet (0.5 mg) by mouth every 4 hours as needed (Anxiety, Nausea/Vomiting or Sleep) 30 tablet 3     meloxicam (MOBIC) 15 MG tablet TAKE ONE TABLET BY MOUTH DAILY AS NEEDED FOR PAIN. TAKE WITH FOOD       methylphenidate (RITALIN) 10 MG tablet as needed       morphine (MS CONTIN) 15 MG CR tablet TAKE ONE TABLET BY MOUTH EVERY 12 HOURS AS NEEDED FOR BONE PAIN       ondansetron (ZOFRAN) 4 MG tablet Take 1-2  tablets (4-8 mg) by mouth every 6 hours as needed for nausea (vomiting) 40 tablet 0     polyethylene glycol (MIRALAX) 17 GM/Dose powder Take 17 g (1 capful) by mouth 2 times daily 255 g 1     polyvinyl alcohol (LIQUIFILM TEARS) 1.4 % ophthalmic solution INSTILL ONE DROP IN BOTH EYES FOUR TIMES A DAY AS NEEDED FOR DRY EYES       prednisoLONE acetate (PRED FORTE) 1 % ophthalmic suspension Apply 1 drop to eye 4 times daily To operative eye 10 mL 1     prochlorperazine (COMPAZINE) 10 MG tablet Take 1 tablet (10 mg) by mouth every 6 hours as needed (nausea/vomiting) (Patient not taking: Reported on 7/2/2021) 30 tablet 5     senna-docusate (SENNA S) 8.6-50 MG tablet Take 1 tablet by mouth 2 times daily as needed for constipation (Patient not taking: Reported on 8/6/2021) 60 tablet 1     sertraline (ZOLOFT) 50 MG tablet TAKE ONE-HALF TABLET BY MOUTH EVERY DAY       spironolactone (ALDACTONE) 25 MG tablet Take 2 tablets (50 mg) by mouth 2 times daily as needed (Take for distention and bloating) 60 tablet 1     tamsulosin (FLOMAX) 0.4 MG capsule Take 0.4 mg by mouth       Testosterone 1.62 % GEL        tiZANidine (ZANAFLEX) 2 MG tablet Take 1 tablet (2 mg) by mouth 3 times daily as needed (muscle relaxant) 20 tablet 0        FAMILY HISTORY:  Family History   Problem Relation Age of Onset     Glaucoma Mother      Prostate Cancer Father      Cancer Sister      Macular Degeneration No family hx of        SOCIAL HISTORY:  Social History     Socioeconomic History     Marital status:      Spouse name: Not on file     Number of children: Not on file     Years of education: Not on file     Highest education level: Not on file   Occupational History     Not on file   Tobacco Use     Smoking status: Never Smoker     Smokeless tobacco: Never Used   Substance and Sexual Activity     Alcohol use: Not Currently     Alcohol/week: 1.0 standard drinks     Types: 1 Glasses of wine per week     Drug use: Never     Sexual activity: Not  on file   Other Topics Concern     Parent/sibling w/ CABG, MI or angioplasty before 65F 55M? Not Asked   Social History Narrative     Not on file     Social Determinants of Health     Financial Resource Strain:      Difficulty of Paying Living Expenses:    Food Insecurity:      Worried About Running Out of Food in the Last Year:      Ran Out of Food in the Last Year:    Transportation Needs:      Lack of Transportation (Medical):      Lack of Transportation (Non-Medical):    Physical Activity:      Days of Exercise per Week:      Minutes of Exercise per Session:    Stress:      Feeling of Stress :    Social Connections:      Frequency of Communication with Friends and Family:      Frequency of Social Gatherings with Friends and Family:      Attends [employer] Services:      Active Member of Clubs or Organizations:      Attends Club or Organization Meetings:      Marital Status:    Intimate Partner Violence:      Fear of Current or Ex-Partner:      Emotionally Abused:      Physically Abused:      Sexually Abused:          Review of Systems   A 12-point review of systems was performed. Pertinent findings are noted in the HPI.    Physical Exam   ECOG Status: 2    VITALS: There were no vitals taken for this visit.  GEN: Appears well, alert, oriented, and in NAD  HEENT: EOMI, normal conjunctiva, MMM  NECK: Supple, full ROM, no cervical or clavicular lymphadenopathy  CV: RRR, no murmurs/rubs/gallops, warm and well-perfused  RESP: CTAB, no wheezes/rales/rhonchi, breathing comfortably on room air  ABDOMEN: Soft, non-tender, non-distended  SKIN: Normal color and turgor  NEURO: Alert and oriented. Decreased  strength in bilateral hands (4+/5). Decreased strength of the interosseus muscles, left worse than the right, but overall 4/5. Strength in lower extremities intact. Sensation to light touch intact.   PSYCH: Appropriate mood and affect    Imaging/Path/Labs   Imaging:   C-Spine 8/25/2021 T1        Worsening epidural  disease at T7 and T8            Previously radiated T10 lesion shows improved epidural disease  6/14/2021    9/15/2021      Assessment    Mr. 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 He is on immunotherapy but with disease progression in liver and bone. He recently received palliative radiotherapy to the T10 epidural lesions with imaging response. He has new epidural disease in T1, T7 and T8. He is symptomatic with pain in the neck and decreased upper extremity strength.    Plan   I reviewed the MRI imaging with him. I recommend palliative radiotherapy to the T1 and T7/T8 given presence of epidural disease and symptomatic pain and weakness in the upper extremities. He is agreeable to proceed. He will be simulated today and we plan to deliver 2000 cGy in 5 fractions which will be completed next week. (Of note[facility]).    I discussed that he may experience pain flare or worsening of neurologic symptoms during radiation. However, steroid use is generally discouraged among patients on immunotherapy. We will closely monitor his symptoms and consider low dose steroids if necessary. He voiced understanding.     Marine Baldwin MD    I saw and examined the patient with the resident.  I have reviewed and edited the resident's note and agree with the plan of care.      I reviewed patient's chart, internal/external medical records, imaging studies (including actual images), labs and pathology reports.  I interviewed and counseled the patient face to face.  I additionally discussed the case with patient's care team (Dr. Conteh).      80 minutes were spent on the date of the encounter doing chart review, history and exam, documentation and further activities as noted above.           Marine Baldwin MD

## 2021-09-15 NOTE — PROGRESS NOTES
HPI  FOLLOW-UP VISIT    Patient Name: Galileo Mayberry      : 1945     Age: 76 year old        ______________________________________________________________________________     Chief Complaint   Patient presents with     Cancer     Radiation consult     BP 94/49   Pulse 81   Wt 86.3 kg (190 lb 3.2 oz)   SpO2 93%   BMI 26.53 kg/m      Date Radiation Completed: Metatstatic Melanoma:T 9-2000 cGy completed 21    Pain  Current history of pain associated with this visit:   Intensity: 2/10  Current: aching and stabbing  Location: Upper shoulders middle to outer edge and 1/2 way down back  Treatment: MS contin, Dilaudid and gabapentin    Labs  Other Labs: No    Imaging  MRI: today    Other Appointments: None    MD Name:  Appointment Date:    MD Name: Appointment Date:   MD Name: Appointment Date:   Other Appointment Notes:     Residual Radiation side effect: Tolerated radiation well/      Additional Instructions: Here to discuss additional radiation.      Review of Systems   Constitutional: Positive for malaise/fatigue (R/T chemo and medications) and weight loss (R/T chemo). Negative for chills, diaphoresis and fever.   HENT: Negative for ear pain, nosebleeds and sore throat.    Eyes: Negative for blurred vision, double vision and pain.   Respiratory: Positive for cough and shortness of breath (SOB, took 4 Liters of fluid last Friday so able to sit without SOB.).    Cardiovascular: Negative for chest pain and leg swelling.   Gastrointestinal: Positive for constipation (Has stool softners to help with constipation r/t pain meds) and nausea (Using meds with good results). Negative for blood in stool, diarrhea and vomiting.   Genitourinary: Positive for dysuria (on flomax, struggle with good urine flow).   Musculoskeletal: Positive for falls (Fell getting out of bed, does not feel like he injured anything. Uses a cane) and joint pain (Shoulder, see pain note). Negative for back pain and neck pain.    Skin: Negative for rash.   Neurological: Positive for tingling (Bilateral insides of fingers and bottome of feet.). Negative for dizziness, seizures and headaches.   Endo/Heme/Allergies: Bruises/bleeds easily (Bruises more easily.).   Psychiatric/Behavioral: Negative for depression. The patient is not nervous/anxious and does not have insomnia.            Nurse face-to-face time: Level 4:  15 min face to face time

## 2021-09-15 NOTE — LETTER
9/15/2021         RE: Galileo Mayberry  [address]  [facility] [id number]        Dear Colleague,    Thank you for referring your patient, Galileo Mayberry, to the [facility] RADIATION ONCOLOGY. Please see a copy of my visit note below.       Department of Radiation Oncology  [facility]  [address] [place] [address]  [phone]       Consultation Note    Name: Galileo Mayberry MRN: [id number]   : 1945   Date of Service: 9/15/2021  Referring: KAREEN Clemons / Dr. Trent     Diagnosis: Metastatic uveal melanoma     History of Present Illness   Mr. Mayberry is a 76 year old man with diagnosis of metastatic uveal melanoma.      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 19 CT CAP showed no signs of metastatic disease. Patient was seen at [place] where B-scan ultrasound showed disease approximately 7x22mm in the inferior anterior left eye with ciliary body involvement. Patient had 24mm I-125 plaque placement delivering 85 Gy to an 8 mm height on 2/10/2020. FNA biopsy performed showing Class II PRAME mRNA positive, high-risk of early metastasis. MRI showed no metastasis in the liver. Patient started adjuvant Sunitinib 3/31/2020 for high risk disease. Unfortunately, 2020 MRI liver showed numerous (30+) metastatic liver lesions, largest being 1.85 cm. Liver biopsy showed PD-L1 negative disease. He underwent Y90 radioembolization x2 to right lobe of liver on 10/19/2020, and again on 2020 with a mixed response. He started Ipilimumab and Nivolumab on 10/23/2020 and completed 4 cycles. PET/CT scan and MRI of the liver on 2021 showed progressive disease in the bone and liver. Various options were discussed with him including continuation of Nivolumab, chemotherapy and best supportive care. He opted for maintenance Nivolumab which was started on 1/15/2021. He was also started on Zometa for  bone metastases.     Although he is tolerating Nivolumab and doing well clinically, MRI spine 3/23/21 demonstrated an area of concern at T10 with cortical breakthrough at the inferior endplate, extension into the T10-11 neural foramen and right lateral recess. He was seen by Dr. Conteh and underwent palliative radiotherapy to T9-11, 20 Gy in 5 fractions, completing 4/9/21. PET/CT scan again showed disease progression. He opted to continue with Nivolumab.     PET/CT 6/3/21 demonstrated overall increased uptake of intraosseous mets, increased uptake of multiple intramuscular deposits, increased size / uptake of retroperitoneal lymph node, and increased intrahepatic disease burden. MRI T and L spine 6/14/21 demonstrated diffuse osseous disease, overall similar to March scan. The lesion at T10 was unchanged.    Given overall disease progression, he started combined chemo-immunotherapy and received 1 cycle of Carboplatin / Paclitaxel on 7/2/21 and re-started Ipilimumab / Nivolumab 7/9/21, which he last received 8/27/21. The plan moving forward is to continue with Ipilimumab and Nivolumab without additional chemotherapy, depending on tolerability.     More recently, he began experiencing bilateral shoulder pain. [facility], which showed diffuse metastatic disease with notable epidural involvement of C7/T1.     Today, Mr. Mayberry is here for a discussion of radiation therapy to the C7/T1 lesion. Prior to his visit today, we also arranged a T spine MRI to evaluate the full extent of spine involvement (Lumbar spine could not be done due to scheduling issue).     On interview, he states that his neck pain started shortly after previous radiation. Pain is intermittent and radiates to his bilateral shoulders. He also began to experience weakness in the arms in the last 2 weeks. His  strength has weakened and he has trouble turning the door knobs and holding a cell phone steady. He denies  any numbness. He has no new mid to lower back pain. Of note, [facility] which [facility] at the same time.  He had a paracentesis by Dr. Jason on 9/10 during which 4.1 L ascitic fluid was removed. He is on Lasix 20 mg BID.     CHEMOTHERAPY HISTORY: Mostly immunotherapy (see HPI).  Most recently, he received one cycle of Carbo/Taxol, but will continue with Ipilumumab and Nivolumab. His medical oncologist is Dr. Trent.    PACEMAKER: None    PREGNANCY STATUS: N/A    PAST RADIATION THERAPY HISTORY:   1. 2/10/2020: I-125 24 mm plaque placement dlievering 85 Gy to 8 mm hieght    2. 10/19/2020: Y90 radioembolization with 72.2 mCi of ThereSpheres to the right lobe of the liver; predominantly in hepatic segment 5 and 6, milder uptake in the region of segment 8    3. 11/16/2020: Y90 radioembolization wit 69 mCi of ThereSpheres to hepatic segments 4A and 4B    4. 4/5/2021-4/9/2021: T9-T11            PAST MEDICAL HISTORY:  Past Medical History:   Diagnosis Date     BPH (benign prostatic hyperplasia)      Degenerative joint disease      Metastatic melanoma (H)     L eye     Nonsenile cataract      Torn rotator cuff 03/25/2021    right       PAST SURGICAL HISTORY:  Past Surgical History:   Procedure Laterality Date     ------------OTHER-------------  2014    back surgery L4/L5      ------------OTHER-------------      knee surgery both 1961 Right, 1971 Left knee     AS REMOVAL OF KIDNEY STONE  2015     CATARACT IOL, RT/LT Left 07/26/2021     IR SIRT (SELECTIVE INTERNAL RADIO THERAPY)  10/13/2020     IR VISCERAL ANGIOGRAM  10/13/2020     IR VISCERAL EMBOLIZATION  10/19/2020     IR VISCERAL EMBOLIZATION  11/16/2020     JOINT REPLACEMENT  2017    Both kneses replaced (2017 and 2018)     PHACOEMULSIFICATION CLEAR CORNEA WITH STANDARD INTRAOCULAR LENS IMPLANT Left 7/26/2021    Procedure: LEFT EYE PHACOEMULSIFICATION, COMPLEX CATARACT, WITH INTRAOCULAR LENS IMPLANT;  Surgeon: Randy Hi  MD Liz;  Location: [facility] OR     ROTATOR CUFF REPAIR RT/LT Right 2016     SURGICAL HISTORY OF -   05/2021    ablation of right shoulder     TURP  2000       ALLERGIES:  Allergies as of 09/15/2021 - Reviewed 09/10/2021   Allergen Reaction Noted     Vardenafil Other (See Comments) and Nausea 06/23/2009     Contrast dye Rash 12/04/2008       MEDICATIONS:  Current Outpatient Medications   Medication Sig Dispense Refill     acetaminophen (TYLENOL) 500 MG tablet Take 1,000 mg by mouth       ALPRAZolam (XANAX) 0.25 MG tablet TAKE ONE TABLET BY MOUTH EVERY DAY AS NEEDED FOR SEVERE ANXIETY; DON&apos;T TAKE WITHIN 4 HRS OF MORPHINE OR HYDROMORPHONE (Patient not taking: Reported on 7/23/2021)       Ascorbic Acid (VITAMIN C) 500 MG CAPS Take 500 mg by mouth       calcium carbonate (OS-FAUSTO) 500 MG tablet Take 1 tablet (500 mg) by mouth 2 times daily 60 tablet 3     Cholecalciferol (VITAMIN D3) 25 MCG (1000 UT) CAPS Takes 3 daily       diclofenac (VOLTAREN) 1 % topical gel        doxepin (SINEQUAN) 10 MG/ML (HIGH CONC) solution Take 0.6 mLs (6 mg) by mouth At Bedtime 18 mL 1     Ferrous Gluconate 240 (27 Fe) MG TABS Take 240 mg by mouth       finasteride (PROSCAR) 5 MG tablet Take 5 mg by mouth       furosemide (LASIX) 20 MG tablet Take 1 tablet (20 mg) by mouth 2 times daily as needed (Take for distention and bloating) 60 tablet 1     gabapentin (NEURONTIN) 100 MG capsule 100mg (1 capsule) in the morning and in the middle of the day 20 capsule 0     HYDROmorphone (DILAUDID) 2 MG tablet Take 0.5 tablets (1 mg) by mouth every 6 hours as needed for moderate to severe pain 30 tablet 0     lactulose (CEPHULAC) 20 GM packet Take 1 packet (20 g) by mouth 3 times daily as needed for constipation (Patient not taking: Reported on 8/6/2021) 60 packet 3     LORazepam (ATIVAN) 0.5 MG tablet Take 1 tablet (0.5 mg) by mouth every 4 hours as needed (Anxiety, Nausea/Vomiting or Sleep) 30 tablet 3     meloxicam (MOBIC) 15 MG tablet TAKE ONE  TABLET BY MOUTH DAILY AS NEEDED FOR PAIN. TAKE WITH FOOD       methylphenidate (RITALIN) 10 MG tablet as needed       morphine (MS CONTIN) 15 MG CR tablet TAKE ONE TABLET BY MOUTH EVERY 12 HOURS AS NEEDED FOR BONE PAIN       ondansetron (ZOFRAN) 4 MG tablet Take 1-2 tablets (4-8 mg) by mouth every 6 hours as needed for nausea (vomiting) 40 tablet 0     polyethylene glycol (MIRALAX) 17 GM/Dose powder Take 17 g (1 capful) by mouth 2 times daily 255 g 1     polyvinyl alcohol (LIQUIFILM TEARS) 1.4 % ophthalmic solution INSTILL ONE DROP IN BOTH EYES FOUR TIMES A DAY AS NEEDED FOR DRY EYES       prednisoLONE acetate (PRED FORTE) 1 % ophthalmic suspension Apply 1 drop to eye 4 times daily To operative eye 10 mL 1     prochlorperazine (COMPAZINE) 10 MG tablet Take 1 tablet (10 mg) by mouth every 6 hours as needed (nausea/vomiting) (Patient not taking: Reported on 7/2/2021) 30 tablet 5     senna-docusate (SENNA S) 8.6-50 MG tablet Take 1 tablet by mouth 2 times daily as needed for constipation (Patient not taking: Reported on 8/6/2021) 60 tablet 1     sertraline (ZOLOFT) 50 MG tablet TAKE ONE-HALF TABLET BY MOUTH EVERY DAY       spironolactone (ALDACTONE) 25 MG tablet Take 2 tablets (50 mg) by mouth 2 times daily as needed (Take for distention and bloating) 60 tablet 1     tamsulosin (FLOMAX) 0.4 MG capsule Take 0.4 mg by mouth       Testosterone 1.62 % GEL        tiZANidine (ZANAFLEX) 2 MG tablet Take 1 tablet (2 mg) by mouth 3 times daily as needed (muscle relaxant) 20 tablet 0        FAMILY HISTORY:  Family History   Problem Relation Age of Onset     Glaucoma Mother      Prostate Cancer Father      Cancer Sister      Macular Degeneration No family hx of        SOCIAL HISTORY:  Social History     Socioeconomic History     Marital status:      Spouse name: Not on file     Number of children: Not on file     Years of education: Not on file     Highest education level: Not on file   Occupational History     Not on file    Tobacco Use     Smoking status: Never Smoker     Smokeless tobacco: Never Used   Substance and Sexual Activity     Alcohol use: Not Currently     Alcohol/week: 1.0 standard drinks     Types: 1 Glasses of wine per week     Drug use: Never     Sexual activity: Not on file   Other Topics Concern     Parent/sibling w/ CABG, MI or angioplasty before 65F 55M? Not Asked   Social History Narrative     Not on file     Social Determinants of Health     Financial Resource Strain:      Difficulty of Paying Living Expenses:    Food Insecurity:      Worried About Running Out of Food in the Last Year:      Ran Out of Food in the Last Year:    Transportation Needs:      Lack of Transportation (Medical):      Lack of Transportation (Non-Medical):    Physical Activity:      Days of Exercise per Week:      Minutes of Exercise per Session:    Stress:      Feeling of Stress :    Social Connections:      Frequency of Communication with Friends and Family:      Frequency of Social Gatherings with Friends and Family:      Attends Gnosticism Services:      Active Member of Clubs or Organizations:      Attends Club or Organization Meetings:      Marital Status:    Intimate Partner Violence:      Fear of Current or Ex-Partner:      Emotionally Abused:      Physically Abused:      Sexually Abused:          Review of Systems   A 12-point review of systems was performed. Pertinent findings are noted in the HPI.    Physical Exam   ECOG Status: 2    VITALS: There were no vitals taken for this visit.  GEN: Appears well, alert, oriented, and in NAD  HEENT: EOMI, normal conjunctiva, MMM  NECK: Supple, full ROM, no cervical or clavicular lymphadenopathy  CV: RRR, no murmurs/rubs/gallops, warm and well-perfused  RESP: CTAB, no wheezes/rales/rhonchi, breathing comfortably on room air  ABDOMEN: Soft, non-tender, non-distended  SKIN: Normal color and turgor  NEURO: Alert and oriented. Decreased  strength in bilateral hands (4+/5). Decreased strength  of the interosseus muscles, left worse than the right, but overall 4/5. Strength in lower extremities intact. Sensation to light touch intact.   PSYCH: Appropriate mood and affect    Imaging/Path/Labs   Imaging:   C-Spine 8/25/2021 T1        Worsening epidural disease at T7 and T8            Previously radiated T10 lesion shows improved epidural disease  6/14/2021    9/15/2021      Assessment    Mr. 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 He is on immunotherapy but with disease progression in liver and bone. He recently received palliative radiotherapy to the T10 epidural lesions with imaging response. He has new epidural disease in T1, T7 and T8. He is symptomatic with pain in the neck and decreased upper extremity strength.    Plan   I reviewed the MRI imaging with him. I recommend palliative radiotherapy to the T1 and T7/T8 given presence of epidural disease and symptomatic pain and weakness in the upper extremities. He is agreeable to proceed. He will be simulated today and we plan to deliver 2000 cGy in 5 fractions which will be completed next week. (Of note[facility]).    I discussed that he may experience pain flare or worsening of neurologic symptoms during radiation. However, steroid use is generally discouraged among patients on immunotherapy. We will closely monitor his symptoms and consider low dose steroids if necessary. He voiced understanding.     Marine Baldwin MD    I saw and examined the patient with the resident.  I have reviewed and edited the resident's note and agree with the plan of care.      I reviewed patient's chart, internal/external medical records, imaging studies (including actual images), labs and pathology reports.  I interviewed and counseled the patient face to face.  I additionally discussed the case with patient's care team (Dr. Conteh).      80 minutes were spent on the date of the encounter doing chart review, history  and exam, documentation and further activities as noted above.           Marien Baldwin MD      [facility]  FOLLOW-UP VISIT    Patient Name: Galileo Mayberry      : 1945     Age: 76 year old        ______________________________________________________________________________     Chief Complaint   Patient presents with     Cancer     Radiation consult     BP 94/49   Pulse 81   Wt 86.3 kg (190 lb 3.2 oz)   SpO2 93%   BMI 26.53 kg/m      Date Radiation Completed: Metatstatic Melanoma:T  cGy completed 21    Pain  Current history of pain associated with this visit:   Intensity: 2/10  Current: aching and stabbing  Location: Upper shoulders middle to outer edge and 1/2 way down back  Treatment: MS contin, Dilaudid and gabapentin    Labs  Other Labs: No    Imaging  MRI: today    Other Appointments: None    MD Name:  Appointment Date:    MD Name: Appointment Date:   MD Name: Appointment Date:   Other Appointment Notes:     Residual Radiation side effect: Tolerated radiation well/      Additional Instructions: Here to discuss additional radiation.      Review of Systems   Constitutional: Positive for malaise/fatigue (R/T chemo and medications) and weight loss (R/T chemo). Negative for chills, diaphoresis and fever.   HENT: Negative for ear pain, nosebleeds and sore throat.    Eyes: Negative for blurred vision, double vision and pain.   Respiratory: Positive for cough and shortness of breath (SOB, took 4 Liters of fluid last Friday so able to sit without SOB.).    Cardiovascular: Negative for chest pain and leg swelling.   Gastrointestinal: Positive for constipation (Has stool softners to help with constipation r/t pain meds) and nausea (Using meds with good results). Negative for blood in stool, diarrhea and vomiting.   Genitourinary: Positive for dysuria (on flomax, struggle with good urine flow).   Musculoskeletal: Positive for falls (Fell getting out of bed, does not feel like he injured anything.  Uses a cane) and joint pain (Shoulder, see pain note). Negative for back pain and neck pain.   Skin: Negative for rash.   Neurological: Positive for tingling (Bilateral insides of fingers and bottome of feet.). Negative for dizziness, seizures and headaches.   Endo/Heme/Allergies: Bruises/bleeds easily (Bruises more easily.).   Psychiatric/Behavioral: Negative for depression. The patient is not nervous/anxious and does not have insomnia.            Nurse face-to-face time: Level 4:  15 min face to face time        Again, thank you for allowing me to participate in the care of your patient.        Sincerely,        Marine Baldwin MD

## 2021-09-17 NOTE — LETTER
9/17/2021         RE: Galileo Mayberry  [address] [place] [address]        Dear Colleague,    Thank you for referring your patient, Galileo Mayberry, to the [facility]. Please see a copy of my visit note below.    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 11/2019, he is diagnosed with ocular melanoma, after a choroidal lesion was found in his Left eye.  Patient reports over the last several months he developed new floaters/spots in his L eye.  2. 11/25/2019, he was seen by ophthalmology who noted a left eye, elevated bilobed lesion, size 6.28mm x 18.31(T) x 17.04(L).   3. 12/14/19, CT-CAP showed no evidence of metastatic disease.  4. 1/10/2020: Patient referred to [facility]. B-scan ultrasound of left eye showed elevated bilobed lesion measuring 7.2-7.3 height with a base of 21.4 x 22.5 mm. Low to medium reflectivity. Difficult measurements due to location. Ciliary body involvement was noted. Ultrasound basal dimension measurement included subretinal fluid, and clinical measurement had to be done by transillumination due to anterior tumor location.  5. 1/15/2020, visual acuity right eye 20/20 -1, left eye 20/25 +2 nh. Visual fields counting fingers full bilaterally. Clinical fundus exam of left eye revealed choroidal/ciliary body malignant melanoma with basal measurement of 22 x 21 mm and height of 7.5 mm. Located in the Inferior left eye from 4:30-7:30, 15 mm to disc, 15 mm to fovea, +subretinal fluid, bilobed. The tumor without fluid appears to be 1-2 mm smaller in basal dimension (20 x 19 mm).   6. 2/10/2020, Iodine-125 24 mm plaque placement delivering 85 Gy to an 8 mm height. FNA biopsy performed, and molecular testing shows Class II PRAME positive, high-risk of early metastasis.  7. 3/31/2020, he starts adjuvant sunitinib 25 mg daily  for high risk disease. He completed 6 months of therapy.  8. 2020, MRI-liver shows interval development of numerous (more than 30) metastatic lesions in the liver in bilobar distribution. The largest in segment JUAN measures 1.85 cm.  9. 10/19/2020, he undergoes Y90 radioembolization with 72.2 mCi of Theraspheres to the right lobe of the liver; predominantly in hepatic segments 5 and 6, milder uptake in the region of segment 8  10. 10/23/2020, first cycle of Ipilimumab 1mg/kg and nivolumab 3mg/kg. He received 4 cycles of Ipi/Nivo.  11. 2020, he has his 2nd Y90 radioembolization with 69 mCi of TheraSpheres to hepatic segments 4A and 4B.       12. 1/15/2020, he starts nivolumab 480 mg IV monthly maintenance.  13. 2021, receives 2000 cGy in 5 fractions to T9-T11.  14. 2021, he receives 1 cycle of carboplatin and paclitaxel, and on 2021 he starts ipilimumab and nivolumab. Cycle complicated by fatigue and nausea, poor oral intake and constipation.  15. 21, MRI cervical spine showed diffuse metastatic disease with notable epidural involvement of C7/T1.     History of Present Illness:  Mr. 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 He returns in follow-up today. Following 1 cycle of carboplatin and paclitaxel he has received 3 cycles of ipilimumab and nivolumab, last on 21.    Since last seen [facility]. The abdomen has grown more uncomfortable.  He had a paracentesis on 9/10 with removal of 4.1 L ascitic fluid. He is on Lasix 20 mg BID.     Given what appears to be epidural involvement of C7/T1 he is scheduled to have 2000 cGy in 5 fractions next week under Dr. Baldwin.    In addition, he's fallen a couple of times and now uses a cane all the time with ambulation. His daughter is concerned about him as her father in law  from a recent fall.    Overall he feels treatment is going about the same. He is still  able to ambulate and remains as active as he can. He is social and makes time to see and spend time with friends. He is realizing his personal impact on many people and balancing a spiritual calling to continue to be impactful in the world even in the latter stages of his disease.    ECOG performance status is 2.    Review of Systems:  12-point ROS negative except as in HPI    Current Outpatient Medications   Medication Sig Dispense Refill     acetaminophen (TYLENOL) 500 MG tablet Take 1,000 mg by mouth       ALPRAZolam (XANAX) 0.25 MG tablet TAKE ONE TABLET BY MOUTH EVERY DAY AS NEEDED FOR SEVERE ANXIETY; DON&apos;T TAKE WITHIN 4 HRS OF MORPHINE OR HYDROMORPHONE (Patient not taking: Reported on 7/23/2021)       Ascorbic Acid (VITAMIN C) 500 MG CAPS Take 500 mg by mouth       calcium carbonate (OS-FAUSTO) 500 MG tablet Take 1 tablet (500 mg) by mouth 2 times daily 60 tablet 3     Cholecalciferol (VITAMIN D3) 25 MCG (1000 UT) CAPS Takes 3 daily       diclofenac (VOLTAREN) 1 % topical gel        doxepin (SINEQUAN) 10 MG/ML (HIGH CONC) solution Take 0.6 mLs (6 mg) by mouth At Bedtime 18 mL 1     Ferrous Gluconate 240 (27 Fe) MG TABS Take 240 mg by mouth       finasteride (PROSCAR) 5 MG tablet Take 5 mg by mouth       furosemide (LASIX) 20 MG tablet Take 1 tablet (20 mg) by mouth 2 times daily as needed (Take for distention and bloating) 60 tablet 1     gabapentin (NEURONTIN) 100 MG capsule 100mg (1 capsule) in the morning and in the middle of the day 20 capsule 0     HYDROmorphone (DILAUDID) 2 MG tablet Take 0.5 tablets (1 mg) by mouth every 6 hours as needed for moderate to severe pain 30 tablet 0     lactulose (CEPHULAC) 20 GM packet Take 1 packet (20 g) by mouth 3 times daily as needed for constipation (Patient not taking: Reported on 8/6/2021) 60 packet 3     LORazepam (ATIVAN) 0.5 MG tablet Take 1 tablet (0.5 mg) by mouth every 4 hours as needed (Anxiety, Nausea/Vomiting or Sleep) 30 tablet 3     meloxicam (MOBIC) 15  MG tablet TAKE ONE TABLET BY MOUTH DAILY AS NEEDED FOR PAIN. TAKE WITH FOOD       methylphenidate (RITALIN) 10 MG tablet as needed       morphine (MS CONTIN) 15 MG CR tablet TAKE ONE TABLET BY MOUTH EVERY 12 HOURS AS NEEDED FOR BONE PAIN       ondansetron (ZOFRAN) 4 MG tablet Take 1-2 tablets (4-8 mg) by mouth every 6 hours as needed for nausea (vomiting) 40 tablet 0     polyethylene glycol (MIRALAX) 17 GM/Dose powder Take 17 g (1 capful) by mouth 2 times daily 255 g 1     polyvinyl alcohol (LIQUIFILM TEARS) 1.4 % ophthalmic solution INSTILL ONE DROP IN BOTH EYES FOUR TIMES A DAY AS NEEDED FOR DRY EYES       prednisoLONE acetate (PRED FORTE) 1 % ophthalmic suspension Apply 1 drop to eye 4 times daily To operative eye 10 mL 1     prochlorperazine (COMPAZINE) 10 MG tablet Take 1 tablet (10 mg) by mouth every 6 hours as needed (nausea/vomiting) (Patient not taking: Reported on 7/2/2021) 30 tablet 5     senna-docusate (SENNA S) 8.6-50 MG tablet Take 1 tablet by mouth 2 times daily as needed for constipation (Patient not taking: Reported on 8/6/2021) 60 tablet 1     sertraline (ZOLOFT) 50 MG tablet TAKE ONE-HALF TABLET BY MOUTH EVERY DAY       spironolactone (ALDACTONE) 25 MG tablet Take 2 tablets (50 mg) by mouth 2 times daily as needed (Take for distention and bloating) 60 tablet 1     tamsulosin (FLOMAX) 0.4 MG capsule Take 0.4 mg by mouth       Testosterone 1.62 % GEL        tiZANidine (ZANAFLEX) 2 MG tablet Take 1 tablet (2 mg) by mouth 3 times daily as needed (muscle relaxant) 20 tablet 0       Past Medical History:   Diagnosis Date     BPH (benign prostatic hyperplasia)      Degenerative joint disease      Metastatic melanoma (H)     L eye     Nonsenile cataract      Torn rotator cuff 03/25/2021    right     Past Surgical History:   Procedure Laterality Date     ------------OTHER-------------  2014    back surgery L4/L5      ------------OTHER-------------      knee surgery both 1961 Right, 1971 Left knee     AS  REMOVAL OF KIDNEY STONE  2015     CATARACT IOL, RT/LT Left 07/26/2021     IR SIRT (SELECTIVE INTERNAL RADIO THERAPY)  10/13/2020     IR VISCERAL ANGIOGRAM  10/13/2020     IR VISCERAL EMBOLIZATION  10/19/2020     IR VISCERAL EMBOLIZATION  11/16/2020     JOINT REPLACEMENT  2017    Both kneses replaced (2017 and 2018)     PHACOEMULSIFICATION CLEAR CORNEA WITH STANDARD INTRAOCULAR LENS IMPLANT Left 7/26/2021    Procedure: LEFT EYE PHACOEMULSIFICATION, COMPLEX CATARACT, WITH INTRAOCULAR LENS IMPLANT;  Surgeon: Randy Hi MD;  Location: [facility] OR     ROTATOR CUFF REPAIR RT/LT Right 2016     SURGICAL HISTORY OF -   05/2021    ablation of right shoulder     TURP  2000     Family History   Problem Relation Age of Onset     Glaucoma Mother      Prostate Cancer Father      Cancer Sister      Macular Degeneration No family hx of        Physical Examination:  There were no vitals taken for this visit.  Wt Readings from Last 5 Encounters:   09/15/21 86.3 kg (190 lb 3.2 oz)   09/10/21 86.5 kg (190 lb 11.2 oz)   08/27/21 92.8 kg (204 lb 9.6 oz)   08/06/21 93.1 kg (205 lb 4.8 oz)   07/30/21 90.3 kg (199 lb)   GENERAL: Healthy, alert and no distress  EYES: Eyes grossly normal to inspection.  No discharge or erythema, or obvious scleral/conjunctival abnormalities.  HENT: Normal cephalic/atraumatic.  External ears, nose and mouth without ulcers or lesions.  No nasal drainage visible.  NECK: No asymmetry, visible masses or scars  RESP: No audible wheeze, cough, or visible cyanosis.  No visible retractions or increased work of breathing.    SKIN: Visible skin clear. No significant rash, abnormal pigmentation or lesions.  NEURO: Cranial nerves grossly intact.  Mentation and speech appropriate for age.  PSYCH: Cranial nerves are intact, normal strength. Affect normal/bright, judgement and insight intact, normal speech and appearance well-groomed.    Labs:  Infusion Therapy Visit on 09/17/2021   Component Date Value Ref Range  Status     Sodium 09/17/2021 128* 133 - 144 mmol/L Final     Potassium 09/17/2021 4.6  3.4 - 5.3 mmol/L Final     Chloride 09/17/2021 92* 94 - 109 mmol/L Final     Carbon Dioxide (CO2) 09/17/2021 24  20 - 32 mmol/L Final     Anion Gap 09/17/2021 12  3 - 14 mmol/L Final     Urea Nitrogen 09/17/2021 25  7 - 30 mg/dL Final     Creatinine 09/17/2021 1.36* 0.66 - 1.25 mg/dL Final     Calcium 09/17/2021 8.9  8.5 - 10.1 mg/dL Final     Glucose 09/17/2021 115* 70 - 99 mg/dL Final     Alkaline Phosphatase 09/17/2021 210* 40 - 150 U/L Final     AST 09/17/2021 152* 0 - 45 U/L Final     ALT 09/17/2021 28  0 - 70 U/L Final     Protein Total 09/17/2021 7.4  6.8 - 8.8 g/dL Final     Albumin 09/17/2021 3.0* 3.4 - 5.0 g/dL Final     Bilirubin Total 09/17/2021 2.9* 0.2 - 1.3 mg/dL Final     GFR Estimate 09/17/2021 49* >60 mL/min/1.73m2 Final     Lactate Dehydrogenase 09/17/2021 2,167* 85 - 227 U/L Final     Magnesium 09/17/2021 2.6* 1.6 - 2.3 mg/dL Final   Oncology Visit on 09/17/2021   Component Date Value Ref Range Status     WBC Count 09/17/2021 4.2  4.0 - 11.0 10e3/uL Final     RBC Count 09/17/2021 4.83  4.40 - 5.90 10e6/uL Final     Hemoglobin 09/17/2021 14.0  13.3 - 17.7 g/dL Final     Hematocrit 09/17/2021 42.7  40.0 - 53.0 % Final     MCV 09/17/2021 88  78 - 100 fL Final     MCH 09/17/2021 29.0  26.5 - 33.0 pg Final     MCHC 09/17/2021 32.8  31.5 - 36.5 g/dL Final     RDW 09/17/2021 18.3* 10.0 - 15.0 % Final     Platelet Count 09/17/2021 134* 150 - 450 10e3/uL Final     % Neutrophils 09/17/2021 62  % Final     % Lymphocytes 09/17/2021 21  % Final     % Monocytes 09/17/2021 15  % Final     % Eosinophils 09/17/2021 0  % Final     % Basophils 09/17/2021 1  % Final     % Immature Granulocytes 09/17/2021 1  % Final     NRBCs per 100 WBC 09/17/2021 0  <1 /100 Final     Absolute Neutrophils 09/17/2021 2.6  1.6 - 8.3 10e3/uL Final     Absolute Lymphocytes 09/17/2021 0.9  0.8 - 5.3 10e3/uL Final     Absolute Monocytes 09/17/2021 0.6   0.0 - 1.3 10e3/uL Final     Absolute Eosinophils 09/17/2021 0.0  0.0 - 0.7 10e3/uL Final     Absolute Basophils 09/17/2021 0.0  0.0 - 0.2 10e3/uL Final     Absolute Immature Granulocytes 09/17/2021 0.0  <=0.0 10e3/uL Final     Absolute NRBCs 09/17/2021 0.0  10e3/uL Final     Platelet Assessment 09/17/2021 Automated Count Confirmed. Platelet morphology is normal.  Automated Count Confirmed. Platelet morphology is normal. Final     RBC Morphology 09/17/2021 Confirmed RBC Indices   Final           ASSESSMENT/PLAN    #1 Metastatic uveal melanoma, bone, liver, lung, and soft tissue  #2 Choroidal melanoma with ciliary body involvement, left eye, status-post plaque brachytherapy, Class II and PRAME mRNA positive  It was a pleasure to see Mr. Mayberry today. He is a 76 year old man with widely metastatic uveal melanoma.  He has had radioembolization and Ipi/Nivo followed by re-induction with chemoimmunotherapy.    He understands that treatment is likely no longer providing benefit given the advanced stages of his disease. However, as [facility] he feels he has all the support he needs, that palliative care needs are being met, and that treatment continues to provide him with the desired balance of hope for additional quantity of life and quality of life. He would like to proceed with treatment. However, he is appreciative of my concerns that our treatment induces fatigue and wipes him out for several days after an infusion thereby potentially negatively impacting quality of life.      He would like to arrange to discuss again after this cycle, which we will set up.    #3 Hyponatremia  Sodium is a bit on the low side. Would recommend he include salt. Will hold on salt tabs as this is likely to worsen overall fluid status. Will repeat a CMP prior to next meeting.    Multiple questions answered.    Jw Reyes M.D.   of Medicine  Hematology, Oncology and  Transplantation      Again, thank you for allowing me to participate in the care of your patient.        Sincerely,        Jw Trent MD

## 2021-09-17 NOTE — PROGRESS NOTES
"Infusion Nursing Note:  Galileo Mayberry presents today for Cycle 4 Day 8 ipilimumab, nivolumab.    Patient seen by provider today: Yes: Dr. Trent   present during visit today: Not Applicable.    Note: Favian presents today feeling \"okay\". Denies pain or nausea/vomiting. Offers no new concerns since visit with Dr. Trent prior to infusion.      Intravenous Access:  Peripheral IV placed.    Treatment Conditions:  Lab Results   Component Value Date    HGB 14.0 09/17/2021    WBC 4.2 09/17/2021    ANEU 5.0 07/09/2021    ANEUTAUTO 2.6 09/17/2021     (L) 09/17/2021      Lab Results   Component Value Date     (L) 09/17/2021    POTASSIUM 4.6 09/17/2021    MAG 2.6 (H) 09/17/2021    CR 1.36 (H) 09/17/2021    FAUSTO 8.9 09/17/2021    BILITOTAL 2.9 (H) 09/17/2021    ALBUMIN 3.0 (L) 09/17/2021    ALT 28 09/17/2021     (H) 09/17/2021     Results reviewed, labs MET treatment parameters, ok to proceed with treatment.      Post Infusion Assessment:  Patient tolerated infusion without incident.  Blood return noted pre and post infusion.  Site patent and intact, free from redness, edema or discomfort.  No evidence of extravasations.  Access discontinued per protocol.       Discharge Plan:   Patient declined prescription refills.  Discharge instructions reviewed with: Patient.  Patient and/or family verbalized understanding of discharge instructions and all questions answered.  AVS to patient via [email or web address].  Patient will return 10/08 for next infusion appointment.   Patient discharged in stable condition accompanied by: self.  Departure Mode: Ambulatory.      Jeni Ramon RN                    "

## 2021-09-17 NOTE — PROGRESS NOTES
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 11/2019, he is diagnosed with ocular melanoma, after a choroidal lesion was found in his Left eye.  Patient reports over the last several months he developed new floaters/spots in his L eye.  2. 11/25/2019, he was seen by ophthalmology who noted a left eye, elevated bilobed lesion, size 6.28mm x 18.31(T) x 17.04(L).   3. 12/14/19, CT-CAP showed no evidence of metastatic disease.  4. 1/10/2020: Patient referred to [facility]. B-scan ultrasound of left eye showed elevated bilobed lesion measuring 7.2-7.3 height with a base of 21.4 x 22.5 mm. Low to medium reflectivity. Difficult measurements due to location. Ciliary body involvement was noted. Ultrasound basal dimension measurement included subretinal fluid, and clinical measurement had to be done by transillumination due to anterior tumor location.  5. 1/15/2020, visual acuity right eye 20/20 -1, left eye 20/25 +2 nh. Visual fields counting fingers full bilaterally. Clinical fundus exam of left eye revealed choroidal/ciliary body malignant melanoma with basal measurement of 22 x 21 mm and height of 7.5 mm. Located in the Inferior left eye from 4:30-7:30, 15 mm to disc, 15 mm to fovea, +subretinal fluid, bilobed. The tumor without fluid appears to be 1-2 mm smaller in basal dimension (20 x 19 mm).   6. 2/10/2020, Iodine-125 24 mm plaque placement delivering 85 Gy to an 8 mm height. FNA biopsy performed, and molecular testing shows Class II PRAME positive, high-risk of early metastasis.  7. 3/31/2020, he starts adjuvant sunitinib 25 mg daily for high risk disease. He completed 6 months of therapy.  8. 8/26/2020, MRI-liver shows interval development of numerous (more than 30) metastatic lesions in the liver in bilobar distribution. The largest in segment JUAN measures 1.85 cm.  9. 10/19/2020, he undergoes  Y90 radioembolization with 72.2 mCi of Theraspheres to the right lobe of the liver; predominantly in hepatic segments 5 and 6, milder uptake in the region of segment 8  10. 10/23/2020, first cycle of Ipilimumab 1mg/kg and nivolumab 3mg/kg. He received 4 cycles of Ipi/Nivo.  11. 2020, he has his 2nd Y90 radioembolization with 69 mCi of TheraSpheres to hepatic segments 4A and 4B.       12. 1/15/2020, he starts nivolumab 480 mg IV monthly maintenance.  13. 2021, receives 2000 cGy in 5 fractions to T9-T11.  14. 2021, he receives 1 cycle of carboplatin and paclitaxel, and on 2021 he starts ipilimumab and nivolumab. Cycle complicated by fatigue and nausea, poor oral intake and constipation.  15. 21, MRI cervical spine showed diffuse metastatic disease with notable epidural involvement of C7/T1.     History of Present Illness:  Mr. 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 He returns in follow-up today. Following 1 cycle of carboplatin and paclitaxel he has received 3 cycles of ipilimumab and nivolumab, last on 21.    Since last seen [facility]. The abdomen has grown more uncomfortable.  He had a paracentesis on 9/10 with removal of 4.1 L ascitic fluid. He is on Lasix 20 mg BID.     Given what appears to be epidural involvement of C7/T1 he is scheduled to have 2000 cGy in 5 fractions next week under Dr. Baldwin.    In addition, he's fallen a couple of times and now uses a cane all the time with ambulation. His daughter is concerned about him as her father in law  from a recent fall.    Overall he feels treatment is going about the same. He is still able to ambulate and remains as active as he can. He is social and makes time to see and spend time with friends. He is realizing his personal impact on many people and balancing a spiritual calling to continue to be impactful in the world even in the latter stages of  his disease.    ECOG performance status is 2.    Review of Systems:  12-point ROS negative except as in HPI    Current Outpatient Medications   Medication Sig Dispense Refill     acetaminophen (TYLENOL) 500 MG tablet Take 1,000 mg by mouth       ALPRAZolam (XANAX) 0.25 MG tablet TAKE ONE TABLET BY MOUTH EVERY DAY AS NEEDED FOR SEVERE ANXIETY; DON&apos;T TAKE WITHIN 4 HRS OF MORPHINE OR HYDROMORPHONE (Patient not taking: Reported on 7/23/2021)       Ascorbic Acid (VITAMIN C) 500 MG CAPS Take 500 mg by mouth       calcium carbonate (OS-FAUSTO) 500 MG tablet Take 1 tablet (500 mg) by mouth 2 times daily 60 tablet 3     Cholecalciferol (VITAMIN D3) 25 MCG (1000 UT) CAPS Takes 3 daily       diclofenac (VOLTAREN) 1 % topical gel        doxepin (SINEQUAN) 10 MG/ML (HIGH CONC) solution Take 0.6 mLs (6 mg) by mouth At Bedtime 18 mL 1     Ferrous Gluconate 240 (27 Fe) MG TABS Take 240 mg by mouth       finasteride (PROSCAR) 5 MG tablet Take 5 mg by mouth       furosemide (LASIX) 20 MG tablet Take 1 tablet (20 mg) by mouth 2 times daily as needed (Take for distention and bloating) 60 tablet 1     gabapentin (NEURONTIN) 100 MG capsule 100mg (1 capsule) in the morning and in the middle of the day 20 capsule 0     HYDROmorphone (DILAUDID) 2 MG tablet Take 0.5 tablets (1 mg) by mouth every 6 hours as needed for moderate to severe pain 30 tablet 0     lactulose (CEPHULAC) 20 GM packet Take 1 packet (20 g) by mouth 3 times daily as needed for constipation (Patient not taking: Reported on 8/6/2021) 60 packet 3     LORazepam (ATIVAN) 0.5 MG tablet Take 1 tablet (0.5 mg) by mouth every 4 hours as needed (Anxiety, Nausea/Vomiting or Sleep) 30 tablet 3     meloxicam (MOBIC) 15 MG tablet TAKE ONE TABLET BY MOUTH DAILY AS NEEDED FOR PAIN. TAKE WITH FOOD       methylphenidate (RITALIN) 10 MG tablet as needed       morphine (MS CONTIN) 15 MG CR tablet TAKE ONE TABLET BY MOUTH EVERY 12 HOURS AS NEEDED FOR BONE PAIN       ondansetron (ZOFRAN) 4  MG tablet Take 1-2 tablets (4-8 mg) by mouth every 6 hours as needed for nausea (vomiting) 40 tablet 0     polyethylene glycol (MIRALAX) 17 GM/Dose powder Take 17 g (1 capful) by mouth 2 times daily 255 g 1     polyvinyl alcohol (LIQUIFILM TEARS) 1.4 % ophthalmic solution INSTILL ONE DROP IN BOTH EYES FOUR TIMES A DAY AS NEEDED FOR DRY EYES       prednisoLONE acetate (PRED FORTE) 1 % ophthalmic suspension Apply 1 drop to eye 4 times daily To operative eye 10 mL 1     prochlorperazine (COMPAZINE) 10 MG tablet Take 1 tablet (10 mg) by mouth every 6 hours as needed (nausea/vomiting) (Patient not taking: Reported on 7/2/2021) 30 tablet 5     senna-docusate (SENNA S) 8.6-50 MG tablet Take 1 tablet by mouth 2 times daily as needed for constipation (Patient not taking: Reported on 8/6/2021) 60 tablet 1     sertraline (ZOLOFT) 50 MG tablet TAKE ONE-HALF TABLET BY MOUTH EVERY DAY       spironolactone (ALDACTONE) 25 MG tablet Take 2 tablets (50 mg) by mouth 2 times daily as needed (Take for distention and bloating) 60 tablet 1     tamsulosin (FLOMAX) 0.4 MG capsule Take 0.4 mg by mouth       Testosterone 1.62 % GEL        tiZANidine (ZANAFLEX) 2 MG tablet Take 1 tablet (2 mg) by mouth 3 times daily as needed (muscle relaxant) 20 tablet 0       Past Medical History:   Diagnosis Date     BPH (benign prostatic hyperplasia)      Degenerative joint disease      Metastatic melanoma (H)     L eye     Nonsenile cataract      Torn rotator cuff 03/25/2021    right     Past Surgical History:   Procedure Laterality Date     ------------OTHER-------------  2014    back surgery L4/L5      ------------OTHER-------------      knee surgery both 1961 Right, 1971 Left knee     AS REMOVAL OF KIDNEY STONE  2015     CATARACT IOL, RT/LT Left 07/26/2021     IR SIRT (SELECTIVE INTERNAL RADIO THERAPY)  10/13/2020     IR VISCERAL ANGIOGRAM  10/13/2020     IR VISCERAL EMBOLIZATION  10/19/2020     IR VISCERAL EMBOLIZATION  11/16/2020     JOINT  REPLACEMENT  2017    Both kneses replaced (2017 and 2018)     PHACOEMULSIFICATION CLEAR CORNEA WITH STANDARD INTRAOCULAR LENS IMPLANT Left 7/26/2021    Procedure: LEFT EYE PHACOEMULSIFICATION, COMPLEX CATARACT, WITH INTRAOCULAR LENS IMPLANT;  Surgeon: Randy Hi MD;  Location: [facility] OR     ROTATOR CUFF REPAIR RT/LT Right 2016     SURGICAL HISTORY OF -   05/2021    ablation of right shoulder     TURP  2000     Family History   Problem Relation Age of Onset     Glaucoma Mother      Prostate Cancer Father      Cancer Sister      Macular Degeneration No family hx of        Physical Examination:  There were no vitals taken for this visit.  Wt Readings from Last 5 Encounters:   09/15/21 86.3 kg (190 lb 3.2 oz)   09/10/21 86.5 kg (190 lb 11.2 oz)   08/27/21 92.8 kg (204 lb 9.6 oz)   08/06/21 93.1 kg (205 lb 4.8 oz)   07/30/21 90.3 kg (199 lb)   GENERAL: Healthy, alert and no distress  EYES: Eyes grossly normal to inspection.  No discharge or erythema, or obvious scleral/conjunctival abnormalities.  HENT: Normal cephalic/atraumatic.  External ears, nose and mouth without ulcers or lesions.  No nasal drainage visible.  NECK: No asymmetry, visible masses or scars  RESP: No audible wheeze, cough, or visible cyanosis.  No visible retractions or increased work of breathing.    SKIN: Visible skin clear. No significant rash, abnormal pigmentation or lesions.  NEURO: Cranial nerves grossly intact.  Mentation and speech appropriate for age.  PSYCH: Cranial nerves are intact, normal strength. Affect normal/bright, judgement and insight intact, normal speech and appearance well-groomed.    Labs:  Infusion Therapy Visit on 09/17/2021   Component Date Value Ref Range Status     Sodium 09/17/2021 128* 133 - 144 mmol/L Final     Potassium 09/17/2021 4.6  3.4 - 5.3 mmol/L Final     Chloride 09/17/2021 92* 94 - 109 mmol/L Final     Carbon Dioxide (CO2) 09/17/2021 24  20 - 32 mmol/L Final     Anion Gap 09/17/2021 12  3 - 14 mmol/L  Final     Urea Nitrogen 09/17/2021 25  7 - 30 mg/dL Final     Creatinine 09/17/2021 1.36* 0.66 - 1.25 mg/dL Final     Calcium 09/17/2021 8.9  8.5 - 10.1 mg/dL Final     Glucose 09/17/2021 115* 70 - 99 mg/dL Final     Alkaline Phosphatase 09/17/2021 210* 40 - 150 U/L Final     AST 09/17/2021 152* 0 - 45 U/L Final     ALT 09/17/2021 28  0 - 70 U/L Final     Protein Total 09/17/2021 7.4  6.8 - 8.8 g/dL Final     Albumin 09/17/2021 3.0* 3.4 - 5.0 g/dL Final     Bilirubin Total 09/17/2021 2.9* 0.2 - 1.3 mg/dL Final     GFR Estimate 09/17/2021 49* >60 mL/min/1.73m2 Final     Lactate Dehydrogenase 09/17/2021 2,167* 85 - 227 U/L Final     Magnesium 09/17/2021 2.6* 1.6 - 2.3 mg/dL Final   Oncology Visit on 09/17/2021   Component Date Value Ref Range Status     WBC Count 09/17/2021 4.2  4.0 - 11.0 10e3/uL Final     RBC Count 09/17/2021 4.83  4.40 - 5.90 10e6/uL Final     Hemoglobin 09/17/2021 14.0  13.3 - 17.7 g/dL Final     Hematocrit 09/17/2021 42.7  40.0 - 53.0 % Final     MCV 09/17/2021 88  78 - 100 fL Final     MCH 09/17/2021 29.0  26.5 - 33.0 pg Final     MCHC 09/17/2021 32.8  31.5 - 36.5 g/dL Final     RDW 09/17/2021 18.3* 10.0 - 15.0 % Final     Platelet Count 09/17/2021 134* 150 - 450 10e3/uL Final     % Neutrophils 09/17/2021 62  % Final     % Lymphocytes 09/17/2021 21  % Final     % Monocytes 09/17/2021 15  % Final     % Eosinophils 09/17/2021 0  % Final     % Basophils 09/17/2021 1  % Final     % Immature Granulocytes 09/17/2021 1  % Final     NRBCs per 100 WBC 09/17/2021 0  <1 /100 Final     Absolute Neutrophils 09/17/2021 2.6  1.6 - 8.3 10e3/uL Final     Absolute Lymphocytes 09/17/2021 0.9  0.8 - 5.3 10e3/uL Final     Absolute Monocytes 09/17/2021 0.6  0.0 - 1.3 10e3/uL Final     Absolute Eosinophils 09/17/2021 0.0  0.0 - 0.7 10e3/uL Final     Absolute Basophils 09/17/2021 0.0  0.0 - 0.2 10e3/uL Final     Absolute Immature Granulocytes 09/17/2021 0.0  <=0.0 10e3/uL Final     Absolute NRBCs 09/17/2021 0.0   10e3/uL Final     Platelet Assessment 09/17/2021 Automated Count Confirmed. Platelet morphology is normal.  Automated Count Confirmed. Platelet morphology is normal. Final     RBC Morphology 09/17/2021 Confirmed RBC Indices   Final           ASSESSMENT/PLAN    #1 Metastatic uveal melanoma, bone, liver, lung, and soft tissue  #2 Choroidal melanoma with ciliary body involvement, left eye, status-post plaque brachytherapy, Class II and PRAME mRNA positive  It was a pleasure to see Mr. Mayberry today. He is a 76 year old man with widely metastatic uveal melanoma.  He has had radioembolization and Ipi/Nivo followed by re-induction with chemoimmunotherapy.    He understands that treatment is likely no longer providing benefit given the advanced stages of his disease. However, as [facility] he feels he has all the support he needs, that palliative care needs are being met, and that treatment continues to provide him with the desired balance of hope for additional quantity of life and quality of life. He would like to proceed with treatment. However, he is appreciative of my concerns that our treatment induces fatigue and wipes him out for several days after an infusion thereby potentially negatively impacting quality of life.      He would like to arrange to discuss again after this cycle, which we will set up.    #3 Hyponatremia  Sodium is a bit on the low side. Would recommend he include salt. Will hold on salt tabs as this is likely to worsen overall fluid status. Will repeat a CMP prior to next meeting.    Multiple questions answered.    Jw Reyes M.D.   of Medicine  Hematology, Oncology and Transplantation

## 2021-09-17 NOTE — PATIENT INSTRUCTIONS
[facility] Triage Nurse Line: [phone]    Call triage nurse with chills and/or temperature greater than or equal to 100.4, uncontrolled nausea/vomiting, diarrhea, constipation, dizziness, shortness of breath, chest pain, bleeding, unexplained bruising, or any new/concerning symptoms, questions/concerns.     If you are having any concerning symptoms or wish to speak to a provider before your next infusion visit, please call your care coordinator or triage to notify them so we can adequately serve you.       Lab Results:  Recent Results (from the past 12 hour(s))   Comprehensive metabolic panel    Collection Time: 09/17/21  9:14 AM   Result Value Ref Range    Sodium 128 (L) 133 - 144 mmol/L    Potassium 4.6 3.4 - 5.3 mmol/L    Chloride 92 (L) 94 - 109 mmol/L    Carbon Dioxide (CO2) 24 20 - 32 mmol/L    Anion Gap 12 3 - 14 mmol/L    Urea Nitrogen 25 7 - 30 mg/dL    Creatinine 1.36 (H) 0.66 - 1.25 mg/dL    Calcium 8.9 8.5 - 10.1 mg/dL    Glucose 115 (H) 70 - 99 mg/dL    Alkaline Phosphatase 210 (H) 40 - 150 U/L     (H) 0 - 45 U/L    ALT 28 0 - 70 U/L    Protein Total 7.4 6.8 - 8.8 g/dL    Albumin 3.0 (L) 3.4 - 5.0 g/dL    Bilirubin Total 2.9 (H) 0.2 - 1.3 mg/dL    GFR Estimate 49 (L) >60 mL/min/1.73m2   Lactate Dehydrogenase    Collection Time: 09/17/21  9:14 AM   Result Value Ref Range    Lactate Dehydrogenase 2,167 (H) 85 - 227 U/L   Magnesium    Collection Time: 09/17/21  9:14 AM   Result Value Ref Range    Magnesium 2.6 (H) 1.6 - 2.3 mg/dL   CBC with platelets and differential    Collection Time: 09/17/21  9:14 AM   Result Value Ref Range    WBC Count 4.2 4.0 - 11.0 10e3/uL    RBC Count 4.83 4.40 - 5.90 10e6/uL    Hemoglobin 14.0 13.3 - 17.7 g/dL    Hematocrit 42.7 40.0 - 53.0 %    MCV 88 78 - 100 fL    MCH 29.0 26.5 - 33.0 pg    MCHC 32.8 31.5 - 36.5 g/dL    RDW 18.3 (H) 10.0 - 15.0 %    Platelet Count 134 (L) 150 - 450 10e3/uL    % Neutrophils 62 %    % Lymphocytes 21 %    % Monocytes 15 %     % Eosinophils 0 %    % Basophils 1 %    % Immature Granulocytes 1 %    NRBCs per 100 WBC 0 <1 /100    Absolute Neutrophils 2.6 1.6 - 8.3 10e3/uL    Absolute Lymphocytes 0.9 0.8 - 5.3 10e3/uL    Absolute Monocytes 0.6 0.0 - 1.3 10e3/uL    Absolute Eosinophils 0.0 0.0 - 0.7 10e3/uL    Absolute Basophils 0.0 0.0 - 0.2 10e3/uL    Absolute Immature Granulocytes 0.0 <=0.0 10e3/uL    Absolute NRBCs 0.0 10e3/uL   RBC and Platelet Morphology    Collection Time: 09/17/21  9:14 AM   Result Value Ref Range    Platelet Assessment  Automated Count Confirmed. Platelet morphology is normal.     Automated Count Confirmed. Platelet morphology is normal.    RBC Morphology Confirmed RBC Indices

## 2021-09-22 NOTE — PROGRESS NOTES
RADIATION ONCOLOGY WEEKLY ON TREATMENT VISIT         Galileo Mayberry      Date: Sep 22, 2021   MRN: [id number]   : 1945    Diagnosis: Metastatic melanoma    Reason for Visit:  On Radiation Treatment Visit     Treatment Summary to Date  Treatment Sites: C6-T2, T6-T9 Current Dose: 1200/2000 cGy Fractions: 3/5         Subjective:  The patient is tolerating radiotherapy well. He has not noticed any bothersome side effects. Pain has not changed since starting radiation. He denies odynophagia.  His overall performance status seems to be declining. He and his wife are unsure if he will be able to tolerate any additional immunotherapy.    Nursing ROS:   Nutrition Alteration  Diet Type: Patient's Preference     ENT and Mouth Exam  Mucositis - Current: 0 - None   Cardiovascular  Respiratory effort: 1 - Normal - without distress  Gastrointestinal  Nausea: 0 - None  Genitourinary  Urinary Status: 0 - Normal  Psychosocial  Mood - Anxiety: 0 - Normal  Mood - Depression: 0 - Normal       Treatment Breaks: None  ED visits / Hospitalizations: None    Objective:   Resp 18   Wt 88.1 kg (194 lb 3.2 oz)   BMI 27.09 kg/m    Gen: cachectic, in no acute distress  Skin: No erythema    Labs:  Lab Results   Component Value Date    WBC 4.2 2021    HGB 14.0 2021    HCT 42.7 2021    MCV 88 2021     (L) 2021     Lab Results   Component Value Date     (L) 2021    POTASSIUM 4.6 2021    CHLORIDE 92 (L) 2021    CO2 24 2021     (H) 2021     Lab Results   Component Value Date    BUN 25 2021    CR 1.36 (H) 2021        Assessment:    Tolerating radiation therapy well.  All questions and concerns addressed.    Toxicities:  Fatigue: Grade 0: No toxicity  Dermatitis: Grade 0: No toxicity     Plan:   1. Continue current therapy.    2. Discussed potential for temporary esophagitis as a side effect.   3. Follow-up here as-needed.  4. Follow-up with Med Onc  as scheduled.       Mosaiq chart and setup information reviewed  CBCT checked    Medication Review  Med list reviewed with patient?: Yes  Med list printed and given: Offered and declined    Educational Topic Discussed  Additional Instructions: follow up with Miley's team for schedule   Education Instructions: Continue with XRT    Dipti Ying MD PGY-3  Radiation Oncology  [facility]      I saw and examined the patient with the resident.  I have reviewed and edited the resident's note and agree with the plan of care.      15  minutes were spent on the date of the encounter doing chart review, history and exam, documentation and further activities as noted above.           Marine Baldwin MD

## 2021-09-22 NOTE — LETTER
2021         RE: Galileo Mayberry  [address]  [place] [address]        Dear Colleague,    Thank you for referring your patient, Galileo Mayberry, to the [facility] RADIATION ONCOLOGY. Please see a copy of my visit note below.    RADIATION ONCOLOGY WEEKLY ON TREATMENT VISIT         Galileo Mayberry      Date: Sep 22, 2021   MRN: [id number]   : 1945    Diagnosis: Metastatic melanoma    Reason for Visit:  On Radiation Treatment Visit     Treatment Summary to Date  Treatment Sites: C6-T2, T6-T9 Current Dose: 1200/2000 cGy Fractions: 3/5         Subjective:  The patient is tolerating radiotherapy well. He has not noticed any bothersome side effects. Pain has not changed since starting radiation. He denies odynophagia.  His overall performance status seems to be declining. He and his wife are unsure if he will be able to tolerate any additional immunotherapy.    Nursing ROS:   Nutrition Alteration  Diet Type: Patient's Preference     ENT and Mouth Exam  Mucositis - Current: 0 - None   Cardiovascular  Respiratory effort: 1 - Normal - without distress  Gastrointestinal  Nausea: 0 - None  Genitourinary  Urinary Status: 0 - Normal  Psychosocial  Mood - Anxiety: 0 - Normal  Mood - Depression: 0 - Normal       Treatment Breaks: None  ED visits / Hospitalizations: None    Objective:   Resp 18   Wt 88.1 kg (194 lb 3.2 oz)   BMI 27.09 kg/m    Gen: cachectic, in no acute distress  Skin: No erythema    Labs:  Lab Results   Component Value Date    WBC 4.2 2021    HGB 14.0 2021    HCT 42.7 2021    MCV 88 2021     (L) 2021     Lab Results   Component Value Date     (L) 2021    POTASSIUM 4.6 2021    CHLORIDE 92 (L) 2021    CO2 24 2021     (H) 2021     Lab Results   Component Value Date    BUN 25 2021    CR 1.36 (H) 2021        Assessment:    Tolerating radiation therapy well.  All questions and  concerns addressed.    Toxicities:  Fatigue: Grade 0: No toxicity  Dermatitis: Grade 0: No toxicity     Plan:   1. Continue current therapy.    2. Discussed potential for temporary esophagitis as a side effect.   3. Follow-up here as-needed.  4. Follow-up with Med Onc as scheduled.       Mosaiq chart and setup information reviewed  CBCT checked    Medication Review  Med list reviewed with patient?: Yes  Med list printed and given: Offered and declined    Educational Topic Discussed  Additional Instructions: follow up with Miley's team for schedule   Education Instructions: Continue with XRT    Dipti Ying MD PGY-3  Radiation Oncology  [facility]      I saw and examined the patient with the resident.  I have reviewed and edited the resident's note and agree with the plan of care.      15  minutes were spent on the date of the encounter doing chart review, history and exam, documentation and further activities as noted above.           Marine Baldwin MD

## 2021-09-24 NOTE — LETTER
9/24/2021         RE: Galileo Mayberry  [address]  [place] [address]        Dear Colleague,    Thank you for referring your patient, Galileo Mayberry, to the [facility]. Please see a copy of my visit note below.    Favian is a 76 year old who is being evaluated via a billable telephone visit.       Galileo Mayberry states he is currently in the state of [place] for his visit today.     What phone number would you like to be contacted at? [phone]  How would you like to obtain your AVS? 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 11/2019, he is diagnosed with ocular melanoma, after a choroidal lesion was found in his Left eye.  Patient reports over the last several months he developed new floaters/spots in his L eye.  2. 11/25/2019, he was seen by ophthalmology who noted a left eye, elevated bilobed lesion, size 6.28mm (H) x 18.31(W) x 17.04(L).   3. 12/14/19, CT-CAP showed no evidence of metastatic disease.  4. 1/10/2020: Patient referred to [facility]. B-scan ultrasound of left eye showed elevated bilobed lesion measuring 7.2-7.3 height with a base of 21.4 x 22.5 mm. Low to medium reflectivity. Difficult measurements due to location. Ciliary body involvement was noted. Ultrasound basal dimension measurement included subretinal fluid, and clinical measurement had to be done by transillumination due to anterior tumor location.  5. 1/15/2020, visual acuity right eye 20/20 -1, left eye 20/25 +2 nh. Visual fields counting fingers full bilaterally. Clinical fundus exam of left eye revealed choroidal/ciliary body malignant melanoma with basal measurement of 22 x 21 mm and height of 7.5 mm. Located in the Inferior left eye from 4:30-7:30, 15 mm to disc, 15 mm to fovea, +subretinal fluid, bilobed. The tumor  without fluid appears to be 1-2 mm smaller in basal dimension (20 x 19 mm).   6. 2/10/2020, Iodine-125 24 mm plaque placement delivering 85 Gy to an 8 mm height. FNA biopsy performed, and molecular testing shows Class II PRAME positive, high-risk of early metastasis.  7. 3/31/2020, he starts adjuvant sunitinib 25 mg daily for high risk disease. He completed 6 months of therapy.  8. 8/26/2020, MRI-liver shows interval development of numerous (more than 30) metastatic lesions in the liver in bilobar distribution. The largest in segment JUAN measures 1.85 cm.  9. 10/19/2020, he undergoes Y90 radioembolization with 72.2 mCi of Theraspheres to the right lobe of the liver; predominantly in hepatic segments 5 and 6, milder uptake in the region of segment 8  10. 10/23/2020, first cycle of Ipilimumab 1mg/kg and nivolumab 3mg/kg. He received 4 cycles of Ipi/Nivo.  11. 11/16/2020, he has his 2nd Y90 radioembolization with 69 mCi of TheraSpheres to hepatic segments 4A and 4B.       12. 1/15/2020, he starts nivolumab 480 mg IV monthly maintenance.  13. 4/7/2021, receives 2000 cGy in 5 fractions to T9-T11.  14. 7/2/2021, he receives 1 cycle of carboplatin and paclitaxel, and on 7/9/2021 he starts ipilimumab and nivolumab. Cycle complicated by fatigue and nausea, poor oral intake and constipation.  15. 8/27/21, MRI cervical spine showed diffuse metastatic disease with notable epidural involvement of C7/T1.     History of Present Illness:  Mr. 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 He returns in follow-up today. Following 1 cycle of carboplatin and paclitaxel he has received 3 cycles of ipilimumab and nivolumab, last on 8/27/21.    He is doing alright after treatment. He feels he is doing relatively well and still making a difference in the world and would like to continue on treatment.    ECOG performance status is 2.    Review of Systems:  12-point ROS negative except as in HPI    Current Outpatient Medications    Medication Sig Dispense Refill     acetaminophen (TYLENOL) 500 MG tablet Take 1,000 mg by mouth       ALPRAZolam (XANAX) 0.25 MG tablet TAKE ONE TABLET BY MOUTH EVERY DAY AS NEEDED FOR SEVERE ANXIETY; DON&apos;T TAKE WITHIN 4 HRS OF MORPHINE OR HYDROMORPHONE       Ascorbic Acid (VITAMIN C) 500 MG CAPS Take 500 mg by mouth       calcium carbonate (OS-FAUSTO) 500 MG tablet Take 1 tablet (500 mg) by mouth 2 times daily 60 tablet 3     Cholecalciferol (VITAMIN D3) 25 MCG (1000 UT) CAPS Takes 3 daily       diclofenac (VOLTAREN) 1 % topical gel        Ferrous Gluconate 240 (27 Fe) MG TABS Take 240 mg by mouth       finasteride (PROSCAR) 5 MG tablet Take 5 mg by mouth       furosemide (LASIX) 20 MG tablet Take 1 tablet (20 mg) by mouth 2 times daily as needed (Take for distention and bloating) 60 tablet 1     gabapentin (NEURONTIN) 100 MG capsule 100mg (1 capsule) in the morning and in the middle of the day 20 capsule 0     hydrocortisone (ANUSOL-HC) 25 MG suppository INSERT 1 SUPPOSITORY IN RECTUM TWICE A DAY FOR  HEMORRHODS       HYDROmorphone (DILAUDID) 2 MG tablet Take 0.5 tablets (1 mg) by mouth every 6 hours as needed for moderate to severe pain 30 tablet 0     lactulose (CEPHULAC) 20 GM packet Take 1 packet (20 g) by mouth 3 times daily as needed for constipation 60 packet 3     LORazepam (ATIVAN) 0.5 MG tablet Take 1 tablet (0.5 mg) by mouth every 4 hours as needed (Anxiety, Nausea/Vomiting or Sleep) 30 tablet 3     meloxicam (MOBIC) 15 MG tablet TAKE ONE TABLET BY MOUTH DAILY AS NEEDED FOR PAIN. TAKE WITH FOOD       methocarbamol (ROBAXIN) 750 MG tablet TAKE ONE TABLET BY MOUTH THREE TIMES A DAY AS NEEDED FOR MUSCULOSKELETAL PAIN       methylphenidate (RITALIN) 10 MG tablet as needed       mirtazapine (REMERON) 15 MG tablet TAKE ONE-HALF TABLET BY MOUTH AT BEDTIME FOR 3 DAYS, THEN TAKE ONE TABLET AT BEDTIME FOR APPETITE, SLEEP AND MOOD.       morphine (MS CONTIN) 15 MG CR tablet TAKE ONE TABLET BY MOUTH EVERY 12  HOURS AS NEEDED FOR BONE PAIN       ondansetron (ZOFRAN) 4 MG tablet Take 1-2 tablets (4-8 mg) by mouth every 6 hours as needed for nausea (vomiting) 40 tablet 0     polyethylene glycol (MIRALAX) 17 GM/Dose powder Take 17 g (1 capful) by mouth 2 times daily 255 g 1     polyvinyl alcohol (LIQUIFILM TEARS) 1.4 % ophthalmic solution INSTILL ONE DROP IN BOTH EYES FOUR TIMES A DAY AS NEEDED FOR DRY EYES       prednisoLONE acetate (PRED FORTE) 1 % ophthalmic suspension Apply 1 drop to eye 4 times daily To operative eye 10 mL 1     sertraline (ZOLOFT) 50 MG tablet TAKE ONE-HALF TABLET BY MOUTH EVERY DAY       spironolactone (ALDACTONE) 25 MG tablet Take 2 tablets (50 mg) by mouth 2 times daily as needed (Take for distention and bloating) 60 tablet 1     tamsulosin (FLOMAX) 0.4 MG capsule Take 0.4 mg by mouth       Testosterone 1.62 % GEL        tiZANidine (ZANAFLEX) 2 MG tablet Take 1 tablet (2 mg) by mouth 3 times daily as needed (muscle relaxant) 20 tablet 0     witch hazel-glycerin (TUCKS) pad USE 1 APPLICATION TOPICALLY THREE TIMES A DAY AS NEEDED FOR  HEMORRHOIDS       doxepin (SINEQUAN) 10 MG/ML (HIGH CONC) solution Take 0.6 mLs (6 mg) by mouth At Bedtime (Patient not taking: Reported on 9/24/2021) 18 mL 1     prochlorperazine (COMPAZINE) 10 MG tablet Take 1 tablet (10 mg) by mouth every 6 hours as needed (nausea/vomiting) (Patient not taking: Reported on 7/2/2021) 30 tablet 5     senna-docusate (SENNA S) 8.6-50 MG tablet Take 1 tablet by mouth 2 times daily as needed for constipation (Patient not taking: Reported on 8/6/2021) 60 tablet 1       Past Medical History:   Diagnosis Date     BPH (benign prostatic hyperplasia)      Degenerative joint disease      Metastatic melanoma (H)     L eye     Nonsenile cataract      Torn rotator cuff 03/25/2021    right     Past Surgical History:   Procedure Laterality Date     ------------OTHER-------------  2014    back surgery L4/L5      ------------OTHER-------------      knee  surgery both 1961 Right, 1971 Left knee     AS REMOVAL OF KIDNEY STONE  2015     CATARACT IOL, RT/LT Left 07/26/2021     IR SIRT (SELECTIVE INTERNAL RADIO THERAPY)  10/13/2020     IR VISCERAL ANGIOGRAM  10/13/2020     IR VISCERAL EMBOLIZATION  10/19/2020     IR VISCERAL EMBOLIZATION  11/16/2020     JOINT REPLACEMENT  2017    Both kneses replaced (2017 and 2018)     PHACOEMULSIFICATION CLEAR CORNEA WITH STANDARD INTRAOCULAR LENS IMPLANT Left 7/26/2021    Procedure: LEFT EYE PHACOEMULSIFICATION, COMPLEX CATARACT, WITH INTRAOCULAR LENS IMPLANT;  Surgeon: Randy Hi MD;  Location: [facility] OR     ROTATOR CUFF REPAIR RT/LT Right 2016     SURGICAL HISTORY OF -   05/2021    ablation of right shoulder     TURP  2000     Family History   Problem Relation Age of Onset     Glaucoma Mother      Prostate Cancer Father      Cancer Sister      Macular Degeneration No family hx of        Physical Examination:  There were no vitals taken for this visit.  Wt Readings from Last 5 Encounters:   09/22/21 88.1 kg (194 lb 3.2 oz)   09/17/21 85 kg (187 lb 8 oz)   09/15/21 86.3 kg (190 lb 3.2 oz)   09/10/21 86.5 kg (190 lb 11.2 oz)   08/27/21 92.8 kg (204 lb 9.6 oz)   Telephone visit, no examination    ASSESSMENT/PLAN    #1 Metastatic uveal melanoma, bone, liver, lung, and soft tissue  #2 Choroidal melanoma with ciliary body involvement, left eye, status-post plaque brachytherapy, Class II and PRAME mRNA positive  It was a pleasure to see Mr. Mayberry today. He is a 76 year old man with widely metastatic uveal melanoma.  He has had radioembolization and Ipi/Nivo followed by re-induction with chemoimmunotherapy. He has not had the desired response to treatment and he has continued to decline.    We reviewed that it appears he has had the benefit of treatment, that is to say he has had improved survival over what he would have had on no treatment. However, at this time, treatment is no longer providing significant benefits given the  advanced stage of his disease. Treatment is now causing mostly side effects that are contributing to worsened quality of life.     [facility] when needed and we can continue to provide supportive care measures, and he also follows with palliative care medicine. We will continue to see Favian as needed and provide support around paracentesis as well as other supportive measures if needed.    I shared my personal cell phone number with Favian so he may reach me directly when needed. Otherwise, he has Sayra Montiel's number and the triage line number for general information.    It has been a privilege to be involved in Favian Mayberry's care. Questions answered.    Phone call duration: 25 minutes    Jw Reyes M.D.   of Medicine  Hematology, Oncology and Transplantation      Again, thank you for allowing me to participate in the care of your patient.        Sincerely,        Jw Trent MD

## 2021-09-24 NOTE — PROGRESS NOTES
Favian is a 76 year old who is being evaluated via a billable telephone visit.      Galileo Mayberry states he is currently in the [facility] for his visit today.    What phone number would you like to be contacted at? [phone]  How would you like to obtain your AVS? [facility]  Phone call duration: *** minutes    Carolina Oquendo, Virtual Visit Facilitator

## 2021-09-24 NOTE — NURSING NOTE
Galileo Mayberry 76 year old male presents today with concerns about his infusion treatments and thinks he may have had an additional infusion.    Patient verified meds and allergies are correct via patients echeck in. No changes at this time.    Carolina Oquendo, Virtual Facilitator

## 2021-09-24 NOTE — PROGRESS NOTES
Favian is a 76 year old who is being evaluated via a billable telephone visit.       Galileo Mayberry states he is currently in the state [facility] for his visit today.     What phone number would you like to be contacted at? [phone]  How would you like to obtain your AVS? 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 11/2019, he is diagnosed with ocular melanoma, after a choroidal lesion was found in his Left eye.  Patient reports over the last several months he developed new floaters/spots in his L eye.  2. 11/25/2019, he was seen by ophthalmology who noted a left eye, elevated bilobed lesion, size 6.28mm (H) x 18.31(W) x 17.04(L).   3. 12/14/19, CT-CAP showed no evidence of metastatic disease.  4. 1/10/2020: Patient referred to [facility]. B-scan ultrasound of left eye showed elevated bilobed lesion measuring 7.2-7.3 height with a base of 21.4 x 22.5 mm. Low to medium reflectivity. Difficult measurements due to location. Ciliary body involvement was noted. Ultrasound basal dimension measurement included subretinal fluid, and clinical measurement had to be done by transillumination due to anterior tumor location.  5. 1/15/2020, visual acuity right eye 20/20 -1, left eye 20/25 +2 nh. Visual fields counting fingers full bilaterally. Clinical fundus exam of left eye revealed choroidal/ciliary body malignant melanoma with basal measurement of 22 x 21 mm and height of 7.5 mm. Located in the Inferior left eye from 4:30-7:30, 15 mm to disc, 15 mm to fovea, +subretinal fluid, bilobed. The tumor without fluid appears to be 1-2 mm smaller in basal dimension (20 x 19 mm).   6. 2/10/2020, Iodine-125 24 mm plaque placement delivering 85 Gy to an 8 mm height. FNA biopsy performed, and molecular testing shows Class II PRAME positive, high-risk of early  metastasis.  7. 3/31/2020, he starts adjuvant sunitinib 25 mg daily for high risk disease. He completed 6 months of therapy.  8. 8/26/2020, MRI-liver shows interval development of numerous (more than 30) metastatic lesions in the liver in bilobar distribution. The largest in segment JUAN measures 1.85 cm.  9. 10/19/2020, he undergoes Y90 radioembolization with 72.2 mCi of Theraspheres to the right lobe of the liver; predominantly in hepatic segments 5 and 6, milder uptake in the region of segment 8  10. 10/23/2020, first cycle of Ipilimumab 1mg/kg and nivolumab 3mg/kg. He received 4 cycles of Ipi/Nivo.  11. 11/16/2020, he has his 2nd Y90 radioembolization with 69 mCi of TheraSpheres to hepatic segments 4A and 4B.       12. 1/15/2020, he starts nivolumab 480 mg IV monthly maintenance.  13. 4/7/2021, receives 2000 cGy in 5 fractions to T9-T11.  14. 7/2/2021, he receives 1 cycle of carboplatin and paclitaxel, and on 7/9/2021 he starts ipilimumab and nivolumab. Cycle complicated by fatigue and nausea, poor oral intake and constipation.  15. 8/27/21, MRI cervical spine showed diffuse metastatic disease with notable epidural involvement of C7/T1.     History of Present Illness:  Mr. 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 He returns in follow-up today. Following 1 cycle of carboplatin and paclitaxel he has received 3 cycles of ipilimumab and nivolumab, last on 8/27/21.    He is doing alright after treatment. He feels he is doing relatively well and still making a difference in the world and would like to continue on treatment.    ECOG performance status is 2.    Review of Systems:  12-point ROS negative except as in HPI    Current Outpatient Medications   Medication Sig Dispense Refill     acetaminophen (TYLENOL) 500 MG tablet Take 1,000 mg by mouth       ALPRAZolam (XANAX) 0.25 MG tablet TAKE ONE TABLET BY MOUTH EVERY DAY AS NEEDED FOR SEVERE ANXIETY; DON&apos;T TAKE WITHIN 4 HRS OF MORPHINE OR  HYDROMORPHONE       Ascorbic Acid (VITAMIN C) 500 MG CAPS Take 500 mg by mouth       calcium carbonate (OS-FAUSTO) 500 MG tablet Take 1 tablet (500 mg) by mouth 2 times daily 60 tablet 3     Cholecalciferol (VITAMIN D3) 25 MCG (1000 UT) CAPS Takes 3 daily       diclofenac (VOLTAREN) 1 % topical gel        Ferrous Gluconate 240 (27 Fe) MG TABS Take 240 mg by mouth       finasteride (PROSCAR) 5 MG tablet Take 5 mg by mouth       furosemide (LASIX) 20 MG tablet Take 1 tablet (20 mg) by mouth 2 times daily as needed (Take for distention and bloating) 60 tablet 1     gabapentin (NEURONTIN) 100 MG capsule 100mg (1 capsule) in the morning and in the middle of the day 20 capsule 0     hydrocortisone (ANUSOL-HC) 25 MG suppository INSERT 1 SUPPOSITORY IN RECTUM TWICE A DAY FOR  HEMORRHODS       HYDROmorphone (DILAUDID) 2 MG tablet Take 0.5 tablets (1 mg) by mouth every 6 hours as needed for moderate to severe pain 30 tablet 0     lactulose (CEPHULAC) 20 GM packet Take 1 packet (20 g) by mouth 3 times daily as needed for constipation 60 packet 3     LORazepam (ATIVAN) 0.5 MG tablet Take 1 tablet (0.5 mg) by mouth every 4 hours as needed (Anxiety, Nausea/Vomiting or Sleep) 30 tablet 3     meloxicam (MOBIC) 15 MG tablet TAKE ONE TABLET BY MOUTH DAILY AS NEEDED FOR PAIN. TAKE WITH FOOD       methocarbamol (ROBAXIN) 750 MG tablet TAKE ONE TABLET BY MOUTH THREE TIMES A DAY AS NEEDED FOR MUSCULOSKELETAL PAIN       methylphenidate (RITALIN) 10 MG tablet as needed       mirtazapine (REMERON) 15 MG tablet TAKE ONE-HALF TABLET BY MOUTH AT BEDTIME FOR 3 DAYS, THEN TAKE ONE TABLET AT BEDTIME FOR APPETITE, SLEEP AND MOOD.       morphine (MS CONTIN) 15 MG CR tablet TAKE ONE TABLET BY MOUTH EVERY 12 HOURS AS NEEDED FOR BONE PAIN       ondansetron (ZOFRAN) 4 MG tablet Take 1-2 tablets (4-8 mg) by mouth every 6 hours as needed for nausea (vomiting) 40 tablet 0     polyethylene glycol (MIRALAX) 17 GM/Dose powder Take 17 g (1 capful) by mouth 2  times daily 255 g 1     polyvinyl alcohol (LIQUIFILM TEARS) 1.4 % ophthalmic solution INSTILL ONE DROP IN BOTH EYES FOUR TIMES A DAY AS NEEDED FOR DRY EYES       prednisoLONE acetate (PRED FORTE) 1 % ophthalmic suspension Apply 1 drop to eye 4 times daily To operative eye 10 mL 1     sertraline (ZOLOFT) 50 MG tablet TAKE ONE-HALF TABLET BY MOUTH EVERY DAY       spironolactone (ALDACTONE) 25 MG tablet Take 2 tablets (50 mg) by mouth 2 times daily as needed (Take for distention and bloating) 60 tablet 1     tamsulosin (FLOMAX) 0.4 MG capsule Take 0.4 mg by mouth       Testosterone 1.62 % GEL        tiZANidine (ZANAFLEX) 2 MG tablet Take 1 tablet (2 mg) by mouth 3 times daily as needed (muscle relaxant) 20 tablet 0     witch hazel-glycerin (TUCKS) pad USE 1 APPLICATION TOPICALLY THREE TIMES A DAY AS NEEDED FOR  HEMORRHOIDS       doxepin (SINEQUAN) 10 MG/ML (HIGH CONC) solution Take 0.6 mLs (6 mg) by mouth At Bedtime (Patient not taking: Reported on 9/24/2021) 18 mL 1     prochlorperazine (COMPAZINE) 10 MG tablet Take 1 tablet (10 mg) by mouth every 6 hours as needed (nausea/vomiting) (Patient not taking: Reported on 7/2/2021) 30 tablet 5     senna-docusate (SENNA S) 8.6-50 MG tablet Take 1 tablet by mouth 2 times daily as needed for constipation (Patient not taking: Reported on 8/6/2021) 60 tablet 1       Past Medical History:   Diagnosis Date     BPH (benign prostatic hyperplasia)      Degenerative joint disease      Metastatic melanoma (H)     L eye     Nonsenile cataract      Torn rotator cuff 03/25/2021    right     Past Surgical History:   Procedure Laterality Date     ------------OTHER-------------  2014    back surgery L4/L5      ------------OTHER-------------      knee surgery both 1961 Right, 1971 Left knee     AS REMOVAL OF KIDNEY STONE  2015     CATARACT IOL, RT/LT Left 07/26/2021     IR SIRT (SELECTIVE INTERNAL RADIO THERAPY)  10/13/2020     IR VISCERAL ANGIOGRAM  10/13/2020     IR VISCERAL EMBOLIZATION   10/19/2020     IR VISCERAL EMBOLIZATION  11/16/2020     JOINT REPLACEMENT  2017    Both kneses replaced (2017 and 2018)     PHACOEMULSIFICATION CLEAR CORNEA WITH STANDARD INTRAOCULAR LENS IMPLANT Left 7/26/2021    Procedure: LEFT EYE PHACOEMULSIFICATION, COMPLEX CATARACT, WITH INTRAOCULAR LENS IMPLANT;  Surgeon: Randy Hi MD;  Location: [facility] OR     ROTATOR CUFF REPAIR RT/LT Right 2016     SURGICAL HISTORY OF -   05/2021    ablation of right shoulder     TURP  2000     Family History   Problem Relation Age of Onset     Glaucoma Mother      Prostate Cancer Father      Cancer Sister      Macular Degeneration No family hx of        Physical Examination:  There were no vitals taken for this visit.  Wt Readings from Last 5 Encounters:   09/22/21 88.1 kg (194 lb 3.2 oz)   09/17/21 85 kg (187 lb 8 oz)   09/15/21 86.3 kg (190 lb 3.2 oz)   09/10/21 86.5 kg (190 lb 11.2 oz)   08/27/21 92.8 kg (204 lb 9.6 oz)   Telephone visit, no examination    ASSESSMENT/PLAN    #1 Metastatic uveal melanoma, bone, liver, lung, and soft tissue  #2 Choroidal melanoma with ciliary body involvement, left eye, status-post plaque brachytherapy, Class II and PRAME mRNA positive  It was a pleasure to see Mr. Mayberry today. He is a 76 year old man with widely metastatic uveal melanoma.  He has had radioembolization and Ipi/Nivo followed by re-induction with chemoimmunotherapy. He has not had the desired response to treatment and he has continued to decline.    We reviewed that it appears he has had the benefit of treatment, that is to say he has had improved survival over what he would have had on no treatment. However, at this time, treatment is no longer providing significant benefits given the advanced stage of his disease. Treatment is now causing mostly side effects that are contributing to worsened quality of life.     [facility] when needed and we can continue to provide supportive  care measures, and he also follows with palliative care medicine. We will continue to see Favian as needed and provide support around paracentesis as well as other supportive measures if needed.    I shared my personal cell phone number with Favian so he may reach me directly when needed. Otherwise, he has Sayra Montiel's number and the triage line number for general information.    It has been a privilege to be involved in Favian Mayberry's care. Questions answered.    Phone call duration: 25 minutes    Jw Reyes M.D.   of Medicine  Hematology, Oncology and Transplantation

## 2021-09-30 NOTE — PROCEDURES
Radiotherapy Treatment Summary          Date of Report: 2021     PATIENT: SHIVA LYNCH  MEDICAL RECORD NO: [id number]  : 1945     DIAGNOSIS: C69.32 Malignant neoplasm of left choroid  INTENT OF RADIOTHERAPY: Palliative  PATHOLOGY: Melanoma                                 STAGE: IV  CONCURRENT SYSTEMIC THERAPY: Ipilimumab, Nivolumab                   Details of the treatments summarized below are found in records kept in the Department of Radiation Oncology   at [facility].     Treatment Summary:  Radiation Oncology - Course: 3 Protocol:   Treatment Site    Current Dose Modality From To Elapsed Days Fx.  3 C6-T2                1,600 cGy 18 X  9/20/2021  2021   7  4  4 T6-T9                1,600 cGy 18 X  9/20/2021  2021   7  4          Dose per Fraction:  400cGy      Total Dose:  1600 cGy            COMMENTS:                      Mr. Lynch is a 76 year old man with diagnosis of metastatic uveal melanoma. He was initially diagnosed with   uveal melanoma in 2019 and underwent plaque brachytherapy. FNA biopsy performed showed Class   II PRAME mRNA positive, high-risk of early metastasis. He started adjuvant Sunitinib 3/31/2020 for high risk   disease. Unfortunately, he was found to have metastatic disease to the liver in 2020. Since then, he has   been on immunotherapy and chemotherapy with progressive disease. He received a course of palliative therapy   to T10 epidural disease early this year. Most recent MR C and T spine demonstrated new epidural disease in T1,   T7 and T8. Palliative radiotherapy was recommended, as outlined above. A total of 2000 cGy in 5 fractions was   planned. Mr. Lynch opted to stop treatment after 4 fractions due to decline in performance status. [facility]     ED visits/hospitalizations: None     Missed treatments: None     Acute Toxicity Profile by CTC v5.0: None     PAIN MANAGEMENT: No changes to pain management  during radiotherapy.                             FOLLOW UP PLAN: Follow-up up here as-needed.                             Resident Physician: Dipti Ying M.D.   Staff Physician: Marine Baldwin M.D.  Physicist: Rui Dhaliwal, Ph.D.     CC: Jw Trent MD                                     Radiation Oncology:  [facility] [address] [place] [address]

## 2021-10-27 ENCOUNTER — PATIENT OUTREACH (OUTPATIENT)
Dept: ONCOLOGY | Facility: CLINIC | Age: 76
End: 2021-10-27

## 2021-12-06 PROBLEM — C69.32 MALIGNANT MELANOMA OF CHOROID OF LEFT EYE (H): Status: ACTIVE | Noted: 2019-11-25

## 2022-08-09 NOTE — PRE-PROCEDURE
GENERAL PRE-PROCEDURE:   Date/Time:  11/16/2020 7:46 AM    Written consent obtained?: Yes    Risks and benefits: Risks, benefits and alternatives were discussed    Consent given by:  Patient  Patient states understanding of procedure being performed: Yes    Patient's understanding of procedure matches consent: Yes    Procedure consent matches procedure scheduled: Yes    Expected level of sedation:  Moderate  Appropriately NPO:  Yes  ASA Class:  Class 2- mild systemic disease, no acute problems, no functional limitations  Mallampati  :  Grade 2- soft palate, base of uvula, tonsillar pillars, and portion of posterior pharyngeal wall visible  Lungs:  Lungs clear with good breath sounds bilaterally  Heart:  Normal heart sounds and rate  History & Physical reviewed:  History and physical reviewed and no updates needed  Statement of review:  I have reviewed the lab findings, diagnostic data, medications, and the plan for sedation     No

## 2023-01-20 NOTE — LETTER
"    8/25/2021         RE: Galileo Mayberry  [address]  [facility] [address]        Dear Colleague,    Thank you for referring your patient, Galileo Mayberry, to the [facility]. Please see a copy of my visit note below.    Favian is a 76 year old who is being evaluated via a billable video visit.      How would you like to obtain your AVS? MyChart  If the video visit is dropped, the invitation should be resent by: Send to e-mail at: [email or web address]  Will anyone else be joining your video visit? No     I have reviewed and updated the patient's allergies and medication list.    Concerns: none  Refills: none     Vitals - Patient Reported  Weight (Patient Reported): 89.8 kg (198 lb)  Height (Patient Reported): 180.3 cm (5' 11\")  BMI (Based on Pt Reported Ht/Wt): 27.62  Pain Score: Mild Pain (2)  Pain Loc: Other - see comment (widespread)    Bree Lundberg CMA    Video-Visit Details    Type of service:  Video Visit    Video Start Time: 1:27 PM    Video End Time:1:47 PM    Originating Location (pt. 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 11/2019, he is diagnosed with ocular melanoma, after a choroidal lesion was found in his Left eye.  Patient reports over the last several months he developed new floaters/spots in his L eye.  2. 11/25/2019, he was seen by ophthalmology who noted a left eye, elevated bilobed lesion, size 6.28mm x 18.31(T) x 17.04(L).   3. 12/14/19, CT-CAP showed no evidence of metastatic disease.  4. 1/10/2020: Patient referred to [facility]. " [address] [address]                               DISCHARGE SUMMARY    PATIENT NAME: Nadege Arce                 :        1961  MED REC NO:   [id number]                            ROOM:       [id number]  ACCOUNT NO:   [de-identified]                           ADMIT DATE: 2023  PROVIDER:     Guillemro Hand MD                  [address] [place] [address] DATE: 2023    FINAL DIAGNOSES:  1. Acute cholecystitis. 2.  Hyperkalemia. 3.  Chronic anemia. 4.  Multiple sclerosis. 5.  Hyponatremia. COURSE OF ILLNESS:  This is a 69-year-old woman who came in with a  gangrenous gallbladder. She was started on IV antibiotics for a couple  of days and ultimately taken to the OR, where she underwent a  laparoscopic cholecystectomy. She did not have any significant  complications except for some persistent abdominal pain and mild nausea  after surgery. Ultimately she was tolerating a diet and did have a  bowel movement prior to discharge. She did have a drain in place in her  right upper quadrant. She did have some electrolyte abnormalities on  presentation with some hypokalemia and hyponatremia, but these did  improve throughout the admission with some IV fluids and were normal at  the time of discharge. She is chronically anemic with a hemoglobin in  the 9 to 10 range and this was stable throughout this admission. From a  standpoint of her MS and history of significant fractures in the past,  the patient is chronically bed-bound at home, although will periodically  get into a powered wheelchair. It is very difficult for her to leave  her. She is stable at the time of discharge. I will likely see the  patient in about one week for a transitional visit. This will likely be  done as a house call. She will follow up with Surgery, at their  discretion.   Her discharge medications are as follows:  Lorazepam 0.5 mg  every 12 hours B-scan ultrasound of left eye showed elevated bilobed lesion measuring 7.2-7.3 height with a base of 21.4 x 22.5 mm. Low to medium reflectivity. Difficult measurements due to location. Ciliary body involvement was noted. Ultrasound basal dimension measurement included subretinal fluid, and clinical measurement had to be done by transillumination due to anterior tumor location.  5. 1/15/2020, visual acuity right eye 20/20 -1, left eye 20/25 +2 nh. Visual fields counting fingers full bilaterally. Clinical fundus exam of left eye revealed choroidal/ciliary body malignant melanoma with basal measurement of 22 x 21 mm and height of 7.5 mm. Located in the Inferior left eye from 4:30-7:30, 15 mm to disc, 15 mm to fovea, +subretinal fluid, bilobed. The tumor without fluid appears to be 1-2 mm smaller in basal dimension (20 x 19 mm).   6. 2/10/2020, Iodine-125 24 mm plaque placement delivering 85 Gy to an 8 mm height. FNA biopsy performed, and molecular testing shows Class II PRAME positive, high-risk of early metastasis.  7. 3/31/2020, he starts adjuvant sunitinib 25 mg daily for high risk disease. He completed 6 months of therapy.  8. 8/26/2020, MRI-liver shows interval development of numerous (more than 30) metastatic lesions in the liver in bilobar distribution. The largest in segment JUAN measures 1.85 cm.  9. 10/19/2020, he undergoes Y90 radioembolization with 72.2 mCi of Theraspheres to the right lobe of the liver; predominantly in hepatic segments 5 and 6, milder uptake in the region of segment 8  10. 10/23/2020, first cycle of Ipilimumab 1mg/kg and nivolumab 3mg/kg. He received 4 cycles of Ipi/Nivo.  11. 11/16/2020, he has his 2nd Y90 radioembolization with 69 mCi of TheraSpheres to hepatic segments 4A and 4B.       12. 1/15/2020, he starts nivolumab 480 mg IV monthly maintenance.  13. 4/7/2021, receives 2000 cGy in 5 fractions to T9-T11.    History of Present Illness:  Mr. Mayberry is a 76 year old man with metastatic  as needed, duloxetine 60 mg two tablets daily, gabapentin  300 mg one tablet in the morning and two tablets in the evening,  trazodone 50 mg at bedtime, levothyroxine 175 mcg daily, Flexeril 10 mg  b.i.d., Nexium 40 mg daily, and Carafate 1 gm daily.         Aquilino Wesley MD    D: 01/19/2023 19:35:15       T: 01/19/2023 19:37:40     [email or web address]  Job#: [id number]     Doc#: [id number]    CC: uveal melanoma. He returns in follow-up today.    -No new symptoms.   -Had cataract surgery on the left eye with improvement in vision.  -Has dyspnea on exertion that has been getting progressively worse. Has a cough with green phlegm for a few weeks.   -Has had some nausea in the last week, but no vomiting. Gets relief from Zofran.   -Has had constipation. Taking Senna-S bid and MiraLax bid.   -Fluid intake is fair. Urine is dark color.   -Eating is fair.   -[facility] with [facility].   -Has mild neuropathy in feet and in right hand that is unchanged that started after chemotherapy. Less bothersome recently.   -Denies any new lumps or bumps.   -[facility]   -Remains on pain medication, [facility] by [facility].     Review of Systems:  Patient denies any of the following except if noted above: fevers, chills, difficulty with energy, vision or hearing changes, chest pain, dyspnea at rest, abdominal pain, vomiting, diarrhea, urinary concerns, headaches, issues with sleep or mood.    Current Outpatient Medications   Medication Sig Dispense Refill     acetaminophen (TYLENOL) 500 MG tablet Take 1,000 mg by mouth       ALPRAZolam (XANAX) 0.25 MG tablet TAKE ONE TABLET BY MOUTH EVERY DAY AS NEEDED FOR SEVERE ANXIETY; DON&apos;T TAKE WITHIN 4 HRS OF MORPHINE OR HYDROMORPHONE (Patient not taking: Reported on 7/23/2021)       Ascorbic Acid (VITAMIN C) 500 MG CAPS Take 500 mg by mouth       calcium carbonate (OS-FAUSTO) 500 MG tablet Take 1 tablet (500 mg) by mouth 2 times daily 60 tablet 3     Cholecalciferol (VITAMIN D3) 25 MCG (1000 UT) CAPS Takes 3 daily       diclofenac (VOLTAREN) 1 % topical gel        doxepin (SINEQUAN) 10 MG/ML (HIGH CONC) solution Take 0.6 mLs (6 mg) by mouth At Bedtime 18 mL 1     Ferrous Gluconate 240 (27 Fe) MG TABS Take 240 mg by mouth       finasteride (PROSCAR) 5 MG tablet Take 5 mg by mouth       gabapentin  (NEURONTIN) 100 MG capsule 100mg (1 capsule) in the morning and in the middle of the day 20 capsule 0     HYDROmorphone (DILAUDID) 2 MG tablet Take 0.5 tablets (1 mg) by mouth every 6 hours as needed for moderate to severe pain 30 tablet 0     lactulose (CEPHULAC) 20 GM packet Take 1 packet (20 g) by mouth 3 times daily as needed for constipation (Patient not taking: Reported on 8/6/2021) 60 packet 3     LORazepam (ATIVAN) 0.5 MG tablet Take 1 tablet (0.5 mg) by mouth every 4 hours as needed (Anxiety, Nausea/Vomiting or Sleep) 30 tablet 3     meloxicam (MOBIC) 15 MG tablet TAKE ONE TABLET BY MOUTH DAILY AS NEEDED FOR PAIN. TAKE WITH FOOD       methylphenidate (RITALIN) 10 MG tablet as needed       morphine (MS CONTIN) 15 MG CR tablet TAKE ONE TABLET BY MOUTH EVERY 12 HOURS AS NEEDED FOR BONE PAIN       ondansetron (ZOFRAN) 4 MG tablet Take 1-2 tablets (4-8 mg) by mouth every 6 hours as needed for nausea (vomiting) 40 tablet 0     polyethylene glycol (MIRALAX) 17 GM/Dose powder Take 17 g (1 capful) by mouth 2 times daily (Patient not taking: Reported on 8/6/2021) 255 g 1     polyvinyl alcohol (LIQUIFILM TEARS) 1.4 % ophthalmic solution INSTILL ONE DROP IN BOTH EYES FOUR TIMES A DAY AS NEEDED FOR DRY EYES       prednisoLONE acetate (PRED FORTE) 1 % ophthalmic suspension Apply 1 drop to eye 4 times daily To operative eye 10 mL 1     prochlorperazine (COMPAZINE) 10 MG tablet Take 1 tablet (10 mg) by mouth every 6 hours as needed (nausea/vomiting) (Patient not taking: Reported on 7/2/2021) 30 tablet 5     senna-docusate (SENNA S) 8.6-50 MG tablet Take 1 tablet by mouth 2 times daily as needed for constipation (Patient not taking: Reported on 8/6/2021) 60 tablet 1     sertraline (ZOLOFT) 50 MG tablet TAKE ONE-HALF TABLET BY MOUTH EVERY DAY       tamsulosin (FLOMAX) 0.4 MG capsule Take 0.4 mg by mouth       Testosterone 1.62 % GEL        tiZANidine (ZANAFLEX) 2 MG tablet Take 1 tablet (2 mg) by mouth 3 times daily as  needed (muscle relaxant) 20 tablet 0     Objective:  General: patient appears well in no acute distress, alert and oriented, speech clear and fluid  Skin: no visualized rash or lesions on visualized skin  Resp: Appears to be breathing comfortably without accessory muscle usage, speaking in full sentences, no audible wheezes or cough.  Psych: Coherent speech, normal rate and volume, able to articulate logical thoughts, able to abstract reason, no tangential thoughts, no hallucinations or delusions  Patient's affect is appropriate.    Labs:  Most Recent 3 CBC's:Recent Labs   Lab Test 08/06/21  0648 07/30/21  1443 07/09/21  1155   WBC 3.9* 5.5 7.4   HGB 12.1* 12.1* 12.3*   MCV 90 90 87   * 141* 105*    Most Recent 3 BMP's:  Recent Labs   Lab Test 08/06/21  0648 07/30/21  1443 07/09/21  1110    136 140   POTASSIUM 4.4 4.3 4.4   CHLORIDE 105 107 109   CO2 24 27 23   BUN 15 16 17   CR 1.10 0.95 1.05   ANIONGAP 8 2* 8   FAUSTO 8.4* 8.7 8.8   * 109* 125*    Most Recent 2 LFT's:  Recent Labs   Lab Test 08/06/21  0648 07/30/21  1443   AST 86* 82*   ALT 22 20   ALKPHOS 148 152*   BILITOTAL 0.8 0.9    Most Recent TSH and T4:  Recent Labs   Lab Test 05/07/21  1255   TSH 3.65     I reviewed the above labs today.    ASSESSMENT/PLAN    #1 Metastatic uveal melanoma, bone, liver, lung, and soft tissue  #2 Choroidal melanoma with ciliary body involvement, left eye, status-post plaque brachytherapy, Class II and PRAME mRNA positive  #3 Shoulder and hip pain, moderate  It was a pleasure to see Mr. Mayberry today. He is a 76 year old man with metastatic uveal melanoma.  He underwent radioembolization and Ipi/Nivo followed by maintainence nivolumab.  He has had one cycle of combined chemoimmunotherapy. LDH has decreased from over 2034 to 1742. He has had the desired response thus far. The ultimate goal is to reduce the number of immunosuppressive immune cells with chemotherapy while de-inhibiting effector T cells and NK  cells through the use of immune checkpoint blockade.  Given tolerability concerns, we limited him to just one cycle of chemotherapy. He then proceeded with immunotherapy alone with plans for an additional 3 cycles of ipilimumab and nivolumab. He is doing well today and will continue with cycle 3 ipi/nivo later this week, assuming his chest CT, as below, looks okay. Will tentatively plan for a PET/CT after 4 cycles of ipi/nivo.     #4 Cough and dyspnea on exertion.   Will obtain a chest CT to further assess.     Jalyn Greenberg PA-C  [facility] [address] [address]  [phone]    Addendum: Chest CT is concerning for disease progression. I reviewed this with Dr. Trent and discussed with the patient whether or not he wished to continue with treatment. Patient does prefer to continue with treatment.     8/27/21 Addendum: [facility] his [facility] C-spine imaging.   Epidural involvement at the C6-T1. Moderate to severe narrowing. No signal change. Will set him up with rad onc for consideration of palliative radiation. Patient has bilateral hand numbness. Will get the records sent to us here.       Again, thank you for allowing me to participate in the care of your patient.        Sincerely,        Jalyn Greenberg PA-C

## 2023-04-03 PROBLEM — C79.51 MALIGNANT NEOPLASM METASTATIC TO BONE (H): Status: ACTIVE | Noted: 2020-01-01

## 2023-12-14 NOTE — PROCEDURES
Anticoagulation Clinic - Remote Progress Note  mdINR Home Monitor  Testing frequency: weekly    Indication: paroxysmal afib  Referring Provider: Susan [next 10/25/23]  Initial Warfarin Start Date: 2020? 2019?  Goal INR: 2.0-3.0  Current Drug Interactions: hydrochlorothiazide   [id number]: 4 (Age ?73, Sex, HTN) [estimated 5/20/21]  Bleed Risk: self reports negative history for GI bleed  Other: DOAC too expensive    Diet: green beans, peas, zucchini, or priyanka salads 2-3x/week (7/6/2023)  Alcohol: none  Tobacco: none  OTC Pain Medication: APAP only    INR History:  Date 5/14 5/18 5/25 6/1 6/15 7/6 7/12 7/22 7/28 8/4 8/11 8/18 8/23 8/27   Total WeeklyDose  22.5mg 25mg 25mg 25mg 25mg 25mg 25mg 25mg 25mg 25mg 27.5mg 27.5mg 27.5mg   INR 3.5 1.9 2.3 2.3 2.8 2.7 2.1 2.2 1.9 2.0 1.8 2.3 2.2 1.8   Notes [facility] 1x hold; incr GLV     HM  incr GLV  no GLV incr GLV cefdinir cefdinir     Date 9/1 9/8 9/15 9/22 9/29 10/6 10/13 10/20 10/27 11/3 11/10 11/17 11/24 12/1   Total WeeklyDose 27.5mg 27.5mg 27.5mg 27.5mg 27.5mg 27.5mg 27.5mg 30mg 27.5mg 27.5mg 27.5mg 25mg 27.5mg 27.5mg   INR 2.1 2.3 3.3 2.3 2.2 2.0 1.9 2.6 3.2 2.4 3.4 1.6 2.4 2.3   Notes cefdinir  decr GLV?  keflex   1x incr dose   decr GLV? 1x decr dose;   incr GLV?       Date 12/9 12/15 12/22 12/29 1/5/22 1/12 1/20 1/26 1/31 2/3 2/7 2/10 2/16 2/23   Total WeeklyDose 27.5mg 27.5mg 27.5mg 27.5mg 27.5mg 27.5mg 27.5mg 27.5mg 27.5mg 25mg 25mg 22.5mg 27.5mg 27.5mg   INR 2.5 2.4 2.7 2.5 2.6 2.2 2.5 2.1 3.8 2.6 4.1 2.5 2.7 3.5   Notes call    call   COVID+ dex / azith 1x hold pred 20 BID x5d  1x hold; 1x decr dose;   call call call     Date 3/2 3/9 3/16 3/23 3/30 4/5 4/13 4/20 4/27 5/4 5/11 5/18 5/25 6/1   Total WeeklyDose 25mg 27.5mg 27.5mg 27.5mg 27.5mg 27.5mg 27.5 mg 27.5mg 27.5 mg 27.5 mg 27.5 mg 27.5 mg 27.5 mg 27.5mg   INR 2.5 2.9 2.2 2.8 2.7 2.4 2.5 2.3 2.5 2.8 3.1 2.6 2.5 2.3   Notes 1x decr  call call    call    call Call  apap rec 5/19       Date 6/8 6/15  [facility]     Procedure: Visceral angiogram with MAA delivery    Date/Time: 10/13/2020 11:38 AM  Performed by: Remberto Monroy MD  Authorized by: Remberto Monroy MD   IR Fellow Physician: Remberto Monroy    UNIVERSAL PROTOCOL   Site Marked: NA  Prior Images Obtained and Reviewed:  Yes  Required items: Required blood products, implants, devices and special equipment available    Patient identity confirmed:  Verbally with patient, arm band, provided demographic data and hospital-assigned identification number  Patient was reevaluated immediately before administering moderate or deep sedation or anesthesia  Confirmation Checklist:  Patient's identity using two indicators, relevant allergies, procedure was appropriate and matched the consent or emergent situation and correct equipment/implants were available  Time out: Immediately prior to the procedure a time out was called    Universal Protocol: the Joint Commission Universal Protocol was followed    Preparation: Patient was prepped and draped in usual sterile fashion    ESBL (mL):  5         ANESTHESIA    Anesthesia: Local infiltration  Local Anesthetic:  Lidocaine 1% without epinephrine  Anesthetic Total (mL):  10      SEDATION    Patient Sedated: Yes    Sedation Type:  Moderate (conscious) sedation  Vital signs: Vital signs monitored during sedation    See dictated procedure note for full details.  Findings: Multiple liver metastasis. MAA delivery performed from Hepatic artery proper.    Specimens: none    Complications: None    Condition: Stable    Plan: - Bed rest for 3 hrs  - Patient will go to [facility] for Lung shunt scan.    PROCEDURE   Patient Tolerance:  Patient tolerated the procedure well with no immediate complications    Length of time physician/provider present for 1:1 monitoring during sedation: 45       6/22 6/29 7/6 7/14 7/20 7/27 8/3 8/10 8/17 8/25 8/31 9/7   Total Weekly Dose 27.5 mg 27.5 mg 27.5 mg 27.5 mg 27.5 mg 27.5 mg 27.5 mg 30 mg 27.5 mg 27.5mg 27.5 mg 27.5 mg 27.5 mg 27.5 mg   INR 2.5 2.5 2.7 2.5 2.9 2.4 1.8 2.7 2.8 2.8 2.8 3.1 2.6 3.5   Notes call    call  call 12/71x boost   call Call  Dec GLV  call     Date 9/14 9/21 9/28 10/5 10/12 10/19 10/26 11/2 11/9 11/16 11/23 11/30 12/7   Total WeeklyDose 27.5mg 25 mg 25 mg 25 mg 25 mg  25 mg 25 mg 25 mg 25 mg 25 mg 25 mg 25 mg 25 mg   INR 3.1 2.9 2.5 2.9 2.2 2.4 2.3 2.1 2.3 2.3 2.1 2.2 1.9   Notes 1x hold inc GLV call   Call     call    Call  INC GLV     Date 12/14 12/22 12/28 1/4/23 1/11 1/18 1/25 2/1 2/8 2/16/23 2/22 3/1/23   Total WeeklyDose 27.5 mg 25 mg  20 mg 27.5 mg 27.5 mg 27.5 mg 27.5 mg 27.5 mg 27.5 mg 27.5 mg 27.5mg 27.5 mg    INR 2.8 2.1 1.9 1.9 1.8 2.1 2.2 2.0 2.7 2.5 2.6 2.8   Notes 1x boost  rec 12/15 Call  Call 1x miss call call Call  Call    Call        Date 3/9 3/16 3/22 3/29 4/5 4/12 4/19 4/26 5/3 5/10/23 5/17 5/24   Total WeeklyDose 27.5 mg 27.5 mg 27.5 mg 25mg 27.5 mg 27.5 mg 27.5 mg 27.5 mg 27.5 mg  27.5 mg  27.5 mg 27.5 mg   INR 2.6  2.6 4.1 2.7 2.6 2.7 2.9 2.7 2.5 2.8 2.0 2.4   Notes  call call call call    call        Date 5/31 6/7/23 6/14 6/21/23 6/28 7/5 7/13 7/19/23 7/26/23 8/2 08/10 8/16/23   Total WeeklyDose 27.5 mg 27.5 mg  27.5 mg 27.5 mg 27.5 mg 25mg 25 mg 25 mg 25 mg 25 mg 25 mg 25 mg   INR 2.8 2.5 3.2 2.9 3.8 3.2 2.9 2.3 2.6 2.9 2.8 2.9   Notes call  Call  Dec GLV   Hold x1; moving; call     call      Date 08/23 8/30/23 9/8 9/14 09/21 09/28 10/05 10/13/23 10/19 10/26 11/3 11/9   Total WeeklyDose 25 mg 25 mg 25 mg 25 mg 25 mg 25 mg 25 mg 25 mg 27.5 mg 25 mg  27.5 mg 27.5 mg   INR 2.9 2.0 2.4 2.4 2.7 2.8 2.3 1.9 2.4 1.8 2.0 3.1   Notes   call    call Call  1x boost   call     Date 11/16 11/22 11/29 12/06 12/14          Total Weekly Dose 25 mg 25 mg 25 mg 25 mg 25 mg          INR 2.4 2.3 2.5 2.6 2.6          Notes   Call  Rec'd  12/07             Phone Interview:  Tablet Strength: 5 mg (1/5/22)  Patient Contact Info: [phone] (home) preferred; [phone] (cell)  Verbal Release Auth: signed 5/25/21 -- May speak w/Femi Ngo, ; May leave voicemail on home phone  Lab Contact Info: [facility]  *Will call once monthly or if INR out of range*    Patient Findings  Comments: Patient not contacted during this encounter.     Plan:  INR was therapeutic today at 2.6 (goal 2.0-3.0).  Ms. Ngo to continue dose of warfarin 2.5 mg daily except warfarin 5 mg oral SunTuesThurs until recheck (25 mg/week)  Repeat INR in one week, 12/21/23  Marianela Ngo understands the importance of calling the [facility] if she notices any s/sx of bleeding, stroke, or abnormal bruising, if any changes are made to her medications or medication doses (Rx, OTC, herbal), or if any upcoming procedures are scheduled. Marianela Ngo will likewise let us know if any other changes, questions, or concerns arise regarding anticoagulation therapy. she understands the importance of seeking medical attention immediately if she experiences any falls, vehicle accidents, or abnormal bleeding or bruising. Marianela Ngo voiced understanding of this information and confirms that she has the [facility]'s contact information. Otherwise, we will plan to contact the patient once monthly or if her INR is out of range.    Janae Knox,  Certified Pharmacy Technician  12/14/2023 11:05 Maryjane MORAES, PharmD, have reviewed the note in full and agree with the assessment and plan.  12/14/23  12:25 EST

## (undated) DEVICE — PACK CATARACT CUSTOM ASC SEY15CPUMC

## (undated) DEVICE — EYE TIP IRRIGATION & ASPIRATION POLYMER CVD 0.3MM 8065751512

## (undated) DEVICE — EYE SHIELD PLASTIC

## (undated) DEVICE — LINEN TOWEL PACK X5 5464

## (undated) DEVICE — EYE PACK CUSTOM ANTERIOR 30DEG TIP CENTURION PPK6682-04

## (undated) DEVICE — EYE KNIFE SLIT XSTAR VISITEC 2.6MM 45DEG 373726

## (undated) DEVICE — GLOVE PROTEXIS MICRO 7.5  2D73PM75

## (undated) DEVICE — EYE KNIFE STILETTO VISITEC 1.1MM ANG 45DEG SIDEPORT 376620

## (undated) DEVICE — SOL WATER IRRIG 500ML BOTTLE 2F7113

## (undated) DEVICE — EYE CANN IRR 27GA ANTERIOR CHAMBER 581280

## (undated) DEVICE — EYE CANN IRR 25GA CYSTOTOME 581610

## (undated) RX ORDER — LIDOCAINE HYDROCHLORIDE 10 MG/ML
INJECTION, SOLUTION EPIDURAL; INFILTRATION; INTRACAUDAL; PERINEURAL
Status: DISPENSED
Start: 2021-01-01

## (undated) RX ORDER — ONDANSETRON 2 MG/ML
INJECTION INTRAMUSCULAR; INTRAVENOUS
Status: DISPENSED
Start: 2020-01-01

## (undated) RX ORDER — ONDANSETRON 2 MG/ML
INJECTION INTRAMUSCULAR; INTRAVENOUS
Status: DISPENSED
Start: 2021-01-01

## (undated) RX ORDER — DEXAMETHASONE SODIUM PHOSPHATE 4 MG/ML
INJECTION, SOLUTION INTRA-ARTICULAR; INTRALESIONAL; INTRAMUSCULAR; INTRAVENOUS; SOFT TISSUE
Status: DISPENSED
Start: 2021-01-01

## (undated) RX ORDER — FENTANYL CITRATE 50 UG/ML
INJECTION, SOLUTION INTRAMUSCULAR; INTRAVENOUS
Status: DISPENSED
Start: 2020-01-01

## (undated) RX ORDER — ACETAMINOPHEN 325 MG/1
TABLET ORAL
Status: DISPENSED
Start: 2021-01-01

## (undated) RX ORDER — LIDOCAINE HYDROCHLORIDE 10 MG/ML
INJECTION, SOLUTION EPIDURAL; INFILTRATION; INTRACAUDAL; PERINEURAL
Status: DISPENSED
Start: 2020-10-13

## (undated) RX ORDER — HYDROMORPHONE HYDROCHLORIDE 1 MG/ML
INJECTION, SOLUTION INTRAMUSCULAR; INTRAVENOUS; SUBCUTANEOUS
Status: DISPENSED
Start: 2021-01-01

## (undated) RX ORDER — SODIUM CHLORIDE 9 MG/ML
INJECTION, SOLUTION INTRAVENOUS
Status: DISPENSED
Start: 2020-10-13

## (undated) RX ORDER — DEXMEDETOMIDINE HYDROCHLORIDE 4 UG/ML
INJECTION, SOLUTION INTRAVENOUS
Status: DISPENSED
Start: 2021-01-01

## (undated) RX ORDER — LIDOCAINE HYDROCHLORIDE 10 MG/ML
INJECTION, SOLUTION EPIDURAL; INFILTRATION; INTRACAUDAL; PERINEURAL
Status: DISPENSED
Start: 2020-01-01

## (undated) RX ORDER — AMPICILLIN AND SULBACTAM 2; 1 G/1; G/1
INJECTION, POWDER, FOR SOLUTION INTRAMUSCULAR; INTRAVENOUS
Status: DISPENSED
Start: 2020-01-01

## (undated) RX ORDER — PANTOPRAZOLE SODIUM 40 MG/10ML
INJECTION, POWDER, LYOPHILIZED, FOR SOLUTION INTRAVENOUS
Status: DISPENSED
Start: 2020-01-01

## (undated) RX ORDER — ALBUMIN (HUMAN) 12.5 G/50ML
SOLUTION INTRAVENOUS
Status: DISPENSED
Start: 2021-01-01

## (undated) RX ORDER — FENTANYL CITRATE 50 UG/ML
INJECTION, SOLUTION INTRAMUSCULAR; INTRAVENOUS
Status: DISPENSED
Start: 2021-01-01

## (undated) RX ORDER — FENTANYL CITRATE 50 UG/ML
INJECTION, SOLUTION INTRAMUSCULAR; INTRAVENOUS
Status: DISPENSED
Start: 2020-10-13

## (undated) RX ORDER — SODIUM CHLORIDE 9 MG/ML
INJECTION, SOLUTION INTRAVENOUS
Status: DISPENSED
Start: 2020-01-01